# Patient Record
Sex: FEMALE | Race: OTHER | HISPANIC OR LATINO | Employment: OTHER | URBAN - METROPOLITAN AREA
[De-identification: names, ages, dates, MRNs, and addresses within clinical notes are randomized per-mention and may not be internally consistent; named-entity substitution may affect disease eponyms.]

---

## 2018-04-28 LAB
ABSOL LYMPHOCYTES (HISTORICAL): 2.1 K/UL (ref 0.5–4)
ACETONE, QUANT (HISTORICAL): NEGATIVE
ALBUMIN SERPL BCP-MCNC: 3.4 G/DL (ref 3–5.2)
ALP SERPL-CCNC: 184 U/L (ref 43–122)
ALT SERPL W P-5'-P-CCNC: 17 U/L (ref 9–52)
AMORPHOUS MATERIAL (HISTORICAL): ABNORMAL
ANION GAP SERPL CALCULATED.3IONS-SCNC: 14 MMOL/L (ref 5–14)
AST SERPL W P-5'-P-CCNC: 19 U/L (ref 14–36)
BACTERIA UR QL AUTO: ABNORMAL
BASOPHILS # BLD AUTO: 0 % (ref 0–1)
BASOPHILS # BLD AUTO: 0 K/UL (ref 0–0.1)
BILIRUB SERPL-MCNC: 1.5 MG/DL
BILIRUB UR QL STRIP: NEGATIVE MG/DL
BUN SERPL-MCNC: 42 MG/DL (ref 5–25)
CALCIUM SERPL-MCNC: 9.3 MG/DL (ref 8.4–10.2)
CASTS/CASTS TYPE (HISTORICAL): ABNORMAL /LPF
CHLORIDE SERPL-SCNC: 102 MEQ/L (ref 97–108)
CLARITY UR: ABNORMAL
CO2 SERPL-SCNC: 22 MMOL/L (ref 22–30)
COLOR UR: ABNORMAL
CREATINE, SERUM (HISTORICAL): 2.1 MG/DL (ref 0.6–1.2)
CRYSTAL TYPE (HISTORICAL): ABNORMAL /HPF
DEPRECATED RDW RBC AUTO: 13.2 %
EGFR (HISTORICAL): 24 ML/MIN/1.73 M2
EOSINOPHIL # BLD AUTO: 0.4 K/UL (ref 0–0.4)
EOSINOPHIL NFR BLD AUTO: 4 % (ref 0–6)
GLUCOSE SERPL-MCNC: 229 MG/DL (ref 70–99)
GLUCOSE SERPL-MCNC: 233 MG/DL (ref 70–99)
GLUCOSE SERPL-MCNC: 329 MG/DL (ref 70–99)
GLUCOSE UR STRIP-MCNC: 100 MG/DL
HCT VFR BLD AUTO: 37.6 % (ref 36–46)
HGB BLD-MCNC: 12.5 G/DL (ref 12–16)
HGB UR QL STRIP.AUTO: ABNORMAL
KETONES UR STRIP-MCNC: NEGATIVE MG/DL
LACTATE SERPL-SCNC: 1.7 MMOL/L (ref 0.7–2.1)
LEUKOCYTE ESTERASE UR QL STRIP: ABNORMAL
LYMPHOCYTES NFR BLD AUTO: 20 % (ref 25–45)
MCH RBC QN AUTO: 31.1 PG (ref 26–34)
MCHC RBC AUTO-ENTMCNC: 33.4 % (ref 31–36)
MCV RBC AUTO: 93 FL (ref 80–100)
MONOCYTES # BLD AUTO: 0.5 K/UL (ref 0.2–0.9)
MONOCYTES NFR BLD AUTO: 5 % (ref 1–10)
MUCOUS THREADS URNS QL MICRO: ABNORMAL
NEUTROPHILS ABS COUNT (HISTORICAL): 7.6 K/UL (ref 1.8–7.8)
NEUTS SEG NFR BLD AUTO: 71 % (ref 45–65)
NITRITE UR QL STRIP: POSITIVE
NON-SQ EPI CELLS URNS QL MICRO: ABNORMAL
OTHER STN SPEC: ABNORMAL
PH UR STRIP.AUTO: 5 [PH] (ref 4.5–8)
PLATELET # BLD AUTO: 193 K/MCL (ref 150–450)
POTASSIUM SERPL-SCNC: 4.4 MEQ/L (ref 3.6–5)
PROT UR STRIP-MCNC: >=500 MG/DL
RBC # BLD AUTO: 4.03 M/MCL (ref 4–5.2)
RBC #/AREA URNS AUTO: 3 /HPF
SODIUM SERPL-SCNC: 138 MEQ/L (ref 137–147)
SP GR UR STRIP.AUTO: 1.02 (ref 1–1.04)
TOTAL PROTEIN (HISTORICAL): 7.2 G/DL (ref 5.9–8.4)
TROPONIN I SERPL-MCNC: <0.01 NG/ML (ref 0–0.03)
UROBILINOGEN UR QL STRIP.AUTO: NEGATIVE MG/DL (ref 0–1)
WBC # BLD AUTO: 10.6 K/MCL (ref 4.5–11)
WBC #/AREA URNS AUTO: >35 /HPF

## 2018-04-29 LAB
ABSOL LYMPHOCYTES (HISTORICAL): 2.3 K/UL (ref 0.5–4)
ALBUMIN SERPL BCP-MCNC: 2.4 G/DL (ref 3–5.2)
ALP SERPL-CCNC: 119 U/L (ref 43–122)
ALT SERPL W P-5'-P-CCNC: 22 U/L (ref 9–52)
ANION GAP SERPL CALCULATED.3IONS-SCNC: 7 MMOL/L (ref 5–14)
AST SERPL W P-5'-P-CCNC: 15 U/L (ref 14–36)
BASOPHILS # BLD AUTO: 0.1 K/UL (ref 0–0.1)
BASOPHILS # BLD AUTO: 1 % (ref 0–1)
BILIRUB SERPL-MCNC: 1.4 MG/DL
BUN SERPL-MCNC: 41 MG/DL (ref 5–25)
CALCIUM SERPL-MCNC: 8 MG/DL (ref 8.4–10.2)
CHLORIDE SERPL-SCNC: 103 MEQ/L (ref 97–108)
CO2 SERPL-SCNC: 27 MMOL/L (ref 22–30)
CREATINE, SERUM (HISTORICAL): 2.07 MG/DL (ref 0.6–1.2)
DEPRECATED RDW RBC AUTO: 13.2 %
EGFR (HISTORICAL): 25 ML/MIN/1.73 M2
EOSINOPHIL # BLD AUTO: 0.5 K/UL (ref 0–0.4)
EOSINOPHIL NFR BLD AUTO: 6 % (ref 0–6)
EST. AVERAGE GLUCOSE BLD GHB EST-MCNC: 427 MG/DL
GLUCOSE FASTING (HISTORICAL): 231 MG/DL (ref 70–99)
GLUCOSE SERPL-MCNC: 118 MG/DL (ref 70–99)
GLUCOSE SERPL-MCNC: 299 MG/DL (ref 70–99)
GLUCOSE SERPL-MCNC: 312 MG/DL (ref 70–99)
GLUCOSE SERPL-MCNC: 318 MG/DL (ref 70–99)
HBA1C MFR BLD HPLC: 16.5 %
HCT VFR BLD AUTO: 28.1 % (ref 36–46)
HGB BLD-MCNC: 9.3 G/DL (ref 12–16)
LYMPHOCYTES NFR BLD AUTO: 28 % (ref 25–45)
MCH RBC QN AUTO: 31.1 PG (ref 26–34)
MCHC RBC AUTO-ENTMCNC: 33.1 % (ref 31–36)
MCV RBC AUTO: 94 FL (ref 80–100)
MONOCYTES # BLD AUTO: 0.5 K/UL (ref 0.2–0.9)
MONOCYTES NFR BLD AUTO: 6 % (ref 1–10)
NEUTROPHILS ABS COUNT (HISTORICAL): 4.9 K/UL (ref 1.8–7.8)
NEUTS SEG NFR BLD AUTO: 59 % (ref 45–65)
PLATELET # BLD AUTO: 174 K/MCL (ref 150–450)
POTASSIUM SERPL-SCNC: 4.2 MEQ/L (ref 3.6–5)
RBC # BLD AUTO: 3 M/MCL (ref 4–5.2)
SODIUM SERPL-SCNC: 137 MEQ/L (ref 137–147)
TOTAL PROTEIN (HISTORICAL): 5.2 G/DL (ref 5.9–8.4)
TSH SERPL DL<=0.05 MIU/L-ACNC: 1.52 UIU/ML (ref 0.47–4.68)
VITAMIN D25-HYDROXY (HISTORICAL): <12.8 NG/ML (ref 30–100)
WBC # BLD AUTO: 8.1 K/MCL (ref 4.5–11)

## 2018-04-30 LAB
ABSOL LYMPHOCYTES (HISTORICAL): 1.8 K/UL (ref 0.5–4)
AMORPHOUS MATERIAL (HISTORICAL): ABNORMAL
ANION GAP SERPL CALCULATED.3IONS-SCNC: 5 MMOL/L (ref 5–14)
BACTERIA UR QL AUTO: ABNORMAL
BASOPHILS # BLD AUTO: 0 K/UL (ref 0–0.1)
BASOPHILS # BLD AUTO: 1 % (ref 0–1)
BILIRUB UR QL STRIP: NEGATIVE MG/DL
BUN SERPL-MCNC: 32 MG/DL (ref 5–25)
CALCIUM SERPL-MCNC: 8.2 MG/DL (ref 8.4–10.2)
CASTS/CASTS TYPE (HISTORICAL): ABNORMAL /LPF
CHLORIDE SERPL-SCNC: 107 MEQ/L (ref 97–108)
CLARITY UR: ABNORMAL
CO2 SERPL-SCNC: 27 MMOL/L (ref 22–30)
COLOR UR: YELLOW
CREATINE, SERUM (HISTORICAL): 1.69 MG/DL (ref 0.6–1.2)
CRYSTAL TYPE (HISTORICAL): ABNORMAL /HPF
DEPRECATED RDW RBC AUTO: 12.8 %
EGFR (HISTORICAL): 31 ML/MIN/1.73 M2
EOSINOPHIL # BLD AUTO: 0.5 K/UL (ref 0–0.4)
EOSINOPHIL NFR BLD AUTO: 5 % (ref 0–6)
GLUCOSE FASTING (HISTORICAL): 115 MG/DL (ref 70–99)
GLUCOSE SERPL-MCNC: 152 MG/DL (ref 70–99)
GLUCOSE SERPL-MCNC: 191 MG/DL (ref 70–99)
GLUCOSE SERPL-MCNC: 221 MG/DL (ref 70–99)
GLUCOSE SERPL-MCNC: 270 MG/DL (ref 70–99)
GLUCOSE UR STRIP-MCNC: 250 MG/DL
HCT VFR BLD AUTO: 25.5 % (ref 36–46)
HGB BLD-MCNC: 8.6 G/DL (ref 12–16)
HGB UR QL STRIP.AUTO: ABNORMAL
KETONES UR STRIP-MCNC: NEGATIVE MG/DL
LEUKOCYTE ESTERASE UR QL STRIP: ABNORMAL
LYMPHOCYTES NFR BLD AUTO: 21 % (ref 25–45)
MCH RBC QN AUTO: 31.5 PG (ref 26–34)
MCHC RBC AUTO-ENTMCNC: 33.8 % (ref 31–36)
MCV RBC AUTO: 93 FL (ref 80–100)
MONOCYTES # BLD AUTO: 0.6 K/UL (ref 0.2–0.9)
MONOCYTES NFR BLD AUTO: 7 % (ref 1–10)
MUCOUS THREADS URNS QL MICRO: ABNORMAL
NEUTROPHILS ABS COUNT (HISTORICAL): 5.8 K/UL (ref 1.8–7.8)
NEUTS SEG NFR BLD AUTO: 66 % (ref 45–65)
NITRITE UR QL STRIP: NEGATIVE
NON-SQ EPI CELLS URNS QL MICRO: ABNORMAL
OTHER STN SPEC: ABNORMAL
PH UR STRIP.AUTO: 6 [PH] (ref 4.5–8)
PLATELET # BLD AUTO: 187 K/MCL (ref 150–450)
POTASSIUM SERPL-SCNC: 4.4 MEQ/L (ref 3.6–5)
PREGNANCY, SERUM (HISTORICAL): NEGATIVE
PROT UR STRIP-MCNC: 100 MG/DL
RBC # BLD AUTO: 2.74 M/MCL (ref 4–5.2)
RBC #/AREA URNS AUTO: 4 /HPF
SODIUM SERPL-SCNC: 139 MEQ/L (ref 137–147)
SP GR UR STRIP.AUTO: 1.01 (ref 1–1.04)
UROBILINOGEN UR QL STRIP.AUTO: NEGATIVE MG/DL (ref 0–1)
WBC # BLD AUTO: 8.8 K/MCL (ref 4.5–11)
WBC #/AREA URNS AUTO: >35 /HPF

## 2018-05-01 LAB
ABSOL LYMPHOCYTES (HISTORICAL): 2.4 K/UL (ref 0.5–4)
ALBUMIN SERPL BCP-MCNC: 2.3 G/DL (ref 3–5.2)
ALP SERPL-CCNC: 121 U/L (ref 43–122)
ALT SERPL W P-5'-P-CCNC: 20 U/L (ref 9–52)
ANION GAP SERPL CALCULATED.3IONS-SCNC: 6 MMOL/L (ref 5–14)
AST SERPL W P-5'-P-CCNC: 17 U/L (ref 14–36)
BASOPHILS # BLD AUTO: 0.1 K/UL (ref 0–0.1)
BASOPHILS # BLD AUTO: 1 % (ref 0–1)
BILIRUB SERPL-MCNC: 1.5 MG/DL
BUN SERPL-MCNC: 31 MG/DL (ref 5–25)
CALCIUM SERPL-MCNC: 8.2 MG/DL (ref 8.4–10.2)
CHLORIDE SERPL-SCNC: 107 MEQ/L (ref 97–108)
CO2 SERPL-SCNC: 26 MMOL/L (ref 22–30)
CREATINE, SERUM (HISTORICAL): 1.77 MG/DL (ref 0.6–1.2)
DEPRECATED RDW RBC AUTO: 13.2 %
EGFR (HISTORICAL): 30 ML/MIN/1.73 M2
EOSINOPHIL # BLD AUTO: 0.5 K/UL (ref 0–0.4)
EOSINOPHIL NFR BLD AUTO: 6 % (ref 0–6)
FERRITIN SERPL-MCNC: 817 NG/ML (ref 11–264)
GLUCOSE FASTING (HISTORICAL): 141 MG/DL (ref 70–99)
GLUCOSE SERPL-MCNC: 164 MG/DL (ref 70–99)
GLUCOSE SERPL-MCNC: 168 MG/DL (ref 70–99)
GLUCOSE SERPL-MCNC: 175 MG/DL (ref 70–99)
GLUCOSE SERPL-MCNC: 196 MG/DL (ref 70–99)
HCT VFR BLD AUTO: 23.2 % (ref 36–46)
HGB BLD-MCNC: 7.7 G/DL (ref 12–16)
LYMPHOCYTES NFR BLD AUTO: 27 % (ref 25–45)
MCH RBC QN AUTO: 31.4 PG (ref 26–34)
MCHC RBC AUTO-ENTMCNC: 33.2 % (ref 31–36)
MCV RBC AUTO: 95 FL (ref 80–100)
MONOCYTES # BLD AUTO: 0.6 K/UL (ref 0.2–0.9)
MONOCYTES NFR BLD AUTO: 7 % (ref 1–10)
NEUTROPHILS ABS COUNT (HISTORICAL): 5.2 K/UL (ref 1.8–7.8)
NEUTS SEG NFR BLD AUTO: 59 % (ref 45–65)
PLATELET # BLD AUTO: 218 K/MCL (ref 150–450)
POTASSIUM SERPL-SCNC: 4.4 MEQ/L (ref 3.6–5)
RBC # BLD AUTO: 2.46 M/MCL (ref 4–5.2)
SODIUM SERPL-SCNC: 139 MEQ/L (ref 137–147)
TOTAL PROTEIN (HISTORICAL): 5.1 G/DL (ref 5.9–8.4)
VANCOMYCIN TROUGH (HISTORICAL): 12.57 UG/ML (ref 10–20)
VIT B12 SERPL-MCNC: 394 PG/ML (ref 239–931)
WBC # BLD AUTO: 8.8 K/MCL (ref 4.5–11)

## 2018-05-02 LAB
ABSOL LYMPHOCYTES (HISTORICAL): 1.3 K/UL (ref 0.5–4)
ALBUMIN SERPL BCP-MCNC: 2.3 G/DL (ref 3–5.2)
ALP SERPL-CCNC: 140 U/L (ref 43–122)
ALT SERPL W P-5'-P-CCNC: 22 U/L (ref 9–52)
ANION GAP SERPL CALCULATED.3IONS-SCNC: 6 MMOL/L (ref 5–14)
AST SERPL W P-5'-P-CCNC: 24 U/L (ref 14–36)
BASOPHILS # BLD AUTO: 0 % (ref 0–1)
BASOPHILS # BLD AUTO: 0 K/UL (ref 0–0.1)
BILIRUB SERPL-MCNC: 1.1 MG/DL
BUN SERPL-MCNC: 27 MG/DL (ref 5–25)
CALCIUM SERPL-MCNC: 8.3 MG/DL (ref 8.4–10.2)
CHLORIDE SERPL-SCNC: 110 MEQ/L (ref 97–108)
CO2 SERPL-SCNC: 25 MMOL/L (ref 22–30)
CREATINE, SERUM (HISTORICAL): 1.63 MG/DL (ref 0.6–1.2)
DEPRECATED RDW RBC AUTO: 13.2 %
EGFR (HISTORICAL): 33 ML/MIN/1.73 M2
EOSINOPHIL # BLD AUTO: 0.3 K/UL (ref 0–0.4)
EOSINOPHIL NFR BLD AUTO: 5 % (ref 0–6)
GLUCOSE FASTING (HISTORICAL): 127 MG/DL (ref 70–99)
GLUCOSE SERPL-MCNC: 104 MG/DL (ref 70–99)
GLUCOSE SERPL-MCNC: 118 MG/DL (ref 70–99)
GLUCOSE SERPL-MCNC: 125 MG/DL (ref 70–99)
GLUCOSE SERPL-MCNC: 141 MG/DL (ref 70–99)
HCT VFR BLD AUTO: 22.7 % (ref 36–46)
HGB BLD-MCNC: 7.5 G/DL (ref 12–16)
LDH (HISTORICAL): 533 U/L (ref 313–618)
LYMPHOCYTES NFR BLD AUTO: 21 % (ref 25–45)
MCH RBC QN AUTO: 31.2 PG (ref 26–34)
MCHC RBC AUTO-ENTMCNC: 33 % (ref 31–36)
MCV RBC AUTO: 94 FL (ref 80–100)
MONOCYTES # BLD AUTO: 0.4 K/UL (ref 0.2–0.9)
MONOCYTES NFR BLD AUTO: 6 % (ref 1–10)
NEUTROPHILS ABS COUNT (HISTORICAL): 4.2 K/UL (ref 1.8–7.8)
NEUTS SEG NFR BLD AUTO: 68 % (ref 45–65)
PLATELET # BLD AUTO: 222 K/MCL (ref 150–450)
POTASSIUM SERPL-SCNC: 4.2 MEQ/L (ref 3.6–5)
RBC # BLD AUTO: 2.4 M/MCL (ref 4–5.2)
RETICULOCYTE COUNT (HISTORICAL): 2.98 % (ref 0.87–2.63)
SODIUM SERPL-SCNC: 142 MEQ/L (ref 137–147)
TOTAL PROTEIN (HISTORICAL): 5.2 G/DL (ref 5.9–8.4)
WBC # BLD AUTO: 6.2 K/MCL (ref 4.5–11)

## 2018-05-03 LAB
ABSOL LYMPHOCYTES (HISTORICAL): 2.7 K/UL (ref 0.5–4)
ALBUMIN SERPL BCP-MCNC: 2.3 G/DL (ref 3–5.2)
ALP SERPL-CCNC: 144 U/L (ref 43–122)
ALT SERPL W P-5'-P-CCNC: 32 U/L (ref 9–52)
AMORPHOUS MATERIAL (HISTORICAL): ABNORMAL
ANION GAP SERPL CALCULATED.3IONS-SCNC: 7 MMOL/L (ref 5–14)
AST SERPL W P-5'-P-CCNC: 33 U/L (ref 14–36)
BACTERIA UR QL AUTO: ABNORMAL
BANDS (HISTORICAL): 18 % (ref 3–11)
BILIRUB SERPL-MCNC: 1.5 MG/DL
BILIRUB UR QL STRIP: NEGATIVE MG/DL
BUN SERPL-MCNC: 29 MG/DL (ref 5–25)
CALCIUM SERPL-MCNC: 8.5 MG/DL (ref 8.4–10.2)
CASTS/CASTS TYPE (HISTORICAL): ABNORMAL /LPF
CHLORIDE SERPL-SCNC: 111 MEQ/L (ref 97–108)
CLARITY UR: CLEAR
CO2 SERPL-SCNC: 25 MMOL/L (ref 22–30)
COLOR UR: ABNORMAL
COMMENT (HISTORICAL): ABNORMAL
CREATINE, SERUM (HISTORICAL): 1.49 MG/DL (ref 0.6–1.2)
CRYSTAL TYPE (HISTORICAL): ABNORMAL /HPF
DEPRECATED RDW RBC AUTO: 15.8 %
EGFR (HISTORICAL): 36 ML/MIN/1.73 M2
EOSINOPHIL # BLD AUTO: 0.4 K/UL (ref 0–0.4)
EOSINOPHIL NFR BLD AUTO: 5 % (ref 0–6)
GLUCOSE FASTING (HISTORICAL): 57 MG/DL (ref 70–99)
GLUCOSE SERPL-MCNC: 169 MG/DL (ref 70–99)
GLUCOSE SERPL-MCNC: 194 MG/DL (ref 70–99)
GLUCOSE SERPL-MCNC: 197 MG/DL (ref 70–99)
GLUCOSE SERPL-MCNC: 66 MG/DL (ref 70–99)
GLUCOSE SERPL-MCNC: 91 MG/DL (ref 70–99)
GLUCOSE UR STRIP-MCNC: NEGATIVE MG/DL
HCT VFR BLD AUTO: 25.2 % (ref 36–46)
HGB BLD-MCNC: 8.2 G/DL (ref 12–16)
HGB UR QL STRIP.AUTO: ABNORMAL
KETONES UR STRIP-MCNC: NEGATIVE MG/DL
LEUKOCYTE ESTERASE UR QL STRIP: ABNORMAL
LYMPHOCYTES NFR BLD AUTO: 39 % (ref 25–45)
MCH RBC QN AUTO: 29.8 PG (ref 26–34)
MCHC RBC AUTO-ENTMCNC: 32.3 % (ref 31–36)
MCV RBC AUTO: 92 FL (ref 80–100)
MONOCYTES # BLD AUTO: 0.8 K/UL (ref 0.2–0.9)
MONOCYTES NFR BLD AUTO: 12 % (ref 1–10)
MUCOUS THREADS URNS QL MICRO: ABNORMAL
NEUTROPHILS ABS COUNT (HISTORICAL): 3.1 K/UL (ref 1.8–7.8)
NEUTS SEG NFR BLD AUTO: 26 % (ref 45–65)
NITRITE UR QL STRIP: NEGATIVE
NON-SQ EPI CELLS URNS QL MICRO: ABNORMAL
NRBC'S (HISTORICAL): 2 /100 WBC
OTHER STN SPEC: ABNORMAL
PH UR STRIP.AUTO: 6 [PH] (ref 4.5–8)
PLATELET # BLD AUTO: 237 K/MCL (ref 150–450)
POTASSIUM SERPL-SCNC: 3.6 MEQ/L (ref 3.6–5)
PROT UR STRIP-MCNC: >=500 MG/DL
RBC # BLD AUTO: 2.74 M/MCL (ref 4–5.2)
RBC #/AREA URNS AUTO: 5 /HPF
RBC MORPHOLOGY (HISTORICAL): ABNORMAL
SODIUM SERPL-SCNC: 144 MEQ/L (ref 137–147)
SP GR UR STRIP.AUTO: 1.01 (ref 1–1.04)
TOTAL PROTEIN (HISTORICAL): 5.2 G/DL (ref 5.9–8.4)
UROBILINOGEN UR QL STRIP.AUTO: NEGATIVE MG/DL (ref 0–1)
WBC # BLD AUTO: 7 K/MCL (ref 4.5–11)
WBC #/AREA URNS AUTO: >35 /HPF

## 2018-05-04 LAB
ABSOL LYMPHOCYTES (HISTORICAL): 2.2 K/UL (ref 0.5–4)
ALBUMIN SERPL BCP-MCNC: 2.4 G/DL (ref 3–5.2)
ALP SERPL-CCNC: 152 U/L (ref 43–122)
ALT SERPL W P-5'-P-CCNC: 35 U/L (ref 9–52)
ANION GAP SERPL CALCULATED.3IONS-SCNC: 6 MMOL/L (ref 5–14)
AST SERPL W P-5'-P-CCNC: 30 U/L (ref 14–36)
BANDS (HISTORICAL): 23 % (ref 3–11)
BILIRUB SERPL-MCNC: 0.5 MG/DL
BUN SERPL-MCNC: 24 MG/DL (ref 5–25)
CALCIUM SERPL-MCNC: 8.6 MG/DL (ref 8.4–10.2)
CHLORIDE SERPL-SCNC: 108 MEQ/L (ref 97–108)
CO2 SERPL-SCNC: 26 MMOL/L (ref 22–30)
COMMENT (HISTORICAL): ABNORMAL
CREATINE, SERUM (HISTORICAL): 1.44 MG/DL (ref 0.6–1.2)
DEPRECATED RDW RBC AUTO: 15.6 %
EGFR (HISTORICAL): 38 ML/MIN/1.73 M2
EOSINOPHIL # BLD AUTO: 0.5 K/UL (ref 0–0.4)
EOSINOPHIL NFR BLD AUTO: 7 % (ref 0–6)
GLUCOSE FASTING (HISTORICAL): 58 MG/DL (ref 70–99)
GLUCOSE SERPL-MCNC: 100 MG/DL (ref 70–99)
GLUCOSE SERPL-MCNC: 68 MG/DL (ref 70–99)
GLUCOSE SERPL-MCNC: 69 MG/DL (ref 70–99)
GLUCOSE SERPL-MCNC: 84 MG/DL (ref 70–99)
GLUCOSE SERPL-MCNC: 91 MG/DL (ref 70–99)
GLUCOSE SERPL-MCNC: 99 MG/DL (ref 70–99)
HCT VFR BLD AUTO: 24.6 % (ref 36–46)
HGB BLD-MCNC: 8.1 G/DL (ref 12–16)
LYMPHOCYTES NFR BLD AUTO: 30 % (ref 25–45)
MCH RBC QN AUTO: 30.4 PG (ref 26–34)
MCHC RBC AUTO-ENTMCNC: 33 % (ref 31–36)
MCV RBC AUTO: 92 FL (ref 80–100)
METAMYELOCYTES (HISTORICAL): 1 %
MONOCYTES # BLD AUTO: 0.7 K/UL (ref 0.2–0.9)
MONOCYTES NFR BLD AUTO: 10 % (ref 1–10)
MYELOCYTES (HISTORICAL): 1 %
NEUTROPHILS ABS COUNT (HISTORICAL): 4 K/UL (ref 1.8–7.8)
NEUTS SEG NFR BLD AUTO: 28 % (ref 45–65)
PLATELET # BLD AUTO: 245 K/MCL (ref 150–450)
POTASSIUM SERPL-SCNC: 3.9 MEQ/L (ref 3.6–5)
RBC # BLD AUTO: 2.68 M/MCL (ref 4–5.2)
RBC MORPHOLOGY (HISTORICAL): ABNORMAL
SODIUM SERPL-SCNC: 141 MEQ/L (ref 137–147)
TOTAL PROTEIN (HISTORICAL): 5.3 G/DL (ref 5.9–8.4)
WBC # BLD AUTO: 7.4 K/MCL (ref 4.5–11)

## 2018-05-05 LAB
ABSOL LYMPHOCYTES (HISTORICAL): 1.7 K/UL (ref 0.5–4)
ALBUMIN SERPL BCP-MCNC: 2.5 G/DL (ref 3–5.2)
ALP SERPL-CCNC: 184 U/L (ref 43–122)
ALT SERPL W P-5'-P-CCNC: 33 U/L (ref 9–52)
ANION GAP SERPL CALCULATED.3IONS-SCNC: 10 MMOL/L (ref 5–14)
AST SERPL W P-5'-P-CCNC: 35 U/L (ref 14–36)
BASOPHILS # BLD AUTO: 0 % (ref 0–1)
BASOPHILS # BLD AUTO: 0 K/UL (ref 0–0.1)
BILIRUB SERPL-MCNC: 0.4 MG/DL
BUN SERPL-MCNC: 24 MG/DL (ref 5–25)
CALCIUM SERPL-MCNC: 8.5 MG/DL (ref 8.4–10.2)
CHLORIDE SERPL-SCNC: 105 MEQ/L (ref 97–108)
CO2 SERPL-SCNC: 27 MMOL/L (ref 22–30)
CREATINE, SERUM (HISTORICAL): 1.44 MG/DL (ref 0.6–1.2)
DEPRECATED RDW RBC AUTO: 15.4 %
EGFR (HISTORICAL): 38 ML/MIN/1.73 M2
EOSINOPHIL # BLD AUTO: 0.4 K/UL (ref 0–0.4)
EOSINOPHIL NFR BLD AUTO: 4 % (ref 0–6)
GLUCOSE SERPL-MCNC: 133 MG/DL (ref 70–99)
GLUCOSE SERPL-MCNC: 64 MG/DL (ref 70–99)
GLUCOSE SERPL-MCNC: 76 MG/DL (ref 70–99)
GLUCOSE SERPL-MCNC: 77 MG/DL (ref 70–99)
GLUCOSE SERPL-MCNC: 85 MG/DL (ref 70–99)
GLUCOSE SERPL-MCNC: 91 MG/DL (ref 70–99)
GLUCOSE SERPL-MCNC: 92 MG/DL (ref 70–99)
HCT VFR BLD AUTO: 23.3 % (ref 36–46)
HGB BLD-MCNC: 7.8 G/DL (ref 12–16)
LYMPHOCYTES NFR BLD AUTO: 17 % (ref 25–45)
MCH RBC QN AUTO: 31 PG (ref 26–34)
MCHC RBC AUTO-ENTMCNC: 33.6 % (ref 31–36)
MCV RBC AUTO: 92 FL (ref 80–100)
MONOCYTES # BLD AUTO: 0.8 K/UL (ref 0.2–0.9)
MONOCYTES NFR BLD AUTO: 8 % (ref 1–10)
NEUTROPHILS ABS COUNT (HISTORICAL): 7.1 K/UL (ref 1.8–7.8)
NEUTS SEG NFR BLD AUTO: 71 % (ref 45–65)
PLATELET # BLD AUTO: 259 K/MCL (ref 150–450)
POTASSIUM SERPL-SCNC: 4.5 MEQ/L (ref 3.6–5)
RBC # BLD AUTO: 2.52 M/MCL (ref 4–5.2)
SODIUM SERPL-SCNC: 141 MEQ/L (ref 137–147)
TOTAL PROTEIN (HISTORICAL): 5.5 G/DL (ref 5.9–8.4)
WBC # BLD AUTO: 10 K/MCL (ref 4.5–11)

## 2018-05-06 LAB
ABSOL LYMPHOCYTES (HISTORICAL): 2.5 K/UL (ref 0.5–4)
ALBUMIN SERPL BCP-MCNC: 2.6 G/DL (ref 3–5.2)
ALP SERPL-CCNC: 200 U/L (ref 43–122)
ALT SERPL W P-5'-P-CCNC: 33 U/L (ref 9–52)
ANION GAP SERPL CALCULATED.3IONS-SCNC: 12 MMOL/L (ref 5–14)
AST SERPL W P-5'-P-CCNC: 32 U/L (ref 14–36)
BANDS (HISTORICAL): 17 % (ref 3–11)
BILIRUB SERPL-MCNC: 0.6 MG/DL
BUN SERPL-MCNC: 25 MG/DL (ref 5–25)
CALCIUM SERPL-MCNC: 8.7 MG/DL (ref 8.4–10.2)
CALLED AND READ BACK BY. (HISTORICAL): NORMAL
CHLORIDE SERPL-SCNC: 104 MEQ/L (ref 97–108)
CO2 SERPL-SCNC: 25 MMOL/L (ref 22–30)
COMMENT (HISTORICAL): ABNORMAL
CREATINE, SERUM (HISTORICAL): 1.42 MG/DL (ref 0.6–1.2)
DEPRECATED RDW RBC AUTO: 15.5 %
EGFR (HISTORICAL): 38 ML/MIN/1.73 M2
EOSINOPHIL # BLD AUTO: 0.1 K/UL (ref 0–0.4)
EOSINOPHIL NFR BLD AUTO: 1 % (ref 0–6)
GLUCOSE SERPL-MCNC: 110 MG/DL (ref 70–99)
GLUCOSE SERPL-MCNC: 144 MG/DL (ref 70–99)
GLUCOSE SERPL-MCNC: 45 MG/DL (ref 70–99)
GLUCOSE SERPL-MCNC: 53 MG/DL (ref 70–99)
GLUCOSE SERPL-MCNC: 59 MG/DL (ref 70–99)
GLUCOSE SERPL-MCNC: 74 MG/DL (ref 70–99)
GLUCOSE SERPL-MCNC: 95 MG/DL (ref 70–99)
HCT VFR BLD AUTO: 23.9 % (ref 36–46)
HGB BLD-MCNC: 7.9 G/DL (ref 12–16)
LYMPHOCYTES NFR BLD AUTO: 18 % (ref 25–45)
MCH RBC QN AUTO: 30.8 PG (ref 26–34)
MCHC RBC AUTO-ENTMCNC: 32.9 % (ref 31–36)
MCV RBC AUTO: 93 FL (ref 80–100)
METAMYELOCYTES (HISTORICAL): 1 %
MONOCYTES # BLD AUTO: 0.7 K/UL (ref 0.2–0.9)
MONOCYTES NFR BLD AUTO: 5 % (ref 1–10)
NEUTROPHILS ABS COUNT (HISTORICAL): 10.5 K/UL (ref 1.8–7.8)
NEUTS SEG NFR BLD AUTO: 58 % (ref 45–65)
PLATELET # BLD AUTO: 285 K/MCL (ref 150–450)
POTASSIUM SERPL-SCNC: 4.4 MEQ/L (ref 3.6–5)
RBC # BLD AUTO: 2.56 M/MCL (ref 4–5.2)
RBC MORPHOLOGY (HISTORICAL): ABNORMAL
SODIUM SERPL-SCNC: 142 MEQ/L (ref 137–147)
TOTAL PROTEIN (HISTORICAL): 5.7 G/DL (ref 5.9–8.4)
WBC # BLD AUTO: 13.8 K/MCL (ref 4.5–11)

## 2018-05-07 LAB
ABSOL LYMPHOCYTES (HISTORICAL): 1.7 K/UL (ref 0.5–4)
ANION GAP SERPL CALCULATED.3IONS-SCNC: 9 MMOL/L (ref 5–14)
BANDS (HISTORICAL): 36 % (ref 3–11)
BUN SERPL-MCNC: 28 MG/DL (ref 5–25)
CALCIUM SERPL-MCNC: 8.3 MG/DL (ref 8.4–10.2)
CALLED AND READ BACK BY. (HISTORICAL): NORMAL
CHLORIDE SERPL-SCNC: 103 MEQ/L (ref 97–108)
CO2 SERPL-SCNC: 26 MMOL/L (ref 22–30)
COMMENT (HISTORICAL): ABNORMAL
CREATINE, SERUM (HISTORICAL): 1.72 MG/DL (ref 0.6–1.2)
DEPRECATED RDW RBC AUTO: 16 %
EGFR (HISTORICAL): 31 ML/MIN/1.73 M2
EOSINOPHIL # BLD AUTO: 0.2 K/UL (ref 0–0.4)
EOSINOPHIL NFR BLD AUTO: 2 % (ref 0–6)
GLUCOSE SERPL-MCNC: 118 MG/DL (ref 70–99)
GLUCOSE SERPL-MCNC: 176 MG/DL (ref 70–99)
GLUCOSE SERPL-MCNC: 185 MG/DL (ref 70–99)
GLUCOSE SERPL-MCNC: 188 MG/DL (ref 70–99)
GLUCOSE SERPL-MCNC: 200 MG/DL (ref 70–99)
GLUCOSE SERPL-MCNC: 47 MG/DL (ref 70–99)
GLUCOSE SERPL-MCNC: 57 MG/DL (ref 70–99)
GLUCOSE SERPL-MCNC: 60 MG/DL (ref 70–99)
GLUCOSE SERPL-MCNC: 66 MG/DL (ref 70–99)
GLUCOSE SERPL-MCNC: 88 MG/DL (ref 70–99)
HCT VFR BLD AUTO: 22 % (ref 36–46)
HCT VFR BLD AUTO: 25.6 % (ref 36–46)
HGB BLD-MCNC: 7.1 G/DL (ref 12–16)
HGB BLD-MCNC: 8.4 G/DL (ref 12–16)
LDH (HISTORICAL): 562 U/L (ref 313–618)
LYMPHOCYTES NFR BLD AUTO: 14 % (ref 25–45)
MCH RBC QN AUTO: 30 PG (ref 26–34)
MCHC RBC AUTO-ENTMCNC: 32.2 % (ref 31–36)
MCV RBC AUTO: 93 FL (ref 80–100)
MONOCYTES # BLD AUTO: 0.6 K/UL (ref 0.2–0.9)
MONOCYTES NFR BLD AUTO: 5 % (ref 1–10)
MYELOCYTES (HISTORICAL): 2 %
NEUTROPHILS ABS COUNT (HISTORICAL): 9.4 K/UL (ref 1.8–7.8)
NEUTS SEG NFR BLD AUTO: 41 % (ref 45–65)
PLATELET # BLD AUTO: 262 K/MCL (ref 150–450)
POTASSIUM SERPL-SCNC: 4.6 MEQ/L (ref 3.6–5)
RBC # BLD AUTO: 2.36 M/MCL (ref 4–5.2)
RBC MORPHOLOGY (HISTORICAL): ABNORMAL
RETICULOCYTE COUNT (HISTORICAL): 6.4 % (ref 0.87–2.63)
SODIUM SERPL-SCNC: 139 MEQ/L (ref 137–147)
WBC # BLD AUTO: 11.9 K/MCL (ref 4.5–11)

## 2018-05-08 LAB
ABSOL LYMPHOCYTES (HISTORICAL): 2.1 K/UL (ref 0.5–4)
ANION GAP SERPL CALCULATED.3IONS-SCNC: 11 MMOL/L (ref 5–14)
BASOPHILS # BLD AUTO: 0.1 K/UL (ref 0–0.1)
BASOPHILS # BLD AUTO: 1 % (ref 0–1)
BUN SERPL-MCNC: 37 MG/DL (ref 5–25)
CALCIUM SERPL-MCNC: 8.5 MG/DL (ref 8.4–10.2)
CHLORIDE SERPL-SCNC: 103 MEQ/L (ref 97–108)
CO2 SERPL-SCNC: 25 MMOL/L (ref 22–30)
CORTIS SERPL-MCNC: 12.5 UG/DL
CREATINE, SERUM (HISTORICAL): 1.63 MG/DL (ref 0.6–1.2)
CYCLIC CITRULLINATED PEPTIDE ANTIBODY (HISTORICAL): 0.5 NG/ML
DEPRECATED RDW RBC AUTO: 16.8 %
EGFR (HISTORICAL): 33 ML/MIN/1.73 M2
EOSINOPHIL # BLD AUTO: 0.4 K/UL (ref 0–0.4)
EOSINOPHIL NFR BLD AUTO: 4 % (ref 0–6)
GLUCOSE SERPL-MCNC: 214 MG/DL (ref 70–99)
GLUCOSE SERPL-MCNC: 216 MG/DL (ref 70–99)
GLUCOSE SERPL-MCNC: 248 MG/DL (ref 70–99)
GLUCOSE SERPL-MCNC: 266 MG/DL (ref 70–99)
GLUCOSE SERPL-MCNC: 266 MG/DL (ref 70–99)
GLUCOSE SERPL-MCNC: 286 MG/DL (ref 70–99)
HCT VFR BLD AUTO: 24 % (ref 36–46)
HGB BLD-MCNC: 8.1 G/DL (ref 12–16)
LYMPHOCYTES NFR BLD AUTO: 21 % (ref 25–45)
MCH RBC QN AUTO: 30.8 PG (ref 26–34)
MCHC RBC AUTO-ENTMCNC: 33.7 % (ref 31–36)
MCV RBC AUTO: 92 FL (ref 80–100)
MONOCYTES # BLD AUTO: 0.9 K/UL (ref 0.2–0.9)
MONOCYTES NFR BLD AUTO: 9 % (ref 1–10)
NEUTROPHILS ABS COUNT (HISTORICAL): 6.8 K/UL (ref 1.8–7.8)
NEUTS SEG NFR BLD AUTO: 65 % (ref 45–65)
PLATELET # BLD AUTO: 273 K/MCL (ref 150–450)
POTASSIUM SERPL-SCNC: 4.5 MEQ/L (ref 3.6–5)
RBC # BLD AUTO: 2.63 M/MCL (ref 4–5.2)
SODIUM SERPL-SCNC: 139 MEQ/L (ref 137–147)
WBC # BLD AUTO: 10.4 K/MCL (ref 4.5–11)

## 2018-05-09 LAB
ABSOL LYMPHOCYTES (HISTORICAL): 1.9 K/UL (ref 0.5–4)
ALBUMIN %, URINE (HISTORICAL): 56.4
ALPHA 1 URINE (HISTORICAL): 10.4
ALPHA 2 URINE (HISTORICAL): 6.8
ANION GAP SERPL CALCULATED.3IONS-SCNC: 9 MMOL/L (ref 5–14)
BASOPHILS # BLD AUTO: 0 % (ref 0–1)
BASOPHILS # BLD AUTO: 0 K/UL (ref 0–0.1)
BETA GLOBULIN %, URINE (HISTORICAL): 8
BUN SERPL-MCNC: 38 MG/DL (ref 5–25)
CALCIUM SERPL-MCNC: 8.5 MG/DL (ref 8.4–10.2)
CHLORIDE SERPL-SCNC: 104 MEQ/L (ref 97–108)
CLINICAL REPORT (HISTORICAL): NORMAL
CO2 SERPL-SCNC: 25 MMOL/L (ref 22–30)
CREATINE, SERUM (HISTORICAL): 1.57 MG/DL (ref 0.6–1.2)
DEPRECATED RDW RBC AUTO: 16.8 %
EGFR (HISTORICAL): 34 ML/MIN/1.73 M2
EOSINOPHIL # BLD AUTO: 0.3 K/UL (ref 0–0.4)
EOSINOPHIL NFR BLD AUTO: 4 % (ref 0–6)
ERYTHROCYTE SEDIMENTATION RATE (HISTORICAL): >130 MM (ref 1–20)
FERRITIN SERPL-MCNC: 697 NG/ML (ref 11–264)
GAMMA GLOBULIN URINE (HISTORICAL): 18.4
GLUCOSE FASTING (HISTORICAL): 209 MG/DL (ref 70–99)
GLUCOSE SERPL-MCNC: 217 MG/DL (ref 70–99)
GLUCOSE SERPL-MCNC: 304 MG/DL (ref 70–99)
HCT VFR BLD AUTO: 26.2 % (ref 36–46)
HGB BLD-MCNC: 8.6 G/DL (ref 12–16)
INTERPRETATION (HISTORICAL): NORMAL
IRON SERPL-MCNC: 82 UG/DL (ref 37–170)
LDH (HISTORICAL): 548 U/L (ref 313–618)
LYMPHOCYTES NFR BLD AUTO: 22 % (ref 25–45)
MCH RBC QN AUTO: 30.4 PG (ref 26–34)
MCHC RBC AUTO-ENTMCNC: 32.9 % (ref 31–36)
MCV RBC AUTO: 93 FL (ref 80–100)
MONOCYTES # BLD AUTO: 0.7 K/UL (ref 0.2–0.9)
MONOCYTES NFR BLD AUTO: 8 % (ref 1–10)
NEUTROPHILS ABS COUNT (HISTORICAL): 6 K/UL (ref 1.8–7.8)
NEUTS SEG NFR BLD AUTO: 66 % (ref 45–65)
OCCULT BLD, FECAL IMMUNOLOGICAL (HISTORICAL): NEGATIVE
PARAPROTEIN %, URINE (HISTORICAL): 0
PARAPROTEIN EXCRETION PER 24HR (HISTORICAL): NORMAL
PERCENT SATURATION (HISTORICAL): 48 % (ref 11–46)
PLATELET # BLD AUTO: 253 K/MCL (ref 150–450)
POTASSIUM SERPL-SCNC: 4.5 MEQ/L (ref 3.6–5)
PROTEIN 24 HOUR URINE (HISTORICAL): NORMAL
PROTEIN URINE RANDOM (HISTORICAL): 221
RBC # BLD AUTO: 2.83 M/MCL (ref 4–5.2)
RETICULOCYTE COUNT (HISTORICAL): 6.75 % (ref 0.87–2.63)
SODIUM SERPL-SCNC: 138 MEQ/L (ref 137–147)
TIBC SERPL-MCNC: 171 UG/DL (ref 265–497)
TIME (HOURS) (HISTORICAL): NORMAL
TIME (HOURS) (HISTORICAL): NORMAL
TOTAL URINE VOLUME (HISTORICAL): NORMAL
TOTAL URINE VOLUME (HISTORICAL): NORMAL
VIT B12 SERPL-MCNC: >1000 PG/ML (ref 239–931)
WBC # BLD AUTO: 9 K/MCL (ref 4.5–11)

## 2018-05-10 LAB
ERYTHROPOIETIN (HISTORICAL): 52
HOMOCYSTINE PLASMA (HISTORICAL): 8

## 2018-05-11 LAB
ALBUMIN SERPL BCP-MCNC: 2.15 G/DL
ALPHA-1-GLOBULIN CSF (HISTORICAL): 0.67
ALPHA-2-GLOBULIN CSF (HISTORICAL): 1.22
BETA GLOBULIN, SERUM (HISTORICAL): 0.98
CLINICAL REPORT (HISTORICAL): ABNORMAL
GAMMA GLOBULIN (HISTORICAL): 1.29
IMMUNOFIXATION REFLEX (HISTORICAL): ABNORMAL
IMMUNOGLOBULIN A (HISTORICAL): 361
IMMUNOGLOBULIN G (HISTORICAL): 1120
IMMUNOGLOBULIN M (HISTORICAL): 123
INTERPRETATION (HISTORICAL): ABNORMAL
METHYLMALONIC ACID, S (HISTORICAL): 0.39
PROT ELEC SERUM (HISTORICAL): 6.3

## 2018-05-24 ENCOUNTER — HOSPITAL ENCOUNTER (INPATIENT)
Facility: HOSPITAL | Age: 53
LOS: 6 days | Discharge: HOME | DRG: 420 | End: 2018-05-30
Attending: INTERNAL MEDICINE | Admitting: INTERNAL MEDICINE
Payer: MEDICARE

## 2018-05-24 DIAGNOSIS — N76.0 BACTERIAL VAGINOSIS: ICD-10-CM

## 2018-05-24 DIAGNOSIS — Z22.39 ESBL ESCHERICHIA COLI CARRIER: ICD-10-CM

## 2018-05-24 DIAGNOSIS — E11.65 UNCONTROLLED DIABETES MELLITUS WITH HYPERGLYCEMIA (HCC): ICD-10-CM

## 2018-05-24 DIAGNOSIS — N89.8 VAGINAL DISCHARGE: ICD-10-CM

## 2018-05-24 DIAGNOSIS — Z72.0 TOBACCO USE: ICD-10-CM

## 2018-05-24 DIAGNOSIS — B96.89 BACTERIAL VAGINOSIS: ICD-10-CM

## 2018-05-24 DIAGNOSIS — H53.9 VISUAL CHANGES: Primary | ICD-10-CM

## 2018-05-24 DIAGNOSIS — E11.10 DIABETIC KETOACIDOSIS WITHOUT COMA ASSOCIATED WITH TYPE 2 DIABETES MELLITUS (HCC): ICD-10-CM

## 2018-05-24 PROBLEM — E78.5 DYSLIPIDEMIA: Status: ACTIVE | Noted: 2018-05-24

## 2018-05-24 PROBLEM — N18.9 CHRONIC KIDNEY DISEASE: Status: ACTIVE | Noted: 2018-05-24

## 2018-05-24 PROBLEM — N17.9 AKI (ACUTE KIDNEY INJURY) (HCC): Status: ACTIVE | Noted: 2018-05-24

## 2018-05-24 PROBLEM — I10 HTN (HYPERTENSION): Status: ACTIVE | Noted: 2018-05-24

## 2018-05-24 LAB
ABSOL LYMPHOCYTES (HISTORICAL): 1.3 K/UL (ref 0.5–4)
ACETONE, QUANT (HISTORICAL): POSITIVE
ALBUMIN SERPL BCP-MCNC: 3.5 G/DL (ref 3–5.2)
ALP SERPL-CCNC: 174 U/L (ref 43–122)
ALT SERPL W P-5'-P-CCNC: 28 U/L (ref 9–52)
AMORPHOUS MATERIAL (HISTORICAL): ABNORMAL
ANION GAP SERPL CALCULATED.3IONS-SCNC: 10 MMOL/L (ref 4–13)
ANION GAP SERPL CALCULATED.3IONS-SCNC: 16 MMOL/L (ref 5–14)
ANION GAP SERPL CALCULATED.3IONS-SCNC: 6 MMOL/L (ref 4–13)
AST SERPL W P-5'-P-CCNC: 16 U/L (ref 14–36)
BACTERIA UR QL AUTO: ABNORMAL
BACTERIA UR QL AUTO: ABNORMAL /HPF
BASOPHILS # BLD AUTO: 0.1 K/UL (ref 0–0.1)
BASOPHILS # BLD AUTO: 1 % (ref 0–1)
BILIRUB SERPL-MCNC: 0.4 MG/DL
BILIRUB UR QL STRIP: NEGATIVE
BILIRUB UR QL STRIP: NEGATIVE MG/DL
BUN SERPL-MCNC: 28 MG/DL (ref 5–25)
BUN SERPL-MCNC: 30 MG/DL (ref 5–25)
BUN SERPL-MCNC: 33 MG/DL (ref 5–25)
CA-I BLD-SCNC: 1.13 MMOL/L (ref 1.12–1.32)
CA-I BLD-SCNC: 1.15 MMOL/L (ref 1.12–1.32)
CALCIUM SERPL-MCNC: 9.2 MG/DL (ref 8.3–10.1)
CALCIUM SERPL-MCNC: 9.3 MG/DL (ref 8.3–10.1)
CALCIUM SERPL-MCNC: 9.8 MG/DL (ref 8.4–10.2)
CALLED AND READ BACK BY. (HISTORICAL): NORMAL
CASTS/CASTS TYPE (HISTORICAL): ABNORMAL /LPF
CHLORIDE SERPL-SCNC: 108 MMOL/L (ref 100–108)
CHLORIDE SERPL-SCNC: 111 MMOL/L (ref 100–108)
CHLORIDE SERPL-SCNC: 90 MEQ/L (ref 97–108)
CLARITY UR: ABNORMAL
CLARITY UR: ABNORMAL
CO2 SERPL-SCNC: 26 MMOL/L (ref 22–30)
CO2 SERPL-SCNC: 28 MMOL/L (ref 21–32)
CO2 SERPL-SCNC: 29 MMOL/L (ref 21–32)
COLOR UR: ABNORMAL
COLOR UR: YELLOW
CREAT SERPL-MCNC: 1.65 MG/DL (ref 0.6–1.3)
CREAT SERPL-MCNC: 1.8 MG/DL (ref 0.6–1.3)
CREATINE, SERUM (HISTORICAL): 1.8 MG/DL (ref 0.6–1.2)
CRYSTAL TYPE (HISTORICAL): ABNORMAL /HPF
DEPRECATED RDW RBC AUTO: 16.6 %
EGFR (HISTORICAL): 29 ML/MIN/1.73 M2
EOSINOPHIL # BLD AUTO: 0.3 K/UL (ref 0–0.4)
EOSINOPHIL NFR BLD AUTO: 3 % (ref 0–6)
ERYTHROCYTE SEDIMENTATION RATE (HISTORICAL): >130 MM (ref 1–20)
GFR SERPL CREATININE-BSD FRML MDRD: 31 ML/MIN/1.73SQ M
GFR SERPL CREATININE-BSD FRML MDRD: 35 ML/MIN/1.73SQ M
GLUCOSE SERPL-MCNC: 126 MG/DL (ref 65–140)
GLUCOSE SERPL-MCNC: 144 MG/DL (ref 65–140)
GLUCOSE SERPL-MCNC: 209 MG/DL (ref 65–140)
GLUCOSE SERPL-MCNC: 335 MG/DL (ref 65–140)
GLUCOSE SERPL-MCNC: 336 MG/DL (ref 65–140)
GLUCOSE SERPL-MCNC: 358 MG/DL (ref 65–140)
GLUCOSE SERPL-MCNC: 912 MG/DL (ref 70–99)
GLUCOSE SERPL-MCNC: >450 MG/DL (ref 70–99)
GLUCOSE SERPL-MCNC: >500 MG/DL (ref 65–140)
GLUCOSE UR STRIP-MCNC: >=1000 MG/DL
GLUCOSE UR STRIP-MCNC: ABNORMAL MG/DL
HCT VFR BLD AUTO: 37.4 % (ref 36–46)
HGB BLD-MCNC: 12.3 G/DL (ref 12–16)
HGB UR QL STRIP.AUTO: ABNORMAL
HGB UR QL STRIP.AUTO: ABNORMAL
KETONES UR STRIP-MCNC: 5 MG/DL
KETONES UR STRIP-MCNC: NEGATIVE MG/DL
LACTATE SERPL-SCNC: 0.8 MMOL/L (ref 0.7–2.1)
LEUKOCYTE ESTERASE UR QL STRIP: ABNORMAL
LEUKOCYTE ESTERASE UR QL STRIP: ABNORMAL
LIPASE SERPL-CCNC: 163 U/L (ref 23–300)
LYMPHOCYTES NFR BLD AUTO: 15 % (ref 25–45)
MAGNESIUM SERPL-MCNC: 2.1 MG/DL (ref 1.6–2.6)
MAGNESIUM SERPL-MCNC: 2.2 MG/DL (ref 1.6–2.6)
MCH RBC QN AUTO: 31.1 PG (ref 26–34)
MCHC RBC AUTO-ENTMCNC: 32.9 % (ref 31–36)
MCV RBC AUTO: 95 FL (ref 80–100)
MONOCYTES # BLD AUTO: 0.5 K/UL (ref 0.2–0.9)
MONOCYTES NFR BLD AUTO: 6 % (ref 1–10)
MUCOUS THREADS URNS QL MICRO: ABNORMAL
NEUTROPHILS ABS COUNT (HISTORICAL): 6.7 K/UL (ref 1.8–7.8)
NEUTS SEG NFR BLD AUTO: 75 % (ref 45–65)
NITRITE UR QL STRIP: NEGATIVE
NITRITE UR QL STRIP: POSITIVE
NON-SQ EPI CELLS URNS QL MICRO: ABNORMAL
NON-SQ EPI CELLS URNS QL MICRO: ABNORMAL /HPF
OTHER STN SPEC: ABNORMAL
OTHER STN SPEC: ABNORMAL
PH UR STRIP.AUTO: 5 [PH] (ref 4.5–8)
PH UR STRIP.AUTO: 5.5 [PH] (ref 4.5–8)
PHOSPHATE SERPL-MCNC: 3 MG/DL (ref 2.7–4.5)
PHOSPHATE SERPL-MCNC: 3 MG/DL (ref 2.7–4.5)
PLATELET # BLD AUTO: 255 THOUSANDS/UL (ref 149–390)
PLATELET # BLD AUTO: 277 K/MCL (ref 150–450)
PMV BLD AUTO: 11.7 FL (ref 8.9–12.7)
POTASSIUM SERPL-SCNC: 3.1 MMOL/L (ref 3.5–5.3)
POTASSIUM SERPL-SCNC: 4.3 MMOL/L (ref 3.5–5.3)
POTASSIUM SERPL-SCNC: 5.3 MEQ/L (ref 3.6–5)
PROT UR STRIP-MCNC: >=300 MG/DL
PROT UR STRIP-MCNC: >=500 MG/DL
RBC # BLD AUTO: 3.95 M/MCL (ref 4–5.2)
RBC #/AREA URNS AUTO: ABNORMAL /HPF
RBC #/AREA URNS AUTO: ABNORMAL /HPF
SODIUM SERPL-SCNC: 132 MEQ/L (ref 137–147)
SODIUM SERPL-SCNC: 143 MMOL/L (ref 136–145)
SODIUM SERPL-SCNC: 149 MMOL/L (ref 136–145)
SP GR UR STRIP.AUTO: 1.01 (ref 1–1.04)
SP GR UR STRIP.AUTO: 1.02 (ref 1–1.03)
TOTAL PROTEIN (HISTORICAL): 6.9 G/DL (ref 5.9–8.4)
TROPONIN I SERPL-MCNC: <0.01 NG/ML (ref 0–0.03)
UROBILINOGEN UR QL STRIP.AUTO: 0.2 E.U./DL
UROBILINOGEN UR QL STRIP.AUTO: NEGATIVE MG/DL (ref 0–1)
WBC # BLD AUTO: 8.9 K/MCL (ref 4.5–11)
WBC #/AREA URNS AUTO: >35 /HPF
WBC #/AREA URNS AUTO: ABNORMAL /HPF

## 2018-05-24 PROCEDURE — 85049 AUTOMATED PLATELET COUNT: CPT | Performed by: NURSE PRACTITIONER

## 2018-05-24 PROCEDURE — 81001 URINALYSIS AUTO W/SCOPE: CPT | Performed by: NURSE PRACTITIONER

## 2018-05-24 PROCEDURE — 83735 ASSAY OF MAGNESIUM: CPT | Performed by: NURSE PRACTITIONER

## 2018-05-24 PROCEDURE — 99223 1ST HOSP IP/OBS HIGH 75: CPT | Performed by: NURSE PRACTITIONER

## 2018-05-24 PROCEDURE — 82330 ASSAY OF CALCIUM: CPT | Performed by: NURSE PRACTITIONER

## 2018-05-24 PROCEDURE — 87077 CULTURE AEROBIC IDENTIFY: CPT | Performed by: NURSE PRACTITIONER

## 2018-05-24 PROCEDURE — 84100 ASSAY OF PHOSPHORUS: CPT | Performed by: NURSE PRACTITIONER

## 2018-05-24 PROCEDURE — 82948 REAGENT STRIP/BLOOD GLUCOSE: CPT

## 2018-05-24 PROCEDURE — 87186 SC STD MICRODIL/AGAR DIL: CPT | Performed by: NURSE PRACTITIONER

## 2018-05-24 PROCEDURE — 87086 URINE CULTURE/COLONY COUNT: CPT | Performed by: NURSE PRACTITIONER

## 2018-05-24 PROCEDURE — 80048 BASIC METABOLIC PNL TOTAL CA: CPT | Performed by: NURSE PRACTITIONER

## 2018-05-24 RX ORDER — SODIUM CHLORIDE 9 MG/ML
500 INJECTION, SOLUTION INTRAVENOUS CONTINUOUS
Status: DISPENSED | OUTPATIENT
Start: 2018-05-24 | End: 2018-05-24

## 2018-05-24 RX ORDER — NORTRIPTYLINE HYDROCHLORIDE 25 MG/1
50 CAPSULE ORAL DAILY
Status: DISCONTINUED | OUTPATIENT
Start: 2018-05-25 | End: 2018-05-30 | Stop reason: HOSPADM

## 2018-05-24 RX ORDER — ASPIRIN 81 MG/1
81 TABLET ORAL DAILY
COMMUNITY
End: 2018-06-28 | Stop reason: SDUPTHER

## 2018-05-24 RX ORDER — DEXTROSE AND SODIUM CHLORIDE 5; .45 G/100ML; G/100ML
125 INJECTION, SOLUTION INTRAVENOUS CONTINUOUS
Status: DISCONTINUED | OUTPATIENT
Start: 2018-05-24 | End: 2018-05-25

## 2018-05-24 RX ORDER — NORTRIPTYLINE HYDROCHLORIDE 50 MG/1
50 CAPSULE ORAL DAILY
COMMUNITY
End: 2018-08-02 | Stop reason: SDUPTHER

## 2018-05-24 RX ORDER — CHLORHEXIDINE GLUCONATE 0.12 MG/ML
15 RINSE ORAL EVERY 12 HOURS SCHEDULED
Status: DISCONTINUED | OUTPATIENT
Start: 2018-05-24 | End: 2018-05-30 | Stop reason: HOSPADM

## 2018-05-24 RX ORDER — HEPARIN SODIUM 5000 [USP'U]/ML
5000 INJECTION, SOLUTION INTRAVENOUS; SUBCUTANEOUS EVERY 8 HOURS SCHEDULED
Status: DISCONTINUED | OUTPATIENT
Start: 2018-05-24 | End: 2018-05-30 | Stop reason: HOSPADM

## 2018-05-24 RX ORDER — LISINOPRIL 5 MG/1
5 TABLET ORAL DAILY
COMMUNITY
End: 2018-06-28 | Stop reason: SDUPTHER

## 2018-05-24 RX ORDER — ATORVASTATIN CALCIUM 40 MG/1
40 TABLET, FILM COATED ORAL
Status: DISCONTINUED | OUTPATIENT
Start: 2018-05-24 | End: 2018-05-30 | Stop reason: HOSPADM

## 2018-05-24 RX ORDER — TRAMADOL HYDROCHLORIDE 50 MG/1
50 TABLET ORAL EVERY 6 HOURS PRN
Status: DISCONTINUED | OUTPATIENT
Start: 2018-05-24 | End: 2018-05-30 | Stop reason: HOSPADM

## 2018-05-24 RX ORDER — NICOTINE 21 MG/24HR
21 PATCH, TRANSDERMAL 24 HOURS TRANSDERMAL DAILY
Status: DISCONTINUED | OUTPATIENT
Start: 2018-05-25 | End: 2018-05-30 | Stop reason: HOSPADM

## 2018-05-24 RX ORDER — INSULIN GLARGINE 100 [IU]/ML
7 INJECTION, SOLUTION SUBCUTANEOUS
Status: DISCONTINUED | OUTPATIENT
Start: 2018-05-25 | End: 2018-05-26

## 2018-05-24 RX ORDER — SODIUM CHLORIDE 9 MG/ML
2000 INJECTION, SOLUTION INTRAVENOUS CONTINUOUS
Status: DISPENSED | OUTPATIENT
Start: 2018-05-24 | End: 2018-05-24

## 2018-05-24 RX ORDER — ATORVASTATIN CALCIUM 40 MG/1
40 TABLET, FILM COATED ORAL DAILY
COMMUNITY
End: 2018-06-28 | Stop reason: SDUPTHER

## 2018-05-24 RX ORDER — ACETAMINOPHEN 325 MG/1
650 TABLET ORAL EVERY 6 HOURS PRN
Status: DISCONTINUED | OUTPATIENT
Start: 2018-05-24 | End: 2018-05-30 | Stop reason: HOSPADM

## 2018-05-24 RX ORDER — DEXTROSE AND SODIUM CHLORIDE 5; .9 G/100ML; G/100ML
250 INJECTION, SOLUTION INTRAVENOUS CONTINUOUS
Status: DISCONTINUED | OUTPATIENT
Start: 2018-05-24 | End: 2018-05-24

## 2018-05-24 RX ORDER — PREGABALIN 50 MG/1
50 CAPSULE ORAL 2 TIMES DAILY
COMMUNITY
End: 2018-06-28 | Stop reason: SDUPTHER

## 2018-05-24 RX ORDER — SODIUM CHLORIDE 9 MG/ML
250 INJECTION, SOLUTION INTRAVENOUS CONTINUOUS
Status: DISCONTINUED | OUTPATIENT
Start: 2018-05-24 | End: 2018-05-25

## 2018-05-24 RX ORDER — ASPIRIN 81 MG/1
81 TABLET ORAL DAILY
Status: DISCONTINUED | OUTPATIENT
Start: 2018-05-25 | End: 2018-05-30 | Stop reason: HOSPADM

## 2018-05-24 RX ORDER — INSULIN GLARGINE 100 [IU]/ML
10 INJECTION, SOLUTION SUBCUTANEOUS ONCE
Status: COMPLETED | OUTPATIENT
Start: 2018-05-25 | End: 2018-05-25

## 2018-05-24 RX ADMIN — CEFTRIAXONE 1000 MG: 1 INJECTION, SOLUTION INTRAVENOUS at 20:13

## 2018-05-24 RX ADMIN — TRAMADOL HYDROCHLORIDE 50 MG: 50 TABLET, FILM COATED ORAL at 21:44

## 2018-05-24 RX ADMIN — CHLORHEXIDINE GLUCONATE 15 ML: 1.2 RINSE ORAL at 20:13

## 2018-05-24 RX ADMIN — DEXTROSE AND SODIUM CHLORIDE 125 ML/HR: 5; .45 INJECTION, SOLUTION INTRAVENOUS at 22:19

## 2018-05-24 RX ADMIN — SODIUM CHLORIDE 500 ML/HR: 0.9 INJECTION, SOLUTION INTRAVENOUS at 19:32

## 2018-05-24 RX ADMIN — HEPARIN SODIUM 5000 UNITS: 5000 INJECTION, SOLUTION INTRAVENOUS; SUBCUTANEOUS at 22:28

## 2018-05-24 RX ADMIN — ATORVASTATIN CALCIUM 40 MG: 40 TABLET, FILM COATED ORAL at 20:14

## 2018-05-24 RX ADMIN — SODIUM CHLORIDE 250 ML/HR: 0.9 INJECTION, SOLUTION INTRAVENOUS at 21:32

## 2018-05-24 RX ADMIN — SODIUM CHLORIDE 7 UNITS/HR: 9 INJECTION, SOLUTION INTRAVENOUS at 20:13

## 2018-05-24 NOTE — PLAN OF CARE
Problem: Potential for Falls  Goal: Patient will remain free of falls  INTERVENTIONS:  - Assess patient frequently for physical needs  -  Identify cognitive and physical deficits and behaviors that affect risk of falls  -  [place] fall precautions as indicated by assessment   - Educate patient/family on patient safety including physical limitations  - Instruct patient to call for assistance with activity based on assessment  - Modify environment to reduce risk of injury  - Consider OT/PT consult to assist with strengthening/mobility   Outcome: Progressing      Problem: Nutrition/Hydration-ADULT  Goal: Nutrient/Hydration intake appropriate for improving, restoring or maintaining nutritional needs  Monitor and assess patient's nutrition/hydration status for malnutrition (ex- brittle hair, bruises, dry skin, pale skin and conjunctiva, muscle wasting, smooth red tongue, and disorientation)  Collaborate with interdisciplinary team and initiate plan and interventions as ordered  Monitor patient's weight and dietary intake as ordered or per policy  Utilize nutrition screening tool and intervene per policy  Determine patient's food preferences and provide high-protein, high-caloric foods as appropriate       INTERVENTIONS:  - Monitor oral intake, urinary output, labs, and treatment plans  - Assess nutrition and hydration status and recommend course of action  - Evaluate amount of meals eaten  - Assist patient with eating if necessary   - Allow adequate time for meals  - Recommend/ encourage appropriate diets, oral nutritional supplements, and vitamin/mineral supplements  - Order, calculate, and assess calorie counts as needed  - Recommend, monitor, and adjust tube feedings and TPN/PPN based on assessed needs  - Assess need for intravenous fluids  - Provide specific nutrition/hydration education as appropriate  - Include patient/family/caregiver in decisions related to nutrition   Outcome: Progressing      Problem: GASTROINTESTINAL - ADULT  Goal: Minimal or absence of nausea and/or vomiting  INTERVENTIONS:  - Administer IV fluids as ordered to ensure adequate hydration  - Maintain NPO status until nausea and vomiting are resolved  - Nasogastric tube as ordered  - Administer ordered antiemetic medications as needed  - Provide nonpharmacologic comfort measures as appropriate  - Advance diet as tolerated, if ordered  - Nutrition services referral to assist patient with adequate nutrition and appropriate food choices  Outcome: Progressing      Problem: METABOLIC, FLUID AND ELECTROLYTES - ADULT  Goal: Electrolytes maintained within normal limits  INTERVENTIONS:  - Monitor labs and assess patient for signs and symptoms of electrolyte imbalances  - Administer electrolyte replacement as ordered  - Monitor response to electrolyte replacements, including repeat lab results as appropriate  - Instruct patient on fluid and nutrition as appropriate  Outcome: Progressing    Goal: Glucose maintained within target range  INTERVENTIONS:  - Monitor Blood Glucose as ordered  - Assess for signs and symptoms of hyperglycemia and hypoglycemia  - Administer ordered medications to maintain glucose within target range  - Assess nutritional intake and initiate nutrition service referral as needed  Outcome: Progressing

## 2018-05-24 NOTE — PROGRESS NOTES
Patient arrived to unit via transport from [facility]  Midline infusing insulin at 7 units  Glucose checked, greater than 500  Will relay findings to advanced practitioner  Awaiting orders at this time

## 2018-05-24 NOTE — H&P
History & Physical Exam - 61 [facility] Helen 52 y o  female MRN: [id number]  Unit/Bed#: ICU 13 Encounter: [id number]      Assessment/Plan:  1  Diabetic ketoacidosis with history of type 2 diabetes vs HHNK  · Patient will require a stay greater than two midnights  · Arrived to OSH with a serum blood glucose of 912, anion gap of 16 and a UA with +ketones  · WBC 8 6, lactic acid 0 8, afebrile  · Insulin drip ordered, currently at 7 units/hr with IVF   · BMP ordered Q4 hour, monitor for gap closure   · Blood culture ordered  · UA pending   2  JULIO on presumed CKD with unknown staging  · Unknown baseline, creatinine at OSH was 1 8  · IVF per DKA protocol   · Trend BMP  · Avoid nephrotoxic medications  3  Acute right eye vision loss with known cataracts  · C/o right eye vision loss this morning, has a history of cataracts  · Head Ct at [facility] was negative  · Transferred for an opthalmology consulted  4  Neurogenic Bladder s/p suprapubic catheter with chronic UTI  · Recently treated for UTI 2 weeks ago   · UA abnormal on arrival, could represent chronic colonization but started on ceftriaxone 1g daily while awaiting UC  5  Diabetes type 2 with diabetic neuropathy  · Takes lyrica at home, held at this time  · On 7 units of lantus at home per patient  6  New onset vaginal tenderness and pain  · Patient c/o 3 days of vaginal pain and tenderness around labia  · No redness or swelling  · Gynecology consulted  7  Hyperlipidemia   · Continued on home Lipitor  8  Hypertension  · Lisinopril held in the setting of JULIO  · -110s on admission   9   Current tobacco use  · Nicotine patch ordered  · Smoking cessation education     _____________________________________________________________________      HPI:    Eduardo Irwin is a 52 y o  female who is a poor historian with a significant PMH of diabetes type 2 with neuropathy, hyperlipidemia, CKD hypertension, neurogenic bladder s/p suprapubic catheter with chronic urinary tract infections who presented to [facility] this afternoon with 3 days of a headache, nausea, and with right eye vision loss and vomiting over the last 24 hours  She was found to have a blood glucose of 912 with an anion gap of 16  Related to the hyperglycemia and loss of right eye vision, she was transferred to [facility] ICU for further management and evaluation  At [facility], she a head CT that did not show any acute intercranial hemorrhage or mass related to her right eye vision loss  Her UA showed + ketones, + protein, WBC 8 9, A febrile, with a lactic acid of 0 8  The team at [facility] wanted opthalmology consulted following the negative head CT and she was transferred to [facility]  On arrival, she endorses a headache, right eye vision change, and nausea as well as bilateral hand numbness and bilateral foot pain  She says that she has not been eating over the last 2 days and has been vomiting since last night but was still taking her lantus  She complains of vagina pain and discharge which started 3 days ago  She denies CP, palpations, SOB, or cough  She is an active smoker for 40 years with who use to smoke 1ppd but has recently decreased to 5 cigarettes per day  Of note, Ms Ebony Mccormick was recently hospitalized a month ago for about a week per patient for a urinary tract infection and has a chronic subpubic catheter in place  Review of Systems:  Review of Systems   Constitutional: Positive for appetite change and fatigue  Negative for chills and fever  HENT: Negative for postnasal drip, sore throat and trouble swallowing  Eyes: Positive for visual disturbance  Negative for discharge and redness  Respiratory: Negative for cough, chest tightness, shortness of breath and wheezing  Cardiovascular: Negative for chest pain, palpitations and leg swelling  Gastrointestinal: Positive for nausea and vomiting  Negative for abdominal distention, blood in stool, constipation and diarrhea  Endocrine: Negative for polydipsia, polyphagia and polyuria  Genitourinary: Positive for vaginal discharge and vaginal pain  Negative for decreased urine volume, difficulty urinating, dysuria, pelvic pain and vaginal bleeding  Chronic suprapubic catheter    Skin: Negative for color change, pallor and rash  Allergic/Immunologic: Negative for immunocompromised state  Neurological: Positive for weakness, numbness and headaches  Hematological: Negative for adenopathy  Does not bruise/bleed easily  Psychiatric/Behavioral: Negative  Full 14 point review of systems was performed  Aside from what was mentioned in the HPI, it is otherwise negative  Historical Information   Past Medical History:   Diagnosis Date    Chronic kidney disease 5/24/2018    Diabetes mellitus ([address] )     Dyslipidemia 5/24/2018    History of frequent urinary tract infections     HTN (hypertension) 5/24/2018    Hyperlipidemia     Neuropathy      Past Surgical History:   Procedure Laterality Date    SUPRAPUBIC CATHETER INSERTION       Social History   History   Alcohol Use No     History   Drug Use No     History   Smoking Status    Current Every Day Smoker    Packs/day: 1 00    Years: 40 00    Types: Cigarettes   Smokeless Tobacco    Never Used       Family History:   Family History   Problem Relation Age of Onset    Family history unknown: Yes       Medications:  Pertinent medications were reviewed        No Known Allergies      Vitals:   /71   Pulse 102   Temp 98 1 °F (36 7 °C) (Temporal)   Resp 20   Ht 5' 3" (1 6 m)   Wt 82 1 kg (181 lb)   SpO2 98%   BMI 32 06 kg/m²   Body mass index is 32 06 kg/m²    SpO2: 98 %,   SpO2 Activity: At Rest,   O2 Device: None (Room air)      Intake/Output Summary (Last 24 hours) at 05/24/18 2047  Last data filed at 05/24/18 2000   Gross per 24 hour   Intake           233 33 ml   Output                0 ml   Net           233 33 ml     Invasive Devices     Peripheral Intravenous Line            Peripheral IV 05/24/18 Left Arm less than 1 day                Physical Exam:  Gen: NAD, appears stated age, obese  HEENT: Normocephalic, atraumatic, PERRLA, Right eye hemianopia, dry oral mucosa, no sclera icterus  Neck/lymph: Supple, with normal ROM, trachea midline, no lymphadenopathy   Chest: CTA breath sounds posteriorly, diminished bilateral bases  Cor: RRR, tachycardiac, normal S1/S2, no murmur, rubs or gallops  Abd: Rotund, soft, no rebound or guarding  Suprapubic catheter in place with thick yellow drainage from site with mild tenderness on palpation  Labia and vaginal tenderness, no redness or swelling  Ext: Warm with cool feet, palpable pulses and preserved pulse volume, DELA CRUZ 5/5 strength  Neuro: Awake, alert and orientated with decreased sensation in bilateral feet, right eye vision change with blurriness and hemianopia  Skin: Warm and dry without rash    Diagnostic Data:  Lab: I have personally reviewed pertinent lab results  ,   CBC:    Results from last 7 days  Lab Units 05/24/18 2006   PLATELETS Thousands/uL 255      CMP:   Lab Results   Component Value Date     05/24/2018    K 4 3 05/24/2018     05/24/2018    CO2 29 05/24/2018    ANIONGAP 6 05/24/2018    BUN 30 (H) 05/24/2018    CREATININE 1 80 (H) 05/24/2018    GLUCOSE 336 (H) 05/24/2018    CALCIUM 9 3 05/24/2018    EGFR 31 05/24/2018   ,   PT/INR: No results found for: PT, INR,   Troponin: No results found for: TROPONINI,   Magnesium: No results found for: MAG,  Phosphorous:   Lab Results   Component Value Date    PHOS 3 0 05/24/2018       ABG: No results found for: PHART, ZZQ5CKM, PO2ART, RCW6QUT, V3MTFZSU, BEART, SOURCE,     Microbiology:  UA pending  Blood cultures pending    Imaging: Imaging from OSH  CT head: No acute intracranial hemorrhage, mass effect, or an acute cortical/territorial infract  Mild intracranial vascular calcifications are present    CXR: No lung consolidations or a pleural effusion EKG/telemetry/Echo:   Sinus tachycardia in 100s      VTE Prophylaxis: Heparin    Code Status: Level 1 - Full Code    Portions of the record may have been created with voice recognition software  Occasional wrong word or "sound a like" substitutions may have occurred due to the inherent limitations of voice recognition software  Read the chart carefully and recognize, using context, where substitutions have occurred

## 2018-05-25 LAB
ANION GAP SERPL CALCULATED.3IONS-SCNC: 9 MMOL/L (ref 4–13)
BUN SERPL-MCNC: 26 MG/DL (ref 5–25)
CA-I BLD-SCNC: 1.15 MMOL/L (ref 1.12–1.32)
CALCIUM SERPL-MCNC: 8.9 MG/DL (ref 8.3–10.1)
CHLORIDE SERPL-SCNC: 111 MMOL/L (ref 100–108)
CO2 SERPL-SCNC: 28 MMOL/L (ref 21–32)
CREAT SERPL-MCNC: 1.5 MG/DL (ref 0.6–1.3)
GFR SERPL CREATININE-BSD FRML MDRD: 39 ML/MIN/1.73SQ M
GLUCOSE SERPL-MCNC: 118 MG/DL (ref 65–140)
GLUCOSE SERPL-MCNC: 127 MG/DL (ref 65–140)
GLUCOSE SERPL-MCNC: 132 MG/DL (ref 65–140)
GLUCOSE SERPL-MCNC: 149 MG/DL (ref 65–140)
GLUCOSE SERPL-MCNC: 151 MG/DL (ref 65–140)
GLUCOSE SERPL-MCNC: 154 MG/DL (ref 65–140)
GLUCOSE SERPL-MCNC: 292 MG/DL (ref 65–140)
GLUCOSE SERPL-MCNC: 319 MG/DL (ref 65–140)
GLUCOSE SERPL-MCNC: 384 MG/DL (ref 65–140)
GLUCOSE SERPL-MCNC: 416 MG/DL (ref 65–140)
MAGNESIUM SERPL-MCNC: 2 MG/DL (ref 1.6–2.6)
PHOSPHATE SERPL-MCNC: 3.4 MG/DL (ref 2.7–4.5)
POTASSIUM SERPL-SCNC: 3.6 MMOL/L (ref 3.5–5.3)
SODIUM SERPL-SCNC: 148 MMOL/L (ref 136–145)

## 2018-05-25 PROCEDURE — 99232 SBSQ HOSP IP/OBS MODERATE 35: CPT | Performed by: INTERNAL MEDICINE

## 2018-05-25 PROCEDURE — 84100 ASSAY OF PHOSPHORUS: CPT | Performed by: NURSE PRACTITIONER

## 2018-05-25 PROCEDURE — 83735 ASSAY OF MAGNESIUM: CPT | Performed by: NURSE PRACTITIONER

## 2018-05-25 PROCEDURE — 82948 REAGENT STRIP/BLOOD GLUCOSE: CPT

## 2018-05-25 PROCEDURE — 82330 ASSAY OF CALCIUM: CPT | Performed by: NURSE PRACTITIONER

## 2018-05-25 PROCEDURE — 80048 BASIC METABOLIC PNL TOTAL CA: CPT | Performed by: NURSE PRACTITIONER

## 2018-05-25 RX ORDER — POTASSIUM CHLORIDE 20 MEQ/1
40 TABLET, EXTENDED RELEASE ORAL ONCE
Status: COMPLETED | OUTPATIENT
Start: 2018-05-25 | End: 2018-05-25

## 2018-05-25 RX ORDER — PREGABALIN 50 MG/1
50 CAPSULE ORAL 2 TIMES DAILY
Status: DISCONTINUED | OUTPATIENT
Start: 2018-05-25 | End: 2018-05-27

## 2018-05-25 RX ORDER — METRONIDAZOLE 500 MG/1
500 TABLET ORAL EVERY 12 HOURS SCHEDULED
Status: DISCONTINUED | OUTPATIENT
Start: 2018-05-25 | End: 2018-05-30 | Stop reason: HOSPADM

## 2018-05-25 RX ORDER — LISINOPRIL 5 MG/1
5 TABLET ORAL DAILY
Status: DISCONTINUED | OUTPATIENT
Start: 2018-05-25 | End: 2018-05-30 | Stop reason: HOSPADM

## 2018-05-25 RX ORDER — HYDRALAZINE HYDROCHLORIDE 20 MG/ML
10 INJECTION INTRAMUSCULAR; INTRAVENOUS ONCE
Status: COMPLETED | OUTPATIENT
Start: 2018-05-25 | End: 2018-05-25

## 2018-05-25 RX ADMIN — PREGABALIN 50 MG: 50 CAPSULE ORAL at 21:43

## 2018-05-25 RX ADMIN — NICOTINE 21 MG: 21 PATCH, EXTENDED RELEASE TRANSDERMAL at 08:39

## 2018-05-25 RX ADMIN — INSULIN LISPRO 5 UNITS: 100 INJECTION, SOLUTION INTRAVENOUS; SUBCUTANEOUS at 11:18

## 2018-05-25 RX ADMIN — Medication 2 UNITS/HR: at 00:31

## 2018-05-25 RX ADMIN — ASPIRIN 81 MG: 81 TABLET, COATED ORAL at 08:39

## 2018-05-25 RX ADMIN — INSULIN GLARGINE 10 UNITS: 100 INJECTION, SOLUTION SUBCUTANEOUS at 05:42

## 2018-05-25 RX ADMIN — METRONIDAZOLE 500 MG: 500 TABLET ORAL at 21:43

## 2018-05-25 RX ADMIN — LISINOPRIL 5 MG: 5 TABLET ORAL at 12:23

## 2018-05-25 RX ADMIN — TRAMADOL HYDROCHLORIDE 50 MG: 50 TABLET, FILM COATED ORAL at 16:42

## 2018-05-25 RX ADMIN — INSULIN LISPRO 6 UNITS: 100 INJECTION, SOLUTION INTRAVENOUS; SUBCUTANEOUS at 16:42

## 2018-05-25 RX ADMIN — ATORVASTATIN CALCIUM 40 MG: 40 TABLET, FILM COATED ORAL at 16:42

## 2018-05-25 RX ADMIN — INSULIN HUMAN 4 UNITS: 100 INJECTION, SOLUTION PARENTERAL at 10:36

## 2018-05-25 RX ADMIN — INSULIN GLARGINE 7 UNITS: 100 INJECTION, SOLUTION SUBCUTANEOUS at 21:49

## 2018-05-25 RX ADMIN — CHLORHEXIDINE GLUCONATE 15 ML: 1.2 RINSE ORAL at 21:43

## 2018-05-25 RX ADMIN — INSULIN LISPRO 6 UNITS: 100 INJECTION, SOLUTION INTRAVENOUS; SUBCUTANEOUS at 21:49

## 2018-05-25 RX ADMIN — METRONIDAZOLE 500 MG: 500 TABLET ORAL at 11:14

## 2018-05-25 RX ADMIN — HEPARIN SODIUM 5000 UNITS: 5000 INJECTION, SOLUTION INTRAVENOUS; SUBCUTANEOUS at 05:43

## 2018-05-25 RX ADMIN — DEXTROSE AND SODIUM CHLORIDE 125 ML/HR: 5; .45 INJECTION, SOLUTION INTRAVENOUS at 05:58

## 2018-05-25 RX ADMIN — HYDRALAZINE HYDROCHLORIDE 10 MG: 20 INJECTION INTRAMUSCULAR; INTRAVENOUS at 12:23

## 2018-05-25 RX ADMIN — POTASSIUM CHLORIDE 40 MEQ: 1500 TABLET, EXTENDED RELEASE ORAL at 00:28

## 2018-05-25 RX ADMIN — HEPARIN SODIUM 5000 UNITS: 5000 INJECTION, SOLUTION INTRAVENOUS; SUBCUTANEOUS at 21:43

## 2018-05-25 RX ADMIN — NORTRIPTYLINE HYDROCHLORIDE 50 MG: 50 CAPSULE ORAL at 11:15

## 2018-05-25 NOTE — PROGRESS NOTES
Progress Note - Critical Care   Pretty Bryan 52 y o  female MRN: [id number]  Unit/Bed#: ICU 13 Encounter: [id number]    Assessment/Plan:  1  DKA vs HHNK with known hx of DM Type II  · Resolved with insulin infusion and fluid resuscitation  Anion gap closed  Will transition to Lantus/SSI regimen  · Start diabetic diet  2  DM Type II with diabetic neuropathy  · Treatment as above  Goal to maintain -180  · Restart home Lyrica  3  JULIO on presumed CKD with unknown baseline creatinine  · Creatinine improved after fluid resuscitation  Continue to trend renal indices an monitor intake and output  4  Neurogenic bladder s/p chronic suprapubic catheter with history of frequent UTIs vs chronic colonization  · Continue empiric ceftriaxone, currently d#2, for now while awaiting urine cultures  · Follow temps and WBC count  · Pt refused blood cultures  5  Vaginal pain/discharge  · GYN consult pending  6  Hyperlipidemia  · Continue home statin  7  Acute R eye vision loss with hx of known cataract  8  Current everyday smoker  · Encourage complete cessation  · Continue nicotine patch    _____________________________________________________________________    HPI/24hr events:   Afebrile  No acute events overnight      Medications:    Current Facility-Administered Medications:  acetaminophen 650 mg Oral Q6H PRN Colvin Corwin Spatzer, CRNP    aspirin 81 mg Oral Daily Colvin Corwin Spatzer, CRNP    atorvastatin 40 mg Oral Daily [employer] Spatzer, CRNP    cefTRIAXone 1,000 mg Intravenous Q24H Colvin Corwin Spatzer, CRNP Last Rate: Stopped (05/25/18 0000)   chlorhexidine 15 mL Swish & Spit Q12H [facility] Colvin Corwin Spatzer, CRNP    dextrose 5 % and sodium chloride 0 45 % 125 mL/hr Intravenous Continuous Colvin Corwin Spatzer, CRNP Last Rate: 125 mL/hr (05/25/18 0558)   heparin (porcine) 5,000 Units Subcutaneous [facility] Colvin Corwin Spatzer, CRNP    influenza vaccine 0 5 mL Intramuscular Prior to discharge Rolando Hunter MD    insulin glargine 7 Units Subcutaneous HS Timothy Louder Spatzer, CRNP    insulin lispro 1-6 Units Subcutaneous TID AC Timothy Louder Spatzer, CRNP    insulin lispro 1-6 Units Subcutaneous HS Timothy Louder Spatzer, CRNP    nicotine 21 mg Transdermal Daily Edgard oTbias, CRNP    nortriptyline 50 mg Oral Daily Timothy Louder Spatzer, CRNP    traMADol 50 mg Oral Q6H PRN Timothy Louder Spatzer, CRNP          dextrose 5 % and sodium chloride 0 45 % 125 mL/hr Last Rate: 125 mL/hr (05/25/18 0558)         Physical exam:  Vitals: Body mass index is 32 06 kg/m²  Blood pressure 135/73, pulse 92, temperature 98 5 °F (36 9 °C), temperature source Temporal, resp  rate (!) 11, height 5' 3" (1 6 m), weight 82 1 kg (181 lb), SpO2 97 %  ,  Temp  Min: 98 1 °F (36 7 °C)  Max: 98 5 °F (36 9 °C)  IBW: 52 4 kg    SpO2: 97 %  SpO2 Activity: At Rest  O2 Device: None (Room air)      Intake/Output Summary (Last 24 hours) at 05/25/18 1555  Last data filed at 05/25/18 0600   Gross per 24 hour   Intake          2247 95 ml   Output              150 ml   Net          2097 95 ml       Invasive/non-invasive ventilation settings:   Respiratory    Lab Data (Last 4 hours)    None         O2/Vent Data (Last 4 hours)    None              Invasive Devices     Peripheral Intravenous Line            Peripheral IV 05/24/18 Left Arm less than 1 day          Drain            Suprapubic Catheter 1 day                  Physical Exam:  Gen: Awake, alert, appropriate, no acute distress  HEENT: atraumatic, normocephalic, EOMI, R eye vision loss, L eye blurred vision,   Neck: Supple, trachea midline, no JVD, no lymphadenopathy  Chest: clear to auscultation b/l, no wheeze, rales or rhonchi  Cor: Single S1/S2, no m/r/g, RRR  Abd: soft, nontender, nondistended, BS+  Ext: no edema, no clubbing or cyanosis  Neuro: oriented x2, CN II-XII grossly intact, no focal deficits  Skin: warm, dry      Diagnostic Data:  Lab: I have personally reviewed pertinent lab results     CBC:     Results from last 7 days  Lab Units 05/24/18 2006   PLATELETS Thousands/uL 255       CMP:     Results from last 7 days  Lab Units 05/25/18 0410 05/24/18 2317 05/24/18 2006   SODIUM mmol/L 148* 149* 143   POTASSIUM mmol/L 3 6 3 1* 4 3   CHLORIDE mmol/L 111* 111* 108   CO2 mmol/L 28 28 29   BUN mg/dL 26* 28* 30*   CREATININE mg/dL 1 50* 1 65* 1 80*   CALCIUM mg/dL 8 9 9 2 9 3   GLUCOSE RANDOM mg/dL 149* 126 336*     PT/INR:   No results found for: PT, INR,   Magnesium:     Results from last 7 days  Lab Units 05/25/18 0410 05/24/18 2317 05/24/18 2006   MAGNESIUM mg/dL 2 0 2 1 2 2     Phosphorous:     Results from last 7 days  Lab Units 05/25/18 0410 05/24/18 2317 05/24/18 2006   PHOSPHORUS mg/dL 3 4 3 0 3 0       Microbiology:  Urine culture - pending    Imaging:  No new imaging    Cardiac lab/EKG/telemetry/ECHO:   Sinus rhythm to sinus tachycardia on telemetry    VTE Prophylaxis: Heparin, SCDs    Code Status: Level 1 - Full Code    Renard Chroman Spatzer, CRNP    Portions of the record may have been created with voice recognition software  Occasional wrong word or "sound a like" substitutions may have occurred due to the inherent limitations of voice recognition software  Read the chart carefully and recognize, using context, where substitutions have occurred

## 2018-05-25 NOTE — PLAN OF CARE
Problem: Potential for Falls  Goal: Patient will remain free of falls  INTERVENTIONS:  - Assess patient frequently for physical needs  -  Identify cognitive and physical deficits and behaviors that affect risk of falls  -  [place] fall precautions as indicated by assessment   - Educate patient/family on patient safety including physical limitations  - Instruct patient to call for assistance with activity based on assessment  - Modify environment to reduce risk of injury  - Consider OT/PT consult to assist with strengthening/mobility   Outcome: Progressing      Problem: Nutrition/Hydration-ADULT  Goal: Nutrient/Hydration intake appropriate for improving, restoring or maintaining nutritional needs  Monitor and assess patient's nutrition/hydration status for malnutrition (ex- brittle hair, bruises, dry skin, pale skin and conjunctiva, muscle wasting, smooth red tongue, and disorientation)  Collaborate with interdisciplinary team and initiate plan and interventions as ordered  Monitor patient's weight and dietary intake as ordered or per policy  Utilize nutrition screening tool and intervene per policy  Determine patient's food preferences and provide high-protein, high-caloric foods as appropriate       INTERVENTIONS:  - Monitor oral intake, urinary output, labs, and treatment plans  - Assess nutrition and hydration status and recommend course of action  - Evaluate amount of meals eaten  - Assist patient with eating if necessary   - Allow adequate time for meals  - Recommend/ encourage appropriate diets, oral nutritional supplements, and vitamin/mineral supplements  - Order, calculate, and assess calorie counts as needed  - Recommend, monitor, and adjust tube feedings and TPN/PPN based on assessed needs  - Assess need for intravenous fluids  - Provide specific nutrition/hydration education as appropriate  - Include patient/family/caregiver in decisions related to nutrition   Outcome: Progressing      Problem: GASTROINTESTINAL - ADULT  Goal: Minimal or absence of nausea and/or vomiting  INTERVENTIONS:  - Administer IV fluids as ordered to ensure adequate hydration  - Maintain NPO status until nausea and vomiting are resolved  - Nasogastric tube as ordered  - Administer ordered antiemetic medications as needed  - Provide nonpharmacologic comfort measures as appropriate  - Advance diet as tolerated, if ordered  - Nutrition services referral to assist patient with adequate nutrition and appropriate food choices   Outcome: Progressing      Problem: METABOLIC, FLUID AND ELECTROLYTES - ADULT  Goal: Electrolytes maintained within normal limits  INTERVENTIONS:  - Monitor labs and assess patient for signs and symptoms of electrolyte imbalances  - Administer electrolyte replacement as ordered  - Monitor response to electrolyte replacements, including repeat lab results as appropriate  - Instruct patient on fluid and nutrition as appropriate   Outcome: Progressing    Goal: Glucose maintained within target range  INTERVENTIONS:  - Monitor Blood Glucose as ordered  - Assess for signs and symptoms of hyperglycemia and hypoglycemia  - Administer ordered medications to maintain glucose within target range  - Assess nutritional intake and initiate nutrition service referral as needed   Outcome: Progressing

## 2018-05-25 NOTE — PROGRESS NOTES
Attempted to obtain blood cultures  She refused to have the labs drawn  Blood cultures not completed

## 2018-05-25 NOTE — CONSULTS
Consultation - Gynecology   Kwame Hamm [age] [address] y o  female MRN: [id number]  Unit/Bed#: ICU 13 Encounter: [id number]    No chief complaint on file  History of Present Illness   Physician Requesting Consult: Amita Wetzel DO  Reason for Consult / Principal Problem: Vaginal discharge  Subspeciality: General GYN  HPI: Kwame Hamm is a [age] [address] o  year old female who  Was admitted on May 24th for a KI in the setting of UTI  The patient has multiple comorbidities which are currently being treated by primary team   We were consulted for vaginal discharge  Consults    Review of Systems   Eyes:        Right eye blurry vision, history of  cataract   Respiratory: Negative  Cardiovascular: Negative  Gastrointestinal: Negative  Genitourinary: Positive for vaginal discharge and vaginal pain  Negative for pelvic pain and vaginal bleeding     Neurological:        Neurogenic bladder       Historical Information   Past Medical History:   Diagnosis Date    Chronic kidney disease 5/24/2018    Diabetes mellitus ([address] )     Dyslipidemia 5/24/2018    History of frequent urinary tract infections     HTN (hypertension) 5/24/2018    Hyperlipidemia     Neuropathy      Past Surgical History:   Procedure Laterality Date    SUPRAPUBIC CATHETER INSERTION       OB History   No data available     Family History   Problem Relation Age of Onset    Family history unknown: Yes     Social History   History   Alcohol Use No     History   Drug Use No     History   Smoking Status    Current Every Day Smoker    Packs/day: 1 00    Years: 40 00    Types: Cigarettes   Smokeless Tobacco    Never Used       Meds/Allergies   Current Facility-Administered Medications   Medication Dose Route Frequency    acetaminophen (TYLENOL) tablet 650 mg  650 mg Oral Q6H PRN    aspirin (ECOTRIN LOW STRENGTH) EC tablet 81 mg  81 mg Oral Daily    atorvastatin (LIPITOR) tablet 40 mg  40 mg Oral Daily With Dinner    chlorhexidine (PERIDEX) 0 12 % oral rinse 15 mL  15 mL Swish & Spit Q12H [address]    dextrose 5 % and sodium chloride 0 45 % infusion  125 mL/hr Intravenous Continuous    heparin (porcine) subcutaneous injection 5,000 Units  5,000 Units Subcutaneous Q8H [address]    influenza inactivated quadrivalent vaccine (FLULAVAL) IM injection 0 5 mL  0 5 mL Intramuscular Prior to discharge    insulin glargine (LANTUS) subcutaneous injection 7 Units 0 07 mL  7 Units Subcutaneous HS    insulin lispro (HumaLOG) 100 units/mL subcutaneous injection 1-6 Units  1-6 Units Subcutaneous TID AC    insulin lispro (HumaLOG) 100 units/mL subcutaneous injection 1-6 Units  1-6 Units Subcutaneous HS    insulin regular (HumuLIN R,NovoLIN R) injection 4 Units  4 Units Subcutaneous Once    nicotine (NICODERM CQ) 21 mg/24 hr TD 24 hr patch 21 mg  21 mg Transdermal Daily    nortriptyline (PAMELOR) capsule 50 mg  50 mg Oral Daily    traMADol (ULTRAM) tablet 50 mg  50 mg Oral Q6H PRN         No Known Allergies    Objective   Vitals: Blood pressure 135/73, pulse 92, temperature 98 °F (36 7 °C), temperature source Temporal, resp  rate (!) 11, height 5' 3" (1 6 m), weight 82 1 kg (181 lb), SpO2 97 %  Body mass index is 32 06 kg/m²  Intake/Output Summary (Last 24 hours) at 05/25/18 1027  Last data filed at 05/25/18 7645   Gross per 24 hour   Intake          2366 26 ml   Output              150 ml   Net          2216 26 ml       Invasive Devices     Peripheral Intravenous Line            Peripheral IV 05/24/18 Left Arm less than 1 day          Drain            Suprapubic Catheter 1 day                Physical Exam   Genitourinary: Vagina normal    Genitourinary Comments:  Atrophied  vaginal mucosa consistent with postmenopausal changes, no lesions ulcers or boils found  Normal looking cervix, no bleeding     positive whiff test and minimal grayish discharge, consistent with bacterial vaginosis       Lab Results:   Admission on 05/24/2018   Component Date Value    POC Glucose 05/24/2018 >500*    Platelets 83/39/6520 255     MPV 05/24/2018 11 7     Color, UA 05/24/2018 Yellow     Clarity, UA 05/24/2018 Cloudy     Specific Gravity, UA 05/24/2018 1 020     pH, UA 05/24/2018 5 5     Leukocytes, UA 05/24/2018 Small*    Nitrite, UA 05/24/2018 Positive*    Protein, UA 05/24/2018 >=300*    Glucose, UA 05/24/2018 >=1000 (1%)*    Ketones, UA 05/24/2018 Negative     Urobilinogen, UA 05/24/2018 0 2     Bilirubin, UA 05/24/2018 Negative     Blood, UA 05/24/2018 Moderate*    Sodium 05/24/2018 143     Potassium 05/24/2018 4 3     Chloride 05/24/2018 108     CO2 05/24/2018 29     Anion Gap 05/24/2018 6     BUN 05/24/2018 30*    Creatinine 05/24/2018 1 80*    Glucose 05/24/2018 336*    Calcium 05/24/2018 9 3     eGFR 05/24/2018 31     Magnesium 05/24/2018 2 2     Phosphorus 05/24/2018 3 0     Calcium, Ionized 05/24/2018 1 13     Sodium 05/24/2018 149*    Potassium 05/24/2018 3 1*    Chloride 05/24/2018 111*    CO2 05/24/2018 28     Anion Gap 05/24/2018 10     BUN 05/24/2018 28*    Creatinine 05/24/2018 1 65*    Glucose 05/24/2018 126     Calcium 05/24/2018 9 2     eGFR 05/24/2018 35     Magnesium 05/24/2018 2 1     Phosphorus 05/24/2018 3 0     Calcium, Ionized 05/24/2018 1 15     POC Glucose 05/24/2018 358*    RBC, UA 05/24/2018 Field obscured, unable to enumerate*    WBC, UA 05/24/2018 Innumerable*    Epithelial Cells 05/24/2018 Field obscured, unable to enumerate*    Bacteria, UA 05/24/2018 Innumerable*    OTHER OBSERVATIONS 05/24/2018 Yeast Cells Present     POC Glucose 05/24/2018 335*    POC Glucose 05/24/2018 209*    POC Glucose 05/24/2018 144*    POC Glucose 05/25/2018 132     Sodium 05/25/2018 148*    Potassium 05/25/2018 3 6     Chloride 05/25/2018 111*    CO2 05/25/2018 28     Anion Gap 05/25/2018 9     BUN 05/25/2018 26*    Creatinine 05/25/2018 1 50*    Glucose 05/25/2018 149*    Calcium 05/25/2018 8 9     eGFR 05/25/2018 39     Magnesium 2018 2 0     Phosphorus 2018 3 4     Calcium, Ionized 2018 1 15     POC Glucose 2018 118     POC Glucose 2018 151*    POC Glucose 2018 154*    POC Glucose 2018 127     POC Glucose 2018 292*     Imaging Studies: I have personally reviewed pertinent reports  EKG, Pathology, and Other Studies: I have personally reviewed pertinent reports  Assessment/Plan     Assessment:    57-year-old  postmenopausal patient with history of last Pap smear 1 year ago at [place] (per patient)  Admitted to the ICU for DKA on the setting of UTI With vaginal discharge  Consistent with bacterial vaginosis  Plan:  DKA: Resolved per primary care   UTI:  The be secondary to history of neurogenic bladder:Treated with ceftriaxone, for primary care   Vaginal discharge with positive with test:  Will start Flagyl 500 mg  b I d  For 7 days  HTN:  Blood  pressure medication held given JULIO,  For primary care team     will sign off, recommend patient to follow up as an outpatient in the [facility] whenever discharged    Code Status: Level 1 - Full Code  Advance Directive and Living Will:      Power of :    POLST:      Counseling / Coordination of Care  Total floor / unit time spent today15 minutes  minutes  Greater than 50% of total time was spent with the patient and / or family counseling and / or coordination of care   A description of the counseling / coordination of care:

## 2018-05-25 NOTE — CASE MANAGEMENT
Initial Clinical Review    Admission: Date/Time/Statement: 5/24/18 @ 1744     Orders Placed This Encounter   Procedures    Inpatient Admission     Standing Status:   Standing     Number of Occurrences:   1     Order Specific Question:   Admitting Physician     Answer:   Christiano Spence [[id number]]     Order Specific Question:   Level of Care     Answer:   Critical Care [15]     Order Specific Question:   Estimated length of stay     Answer:   More than 2 Midnights     Order Specific Question:   Certification     Answer:   I certify that inpatient services are medically necessary for this patient for a duration of greater than two midnights  See H&P and MD Progress Notes for additional information about the patient's course of treatment  ED: Date/Time/Mode of Arrival:   ED Arrival Information     Patient not seen in ED                       Chief Complaint: No chief complaint on file  History of Illness:    Bryant Azul is a 52 y o  female who is a poor historian with a significant PMH of diabetes type 2 with neuropathy, hyperlipidemia, CKD hypertension, neurogenic bladder s/p suprapubic catheter with chronic urinary tract infections who presented to [facility] this afternoon with 3 days of a headache, nausea, and with right eye vision loss and vomiting over the last 24 hours  She was found to have a blood glucose of 912 with an anion gap of 16  Related to the hyperglycemia and loss of right eye vision, she was transferred to [facility] ICU for further management and evaluation  At [facility], she a head CT that did not show any acute intercranial hemorrhage or mass related to her right eye vision loss  Her UA showed + ketones, + protein, WBC 8 9, A febrile, with a lactic acid of 0 8  The team at [facility] wanted opthalmology consulted following the negative head CT and she was transferred to [facility]                On arrival, she endorses a headache, right eye vision change, and nausea as well as bilateral hand numbness and bilateral foot pain  She says that she has not been eating over the last 2 days and has been vomiting since last night but was still taking her lantus  She complains of vagina pain and discharge which started 3 days ago  She denies CP, palpations, SOB, or cough  She is an active smoker for 40 years with who use to smoke 1ppd but has recently decreased to 5 cigarettes per day  Of note, Ms Theresa Merrill was recently hospitalized a month ago for about a week per patient for a urinary tract infection and has a chronic subpubic catheter in place  ED Vital Signs:   ED Triage Vitals   Temperature Pulse Respirations Blood Pressure SpO2   05/24/18 1755 05/24/18 1755 05/24/18 1755 05/24/18 1755 05/24/18 1755   98 2 °F (36 8 °C) 102 16 116/75 98 %      Temp Source Heart Rate Source Patient Position - Orthostatic VS BP Location FiO2 (%)   05/24/18 1755 05/24/18 1755 -- -- --   Temporal Monitor         Pain Score       05/24/18 1808       9        Wt Readings from Last 1 Encounters:   05/24/18 82 1 kg (181 lb)       Vital Signs (abnormal):    above    Abnormal Labs/Diagnostic Test Results: BUN/Creat     30/1 80  BS   336  u/A     sm leukocyte    + nitrite     > 300 protein    >   1000   Glucose     Mod  blood    ED Treatment:   Medication Administration - No Administrations Displayed (No Start Event Found)     None          Past Medical/Surgical History:    Active Ambulatory Problems     Diagnosis Date Noted    No Active Ambulatory Problems     Resolved Ambulatory Problems     Diagnosis Date Noted    No Resolved Ambulatory Problems     Past Medical History:   Diagnosis Date    Chronic kidney disease 5/24/2018    Diabetes mellitus ([address] )     Dyslipidemia 5/24/2018    History of frequent urinary tract infections     HTN (hypertension) 5/24/2018    Hyperlipidemia     Neuropathy        Admitting Diagnosis: Hyperglycemia [R73 9]    Age/Sex: [address] y o  female    Assessment/Plan: Assessment/Plan:  1  Diabetic ketoacidosis with history of type 2 diabetes vs HHNK  · Patient will require a stay greater than two midnights  · Arrived to OSH with a serum blood glucose of 912, anion gap of 16 and a UA with +ketones  · WBC 8 6, lactic acid 0 8, afebrile  · Insulin drip ordered, currently at 7 units/hr with IVF   · BMP ordered Q4 hour, monitor for gap closure   · Blood culture ordered  · UA pending   2  JULIO on presumed CKD with unknown staging  · Unknown baseline, creatinine at OSH was 1 8  · IVF per DKA protocol   · Trend BMP  · Avoid nephrotoxic medications  3  Acute right eye vision loss with known cataracts  · C/o right eye vision loss this morning, has a history of cataracts  · Head Ct at [facility] was negative  · Transferred for an opthalmology consulted  4  Neurogenic Bladder s/p suprapubic catheter with chronic UTI  · Recently treated for UTI 2 weeks ago   · UA abnormal on arrival, could represent chronic colonization but started on ceftriaxone 1g daily while awaiting UC  5  Diabetes type 2 with diabetic neuropathy  · Takes lyrica at home, held at this time  · On 7 units of lantus at home per patient  6  New onset vaginal tenderness and pain  · Patient c/o 3 days of vaginal pain and tenderness around labia  · No redness or swelling  · Gynecology consulted  7  Hyperlipidemia   · Continued on home Lipitor  8  Hypertension  ? Lisinopril held in the setting of JULIO  ? -110s on admission   9   Current tobacco use  · Nicotine patch ordered  · Smoking cessation education     _____________________________________________________________________      Admission Orders:   IP     5/24  @    6494  Scheduled Meds:   Current Facility-Administered Medications:  acetaminophen 650 mg Oral Q6H PRN Suzzanna Ishikawa Spatzer, CRNP    aspirin 81 mg Oral Daily Suzzanna Ishikawa Spatzer, CRNP    atorvastatin 40 mg Oral Daily With Shari Nestle Spatzer, CRNP    cefTRIAXone 1,000 mg Intravenous Q24H OMAYRA Hernandez Last Rate: Stopped (05/25/18 0000)   chlorhexidine 15 mL Swish & Spit Q12H [facility] Graciela Perks Spatzer, CRNP    dextrose 5 % and sodium chloride 0 45 % 125 mL/hr Intravenous Continuous Graciela Perks Spatzer, CRNP Last Rate: Stopped (05/25/18 6389)   heparin (porcine) 5,000 Units Subcutaneous [facility] Graciela Perks Spatzer, CRNP    influenza vaccine 0 5 mL Intramuscular Prior to discharge Ty Gutiérrez MD    insulin glargine 7 Units Subcutaneous HS Graciela Perks Spatzer, CRNP    insulin lispro 1-6 Units Subcutaneous TID AC Graciela Perks Spatzer, CRNP    insulin lispro 1-6 Units Subcutaneous HS Graciela Perks Spatzer, CRNP    nicotine 21 mg Transdermal Daily Edgard TobiasOMAYRA    nortriptyline 50 mg Oral Daily Graciela Perks Spatzer, CRNP    traMADol 50 mg Oral Q6H PRN Graciela Perks Spatzer, CRNP      Continuous Infusions:   dextrose 5 % and sodium chloride 0 45 % 125 mL/hr Last Rate: Stopped (05/25/18 0655)     PRN Meds:   acetaminophen    influenza vaccine    traMADol     BC  Cons neuro  Cons  OB/GYN  CCD diet  Insulin  Drip -  Now  D/c    Progress  Note    5/25  1  DKA vs HHNK with known hx of DM Type II  · Resolved with insulin infusion and fluid resuscitation  Anion gap closed  Will transition to Lantus/SSI regimen  · Start diabetic diet  2  DM Type II with diabetic neuropathy  · Treatment as above  Goal to maintain -180  · Restart home Lyrica  3  JULIO on presumed CKD with unknown baseline creatinine  · Creatinine improved after fluid resuscitation  Continue to trend renal indices an monitor intake and output  4  Neurogenic bladder s/p chronic suprapubic catheter with history of frequent UTIs vs chronic colonization  · Continue empiric ceftriaxone, currently d#2, for now while awaiting urine cultures  · Follow temps and WBC count  · Pt refused blood cultures  5  Vaginal pain/discharge  · GYN consult pending  6  Hyperlipidemia  · Continue home statin  7  Acute R eye vision loss with hx of known cataract  8   Current everyday smoker  · Encourage complete cessation  · Continue nicotine patch       Thank you,  [address]  [facility] the [facility] by Javi Gil for 2017  Network Utilization Review Department  Phone: [phone]; Fax [phone]  ATTENTION: The Network Utilization Review Department is now centralized for our 7 Facilities  Make a note that we have a new phone and fax numbers for our Department  Please call with any questions or concerns to [phone] and carefully follow the prompts so that you are directed to the right person  All voicemails are confidential  Fax any determinations, approvals, denials, and requests for initial or continue stay review clinical to [phone]  Due to HIGH CALL volume, it would be easier if you could please send faxed requests to expedite your requests and in part, help us provide discharge notifications faster

## 2018-05-26 PROBLEM — E11.42 DIABETIC PERIPHERAL NEUROPATHY (HCC): Status: ACTIVE | Noted: 2018-05-26

## 2018-05-26 LAB
BACTERIA UR CULT: ABNORMAL
GLUCOSE SERPL-MCNC: 386 MG/DL (ref 65–140)
GLUCOSE SERPL-MCNC: 400 MG/DL (ref 65–140)
GLUCOSE SERPL-MCNC: 407 MG/DL (ref 65–140)
GLUCOSE SERPL-MCNC: 450 MG/DL (ref 65–140)

## 2018-05-26 PROCEDURE — 82948 REAGENT STRIP/BLOOD GLUCOSE: CPT

## 2018-05-26 PROCEDURE — 99232 SBSQ HOSP IP/OBS MODERATE 35: CPT | Performed by: INTERNAL MEDICINE

## 2018-05-26 RX ORDER — INSULIN GLARGINE 100 [IU]/ML
20 INJECTION, SOLUTION SUBCUTANEOUS
Status: DISCONTINUED | OUTPATIENT
Start: 2018-05-26 | End: 2018-05-26

## 2018-05-26 RX ORDER — INSULIN GLARGINE 100 [IU]/ML
30 INJECTION, SOLUTION SUBCUTANEOUS
Status: DISCONTINUED | OUTPATIENT
Start: 2018-05-26 | End: 2018-05-27

## 2018-05-26 RX ADMIN — INSULIN LISPRO 6 UNITS: 100 INJECTION, SOLUTION INTRAVENOUS; SUBCUTANEOUS at 16:53

## 2018-05-26 RX ADMIN — ASPIRIN 81 MG: 81 TABLET, COATED ORAL at 08:45

## 2018-05-26 RX ADMIN — LISINOPRIL 5 MG: 5 TABLET ORAL at 08:45

## 2018-05-26 RX ADMIN — METRONIDAZOLE 500 MG: 500 TABLET ORAL at 08:45

## 2018-05-26 RX ADMIN — PIPERACILLIN SODIUM,TAZOBACTAM SODIUM 2.25 G: 2; .25 INJECTION, POWDER, FOR SOLUTION INTRAVENOUS at 23:43

## 2018-05-26 RX ADMIN — HEPARIN SODIUM 5000 UNITS: 5000 INJECTION, SOLUTION INTRAVENOUS; SUBCUTANEOUS at 13:18

## 2018-05-26 RX ADMIN — NICOTINE 21 MG: 21 PATCH, EXTENDED RELEASE TRANSDERMAL at 08:46

## 2018-05-26 RX ADMIN — NORTRIPTYLINE HYDROCHLORIDE 50 MG: 50 CAPSULE ORAL at 13:17

## 2018-05-26 RX ADMIN — INSULIN GLARGINE 30 UNITS: 100 INJECTION, SOLUTION SUBCUTANEOUS at 21:29

## 2018-05-26 RX ADMIN — CHLORHEXIDINE GLUCONATE 15 ML: 1.2 RINSE ORAL at 08:46

## 2018-05-26 RX ADMIN — INSULIN LISPRO 6 UNITS: 100 INJECTION, SOLUTION INTRAVENOUS; SUBCUTANEOUS at 21:30

## 2018-05-26 RX ADMIN — ATORVASTATIN CALCIUM 40 MG: 40 TABLET, FILM COATED ORAL at 16:54

## 2018-05-26 RX ADMIN — INSULIN LISPRO 6 UNITS: 100 INJECTION, SOLUTION INTRAVENOUS; SUBCUTANEOUS at 13:17

## 2018-05-26 RX ADMIN — HEPARIN SODIUM 5000 UNITS: 5000 INJECTION, SOLUTION INTRAVENOUS; SUBCUTANEOUS at 06:03

## 2018-05-26 RX ADMIN — HEPARIN SODIUM 5000 UNITS: 5000 INJECTION, SOLUTION INTRAVENOUS; SUBCUTANEOUS at 21:27

## 2018-05-26 RX ADMIN — INSULIN LISPRO 6 UNITS: 100 INJECTION, SOLUTION INTRAVENOUS; SUBCUTANEOUS at 08:45

## 2018-05-26 RX ADMIN — METRONIDAZOLE 500 MG: 500 TABLET ORAL at 21:29

## 2018-05-26 NOTE — PROGRESS NOTES
[facility] Progress Note  Patient: Teresita Kaminski 52 y o  female   MRN: [id number]  PCP: No primary care provider on file  Unit/Bed#: E5 -01 Encounter: [id number]  Date Of Visit: 05/26/18    Assessment:    Principal Problem:    DKA (diabetic ketoacidoses) ([place] [address] )  Active Problems:    JULIO (acute kidney injury) ([place] [address] )    Chronic kidney disease    HTN (hypertension)    Dyslipidemia    Diabetic peripheral neuropathy ([place] [address] )      Plan:    · Diabetic ketoacidosis initially admitted to ICU for insulin drip now closed gap but with persisting blood sugar elevations she had been on outpatient course of 7 units of Lantus insulin which is apparently not near enough and based on her weight should be on at least 30 at 40 units of Lantus daily she had been on 36 units of Lantus in the past with since and has lost some weight I will start at 30 units q h s  ideally should be also mealtime Humalog but seems resistant to that  Will watch another 24 hours on this and presume can discharge in a m  · Chronic kidney disease stage 3 and after hydration and treatment of her DKA creatinine at 1 5 which presumed baseline avoid nephrotoxins probably has underlying diabetic nephropathy  · Diabetic peripheral neuropathy care apparently has little results with usage of either gabapentin or Lyrica in past unclear what titration schedule has been on that however  · Vaginal infection with discharge being treated with metronidazole by gynecology service to complete a 7 day course total and [facility]  · Right eye cataract needs to see a ophthalmologist as outpatient through her insurance plan  · Hyperlipidemia would reassess this after being compliant with statin atorvastatin in 1 month      VTE Pharmacologic Prophylaxis:   Pharmacologic: Heparin  Mechanical VTE Prophylaxis in Place: Yes    Discussions with Specialists or Other Care Team Provider:     Time Spent for Care: 45 minutes    More than 50% of total time spent on counseling and coordination of care as described above  Subjective:   Main complaint in relation to peripheral neuropathy type pain in tingling little result with Lyrica that she used in the past and presently remains    Objective:     Vitals:   Temp (24hrs), Av 6 °F (36 4 °C), Min:97 °F (36 1 °C), Max:98 8 °F (37 1 °C)    HR:  [] 99  Resp:  [15-18] 18  BP: (130-181)/() 147/93  SpO2:  [97 %-100 %] 100 %  Body mass index is 32 06 kg/m²  Input and Output Summary (last 24 hours): Intake/Output Summary (Last 24 hours) at 18 1051  Last data filed at 18 0953   Gross per 24 hour   Intake              180 ml   Output             1650 ml   Net            -1470 ml       Physical Exam:     Physical Exam:   General appearance: alert, appears stated age and cooperative  Head: Normocephalic, without obvious abnormality, atraumatic  Lungs: clear to auscultation bilaterally  Heart: regular rate and rhythm  Abdomen: soft, non-tender; bowel sounds normal; no masses,  no organomegaly  Back: negative  Extremities: extremities normal, atraumatic, no cyanosis or edema  Neurologic: Grossly normal      Additional Data:     Labs:      Results from last 7 days  Lab Units 18   PLATELETS Thousands/uL 255       Results from last 7 days  Lab Units 18  0410   SODIUM mmol/L 148*   POTASSIUM mmol/L 3 6   CHLORIDE mmol/L 111*   CO2 mmol/L 28   BUN mg/dL 26*   CREATININE mg/dL 1 50*   CALCIUM mg/dL 8 9   GLUCOSE RANDOM mg/dL 149*           * I Have Reviewed All Lab Data Listed Above  * Additional Pertinent Lab Tests Reviewed: [address] [facility] Admission Reviewed    Imaging:  No results found  Imaging Reports Reviewed Today Include:   Imaging Personally Reviewed by Myself Includes:    No results found  Recent Cultures (last 7 days):       Results from last 7 days  Lab Units 18   URINE CULTURE  Culture results to follow         Last  Hours Medication List:     Current Facility-Administered Medications:  acetaminophen 650 mg Oral Q6H PRN Nilsa Gales Spatzer, CRNP   aspirin 81 mg Oral Daily Nilsa Gales Spatzer, CRNP   atorvastatin 40 mg Oral Daily With [employer] Spatzer, CRNP   chlorhexidine 15 mL Swish & Spit Q12H [facility] Nilsa Gales Spatzer, CRNP   heparin (porcine) 5,000 Units Subcutaneous Q8H [place], CRNP   influenza vaccine 0 5 mL Intramuscular Prior to discharge Cristin Jeffers MD   insulin glargine 30 Units Subcutaneous HS Jean-Paul Kapadia MD   insulin lispro 1-6 Units Subcutaneous TID AC Robert W Spatzer, CRNP   insulin lispro 1-6 Units Subcutaneous HS Nilsa Gales Spatzer, CRNP   lisinopril 5 mg Oral Daily OMAYRA Hutchinson   metroNIDAZOLE 500 mg Oral Q12H [facility] [place] HOME Lucas Bae   nicotine 21 mg Transdermal Daily Charles Sonday, OMAYRA   nortriptyline 50 mg Oral Daily Nilsa Gales Spatzer, CRNP   pregabalin 50 mg Oral BID Nilsa Gales Spatzer, CRNP   traMADol 50 mg Oral Q6H PRN OMAYRA [place]        Today, Patient Was Seen By: Jean-Paul Kapadia MD    ** Please Note: Dragon 360 Dictation voice to text software may have been used in the creation of this document   **

## 2018-05-26 NOTE — PLAN OF CARE
GASTROINTESTINAL - ADULT     Minimal or absence of nausea and/or vomiting Progressing        METABOLIC, FLUID AND ELECTROLYTES - ADULT     Electrolytes maintained within normal limits Progressing     Glucose maintained within target range Progressing        Nutrition/Hydration-ADULT     Nutrient/Hydration intake appropriate for improving, restoring or maintaining nutritional needs Progressing        Potential for Falls     Patient will remain free of falls Progressing

## 2018-05-27 LAB
GLUCOSE SERPL-MCNC: 272 MG/DL (ref 65–140)
GLUCOSE SERPL-MCNC: 352 MG/DL (ref 65–140)
GLUCOSE SERPL-MCNC: 356 MG/DL (ref 65–140)
GLUCOSE SERPL-MCNC: 391 MG/DL (ref 65–140)

## 2018-05-27 PROCEDURE — 82948 REAGENT STRIP/BLOOD GLUCOSE: CPT

## 2018-05-27 PROCEDURE — 99232 SBSQ HOSP IP/OBS MODERATE 35: CPT | Performed by: INTERNAL MEDICINE

## 2018-05-27 PROCEDURE — 99223 1ST HOSP IP/OBS HIGH 75: CPT | Performed by: INTERNAL MEDICINE

## 2018-05-27 RX ORDER — GABAPENTIN 300 MG/1
300 CAPSULE ORAL 3 TIMES DAILY
Status: DISCONTINUED | OUTPATIENT
Start: 2018-05-27 | End: 2018-05-30 | Stop reason: HOSPADM

## 2018-05-27 RX ORDER — INSULIN GLARGINE 100 [IU]/ML
35 INJECTION, SOLUTION SUBCUTANEOUS
Status: DISCONTINUED | OUTPATIENT
Start: 2018-05-27 | End: 2018-05-28

## 2018-05-27 RX ADMIN — INSULIN LISPRO 10 UNITS: 100 INJECTION, SOLUTION INTRAVENOUS; SUBCUTANEOUS at 16:15

## 2018-05-27 RX ADMIN — INSULIN LISPRO 6 UNITS: 100 INJECTION, SOLUTION INTRAVENOUS; SUBCUTANEOUS at 11:54

## 2018-05-27 RX ADMIN — LISINOPRIL 5 MG: 5 TABLET ORAL at 08:16

## 2018-05-27 RX ADMIN — GABAPENTIN 300 MG: 300 CAPSULE ORAL at 21:34

## 2018-05-27 RX ADMIN — PREGABALIN 50 MG: 50 CAPSULE ORAL at 08:16

## 2018-05-27 RX ADMIN — INSULIN LISPRO 6 UNITS: 100 INJECTION, SOLUTION INTRAVENOUS; SUBCUTANEOUS at 16:14

## 2018-05-27 RX ADMIN — GABAPENTIN 300 MG: 300 CAPSULE ORAL at 16:15

## 2018-05-27 RX ADMIN — GABAPENTIN 300 MG: 300 CAPSULE ORAL at 12:25

## 2018-05-27 RX ADMIN — HEPARIN SODIUM 5000 UNITS: 5000 INJECTION, SOLUTION INTRAVENOUS; SUBCUTANEOUS at 05:06

## 2018-05-27 RX ADMIN — INSULIN LISPRO 6 UNITS: 100 INJECTION, SOLUTION INTRAVENOUS; SUBCUTANEOUS at 08:17

## 2018-05-27 RX ADMIN — CHLORHEXIDINE GLUCONATE 15 ML: 1.2 RINSE ORAL at 08:16

## 2018-05-27 RX ADMIN — ASPIRIN 81 MG: 81 TABLET, COATED ORAL at 08:16

## 2018-05-27 RX ADMIN — HEPARIN SODIUM 5000 UNITS: 5000 INJECTION, SOLUTION INTRAVENOUS; SUBCUTANEOUS at 21:34

## 2018-05-27 RX ADMIN — NORTRIPTYLINE HYDROCHLORIDE 50 MG: 50 CAPSULE ORAL at 11:54

## 2018-05-27 RX ADMIN — ATORVASTATIN CALCIUM 40 MG: 40 TABLET, FILM COATED ORAL at 16:15

## 2018-05-27 RX ADMIN — METRONIDAZOLE 500 MG: 500 TABLET ORAL at 21:34

## 2018-05-27 RX ADMIN — HEPARIN SODIUM 5000 UNITS: 5000 INJECTION, SOLUTION INTRAVENOUS; SUBCUTANEOUS at 15:30

## 2018-05-27 RX ADMIN — INSULIN LISPRO 3 UNITS: 100 INJECTION, SOLUTION INTRAVENOUS; SUBCUTANEOUS at 21:35

## 2018-05-27 RX ADMIN — PIPERACILLIN SODIUM,TAZOBACTAM SODIUM 2.25 G: 2; .25 INJECTION, POWDER, FOR SOLUTION INTRAVENOUS at 05:06

## 2018-05-27 RX ADMIN — METRONIDAZOLE 500 MG: 500 TABLET ORAL at 08:16

## 2018-05-27 RX ADMIN — CHLORHEXIDINE GLUCONATE 15 ML: 1.2 RINSE ORAL at 21:34

## 2018-05-27 RX ADMIN — INSULIN LISPRO 10 UNITS: 100 INJECTION, SOLUTION INTRAVENOUS; SUBCUTANEOUS at 12:25

## 2018-05-27 RX ADMIN — NICOTINE 21 MG: 21 PATCH, EXTENDED RELEASE TRANSDERMAL at 08:17

## 2018-05-27 RX ADMIN — INSULIN GLARGINE 35 UNITS: 100 INJECTION, SOLUTION SUBCUTANEOUS at 21:34

## 2018-05-27 NOTE — PROGRESS NOTES
Received lab notification of ESBL  RRNP Ambrose Choi made aware of the results  Antibiotic ordered as per RRNP

## 2018-05-27 NOTE — PROGRESS NOTES
[facility] Progress Note  Patient: Welby Brittle 52 y o  female   MRN: [id number]  PCP: No primary care provider on file  Unit/Bed#: E5 -01 Encounter: [id number]  Date Of Visit: 05/27/18    Assessment:    Principal Problem:    DKA (diabetic ketoacidoses) ([place] [address] )  Active Problems:    JULIO (acute kidney injury) ([place] [address] )    Chronic kidney disease    HTN (hypertension)    Dyslipidemia    Diabetic peripheral neuropathy ([place] [address] )      Plan:    · Diabetic ketoacidosis initially admitted to ICU for insulin drip now closed gap but with persisting blood sugar elevations she had been on outpatient course of 7 units of Lantus insulin which is apparently not near enough and based on her weight should be on at least 30 at 40 units of Lantus daily she had been on 36 units of Lantus in the past with since and has lost some weight I will start at 30 units q h s  ideally should be also mealtime Humalog but seems resistant to that  Blood sugars remain quite elevated in spite of the above and cannot be discharged until we get better control I would add mealtime Humalog in spite of her resistance to it and would welcome Endocrinology input but they are not available until another 48 hr  · Chronic kidney disease stage 3 and after hydration and treatment of her DKA creatinine at 1 5 which presumed baseline avoid nephrotoxins probably has underlying diabetic nephropathy recheck BMP in a m  · ESBL UTI now off Zosyn per ID in presumption of colonization, procalcitonin pending in a m    · Diabetic peripheral neuropathy care apparently has little results with usage of either gabapentin or Lyrica in past unclear what titration schedule has been on that however but also seems to be drug-seeking for narcotics while here  · Vaginal infection with discharge being treated with metronidazole by gynecology service to complete a 7 day course total and follow up with [facility] on discharge  · Right eye cataract needs to see a ophthalmologist as outpatient through her insurance plan  · Hyperlipidemia would reassess this after being compliant with statin atorvastatin in 1 month      VTE Pharmacologic Prophylaxis:   Pharmacologic: Heparin  Mechanical VTE Prophylaxis in Place: Yes    Discussions with Specialists or Other Care Team Provider:     Time Spent for Care: 45 minutes  More than 50% of total time spent on counseling and coordination of care as described above  Subjective:   Main complaint in relation to peripheral neuropathy type pain in tingling little result with Lyrica that she used in the past and presently remains    Objective:     Vitals:   Temp (24hrs), Av 9 °F (36 6 °C), Min:97 7 °F (36 5 °C), Max:98 2 °F (36 8 °C)    HR:  [90-97] 93  Resp:  [18-20] 20  BP: (113-129)/(69-75) 129/72  SpO2:  [96 %-99 %] 96 %  Body mass index is 32 06 kg/m²  Input and Output Summary (last 24 hours): Intake/Output Summary (Last 24 hours) at 18 1147  Last data filed at 18 1100   Gross per 24 hour   Intake              240 ml   Output             1650 ml   Net            -1410 ml       Physical Exam:     Physical Exam:   General appearance: alert, appears stated age and cooperative  Head: Normocephalic, without obvious abnormality, atraumatic  Lungs: clear to auscultation bilaterally  Heart: regular rate and rhythm  Abdomen: soft, non-tender; bowel sounds normal; no masses,  no organomegaly suprapubic catheter  Back: negative  Extremities: extremities normal, atraumatic, no cyanosis or edema  Neurologic: Grossly normal      Additional Data:     Labs:      Results from last 7 days  Lab Units 18   PLATELETS Thousands/uL 255       Results from last 7 days  Lab Units 18  0410   SODIUM mmol/L 148*   POTASSIUM mmol/L 3 6   CHLORIDE mmol/L 111*   CO2 mmol/L 28   BUN mg/dL 26*   CREATININE mg/dL 1 50*   CALCIUM mg/dL 8 9   GLUCOSE RANDOM mg/dL 149*           * I Have Reviewed All Lab Data Listed Above    * Additional Pertinent Lab Tests Reviewed: [facility] Admission Reviewed    Imaging:  No results found  Imaging Reports Reviewed Today Include:   Imaging Personally Reviewed by Myself Includes:    No results found  Recent Cultures (last 7 days):       Results from last 7 days  Lab Units 05/24/18 2023   URINE CULTURE  >100,000 cfu/ml Escherichia coli ESBL*       Last 24 Hours Medication List:     Current Facility-Administered Medications:  acetaminophen 650 mg Oral Q6H PRN Shedrick Osmond Spatzer, CRNP   aspirin 81 mg Oral Daily Shedrick Osmond Spatzer, CRNP   atorvastatin 40 mg Oral Daily With [employer] Spatzer, CRNP   chlorhexidine 15 mL Swish & Spit Q12H [address] Shedrick Osmond Spatzer, CRNP   heparin (porcine) 5,000 Units Subcutaneous Q8H [place], CRNP   influenza vaccine 0 5 mL Intramuscular Prior to discharge Domonique Mcclain MD   insulin glargine 35 Units Subcutaneous HS Shey Doherty MD   insulin lispro 1-6 Units Subcutaneous TID AC Robert W Spatzer, CRNP   insulin lispro 1-6 Units Subcutaneous HS Shedrick Osmond Spatzer, CRNP   lisinopril 5 mg Oral Daily [place] Dutch, OMAYRA   metroNIDAZOLE 500 mg Oral Q12H [address] Indra Ngmary   nicotine 21 mg Transdermal Daily Charles Sonday, OMAYRA   nortriptyline 50 mg Oral Daily Shedrick Osmond Spatzer, CRNP   pregabalin 50 mg Oral BID Shedrick Osmond Spatzer, CRNP   traMADol 50 mg Oral Q6H PRN OMAYRA Melchor        Today, Patient Was Seen By: Shey Doherty MD    ** Please Note: Dragon 360 Dictation voice to text software may have been used in the creation of this document   **

## 2018-05-27 NOTE — CONSULTS
Consultation - Infectious Disease   Glenny Murrell 52 y o  female MRN: [id number]  Unit/Bed#: E5 -01 Encounter: [id number]      IMPRESSION & RECOMMENDATIONS:   1  Positive urine culture-patient appears to have asymptomatic bacteriuria in the setting of a chronic suprapubic catheter  The patient is without any fever or other evidence of an invasive urinary tract infection   -discontinue Zosyn  -monitor off all antibiotics  -will check a CBC with diff and a procalcitonin level tomorrow for completeness  -monitor for any onset of sepsis  -suprapubic catheter drainage    2  Acute kidney injury-with presumed chronic kidney disease  Suspect secondary to pre renal issues in the setting of DKA  The renal function is improving   -monitor the GFR  -no additional ID workup for now    3  Bacterial vaginosis-still symptomatic   -continue Flagyl x7 days   -gyn follow-up    4  Diabetic ketoacidosis-seems to have resolved with insulin drip and fluid resuscitation   -management of glucose as per the primary service    5  Right eye visual loss-in a patient with cataracts and poorly controlled diabetes   -await ophthalmology evaluation    HISTORY OF PRESENT ILLNESS:  Reason for Consult:  UTI  HPI: Glenny Murrell is a 52y o  year old female with a history of diabetes mellitus admitted [facility] [place] after being transferred from [facility] for treatment of DKA who I am asked to assist with management  Patient had recently been admitted to [facility] for UTI and recently discharged  The 23rd of May the patient presented back to [facility] with 3 days of headache, nausea, vomiting, and decreased right eye vision  She was found to have a glucose of 912 with an anion gap is 16  She was transferred to [facility] for further management  She is placed in the intensive care unit on insulin drip    She complained of some vaginal burning and drainage and therefore she was seen by gynecology and started on Flagyl for bacterial vaginosis  She had an abnormal urinalysis in the setting of a chronic suprapubic catheter and therefore she was started on ceftriaxone for possible UTI  During the patient's brief hospital stay she has remained afebrile and hemodynamically stable  She is feeling better overall but still has some vaginal burning  She has now been moved out of the intensive care unit  Her urine culture came back positive for an ESBL E coli and therefore her antibiotics were changed to Zosyn  She denies any current headache or stiff neck, denies any nausea vomiting or diarrhea, denies any cough or shortness of breath, denies any new rash or skin lesions  REVIEW OF SYSTEMS:  A complete 12 point system-based review of systems is otherwise negative  PAST MEDICAL HISTORY:  Past Medical History:   Diagnosis Date    Chronic kidney disease 2018    Diabetes mellitus ([address] )     Dyslipidemia 2018    History of frequent urinary tract infections     HTN (hypertension) 2018    Hyperlipidemia     Neuropathy      Past Surgical History:   Procedure Laterality Date    SUPRAPUBIC CATHETER INSERTION         FAMILY HISTORY:  Non-contributory    SOCIAL HISTORY:  Social History   History   Alcohol Use No     History   Drug Use No     History   Smoking Status    Current Every Day Smoker    Packs/day: 1 00    Years: 40 00    Types: Cigarettes   Smokeless Tobacco    Never Used       ALLERGIES:  No Known Allergies    MEDICATIONS:  All current active medications have been reviewed    Antibiotics:  Zosyn 2, Flagyl 3, total antibiotics 4    PHYSICAL EXAM:  HR:  [90-99] 90  Resp:  [18] 18  BP: (113-147)/(69-93) 122/75  SpO2:  [99 %-100 %] 99 %  Temp (24hrs), Av 1 °F (36 7 °C), Min:97 7 °F (36 5 °C), Max:98 8 °F (37 1 °C)  Current: Temperature: 97 7 °F (36 5 °C)    Intake/Output Summary (Last 24 hours) at 18 3351  Last data filed at 18 3596   Gross per 24 hour   Intake              180 ml   Output             1500 ml   Net            -1320 ml       General Appearance:  Appearing well, nontoxic, and in no distress   Head:  Normocephalic, without obvious abnormality, atraumatic   Eyes:  Conjunctiva pink and sclera anicteric, both eyes   Nose: Nares normal, mucosa normal, no drainage   Throat: Oropharynx moist without lesions   Neck: Supple, symmetrical, no adenopathy, no tenderness/mass/nodules   Back:   Symmetric, no curvature, ROM normal, no CVA tenderness   Lungs:   Clear to auscultation bilaterally, respirations unlabored   Chest Wall:  No tenderness or deformity   Heart:  RRR; no murmur, rub or gallop   Abdomen:   Soft, non-tender, non-distended, positive bowel sounds  Suprapubic catheter in place  Extremities: No cyanosis, clubbing or edema   Skin: No rashes or lesions  No draining wounds noted  Lymph nodes: Cervical, supraclavicular nodes normal   Neurologic: Alert, answer simple questions appropriately, able to move all 4 extremities       LABS, IMAGING, & OTHER STUDIES:  Lab Results:  I have personally reviewed pertinent labs      Results from last 7 days  Lab Units 05/24/18 2006   PLATELETS Thousands/uL 255       Results from last 7 days  Lab Units 05/25/18  0410 05/24/18  2317 05/24/18 2006   SODIUM mmol/L 148* 149* 143   POTASSIUM mmol/L 3 6 3 1* 4 3   CHLORIDE mmol/L 111* 111* 108   CO2 mmol/L 28 28 29   ANION GAP mmol/L 9 10 6   BUN mg/dL 26* 28* 30*   CREATININE mg/dL 1 50* 1 65* 1 80*   EGFR ml/min/1 73sq m 39 35 31   GLUCOSE RANDOM mg/dL 149* 126 336*   CALCIUM mg/dL 8 9 9 2 9 3       Results from last 7 days  Lab Units 05/24/18 2023   URINE CULTURE  >100,000 cfu/ml Escherichia coli ESBL*

## 2018-05-28 LAB
ANION GAP SERPL CALCULATED.3IONS-SCNC: 8 MMOL/L (ref 4–13)
BASOPHILS # BLD AUTO: 0.05 THOUSANDS/ΜL (ref 0–0.1)
BASOPHILS NFR BLD AUTO: 1 % (ref 0–1)
BUN SERPL-MCNC: 26 MG/DL (ref 5–25)
CALCIUM SERPL-MCNC: 8.8 MG/DL (ref 8.3–10.1)
CHLORIDE SERPL-SCNC: 104 MMOL/L (ref 100–108)
CO2 SERPL-SCNC: 27 MMOL/L (ref 21–32)
CREAT SERPL-MCNC: 1.58 MG/DL (ref 0.6–1.3)
EOSINOPHIL # BLD AUTO: 0.45 THOUSAND/ΜL (ref 0–0.61)
EOSINOPHIL NFR BLD AUTO: 8 % (ref 0–6)
ERYTHROCYTE [DISTWIDTH] IN BLOOD BY AUTOMATED COUNT: 15.5 % (ref 11.6–15.1)
GFR SERPL CREATININE-BSD FRML MDRD: 37 ML/MIN/1.73SQ M
GLUCOSE SERPL-MCNC: 318 MG/DL (ref 65–140)
GLUCOSE SERPL-MCNC: 327 MG/DL (ref 65–140)
GLUCOSE SERPL-MCNC: 393 MG/DL (ref 65–140)
GLUCOSE SERPL-MCNC: 415 MG/DL (ref 65–140)
GLUCOSE SERPL-MCNC: 416 MG/DL (ref 65–140)
GLUCOSE SERPL-MCNC: >500 MG/DL (ref 65–140)
HCT VFR BLD AUTO: 29.4 % (ref 34.8–46.1)
HGB BLD-MCNC: 9.6 G/DL (ref 11.5–15.4)
LYMPHOCYTES # BLD AUTO: 2.49 THOUSANDS/ΜL (ref 0.6–4.47)
LYMPHOCYTES NFR BLD AUTO: 46 % (ref 14–44)
MCH RBC QN AUTO: 30.7 PG (ref 26.8–34.3)
MCHC RBC AUTO-ENTMCNC: 32.7 G/DL (ref 31.4–37.4)
MCV RBC AUTO: 94 FL (ref 82–98)
MONOCYTES # BLD AUTO: 0.36 THOUSAND/ΜL (ref 0.17–1.22)
MONOCYTES NFR BLD AUTO: 7 % (ref 4–12)
NEUTROPHILS # BLD AUTO: 2.07 THOUSANDS/ΜL (ref 1.85–7.62)
NEUTS SEG NFR BLD AUTO: 38 % (ref 43–75)
NRBC BLD AUTO-RTO: 0 /100 WBCS
PLATELET # BLD AUTO: 205 THOUSANDS/UL (ref 149–390)
PMV BLD AUTO: 11.7 FL (ref 8.9–12.7)
POTASSIUM SERPL-SCNC: 4.1 MMOL/L (ref 3.5–5.3)
PROCALCITONIN SERPL-MCNC: 0.12 NG/ML
RBC # BLD AUTO: 3.13 MILLION/UL (ref 3.81–5.12)
SODIUM SERPL-SCNC: 139 MMOL/L (ref 136–145)
WBC # BLD AUTO: 5.42 THOUSAND/UL (ref 4.31–10.16)

## 2018-05-28 PROCEDURE — 84145 PROCALCITONIN (PCT): CPT | Performed by: INTERNAL MEDICINE

## 2018-05-28 PROCEDURE — 85025 COMPLETE CBC W/AUTO DIFF WBC: CPT | Performed by: INTERNAL MEDICINE

## 2018-05-28 PROCEDURE — 80048 BASIC METABOLIC PNL TOTAL CA: CPT | Performed by: INTERNAL MEDICINE

## 2018-05-28 PROCEDURE — 82948 REAGENT STRIP/BLOOD GLUCOSE: CPT

## 2018-05-28 PROCEDURE — 99232 SBSQ HOSP IP/OBS MODERATE 35: CPT | Performed by: INTERNAL MEDICINE

## 2018-05-28 PROCEDURE — 99232 SBSQ HOSP IP/OBS MODERATE 35: CPT | Performed by: HOSPITALIST

## 2018-05-28 RX ORDER — POLYETHYLENE GLYCOL 3350 17 G/17G
17 POWDER, FOR SOLUTION ORAL DAILY PRN
Status: DISCONTINUED | OUTPATIENT
Start: 2018-05-28 | End: 2018-05-30 | Stop reason: HOSPADM

## 2018-05-28 RX ORDER — INSULIN GLARGINE 100 [IU]/ML
40 INJECTION, SOLUTION SUBCUTANEOUS
Status: DISCONTINUED | OUTPATIENT
Start: 2018-05-28 | End: 2018-05-29

## 2018-05-28 RX ADMIN — INSULIN LISPRO 5 UNITS: 100 INJECTION, SOLUTION INTRAVENOUS; SUBCUTANEOUS at 16:44

## 2018-05-28 RX ADMIN — GABAPENTIN 300 MG: 300 CAPSULE ORAL at 15:04

## 2018-05-28 RX ADMIN — CHLORHEXIDINE GLUCONATE 15 ML: 1.2 RINSE ORAL at 21:57

## 2018-05-28 RX ADMIN — HEPARIN SODIUM 5000 UNITS: 5000 INJECTION, SOLUTION INTRAVENOUS; SUBCUTANEOUS at 05:33

## 2018-05-28 RX ADMIN — INSULIN LISPRO 10 UNITS: 100 INJECTION, SOLUTION INTRAVENOUS; SUBCUTANEOUS at 07:49

## 2018-05-28 RX ADMIN — INSULIN GLARGINE 40 UNITS: 100 INJECTION, SOLUTION SUBCUTANEOUS at 21:48

## 2018-05-28 RX ADMIN — HEPARIN SODIUM 5000 UNITS: 5000 INJECTION, SOLUTION INTRAVENOUS; SUBCUTANEOUS at 21:49

## 2018-05-28 RX ADMIN — LISINOPRIL 5 MG: 5 TABLET ORAL at 08:51

## 2018-05-28 RX ADMIN — HEPARIN SODIUM 5000 UNITS: 5000 INJECTION, SOLUTION INTRAVENOUS; SUBCUTANEOUS at 13:33

## 2018-05-28 RX ADMIN — METRONIDAZOLE 500 MG: 500 TABLET ORAL at 08:51

## 2018-05-28 RX ADMIN — POLYETHYLENE GLYCOL (3350) 17 G: 17 POWDER, FOR SOLUTION ORAL at 22:08

## 2018-05-28 RX ADMIN — CHLORHEXIDINE GLUCONATE 15 ML: 1.2 RINSE ORAL at 08:50

## 2018-05-28 RX ADMIN — METRONIDAZOLE 500 MG: 500 TABLET ORAL at 21:48

## 2018-05-28 RX ADMIN — NORTRIPTYLINE HYDROCHLORIDE 50 MG: 50 CAPSULE ORAL at 11:51

## 2018-05-28 RX ADMIN — INSULIN LISPRO 6 UNITS: 100 INJECTION, SOLUTION INTRAVENOUS; SUBCUTANEOUS at 11:51

## 2018-05-28 RX ADMIN — INSULIN LISPRO 10 UNITS: 100 INJECTION, SOLUTION INTRAVENOUS; SUBCUTANEOUS at 09:02

## 2018-05-28 RX ADMIN — INSULIN LISPRO 6 UNITS: 100 INJECTION, SOLUTION INTRAVENOUS; SUBCUTANEOUS at 21:48

## 2018-05-28 RX ADMIN — INSULIN LISPRO 6 UNITS: 100 INJECTION, SOLUTION INTRAVENOUS; SUBCUTANEOUS at 07:49

## 2018-05-28 RX ADMIN — ATORVASTATIN CALCIUM 40 MG: 40 TABLET, FILM COATED ORAL at 15:33

## 2018-05-28 RX ADMIN — ASPIRIN 81 MG: 81 TABLET, COATED ORAL at 08:50

## 2018-05-28 RX ADMIN — GABAPENTIN 300 MG: 300 CAPSULE ORAL at 08:50

## 2018-05-28 RX ADMIN — GABAPENTIN 300 MG: 300 CAPSULE ORAL at 21:48

## 2018-05-28 RX ADMIN — NICOTINE 21 MG: 21 PATCH, EXTENDED RELEASE TRANSDERMAL at 08:50

## 2018-05-28 NOTE — PROGRESS NOTES
Progress Note - Polly Garza 52 y o  female MRN: [id number]    Unit/Bed#: E5 -01 Encounter: [id number]    Principal Problem:    DKA (diabetic ketoacidoses) ([facility] )  Active Problems:    JULIO (acute kidney injury) ([facility] )    Chronic kidney disease    HTN (hypertension)    Dyslipidemia    Diabetic peripheral neuropathy ([facility])      Assessment/Plan:    · Diabetic ketoacidosis- initially admitted to ICU for insulin drip- now closed gap but with persisting blood sugar elevations running between 300 and 400  Patient states that she had been on outpatient dose of 7 units of Lantus insulin   Patient currently on 40 units of Lantus at night and 15 units of Humalog t i d  with meals  Patient very reluctant to be on t i d  Humalog at home  However without the Lantus Humalog combination I doubt whether will be able to achieve any reasonable blood sugar control  Will have Endocrinology evaluate and recommend  ·  Chronic kidney disease stage 3 with creatinine of 1 58  Likely secondary to diabetic nephropathy  ·   ESBL UTI -id input appreciated  Secondary to chronic colonization antibiotics have been discontinued  · Diabetic peripheral neuropathy-on gabapentin    · Vaginal infection with discharge being treated with metronidazole by gynecology service to complete a 7 day course total and follow up with [facility] on discharge  Today's day 4     · Right eye cataract -will follow up with Ophthalmology as an outpatient  · Hyperlipidemia -continue statin      Subjective:  Patient without any complaints however her blood sugars are ranging between 304 100  Will increase Lantus to 40 units at night and Humalog 15 units t i d  with meals  Patient states that she has been on 7 units of Lantus at home and that her sugar keeps fluctuating  Will have Endocrinology evaluate the patient since patient is reluctant to be on the t i d   Humalog insulin        Physical Exam:   Vitals: Blood pressure 123/60, pulse 89, temperature 97 8 °F (36 6 °C), temperature source Tympanic, resp  rate 18, height 5' 3" (1 6 m), weight 82 1 kg (181 lb), SpO2 95 %  ,Body mass index is 32 06 kg/m²  Gen:  Pleasant, non-tachypnic, non-dyspnic  Conversant  Heart: regular rate and rhythm, S1S2 present, no murmur, rub or gallop  Lungs: clear to ausculatation bilaterally  No wheezing, crackless, or rhonchi  No accessory muscle use or respiratory distress  Abd: soft, non-tender, non-distended  NABS, no guarding, rebound or peritoneal signs  Extremities: no clubbing, cyanosis or edema  2+pedal pulses bilaterally  Full range of motion  Neuro: awake, alert and oriented  Cranial nerves 2-12 intact  Strength and sensation grossly intact  Skin: warm and dry: no petechiae, purpura and rash      LABS:     Results from last 7 days  Lab Units 05/28/18  0541 05/24/18 2006   WBC Thousand/uL 5 42  --    HEMOGLOBIN g/dL 9 6*  --    HEMATOCRIT % 29 4*  --    PLATELETS Thousands/uL 205 255       Results from last 7 days  Lab Units 05/28/18  0541 05/25/18  0410 05/24/18  2317   SODIUM mmol/L 139 148* 149*   POTASSIUM mmol/L 4 1 3 6 3 1*   CHLORIDE mmol/L 104 111* 111*   CO2 mmol/L 27 28 28   BUN mg/dL 26* 26* 28*   CREATININE mg/dL 1 58* 1 50* 1 65*   GLUCOSE RANDOM mg/dL 327* 149* 126   CALCIUM mg/dL 8 8 8 9 9 2       Intake/Output Summary (Last 24 hours) at 05/28/18 1434  Last data filed at 05/28/18 1219   Gross per 24 hour   Intake                0 ml   Output              975 ml   Net             -975 ml           Current Facility-Administered Medications:  acetaminophen 650 mg Oral Q6H PRN Marvelyn Beauvais Spatzer, CRNP   aspirin 81 mg Oral Daily Marvelyn Beauvais Spatzer, CRNP   atorvastatin 40 mg Oral Daily With [employer] Spatzer, CRNP   chlorhexidine 15 mL Swish & Spit Q12H OMAYRA Domínguez   gabapentin 300 mg Oral TID Claudia Schaefer MD   heparin (porcine) 5,000 Units Subcutaneous [facility] Marvelyn Beauvais Spatzer, CRNP   influenza vaccine 0 5 mL Intramuscular Prior to discharge Emily Garcia MD   insulin glargine 40 Units Subcutaneous HS Melissa Soares MD   insulin lispro 1-6 Units Subcutaneous TID AC Robert W Spatzer, CRNP   insulin lispro 1-6 Units Subcutaneous HS Loyal Salles Spatzer, CRNP   insulin lispro 15 Units Subcutaneous TID With Meals Melissa Soares MD   lisinopril 5 mg Oral Daily OMAYRA Ozuna   metroNIDAZOLE 500 mg Oral Q12H [facility] Paola Roquetresa   nicotine 21 mg Transdermal Daily OMAYRA Mendez   nortriptyline 50 mg Oral Daily Loyal Salles Spatzer, CRNP   traMADol 50 mg Oral Q6H PRN OMAYRA Echeverria

## 2018-05-28 NOTE — PROGRESS NOTES
Progress Note - Infectious Disease   Diana Guevara 52 y o  female MRN: [id number]  Unit/Bed#: E5 -01 Encounter: [id number]      Impression/Recommendations:  1  Asymptomatic bacteriuria -likely chronic colonization in the setting of a suprapubic catheter  The patient is without any fever, leukocytosis, or other evidence of an invasive urinary tract infection  Procalcitonin negative  -monitor off all antibiotics  -monitor for any onset of sepsis  -suprapubic catheter drainage     2  Acute kidney injury-with presumed chronic kidney disease  Suspect secondary to pre renal issues in the setting of DKA  The renal function is improving   -monitor the GFR  -no additional ID workup for now     3  Bacterial vaginosis-still symptomatic   -continue Flagyl x7 days   -gyn follow-up     4  Diabetic ketoacidosis-seems to have resolved with insulin drip and fluid resuscitation   -management of glucose as per the primary service     5  Right eye visual loss-in a patient with cataracts and poorly controlled diabetes   -await ophthalmology evaluation    The patient is stable from an ID standpoint  We will sign off for now  Please call with new questions  Antibiotics:  PO Flagyl    Subjective:  Patient seen on AM rounds  Patient still has vaginal symptoms  Taking pain medicine with relief  24 Hour Events:  No documented fevers, chills, sweats, nausea, vomiting, or diarrhea  Objective:  Vitals:  HR:  [89-97] 89  Resp:  [18-22] 18  BP: (117-135)/(60-74) 123/60  SpO2:  [95 %-97 %] 95 %  Temp (24hrs), Av 6 °F (36 4 °C), Min:97 2 °F (36 2 °C), Max:97 8 °F (36 6 °C)  Current: Temperature: 97 8 °F (36 6 °C)    Physical Exam:   General:  No acute distress  Psychiatric:  Awake and alert  Pulmonary:  Normal respiratory excursion without accessory muscle use  Abdomen:  Soft, nontender  Extremities:  No edema  Skin:  No rashes  :  Drainage bag with clear yellow urine    Suprapubic exit site without erythema or purulence    Lab Results:  I have personally reviewed pertinent labs  Results from last 7 days  Lab Units 05/28/18  0541 05/25/18  0410 05/24/18  2317   SODIUM mmol/L 139 148* 149*   POTASSIUM mmol/L 4 1 3 6 3 1*   CHLORIDE mmol/L 104 111* 111*   CO2 mmol/L 27 28 28   ANION GAP mmol/L 8 9 10   BUN mg/dL 26* 26* 28*   CREATININE mg/dL 1 58* 1 50* 1 65*   EGFR ml/min/1 73sq m 37 39 35   GLUCOSE RANDOM mg/dL 327* 149* 126   CALCIUM mg/dL 8 8 8 9 9 2       Results from last 7 days  Lab Units 05/28/18  0541 05/24/18  2006   WBC Thousand/uL 5 42  --    HEMOGLOBIN g/dL 9 6*  --    PLATELETS Thousands/uL 205 255       Results from last 7 days  Lab Units 05/24/18 2023   URINE CULTURE  >100,000 cfu/ml Escherichia coli ESBL*     Procalcitonin 0 12    Imaging Studies:   I have personally reviewed pertinent imaging study reports and images in PACS  EKG, Pathology, and Other Studies:   I have personally reviewed pertinent reports

## 2018-05-28 NOTE — PLAN OF CARE
Problem: Potential for Falls  Goal: Patient will remain free of falls  INTERVENTIONS:  - Assess patient frequently for physical needs  -  Identify cognitive and physical deficits and behaviors that affect risk of falls  -  [place] fall precautions as indicated by assessment   - Educate patient/family on patient safety including physical limitations  - Instruct patient to call for assistance with activity based on assessment  - Modify environment to reduce risk of injury  - Consider OT/PT consult to assist with strengthening/mobility   Outcome: Progressing      Problem: Nutrition/Hydration-ADULT  Goal: Nutrient/Hydration intake appropriate for improving, restoring or maintaining nutritional needs  Monitor and assess patient's nutrition/hydration status for malnutrition (ex- brittle hair, bruises, dry skin, pale skin and conjunctiva, muscle wasting, smooth red tongue, and disorientation)  Collaborate with interdisciplinary team and initiate plan and interventions as ordered  Monitor patient's weight and dietary intake as ordered or per policy  Utilize nutrition screening tool and intervene per policy  Determine patient's food preferences and provide high-protein, high-caloric foods as appropriate       INTERVENTIONS:  - Monitor oral intake, urinary output, labs, and treatment plans  - Assess nutrition and hydration status and recommend course of action  - Evaluate amount of meals eaten  - Assist patient with eating if necessary   - Allow adequate time for meals  - Recommend/ encourage appropriate diets, oral nutritional supplements, and vitamin/mineral supplements  - Order, calculate, and assess calorie counts as needed  - Recommend, monitor, and adjust tube feedings and TPN/PPN based on assessed needs  - Assess need for intravenous fluids  - Provide specific nutrition/hydration education as appropriate  - Include patient/family/caregiver in decisions related to nutrition   Outcome: Progressing      Problem: GASTROINTESTINAL - ADULT  Goal: Minimal or absence of nausea and/or vomiting  INTERVENTIONS:  - Administer IV fluids as ordered to ensure adequate hydration  - Maintain NPO status until nausea and vomiting are resolved  - Nasogastric tube as ordered  - Administer ordered antiemetic medications as needed  - Provide nonpharmacologic comfort measures as appropriate  - Advance diet as tolerated, if ordered  - Nutrition services referral to assist patient with adequate nutrition and appropriate food choices   Outcome: Progressing      Problem: METABOLIC, FLUID AND ELECTROLYTES - ADULT  Goal: Electrolytes maintained within normal limits  INTERVENTIONS:  - Monitor labs and assess patient for signs and symptoms of electrolyte imbalances  - Administer electrolyte replacement as ordered  - Monitor response to electrolyte replacements, including repeat lab results as appropriate  - Instruct patient on fluid and nutrition as appropriate   Outcome: Progressing    Goal: Glucose maintained within target range  INTERVENTIONS:  - Monitor Blood Glucose as ordered  - Assess for signs and symptoms of hyperglycemia and hypoglycemia  - Administer ordered medications to maintain glucose within target range  - Assess nutritional intake and initiate nutrition service referral as needed   Outcome: Progressing      Problem: GENITOURINARY - ADULT  Goal: Maintains or returns to baseline urinary function  INTERVENTIONS:  - Assess urinary function  - Encourage oral fluids to ensure adequate hydration  - Administer IV fluids as ordered to ensure adequate hydration  - Administer ordered medications as needed  - Offer frequent toileting  - Follow urinary retention protocol if ordered   Outcome: Progressing    Goal: Absence of urinary retention  INTERVENTIONS:  - Assess patients ability to void and empty bladder  - Monitor I/O  - Bladder scan as needed  - Discuss with physician/AP medications to alleviate retention as needed  - Discuss catheterization for long term situations as appropriate   Outcome: Progressing    Goal: Urinary catheter remains patent  INTERVENTIONS:  - Assess patency of urinary catheter  - If patient has a chronic ann, consider changing catheter if non-functioning  - Follow guidelines for intermittent irrigation of non-functioning urinary catheter   Outcome: Progressing

## 2018-05-29 LAB
ANION GAP SERPL CALCULATED.3IONS-SCNC: 5 MMOL/L (ref 4–13)
BUN SERPL-MCNC: 32 MG/DL (ref 5–25)
CALCIUM SERPL-MCNC: 8.8 MG/DL (ref 8.3–10.1)
CHLORIDE SERPL-SCNC: 103 MMOL/L (ref 100–108)
CO2 SERPL-SCNC: 29 MMOL/L (ref 21–32)
CREAT SERPL-MCNC: 1.66 MG/DL (ref 0.6–1.3)
EST. AVERAGE GLUCOSE BLD GHB EST-MCNC: 301 MG/DL
GFR SERPL CREATININE-BSD FRML MDRD: 34 ML/MIN/1.73SQ M
GLUCOSE SERPL-MCNC: 186 MG/DL (ref 65–140)
GLUCOSE SERPL-MCNC: 221 MG/DL (ref 65–140)
GLUCOSE SERPL-MCNC: 285 MG/DL (ref 65–140)
GLUCOSE SERPL-MCNC: 338 MG/DL (ref 65–140)
GLUCOSE SERPL-MCNC: 386 MG/DL (ref 65–140)
GLUCOSE SERPL-MCNC: 496 MG/DL (ref 65–140)
HBA1C MFR BLD: 12.1 % (ref 4.2–6.3)
POTASSIUM SERPL-SCNC: 4.7 MMOL/L (ref 3.5–5.3)
SODIUM SERPL-SCNC: 137 MMOL/L (ref 136–145)

## 2018-05-29 PROCEDURE — 80048 BASIC METABOLIC PNL TOTAL CA: CPT | Performed by: HOSPITALIST

## 2018-05-29 PROCEDURE — 99232 SBSQ HOSP IP/OBS MODERATE 35: CPT | Performed by: HOSPITALIST

## 2018-05-29 PROCEDURE — 99255 IP/OBS CONSLTJ NEW/EST HI 80: CPT | Performed by: INTERNAL MEDICINE

## 2018-05-29 PROCEDURE — 82948 REAGENT STRIP/BLOOD GLUCOSE: CPT

## 2018-05-29 PROCEDURE — 83036 HEMOGLOBIN GLYCOSYLATED A1C: CPT | Performed by: INTERNAL MEDICINE

## 2018-05-29 RX ORDER — INSULIN ASPART 100 [IU]/ML
35 INJECTION, SUSPENSION SUBCUTANEOUS EVERY 6 HOURS
Status: DISCONTINUED | OUTPATIENT
Start: 2018-05-29 | End: 2018-05-30 | Stop reason: HOSPADM

## 2018-05-29 RX ORDER — INSULIN GLARGINE 100 [IU]/ML
45 INJECTION, SOLUTION SUBCUTANEOUS
Status: DISCONTINUED | OUTPATIENT
Start: 2018-05-29 | End: 2018-05-29

## 2018-05-29 RX ADMIN — GABAPENTIN 300 MG: 300 CAPSULE ORAL at 08:04

## 2018-05-29 RX ADMIN — INSULIN LISPRO 6 UNITS: 100 INJECTION, SOLUTION INTRAVENOUS; SUBCUTANEOUS at 11:18

## 2018-05-29 RX ADMIN — INSULIN ASPART 35 UNITS: 100 INJECTION, SUSPENSION SUBCUTANEOUS at 18:16

## 2018-05-29 RX ADMIN — ATORVASTATIN CALCIUM 40 MG: 40 TABLET, FILM COATED ORAL at 15:59

## 2018-05-29 RX ADMIN — HEPARIN SODIUM 5000 UNITS: 5000 INJECTION, SOLUTION INTRAVENOUS; SUBCUTANEOUS at 05:12

## 2018-05-29 RX ADMIN — CHLORHEXIDINE GLUCONATE 15 ML: 1.2 RINSE ORAL at 08:04

## 2018-05-29 RX ADMIN — INSULIN LISPRO 4 UNITS: 100 INJECTION, SOLUTION INTRAVENOUS; SUBCUTANEOUS at 15:58

## 2018-05-29 RX ADMIN — ASPIRIN 81 MG: 81 TABLET, COATED ORAL at 08:04

## 2018-05-29 RX ADMIN — POLYETHYLENE GLYCOL (3350) 17 G: 17 POWDER, FOR SOLUTION ORAL at 08:13

## 2018-05-29 RX ADMIN — METRONIDAZOLE 500 MG: 500 TABLET ORAL at 08:04

## 2018-05-29 RX ADMIN — LISINOPRIL 5 MG: 5 TABLET ORAL at 08:04

## 2018-05-29 RX ADMIN — GABAPENTIN 300 MG: 300 CAPSULE ORAL at 15:59

## 2018-05-29 RX ADMIN — CHLORHEXIDINE GLUCONATE 15 ML: 1.2 RINSE ORAL at 21:51

## 2018-05-29 RX ADMIN — NORTRIPTYLINE HYDROCHLORIDE 50 MG: 50 CAPSULE ORAL at 11:17

## 2018-05-29 RX ADMIN — GABAPENTIN 300 MG: 300 CAPSULE ORAL at 21:51

## 2018-05-29 RX ADMIN — HEPARIN SODIUM 5000 UNITS: 5000 INJECTION, SOLUTION INTRAVENOUS; SUBCUTANEOUS at 21:50

## 2018-05-29 RX ADMIN — INSULIN LISPRO 2 UNITS: 100 INJECTION, SOLUTION INTRAVENOUS; SUBCUTANEOUS at 21:49

## 2018-05-29 RX ADMIN — HEPARIN SODIUM 5000 UNITS: 5000 INJECTION, SOLUTION INTRAVENOUS; SUBCUTANEOUS at 13:23

## 2018-05-29 RX ADMIN — METRONIDAZOLE 500 MG: 500 TABLET ORAL at 21:50

## 2018-05-29 RX ADMIN — NICOTINE 21 MG: 21 PATCH, EXTENDED RELEASE TRANSDERMAL at 08:04

## 2018-05-29 RX ADMIN — INSULIN LISPRO 6 UNITS: 100 INJECTION, SOLUTION INTRAVENOUS; SUBCUTANEOUS at 08:06

## 2018-05-29 NOTE — PROGRESS NOTES
Progress Note - Polly Garza 52 y o  female MRN: [id number]    Unit/Bed#: E5 -01 Encounter: [id number]    Principal Problem:    DKA (diabetic ketoacidoses) ([facility] 75 )  Active Problems:    JULIO (acute kidney injury) ([place] [address] )    Chronic kidney disease    HTN (hypertension)    Dyslipidemia    Diabetic peripheral neuropathy (HCC)  Obesity    Assessment/Plan:  · Hyperosmolar hyperglycemic nonketotic syndrome- initially admitted to ICU for insulin drip- now closed gap but with persisting blood sugar elevations running between 300 and 400  Patient states that she had been on outpatient dose of 7 units of Lantus insulin which I find difficult to bili     Patient currently on 45 units of Lantus at night and 20 units of Humalog t i d  with meals  Blood sugars still running around 300  Will have Endocrinology evaluate and recommend      ·  Chronic kidney disease stage 3 with creatinine of 1 68  Likely secondary to diabetic nephropathy  Likely her new baseline      ·   ESBL UTI -id input appreciated  Secondary to chronic colonization antibiotics have been discontinued      · Diabetic peripheral neuropathy-on gabapentin     · Vaginal idischarge being treated with metronidazole by gynecology service to complete a 7 day course total and follow up with [facility] on discharge  Today's day 5      · Right eye cataract -will follow up with Ophthalmology as an outpatient      · Hyperlipidemia -continue statin    Will await Endocrinology recommendations  Subjective:  Blood sugar still very high at around 400  Will increase Lantus to 45 units and Humalog to 20 units t i d  with meals  Physical Exam:   Vitals: Blood pressure 131/75, pulse 91, temperature 97 5 °F (36 4 °C), temperature source Temporal, resp  rate 18, height 5' 3" (1 6 m), weight 82 1 kg (181 lb), SpO2 95 %  ,Body mass index is 32 06 kg/m²  Gen:  Pleasant, non-tachypnic, non-dyspnic  Conversant    Heart: regular rate and rhythm, S1S2 present, no murmur, rub or gallop  Lungs: clear to ausculatation bilaterally  No wheezing, crackless, or rhonchi  No accessory muscle use or respiratory distress  Abd: soft, non-tender, non-distended  NABS, no guarding, rebound or peritoneal signs  Extremities: no clubbing, cyanosis or edema  2+pedal pulses bilaterally  Full range of motion  Neuro: awake, alert and oriented  Cranial nerves 2-12 intact  Strength and sensation grossly intact  Skin: warm and dry: no petechiae, purpura and rash      LABS:     Results from last 7 days  Lab Units 05/28/18  0541 05/24/18 2006   WBC Thousand/uL 5 42  --    HEMOGLOBIN g/dL 9 6*  --    HEMATOCRIT % 29 4*  --    PLATELETS Thousands/uL 205 255       Results from last 7 days  Lab Units 05/29/18  0516 05/28/18  0541 05/25/18  0410   SODIUM mmol/L 137 139 148*   POTASSIUM mmol/L 4 7 4 1 3 6   CHLORIDE mmol/L 103 104 111*   CO2 mmol/L 29 27 28   BUN mg/dL 32* 26* 26*   CREATININE mg/dL 1 66* 1 58* 1 50*   GLUCOSE RANDOM mg/dL 338* 327* 149*   CALCIUM mg/dL 8 8 8 8 8 9       Intake/Output Summary (Last 24 hours) at 05/29/18 1404  Last data filed at 05/28/18 2201   Gross per 24 hour   Intake                0 ml   Output              350 ml   Net             -350 ml           Current Facility-Administered Medications:  acetaminophen 650 mg Oral Q6H PRN Bernette Batten Spatzer, CRNP   aspirin 81 mg Oral Daily Bernette Batten Spatzer, CRNP   atorvastatin 40 mg Oral Daily With [facility] Spatzer, CRNP   chlorhexidine 15 mL Swish & Spit Q12H [address] Bernette Batten Spatzer, CRNP   gabapentin 300 mg Oral TID Edwin Rubi MD   heparin (porcine) 5,000 Units Subcutaneous Count includes the [facility] Bernette Batten Spatzer, CRNP   influenza vaccine 0 5 mL Intramuscular Prior to discharge Heladio Ricci MD   insulin glargine 45 Units Subcutaneous HS Dl Chairez MD   insulin lispro 1-6 Units Subcutaneous TID AC Robert W Spatzer, CRNP   insulin lispro 1-6 Units Subcutaneous HS Bernette Batten Spatzer, CRNP   insulin lispro 20 Units Subcutaneous TID With Meals Alexis Tee MD   lisinopril 5 mg Oral Daily OMAYRA Geller   metroNIDAZOLE 500 mg Oral Q12H [facility] Rafael Curtis   nicotine 21 mg Transdermal Daily OMAYRA Mendez   nortriptyline 50 mg Oral Daily Nowell Douse Spatzer, CRNP   polyethylene glycol 17 g Oral Daily PRN OMAYRA William   traMADol 50 mg Oral Q6H PRN OMAYRA Ramesh

## 2018-05-29 NOTE — CASE MANAGEMENT
Continued Stay Review    Date:   5/29/2018    Vital Signs: /75 (BP Location: Right arm)   Pulse 91   Temp 97 5 °F (36 4 °C) (Temporal)   Resp 18   Ht 5' 3" (1 6 m)   Wt 82 1 kg (181 lb)   SpO2 95%   BMI 32 06 kg/m²     Medications:   Scheduled Meds:   Current Facility-Administered Medications:  acetaminophen 650 mg Oral Q6H PRN Zadie Bedford Spatzer, CRNP   aspirin 81 mg Oral Daily Zadie Bedford Spatzer, CRNP   atorvastatin 40 mg Oral Daily With [employer] Spatzer, CRNP   chlorhexidine 15 mL Swish & Spit Q12H [place], OMAYRA   gabapentin 300 mg Oral TID Regi Garcia MD   heparin (porcine) 5,000 Units Subcutaneous [facility] Zadie Bedford Spatzer, CRNP   influenza vaccine 0 5 mL Intramuscular Prior to discharge José Luis Bernal MD   insulin glargine 45 Units Subcutaneous HS Silvano Vences MD   insulin lispro 1-6 Units Subcutaneous TID AC Robert W Spatzer, CRNP   insulin lispro 1-6 Units Subcutaneous HS Zadie Bedford Spatzer, CRNP   insulin lispro 20 Units Subcutaneous TID With Meals Marek Chandler MD   lisinopril 5 mg Oral Daily OMAYRA Uriarte   metroNIDAZOLE 500 mg Oral Q12H [address] Joneen Phyllis   nicotine 21 mg Transdermal Daily Charles Sonday, OMAYRA   nortriptyline 50 mg Oral Daily Robert W Spatzer, CRNP   polyethylene glycol 17 g Oral Daily PRN OMAYRA Wilcox   traMADol 50 mg Oral Q6H PRN Zadie Bedford Spatzer, CRNP     Continuous Infusions:    PRN Meds:   acetaminophen    influenza vaccine    polyethylene glycol    traMADol    Abnormal Labs/Diagnostic Results:   BUN/Creat   32/1 66  BS   338    Age/Sex: 52 y o  female     · Assessment/Plan: Hyperosmolar hyperglycemic nonketotic syndrome- initially admitted to ICU for insulin drip- now closed gap but with persisting blood sugar elevations running between 300 and 400   Patient states that she had been on outpatient dose of 7 units of Lantus insulin which I find difficult to bili     Patient currently on 45 units of Lantus at night and 20 units of Humalog t i d  with meals  Blood sugars still running around 300    Will have Endocrinology evaluate and recommend      ·  Chronic kidney disease stage 3 with creatinine of 1 68   Likely secondary to diabetic nephropathy  Likely her new baseline      ·   ESBL UTI -id input appreciated   Secondary to chronic colonization antibiotics have been discontinued      · Diabetic peripheral neuropathy-on gabapentin     · Vaginal idischarge being treated with metronidazole by gynecology service to complete a 7 day course total and follow up with [facility] on discharge  Today's day 5      · Right eye cataract -will follow up with Ophthalmology as an outpatient      Hyperlipidemia -continue statin    Discharge Plan:    Home      Thank you,  [facility] in the [facility] by Javi Gil for 2017  Network Utilization Review Department  Phone: [phone]; Fax [phone]  ATTENTION: The Network Utilization Review Department is now centralized for our 7 Facilities  Make a note that we have a new phone and fax numbers for our Department  Please call with any questions or concerns to [phone] and carefully follow the prompts so that you are directed to the right person  All voicemails are confidential  Fax any determinations, approvals, denials, and requests for initial or continue stay review clinical to [phone]  Due to HIGH CALL volume, it would be easier if you could please send faxed requests to expedite your requests and in part, help us provide discharge notifications faster

## 2018-05-29 NOTE — PLAN OF CARE
Problem: Potential for Falls  Goal: Patient will remain free of falls  INTERVENTIONS:  - Assess patient frequently for physical needs  -  Identify cognitive and physical deficits and behaviors that affect risk of falls  -  [place] fall precautions as indicated by assessment   - Educate patient/family on patient safety including physical limitations  - Instruct patient to call for assistance with activity based on assessment  - Modify environment to reduce risk of injury  - Consider OT/PT consult to assist with strengthening/mobility   Outcome: Progressing      Problem: Nutrition/Hydration-ADULT  Goal: Nutrient/Hydration intake appropriate for improving, restoring or maintaining nutritional needs  Monitor and assess patient's nutrition/hydration status for malnutrition (ex- brittle hair, bruises, dry skin, pale skin and conjunctiva, muscle wasting, smooth red tongue, and disorientation)  Collaborate with interdisciplinary team and initiate plan and interventions as ordered  Monitor patient's weight and dietary intake as ordered or per policy  Utilize nutrition screening tool and intervene per policy  Determine patient's food preferences and provide high-protein, high-caloric foods as appropriate       INTERVENTIONS:  - Monitor oral intake, urinary output, labs, and treatment plans  - Assess nutrition and hydration status and recommend course of action  - Evaluate amount of meals eaten  - Assist patient with eating if necessary   - Allow adequate time for meals  - Recommend/ encourage appropriate diets, oral nutritional supplements, and vitamin/mineral supplements  - Order, calculate, and assess calorie counts as needed  - Recommend, monitor, and adjust tube feedings and TPN/PPN based on assessed needs  - Assess need for intravenous fluids  - Provide specific nutrition/hydration education as appropriate  - Include patient/family/caregiver in decisions related to nutrition   Outcome: Progressing      Problem: GASTROINTESTINAL - ADULT  Goal: Minimal or absence of nausea and/or vomiting  INTERVENTIONS:  - Administer IV fluids as ordered to ensure adequate hydration  - Maintain NPO status until nausea and vomiting are resolved  - Nasogastric tube as ordered  - Administer ordered antiemetic medications as needed  - Provide nonpharmacologic comfort measures as appropriate  - Advance diet as tolerated, if ordered  - Nutrition services referral to assist patient with adequate nutrition and appropriate food choices   Outcome: Progressing      Problem: METABOLIC, FLUID AND ELECTROLYTES - ADULT  Goal: Electrolytes maintained within normal limits  INTERVENTIONS:  - Monitor labs and assess patient for signs and symptoms of electrolyte imbalances  - Administer electrolyte replacement as ordered  - Monitor response to electrolyte replacements, including repeat lab results as appropriate  - Instruct patient on fluid and nutrition as appropriate   Outcome: Progressing    Goal: Glucose maintained within target range  INTERVENTIONS:  - Monitor Blood Glucose as ordered  - Assess for signs and symptoms of hyperglycemia and hypoglycemia  - Administer ordered medications to maintain glucose within target range  - Assess nutritional intake and initiate nutrition service referral as needed   Outcome: Progressing      Problem: GENITOURINARY - ADULT  Goal: Maintains or returns to baseline urinary function  INTERVENTIONS:  - Assess urinary function  - Encourage oral fluids to ensure adequate hydration  - Administer IV fluids as ordered to ensure adequate hydration  - Administer ordered medications as needed  - Offer frequent toileting  - Follow urinary retention protocol if ordered   Outcome: Progressing    Goal: Absence of urinary retention  INTERVENTIONS:  - Assess patients ability to void and empty bladder  - Monitor I/O  - Bladder scan as needed  - Discuss with physician/AP medications to alleviate retention as needed  - Discuss catheterization for long term situations as appropriate   Outcome: Progressing    Goal: Urinary catheter remains patent  INTERVENTIONS:  - Assess patency of urinary catheter  - If patient has a chronic ann, consider changing catheter if non-functioning  - Follow guidelines for intermittent irrigation of non-functioning urinary catheter   Outcome: Progressing      Problem: Prexisting or High Potential for Compromised Skin Integrity  Goal: Skin integrity is maintained or improved  INTERVENTIONS:  - Identify patients at risk for skin breakdown  - Assess and monitor skin integrity  - Assess and monitor nutrition and hydration status  - Monitor labs (i e  albumin)  - Assess for incontinence   - Turn and reposition patient  - Assist with mobility/ambulation  - Relieve pressure over bony prominences  - Avoid friction and shearing  - Provide appropriate hygiene as needed including keeping skin clean and dry  - Evaluate need for skin moisturizer/barrier cream  - Collaborate with interdisciplinary team (i e  Nutrition, Rehabilitation, etc )   - Patient/family teaching   Outcome: Progressing

## 2018-05-29 NOTE — CONSULTS
Consultation - Donnamaria Krabbe 52 y o  female MRN: [id number]    Unit/Bed#: E5 -01 Encounter: [id number]      Assessment/Plan     Assessment: This is a 52y o -year-old female with diabetes with hyperglycemia  Plan:  1  Type 2 diabetes with severe hyperglycemia as evident by hemoglobin A1c and glucometer checks-start basal bolus insulin therapy and start NovoLog 70/30 35 units every 6 hours  Continue to monitor blood sugar over time and make adjustments to the regimen if necessary  Check urine for microalbumin  She should also have a 25 hydroxy vitamin-D level done  Upon discharge, if NovoLog 70/30 is not covered or unaffordable that Humalog 75/25 can be substituted or she can get relion 70/30 at [facility] for 25 dollars per vial       CC: Diabetes Consult    History of Present Illness     HPI: Donnamaria Krabbe is a 52y o  year old female with type 2 diabetes for 10 years  She is on insulin at home  She denies any polyuria, polydipsia, nocturia and blurry vision  She denies nephropathy, retinopathy, heart attack, stroke and claudication but does admit to neuropathy  She denies any hypoglycemia  Her father had diabetes  This morning, she had MiraLax mixed with prune juice  Inpatient consult to Endocrinology  Consult performed by: Jared Reyes  Consult ordered by: Edouard Snyder          Review of Systems   Constitutional: Negative for chills and fever  Respiratory: Negative for shortness of breath  Cardiovascular: Negative for chest pain  Gastrointestinal: Negative for constipation, diarrhea, nausea and vomiting  Endocrine: Negative for polydipsia and polyuria  Neurological: Positive for numbness  All other systems reviewed and are negative        Historical Information   Past Medical History:   Diagnosis Date    Chronic kidney disease 5/24/2018    Diabetes mellitus ([facility] )     Dyslipidemia 5/24/2018    History of frequent urinary tract infections     HTN (hypertension) 5/24/2018    Hyperlipidemia     Neuropathy      Past Surgical History:   Procedure Laterality Date    SUPRAPUBIC CATHETER INSERTION       Social History   History   Alcohol Use No     History   Drug Use No     History   Smoking Status    Current Every Day Smoker    Packs/day: 1 00    Years: 40 00    Types: Cigarettes   Smokeless Tobacco    Never Used     Family History:   Family History   Problem Relation Age of Onset    Family history unknown: Yes       Meds/Allergies   Current Facility-Administered Medications   Medication Dose Route Frequency Provider Last Rate Last Dose    acetaminophen (TYLENOL) tablet 650 mg  650 mg Oral Q6H PRN Pixie Spruce Spatzer, CRNP        aspirin (ECOTRIN LOW STRENGTH) EC tablet 81 mg  81 mg Oral Daily Pixie Spruce Spatzer, CRNP   81 mg at 05/29/18 0804    atorvastatin (LIPITOR) tablet 40 mg  40 mg Oral Daily With [employer] Spatzer, CRNP   40 mg at 05/29/18 1559    chlorhexidine (PERIDEX) 0 12 % oral rinse 15 mL  15 mL Swish & Spit Q12H [facility] Pixie Spruce Spatzer, CRNP   15 mL at 05/29/18 0804    gabapentin (NEURONTIN) capsule 300 mg  300 mg Oral TID Shari Pelletier MD   300 mg at 05/29/18 1559    heparin (porcine) subcutaneous injection 5,000 Units  5,000 Units Subcutaneous [facility] Pixie Spruce Spatzer, CRNP   5,000 Units at 05/29/18 1323    influenza inactivated quadrivalent vaccine (FLULAVAL) IM injection 0 5 mL  0 5 mL Intramuscular Prior to discharge Lito Panda MD        insulin glargine (LANTUS) subcutaneous injection 45 Units 0 45 mL  45 Units Subcutaneous HS Silvano Vences MD        insulin lispro (HumaLOG) 100 units/mL subcutaneous injection 1-6 Units  1-6 Units Subcutaneous TID AC Pixie Spruce Spatzer, CRNP   4 Units at 05/29/18 1558    insulin lispro (HumaLOG) 100 units/mL subcutaneous injection 1-6 Units  1-6 Units Subcutaneous HS Pixie Spruce Spatzer, CRNP   6 Units at 05/28/18 2148    insulin lispro (HumaLOG) 100 units/mL subcutaneous injection 20 Units  20 Units Subcutaneous TID With Meals Celeste Gavin MD   20 Units at 05/29/18 1558    lisinopril (ZESTRIL) tablet 5 mg  5 mg Oral Daily Gaby SilvaOMAYRA   5 mg at 05/29/18 0804    metroNIDAZOLE (FLAGYL) tablet 500 mg  500 mg Oral Q12H [facility] Megan Jameson   500 mg at 05/29/18 0804    nicotine (NICODERM CQ) 21 mg/24 hr TD 24 hr patch 21 mg  21 mg Transdermal Daily Edgard Tobias CRNP   21 mg at 05/29/18 0804    nortriptyline (PAMELOR) capsule 50 mg  50 mg Oral Daily Hilton Sears Spatzer, CRNP   50 mg at 05/29/18 1117    polyethylene glycol (MIRALAX) packet 17 g  17 g Oral Daily PRN [employer], CRNP   17 g at 05/29/18 0813    traMADol (ULTRAM) tablet 50 mg  50 mg Oral Q6H PRN Hilton Sears Spatzer, CRNP   50 mg at 05/25/18 1642     No Known Allergies    Objective   Vitals: Blood pressure 130/72, pulse 97, temperature 97 8 °F (36 6 °C), temperature source Temporal, resp  rate 19, height 5' 3" (1 6 m), weight 82 1 kg (181 lb), SpO2 98 %  Intake/Output Summary (Last 24 hours) at 05/29/18 1614  Last data filed at 05/28/18 2201   Gross per 24 hour   Intake                0 ml   Output              350 ml   Net             -350 ml     Invasive Devices     Peripheral Intravenous Line            Long-Dwell Peripheral IV (Midline) 05/24/18 Left 5 days          Drain            Suprapubic Catheter 5 days                Physical Exam   Constitutional: She is oriented to person, place, and time  She appears well-developed and well-nourished  No distress  HENT:   Head: Normocephalic and atraumatic  Mouth/Throat: Oropharynx is clear and moist and mucous membranes are normal  No oropharyngeal exudate  Eyes: Conjunctivae, EOM and lids are normal  Right eye exhibits no discharge  Left eye exhibits no discharge  No scleral icterus  Neck: Neck supple  No thyromegaly present  Cardiovascular: Normal rate, regular rhythm and normal heart sounds  Exam reveals no gallop and no friction rub      No murmur heard   Pulmonary/Chest: Effort normal and breath sounds normal  No respiratory distress  She has no wheezes  Abdominal: Soft  Bowel sounds are normal  She exhibits no distension  There is no tenderness  Musculoskeletal: Normal range of motion  She exhibits no edema, tenderness or deformity  Lymphadenopathy:        Head (right side): No occipital adenopathy present  Head (left side): No occipital adenopathy present  Right: No supraclavicular adenopathy present  Left: No supraclavicular adenopathy present  Neurological: She is alert and oriented to person, place, and time  No cranial nerve deficit  Skin: Skin is warm and intact  No rash noted  She is not diaphoretic  No erythema  Psychiatric: She has a normal mood and affect  Her behavior is normal    Vitals reviewed  The history was obtained from the review of the chart, patient  Lab Results:     Results from last 7 days  Lab Units 05/29/18  0516   HEMOGLOBIN A1C % 12 1*     Lab Results   Component Value Date    WBC 5 42 05/28/2018    HGB 9 6 (L) 05/28/2018    HCT 29 4 (L) 05/28/2018    MCV 94 05/28/2018     05/28/2018     Lab Results   Component Value Date/Time    BUN 32 (H) 05/29/2018 05:16 AM     05/29/2018 05:16 AM    K 4 7 05/29/2018 05:16 AM     05/29/2018 05:16 AM    CO2 29 05/29/2018 05:16 AM    CREATININE 1 66 (H) 05/29/2018 05:16 AM     No results for input(s): CHOL, HDL, LDL, TRIG, VLDL in the last 72 hours  No results found for: Vannessa Elkins  POC Glucose (mg/dl)   Date Value   05/29/2018 285 (H)   05/29/2018 496 (H)   05/29/2018 386 (H)   05/28/2018 415 (H)   05/28/2018 318 (H)   05/28/2018 393 (H)   05/28/2018 >500 (HH)   05/28/2018 416 (H)   05/27/2018 272 (H)   05/27/2018 352 (H)       Imaging Studies: I have personally reviewed pertinent reports  Portions of the record may have been created with voice recognition software

## 2018-05-30 VITALS
SYSTOLIC BLOOD PRESSURE: 125 MMHG | WEIGHT: 181 LBS | HEIGHT: 63 IN | TEMPERATURE: 96.7 F | BODY MASS INDEX: 32.07 KG/M2 | DIASTOLIC BLOOD PRESSURE: 81 MMHG | HEART RATE: 86 BPM | RESPIRATION RATE: 19 BRPM | OXYGEN SATURATION: 98 %

## 2018-05-30 PROBLEM — E11.10 DKA (DIABETIC KETOACIDOSES): Status: RESOLVED | Noted: 2018-05-24 | Resolved: 2018-05-30

## 2018-05-30 PROBLEM — N17.9 AKI (ACUTE KIDNEY INJURY) (HCC): Status: RESOLVED | Noted: 2018-05-24 | Resolved: 2018-05-30

## 2018-05-30 LAB
GLUCOSE SERPL-MCNC: 143 MG/DL (ref 65–140)
GLUCOSE SERPL-MCNC: 299 MG/DL (ref 65–140)
GLUCOSE SERPL-MCNC: 315 MG/DL (ref 65–140)

## 2018-05-30 PROCEDURE — 82948 REAGENT STRIP/BLOOD GLUCOSE: CPT

## 2018-05-30 PROCEDURE — 99239 HOSP IP/OBS DSCHRG MGMT >30: CPT | Performed by: HOSPITALIST

## 2018-05-30 RX ORDER — NICOTINE 21 MG/24HR
1 PATCH, TRANSDERMAL 24 HOURS TRANSDERMAL DAILY
Qty: 28 PATCH | Refills: 0 | Status: SHIPPED | OUTPATIENT
Start: 2018-05-31 | End: 2018-06-28 | Stop reason: SDUPTHER

## 2018-05-30 RX ORDER — METRONIDAZOLE 500 MG/1
500 TABLET ORAL EVERY 12 HOURS SCHEDULED
Qty: 3 TABLET | Refills: 0 | Status: SHIPPED | OUTPATIENT
Start: 2018-05-30 | End: 2018-06-01

## 2018-05-30 RX ORDER — INSULIN ASPART 100 [IU]/ML
35 INJECTION, SUSPENSION SUBCUTANEOUS 3 TIMES DAILY
Qty: 3150 UNITS | Refills: 0 | Status: SHIPPED | OUTPATIENT
Start: 2018-05-30 | End: 2018-06-28 | Stop reason: SDUPTHER

## 2018-05-30 RX ADMIN — CHLORHEXIDINE GLUCONATE 15 ML: 1.2 RINSE ORAL at 08:22

## 2018-05-30 RX ADMIN — INSULIN ASPART 35 UNITS: 100 INJECTION, SUSPENSION SUBCUTANEOUS at 13:27

## 2018-05-30 RX ADMIN — METRONIDAZOLE 500 MG: 500 TABLET ORAL at 08:22

## 2018-05-30 RX ADMIN — INSULIN ASPART 35 UNITS: 100 INJECTION, SUSPENSION SUBCUTANEOUS at 08:26

## 2018-05-30 RX ADMIN — ASPIRIN 81 MG: 81 TABLET, COATED ORAL at 08:21

## 2018-05-30 RX ADMIN — GABAPENTIN 300 MG: 300 CAPSULE ORAL at 08:22

## 2018-05-30 RX ADMIN — LISINOPRIL 5 MG: 5 TABLET ORAL at 08:21

## 2018-05-30 RX ADMIN — INSULIN ASPART 35 UNITS: 100 INJECTION, SUSPENSION SUBCUTANEOUS at 03:09

## 2018-05-30 RX ADMIN — INSULIN LISPRO 12 UNITS: 100 INJECTION, SOLUTION INTRAVENOUS; SUBCUTANEOUS at 11:42

## 2018-05-30 RX ADMIN — NORTRIPTYLINE HYDROCHLORIDE 50 MG: 50 CAPSULE ORAL at 11:44

## 2018-05-30 RX ADMIN — HEPARIN SODIUM 5000 UNITS: 5000 INJECTION, SOLUTION INTRAVENOUS; SUBCUTANEOUS at 06:34

## 2018-05-30 NOTE — PROGRESS NOTES
Progress Note - Nita Vargas 52 y o  female MRN: [id number]    Unit/Bed#: E5 -01 Encounter: [id number]    Principal Problem:    DKA (diabetic ketoacidoses) ([facility] )  Active Problems:    JULIO (acute kidney injury) ([facility] )    Chronic kidney disease    HTN (hypertension)    Dyslipidemia    Diabetic peripheral neuropathy (HCC)    Assessment/Plan:  Hyperosmolar hyperglycemic nonketotic syndrome- initially admitted to ICU for insulin drip- was transferred to a [facility] floor following resolution of hyperglycemia  She was not adequately controlled on Lantus and he Humalog combination hence changed to NovoLog 70/30  35 units every 6 hours with which her blood sugars are now trended down to around 180  Patient is stable for discharge home on this dose      Chronic kidney disease stage 3 with creatinine of 1 66   Likely secondary to diabetic nephropathy  Likely her new baseline      ESBL UTI -id input appreciated   Secondary to chronic colonization antibiotics have been discontinued      Diabetic peripheral neuropathy-on gabapentin     · Vaginal idischarge being treated with metronidazole by gynecology service to complete a 7 day course total and follow up with [facility] on discharge  Today's day 6     · Right eye cataract -will follow up with Ophthalmology as an outpatient      · Hyperlipidemia -continue statin    Stable for discharge home  follow-up with her primary care physician  Subjective:  Blood sugars have now 186-143 after change in insulin to 70/30 35 units 3 times a day  Patient is stable for discharge home  Endocrinology input appreciated  Physical Exam:   Vitals: Blood pressure 125/81, pulse 86, temperature (!) 96 7 °F (35 9 °C), temperature source Temporal, resp  rate 19, height 5' 3" (1 6 m), weight 82 1 kg (181 lb), SpO2 98 %  ,Body mass index is 32 06 kg/m²  Gen:  Pleasant, non-tachypnic, non-dyspnic  Conversant    Heart: regular rate and rhythm, S1S2 present, no murmur, rub or gallop  Lungs: clear to ausculatation bilaterally  No wheezing, crackless, or rhonchi  No accessory muscle use or respiratory distress  Abd: soft, non-tender, non-distended  NABS, no guarding, rebound or peritoneal signs  Extremities: no clubbing, cyanosis or edema  2+pedal pulses bilaterally  Full range of motion  Neuro: awake, alert and oriented  Cranial nerves 2-12 intact  Strength and sensation grossly intact  Skin: warm and dry: no petechiae, purpura and rash      LABS:     Results from last 7 days  Lab Units 05/28/18  0541 05/24/18 2006   WBC Thousand/uL 5 42  --    HEMOGLOBIN g/dL 9 6*  --    HEMATOCRIT % 29 4*  --    PLATELETS Thousands/uL 205 255       Results from last 7 days  Lab Units 05/29/18  0516 05/28/18  0541 05/25/18  0410   SODIUM mmol/L 137 139 148*   POTASSIUM mmol/L 4 7 4 1 3 6   CHLORIDE mmol/L 103 104 111*   CO2 mmol/L 29 27 28   BUN mg/dL 32* 26* 26*   CREATININE mg/dL 1 66* 1 58* 1 50*   GLUCOSE RANDOM mg/dL 338* 327* 149*   CALCIUM mg/dL 8 8 8 8 8 9       Intake/Output Summary (Last 24 hours) at 05/30/18 1136  Last data filed at 05/29/18 2000   Gross per 24 hour   Intake                0 ml   Output              675 ml   Net             -675 ml           Current Facility-Administered Medications:  acetaminophen 650 mg Oral Q6H PRN Dixie Hausen Spatzer, CRNP   aspirin 81 mg Oral Daily Dixie Hausen Spatzer, CRNP   atorvastatin 40 mg Oral Daily With ScoreStreak Spatzer, CRNP   chlorhexidine 15 mL Swish & Spit Q12H [facility] Dixie Hausen Spatzer, CRNP   gabapentin 300 mg Oral TID Cady Marroquin MD   heparin (porcine) 5,000 Units Subcutaneous [facility] Dixie Hausen Spatzer, CRNP   influenza vaccine 0 5 mL Intramuscular Prior to discharge Mohan Bridges MD   insulin aspart protamine-insulin aspart 35 Units Subcutaneous Q6H Stephanie Gomez MD   insulin lispro 2-12 Units Subcutaneous HS Stephanie Gomez MD   insulin lispro 4-20 Units Subcutaneous TID With Meals July Suero MD   lisinopril 5 mg Oral Daily Inocencio De La Torre, OMAYRA   metroNIDAZOLE 500 mg Oral Q12H [facility] Amelia Ghosh   nicotine 21 mg Transdermal Daily Edgard Tobias, OMAYRA   nortriptyline 50 mg Oral Daily Graciela Perks Spatzer, CRNP   polyethylene glycol 17 g Oral Daily PRN OMAYRA Phillips   traMADol 50 mg Oral Q6H PRN OMAYRA Daniels

## 2018-05-30 NOTE — DISCHARGE SUMMARY
Discharge Summary - Medical Shelly Gomez 52 y o  female MRN: [id number]    [facility] [place] [address] MED SURG Room / Bed: [address] 555/E5 -* Encounter: [id number]    BRIEF OVERVIEW      Admission Date: 5/24/2018       Discharge Date:  05/30/2018      Admitting Diagnosis:  Diabetes mellitus type 2 with severe hyperglycemia    Primary Discharge Diagnosis  Principal Problem (Resolved):    Diabetes mellitus type 2 with severe hyperglycemia Active Problems:    Chronic kidney disease    HTN (hypertension)    Dyslipidemia    Diabetic peripheral neuropathy (HCC)    JULIO (acute kidney injury) ([facility] )      Service:  [facility]    Consulting Providers   Endocrinology Dr Raji Peraza and plan on date of discharge  Hyperosmolar hyperglycemic nonketotic syndrome- initially admitted to ICU for insulin drip- was transferred to a med surge floor following resolution of hyperglycemia  She was not adequately controlled on Lantus and he Humalog combination hence changed to NovoLog 70/30  35 units every 8 hours with which her blood sugars are now trended down to around 180  Patient is stable for discharge home on this dose       Chronic kidney disease stage 3 with creatinine of 1 66   Likely secondary to diabetic nephropathy   Likely her new baseline      ESBL UTI -id input appreciated   Secondary to chronic colonization antibiotics have been discontinued      Diabetic peripheral neuropathy-on gabapentin     · Vaginal idischarge being treated with metronidazole by gynecology service to complete a 7 day course total and follow up with [facility] on discharge   Joselin Melendez is day 6     · Right eye cataract -will follow up with Ophthalmology as an outpatient      · Hyperlipidemia -continue statin     Stable for discharge home  follow-up with her primary care physician        Discharge Condition: Improved    Discharge Disposition:  Home    Discharge summary:     Shelly Gomez is a 68-year-old female with a significant past medical history of diabetes mellitus type 2 with neuropathy, dyslipidemia, chronic kidney disease 3 with a baseline creatinine of around 1 5, hypertension, neurogenic bladder status post suprapubic catheter who presented to [facility] with a 3 day history of headache nausea and vomiting on 05/24/2018  She was found to have a blood glucose of 912 with an anion gap of 16 and was transferred to [facility]  She was admitted to the intensive care unit on an insulin drip  During that  She complained of burning in the vagina and was seen by gynecology diagnosed as bacterial vaginosis and started on Flagyl  She had an abnormal urine analysis in the setting of a chronic suprapubic catheter and was initially started on ceftriaxone which was discontinued by Infectious Disease since it was felt to be chronic colonization  The patient has blood sugars subsequently trended down and she was transferred to a [facility] floor she was started on Lantus and Humalog  Despite being on Lantus 45 units and Humalog 20 units t i d  her blood sugar still trended between 300 and 400-endocrinology was consulted who recommended changing her to NovoLog 900418 units 3 times a day-her blood sugars are now trended down to around 180  She will be discharged home on NovoLog 391268 units 3 times a day  She is to follow up with her primary care physician   She remains stable for discharge        Discharge Medications   Please see Medical Reconciliation Discharge Form    Discharge Follow Up Appointments:   PCP    Discharge  Statement   Total Time Spent today including physical exam, discussion with patient and family, and discharge arrangements/care 46 minutes

## 2018-06-28 DIAGNOSIS — E11.65 UNCONTROLLED DIABETES MELLITUS WITH HYPERGLYCEMIA (HCC): ICD-10-CM

## 2018-06-28 DIAGNOSIS — E78.2 MIXED HYPERLIPIDEMIA: Primary | ICD-10-CM

## 2018-06-28 DIAGNOSIS — I25.10 CORONARY ARTERY DISEASE DUE TO LIPID RICH PLAQUE: ICD-10-CM

## 2018-06-28 DIAGNOSIS — I25.83 CORONARY ARTERY DISEASE DUE TO LIPID RICH PLAQUE: ICD-10-CM

## 2018-06-28 DIAGNOSIS — M79.7 FIBROMYALGIA: ICD-10-CM

## 2018-06-28 DIAGNOSIS — Z72.0 TOBACCO USE: ICD-10-CM

## 2018-06-28 DIAGNOSIS — I10 ESSENTIAL HYPERTENSION: ICD-10-CM

## 2018-06-29 RX ORDER — ATORVASTATIN CALCIUM 40 MG/1
40 TABLET, FILM COATED ORAL DAILY
Qty: 30 TABLET | Refills: 5 | Status: SHIPPED | OUTPATIENT
Start: 2018-06-29 | End: 2018-07-02 | Stop reason: SDUPTHER

## 2018-06-29 RX ORDER — LISINOPRIL 5 MG/1
5 TABLET ORAL DAILY
Qty: 30 TABLET | Refills: 5 | Status: SHIPPED | OUTPATIENT
Start: 2018-06-29 | End: 2018-07-02 | Stop reason: SDUPTHER

## 2018-06-29 RX ORDER — PREGABALIN 50 MG/1
50 CAPSULE ORAL 2 TIMES DAILY
Qty: 60 CAPSULE | Refills: 0 | Status: SHIPPED | OUTPATIENT
Start: 2018-06-29 | End: 2018-08-02 | Stop reason: SDUPTHER

## 2018-06-29 RX ORDER — NICOTINE 21 MG/24HR
1 PATCH, TRANSDERMAL 24 HOURS TRANSDERMAL DAILY
Qty: 28 PATCH | Refills: 0 | Status: SHIPPED | OUTPATIENT
Start: 2018-06-29 | End: 2018-08-02 | Stop reason: ALTCHOICE

## 2018-06-29 RX ORDER — ASPIRIN 81 MG/1
81 TABLET ORAL DAILY
Qty: 30 TABLET | Refills: 5 | Status: SHIPPED | OUTPATIENT
Start: 2018-06-29 | End: 2018-07-02 | Stop reason: SDUPTHER

## 2018-06-29 RX ORDER — INSULIN ASPART 100 [IU]/ML
20 INJECTION, SUSPENSION SUBCUTANEOUS
Qty: 3150 UNITS | Refills: 5 | Status: SHIPPED | OUTPATIENT
Start: 2018-06-29 | End: 2018-07-02 | Stop reason: SDUPTHER

## 2018-07-02 ENCOUNTER — TELEPHONE (OUTPATIENT)
Dept: FAMILY MEDICINE CLINIC | Facility: CLINIC | Age: 53
End: 2018-07-02

## 2018-07-02 DIAGNOSIS — E11.65 UNCONTROLLED TYPE 2 DIABETES MELLITUS WITH HYPERGLYCEMIA, WITH LONG-TERM CURRENT USE OF INSULIN (HCC): ICD-10-CM

## 2018-07-02 DIAGNOSIS — Z79.4 UNCONTROLLED TYPE 2 DIABETES MELLITUS WITH HYPERGLYCEMIA, WITH LONG-TERM CURRENT USE OF INSULIN (HCC): ICD-10-CM

## 2018-07-02 DIAGNOSIS — Z72.0 TOBACCO USE: ICD-10-CM

## 2018-07-02 DIAGNOSIS — E78.2 MIXED HYPERLIPIDEMIA: ICD-10-CM

## 2018-07-02 DIAGNOSIS — M79.7 FIBROMYALGIA: ICD-10-CM

## 2018-07-02 DIAGNOSIS — I25.83 CORONARY ARTERY DISEASE DUE TO LIPID RICH PLAQUE: ICD-10-CM

## 2018-07-02 DIAGNOSIS — I10 ESSENTIAL HYPERTENSION: ICD-10-CM

## 2018-07-02 DIAGNOSIS — I25.10 CORONARY ARTERY DISEASE DUE TO LIPID RICH PLAQUE: ICD-10-CM

## 2018-07-02 RX ORDER — OMEPRAZOLE 20 MG/1
20 CAPSULE, DELAYED RELEASE ORAL
COMMUNITY
Start: 2018-05-10 | End: 2018-08-02 | Stop reason: SDUPTHER

## 2018-07-02 RX ORDER — ATORVASTATIN CALCIUM 40 MG/1
40 TABLET, FILM COATED ORAL DAILY
Qty: 30 TABLET | Refills: 0 | Status: SHIPPED | OUTPATIENT
Start: 2018-07-02 | End: 2018-08-02 | Stop reason: SDUPTHER

## 2018-07-02 RX ORDER — ASPIRIN 81 MG/1
81 TABLET ORAL DAILY
Qty: 30 TABLET | Refills: 0 | Status: SHIPPED | OUTPATIENT
Start: 2018-07-02 | End: 2018-08-02 | Stop reason: SDUPTHER

## 2018-07-02 RX ORDER — ERGOCALCIFEROL 1.25 MG/1
50000 CAPSULE ORAL WEEKLY
COMMUNITY
Start: 2018-05-09 | End: 2018-08-02 | Stop reason: SDUPTHER

## 2018-07-02 RX ORDER — UBIQUINOL 100 MG
1 CAPSULE ORAL AS NEEDED
COMMUNITY
Start: 2018-06-26 | End: 2020-01-01 | Stop reason: SDUPTHER

## 2018-07-02 RX ORDER — LISINOPRIL 5 MG/1
5 TABLET ORAL DAILY
Qty: 30 TABLET | Refills: 0 | Status: SHIPPED | OUTPATIENT
Start: 2018-07-02 | End: 2018-08-02 | Stop reason: SDUPTHER

## 2018-07-02 RX ORDER — INSULIN ASPART 100 [IU]/ML
20 INJECTION, SUSPENSION SUBCUTANEOUS
Qty: 3150 UNITS | Refills: 0 | Status: SHIPPED | OUTPATIENT
Start: 2018-07-02 | End: 2018-08-02 | Stop reason: SDUPTHER

## 2018-07-17 ENCOUNTER — TELEPHONE (OUTPATIENT)
Dept: FAMILY MEDICINE CLINIC | Facility: CLINIC | Age: 53
End: 2018-07-17

## 2018-07-17 NOTE — TELEPHONE ENCOUNTER
Visiting nurse states sugar is over 300  Patent does not want to go to the hospital  She states she instructed patient to take her insulin, as she hadn't taken it yet, and that she would call her back in an hour to see if it's improved  I told her to have the patient call our office for the soonest available appt if sugar remains uncontrolled

## 2018-07-20 ENCOUNTER — HOSPITAL ENCOUNTER (EMERGENCY)
Facility: HOSPITAL | Age: 53
Discharge: HOME | End: 2018-07-20
Attending: EMERGENCY MEDICINE | Admitting: EMERGENCY MEDICINE
Payer: MEDICARE

## 2018-07-20 VITALS
DIASTOLIC BLOOD PRESSURE: 68 MMHG | WEIGHT: 194.45 LBS | HEIGHT: 63 IN | HEART RATE: 86 BPM | TEMPERATURE: 97.6 F | SYSTOLIC BLOOD PRESSURE: 120 MMHG | BODY MASS INDEX: 34.45 KG/M2 | RESPIRATION RATE: 18 BRPM | OXYGEN SATURATION: 96 %

## 2018-07-20 DIAGNOSIS — B96.89 BACTERIAL VAGINOSIS: ICD-10-CM

## 2018-07-20 DIAGNOSIS — N39.0 URINARY TRACT INFECTION: Primary | ICD-10-CM

## 2018-07-20 DIAGNOSIS — N76.0 BACTERIAL VAGINOSIS: ICD-10-CM

## 2018-07-20 LAB
AMORPH URATE CRY URNS QL MICRO: ABNORMAL /HPF
BACTERIA UR QL AUTO: ABNORMAL /HPF
BILIRUB UR QL STRIP: NEGATIVE
CLARITY UR: ABNORMAL
COLOR UR: ABNORMAL
GLUCOSE UR STRIP-MCNC: ABNORMAL MG/DL
HGB UR QL STRIP.AUTO: 150
KETONES UR STRIP-MCNC: NEGATIVE MG/DL
LEUKOCYTE ESTERASE UR QL STRIP: 500
MUCOUS THREADS UR QL AUTO: ABNORMAL
NITRITE UR QL STRIP: NEGATIVE
NON-SQ EPI CELLS URNS QL MICRO: ABNORMAL /HPF
OTHER STN SPEC: ABNORMAL
PH UR STRIP.AUTO: 6 [PH] (ref 4.5–8)
PROT UR STRIP-MCNC: >=500 MG/DL
RBC #/AREA URNS AUTO: ABNORMAL /HPF
SP GR UR STRIP.AUTO: 1.01 (ref 1–1.04)
UROBILINOGEN UA: NEGATIVE MG/DL
WBC #/AREA URNS AUTO: ABNORMAL /HPF

## 2018-07-20 PROCEDURE — 81001 URINALYSIS AUTO W/SCOPE: CPT | Performed by: PHYSICIAN ASSISTANT

## 2018-07-20 PROCEDURE — 81003 URINALYSIS AUTO W/O SCOPE: CPT | Performed by: PHYSICIAN ASSISTANT

## 2018-07-20 PROCEDURE — 87086 URINE CULTURE/COLONY COUNT: CPT | Performed by: PHYSICIAN ASSISTANT

## 2018-07-20 PROCEDURE — 99283 EMERGENCY DEPT VISIT LOW MDM: CPT

## 2018-07-20 RX ORDER — SULFAMETHOXAZOLE AND TRIMETHOPRIM 800; 160 MG/1; MG/1
TABLET ORAL
Status: COMPLETED
Start: 2018-07-20 | End: 2018-07-20

## 2018-07-20 RX ORDER — METRONIDAZOLE 500 MG/1
500 TABLET ORAL EVERY 12 HOURS SCHEDULED
Qty: 14 TABLET | Refills: 0 | Status: SHIPPED | OUTPATIENT
Start: 2018-07-20 | End: 2018-07-27

## 2018-07-20 RX ORDER — METRONIDAZOLE 500 MG/1
500 TABLET ORAL ONCE
Status: COMPLETED | OUTPATIENT
Start: 2018-07-20 | End: 2018-07-20

## 2018-07-20 RX ORDER — SULFAMETHOXAZOLE AND TRIMETHOPRIM 800; 160 MG/1; MG/1
1 TABLET ORAL ONCE
Status: COMPLETED | OUTPATIENT
Start: 2018-07-20 | End: 2018-07-20

## 2018-07-20 RX ORDER — SULFAMETHOXAZOLE AND TRIMETHOPRIM 800; 160 MG/1; MG/1
1 TABLET ORAL 2 TIMES DAILY
Qty: 14 TABLET | Refills: 0 | Status: SHIPPED | OUTPATIENT
Start: 2018-07-20 | End: 2018-07-27

## 2018-07-20 RX ADMIN — SULFAMETHOXAZOLE AND TRIMETHOPRIM 1 TABLET: 800; 160 TABLET ORAL at 20:22

## 2018-07-20 RX ADMIN — METRONIDAZOLE 500 MG: 500 TABLET ORAL at 20:22

## 2018-07-20 NOTE — ED PROVIDER NOTES
History  Chief Complaint   Patient presents with    Vaginal Discharge     White cloudy vaginal discharge and pain X 3 days  A 59-year-old female with suprapubic catheter presenting with vaginal pain and discharge  When questioned why patient has suprapubic catheter she reports well I had 10 kids, did a lot of drugs and drank a lot of alcohol so my bladder gave out'  Patient reports having suprapubic catheter changed approximately 5 days ago and started having vaginal pain 1 day after it was changed  Patient describes vaginal pain as sharp and states she has white thin vaginal discharge  She denies any vaginal bleeding, fevers chills or sweats  She denies any blockage from the suprapubic cath and has a has been draining of urine which is cloudy  She has been taking Tylenol Extra Strength at home without much relief  Will send urine for urinalysis but with description and physical exam vaginal discharge likely bacterial vaginosis  Prior to Admission Medications   Prescriptions Last Dose Informant Patient Reported? Taking?    Alcohol Swabs (ALCOHOL PREP) 70 % PADS   Yes Yes   Sig: Apply 1 applicator topically as needed   ONE TOUCH ULTRA TEST test strip   Yes Yes   Si strips by Device route 3 (three) times a day   aspirin (ECOTRIN LOW STRENGTH) 81 mg EC tablet   No Yes   Sig: Take 1 tablet (81 mg total) by mouth daily   atorvastatin (LIPITOR) 40 mg tablet   No Yes   Sig: Take 1 tablet (40 mg total) by mouth daily for 180 days   ergocalciferol (VITAMIN D2) 50,000 units   Yes Yes   Sig: Take 50,000 Units by mouth once a week   insulin aspart protamine-insulin aspart (NovoLOG 70/30) 100 units/mL injection   No Yes   Sig: Inject 20 Units under the skin 2 (two) times a day before meals   lisinopril (ZESTRIL) 5 mg tablet   No Yes   Sig: Take 1 tablet (5 mg total) by mouth daily   nicotine (NICODERM CQ) 21 mg/24 hr TD 24 hr patch   No No   Sig: Place 1 patch on the skin daily   nortriptyline (PAMELOR) 50 mg capsule  Self Yes Yes   Sig: Take 50 mg by mouth daily   omeprazole (PriLOSEC) 20 mg delayed release capsule   Yes Yes   Sig: Take 20 mg by mouth daily before breakfast   pregabalin (LYRICA) 50 mg capsule   No Yes   Sig: Take 1 capsule (50 mg total) by mouth 2 (two) times a day      Facility-Administered Medications: None       Past Medical History:   Diagnosis Date    Chronic kidney disease 5/24/2018    Diabetes mellitus ([address] )     Dyslipidemia 5/24/2018    History of frequent urinary tract infections     HTN (hypertension) 5/24/2018    Hyperlipidemia     Neuropathy        Past Surgical History:   Procedure Laterality Date    SUPRAPUBIC CATHETER INSERTION         Family History   Problem Relation Age of Onset    Family history unknown: Yes     I have reviewed and agree with the history as documented  Social History   Substance Use Topics    Smoking status: Current Every Day Smoker     Packs/day: 1 00     Years: 40 00     Types: Cigarettes    Smokeless tobacco: Never Used    Alcohol use No        Review of Systems   All other systems reviewed and are negative  Physical Exam  Physical Exam   Constitutional: She is oriented to person, place, and time  She appears well-developed and well-nourished  No distress  HENT:   Head: Normocephalic and atraumatic  Eyes: Conjunctivae are normal    EOM grossly intact   Neck: Normal range of motion  Neck supple  No JVD present  Cardiovascular: Normal rate  Pulmonary/Chest: Effort normal    Abdominal: Soft  Genitourinary: Vaginal discharge found  Musculoskeletal:   FROM, steady gait, cap refill brisk, strength and sensation grossly intact throughout   Neurological: She is alert and oriented to person, place, and time  Skin: Skin is warm and dry  Capillary refill takes less than 2 seconds  Psychiatric: She has a normal mood and affect  Her behavior is normal    Nursing note and vitals reviewed        Vital Signs  ED Triage Vitals [07/20/18 1902]   Temperature Pulse Respirations Blood Pressure SpO2   97 6 °F (36 4 °C) 86 18 120/68 96 %      Temp Source Heart Rate Source Patient Position - Orthostatic VS BP Location FiO2 (%)   Temporal Monitor Sitting Left arm --      Pain Score       No Pain           Vitals:    07/20/18 1902   BP: 120/68   Pulse: 86   Patient Position - Orthostatic VS: Sitting       Visual Acuity      ED Medications  Medications - No data to display    Diagnostic Studies  Results Reviewed     Procedure Component Value Units Date/Time    Urine Microscopic [[id number]]  (Abnormal) Collected:  07/20/18 1934    Lab Status:  Final result Specimen:  Urine from Urine, Suprapubic catheter Updated:  07/20/18 2000     RBC, UA 20-30 (A) /hpf      WBC, UA Innumerable (A) /hpf      Epithelial Cells Occasional /hpf      Bacteria, UA Innumerable (A) /hpf      AMORPH URATES Occasional /hpf      OTHER OBSERVATIONS Yeast Cells Present     MUCOUS THREADS Occasional (A)    Urine culture [[id number]] Collected:  07/20/18 1934    Lab Status:   In process Specimen:  Urine from Urine, Suprapubic catheter Updated:  07/20/18 1959    UA w Reflex to Microscopic w Reflex to Culture [[id number]]  (Abnormal) Collected:  07/20/18 1934    Lab Status:  Final result Specimen:  Urine from Urine, Suprapubic catheter Updated:  07/20/18 1949     Color, UA Qian (A)     Clarity, UA Cloudy (A)     Specific Gravity, UA 1 015     pH, UA 6 0     Leukocytes,  0 (A)     Nitrite, UA Negative     Protein, UA >=500 (A) mg/dl      Glucose,  (1/10%) (A) mg/dl      Ketones, UA Negative mg/dl      Bilirubin, UA Negative     Blood,  0 (A)     UROBILINOGEN UA Negative mg/dL                  No orders to display              Procedures  Procedures       Phone Contacts  ED Phone Contact    ED Course                               MDM  Number of Diagnoses or Management Options  Diagnosis management comments: Urinalysis is positive for leukocytes with innumerable bacteria, will treat for uti and BV  All labs and imaging discussed with patient, strict return to ED precautions discussed  Pt verbalizes understanding and agrees with plan  Pt is stable for discharge      CritCare Time    Disposition  Final diagnoses:   Urinary tract infection   Bacterial vaginosis     Time reflects when diagnosis was documented in both MDM as applicable and the Disposition within this note     Time User Action Codes Description Comment    7/20/2018  8:05 PM Amanda Cuevas Add [N39 0] Urinary tract infection     7/20/2018  8:05 PM Lindy Katie JANG Add [N76 0,  B96 89] Bacterial vaginosis       ED Disposition     ED Disposition Condition Comment    Discharge  Welby Brittle discharge to home/self care  Condition at discharge: Good        Follow-up Information     Follow up With Specialties Details Why [address]  [address] [phone]          Patient's Medications   Discharge Prescriptions    METRONIDAZOLE (FLAGYL) 500 MG TABLET    Take 1 tablet (500 mg total) by mouth every 12 (twelve) hours for 7 days       Start Date: 7/20/2018 End Date: 7/27/2018       Order Dose: 500 mg       Quantity: 14 tablet    Refills: 0    SULFAMETHOXAZOLE-TRIMETHOPRIM (BACTRIM DS) 800-160 MG PER TABLET    Take 1 tablet by mouth 2 (two) times a day for 7 days smx-tmp DS (BACTRIM) 800-160 mg tabs (1tab q12 D10)       Start Date: 7/20/2018 End Date: 7/27/2018       Order Dose: 1 tablet       Quantity: 14 tablet    Refills: 0     No discharge procedures on file      ED Provider  Electronically Signed by           Zuleika Duenas PA-C  07/20/18 2007

## 2018-07-21 NOTE — DISCHARGE INSTRUCTIONS

## 2018-07-22 LAB
BACTERIA UR CULT: ABNORMAL

## 2018-07-23 ENCOUNTER — HOSPITAL ENCOUNTER (EMERGENCY)
Facility: HOSPITAL | Age: 53
Discharge: HOME | End: 2018-07-23
Attending: EMERGENCY MEDICINE
Payer: MEDICARE

## 2018-07-23 VITALS
HEART RATE: 81 BPM | DIASTOLIC BLOOD PRESSURE: 59 MMHG | BODY MASS INDEX: 34.72 KG/M2 | WEIGHT: 196 LBS | SYSTOLIC BLOOD PRESSURE: 104 MMHG | TEMPERATURE: 98.7 F | OXYGEN SATURATION: 93 % | RESPIRATION RATE: 18 BRPM

## 2018-07-23 DIAGNOSIS — R10.2 VAGINAL PAIN: ICD-10-CM

## 2018-07-23 DIAGNOSIS — N39.0 UTI (URINARY TRACT INFECTION): Primary | ICD-10-CM

## 2018-07-23 PROCEDURE — 87480 CANDIDA DNA DIR PROBE: CPT | Performed by: EMERGENCY MEDICINE

## 2018-07-23 PROCEDURE — 87591 N.GONORRHOEAE DNA AMP PROB: CPT | Performed by: EMERGENCY MEDICINE

## 2018-07-23 PROCEDURE — 87510 GARDNER VAG DNA DIR PROBE: CPT | Performed by: EMERGENCY MEDICINE

## 2018-07-23 PROCEDURE — 87660 TRICHOMONAS VAGIN DIR PROBE: CPT | Performed by: EMERGENCY MEDICINE

## 2018-07-23 PROCEDURE — 87491 CHLMYD TRACH DNA AMP PROBE: CPT | Performed by: EMERGENCY MEDICINE

## 2018-07-23 PROCEDURE — 99283 EMERGENCY DEPT VISIT LOW MDM: CPT

## 2018-07-23 RX ORDER — NITROFURANTOIN 25; 75 MG/1; MG/1
100 CAPSULE ORAL ONCE
Status: DISCONTINUED | OUTPATIENT
Start: 2018-07-23 | End: 2018-07-23 | Stop reason: HOSPADM

## 2018-07-23 RX ORDER — FLUCONAZOLE 100 MG/1
200 TABLET ORAL ONCE
Status: COMPLETED | OUTPATIENT
Start: 2018-07-23 | End: 2018-07-23

## 2018-07-23 RX ORDER — LEVOFLOXACIN 500 MG/1
500 TABLET, FILM COATED ORAL DAILY
Qty: 7 TABLET | Refills: 0 | Status: SHIPPED | OUTPATIENT
Start: 2018-07-23 | End: 2018-07-30

## 2018-07-23 RX ORDER — LEVOFLOXACIN 750 MG/1
750 TABLET ORAL ONCE
Status: COMPLETED | OUTPATIENT
Start: 2018-07-23 | End: 2018-07-23

## 2018-07-23 RX ORDER — LEVOFLOXACIN 750 MG/1
TABLET ORAL
Status: COMPLETED
Start: 2018-07-23 | End: 2018-07-23

## 2018-07-23 RX ORDER — NITROFURANTOIN 25; 75 MG/1; MG/1
100 CAPSULE ORAL 2 TIMES DAILY
Qty: 10 CAPSULE | Refills: 0 | Status: SHIPPED | OUTPATIENT
Start: 2018-07-23 | End: 2018-08-02 | Stop reason: ALTCHOICE

## 2018-07-23 RX ORDER — FLUCONAZOLE 100 MG/1
TABLET ORAL
Status: COMPLETED
Start: 2018-07-23 | End: 2018-07-23

## 2018-07-23 RX ORDER — NITROFURANTOIN MACROCRYSTALS 50 MG/1
CAPSULE ORAL
Status: COMPLETED
Start: 2018-07-23 | End: 2018-07-23

## 2018-07-23 RX ADMIN — NITROFURANTOIN MACROCRYSTALS 100 MG: 50 CAPSULE ORAL at 19:36

## 2018-07-23 RX ADMIN — FLUCONAZOLE 200 MG: 100 TABLET ORAL at 19:37

## 2018-07-23 RX ADMIN — LEVOFLOXACIN 750 MG: 750 TABLET, FILM COATED ORAL at 19:37

## 2018-07-23 RX ADMIN — LEVOFLOXACIN 750 MG: 750 TABLET ORAL at 19:37

## 2018-07-23 NOTE — DISCHARGE INSTRUCTIONS
Catheter-associated Urinary Tract Infection   WHAT YOU NEED TO KNOW:   A catheter-associated urinary tract infection (CAUTI) is an infection caused by an indwelling urinary catheter  An indwelling urinary catheter is a thin, flexible tube that is inserted into the bladder  It is left in place to drain urine  The infection may travel along the catheter and into the bladder or kidneys  DISCHARGE INSTRUCTIONS:   Return to the emergency department if:   · You have severe pain in your lower back or abdomen  · You have blood in your urine  · You stop urinating, or you urinate much less than usual   Contact your healthcare provider if:   · You have a fever  · Your symptoms do not improve or get worse  · You have questions or concerns about your condition or care  Medicines: You may need any of the following:  · Antibiotics  help treat an infection caused by bacteria  · Antifungals  help treat an infection caused by fungus  · Acetaminophen  decreases pain and fever  It is available without a doctor's order  Ask how much to take and how often to take it  Follow directions  Read the labels of all other medicines you are using to see if they also contain acetaminophen, or ask your doctor or pharmacist  Acetaminophen can cause liver damage if not taken correctly  Do not use more than 4 grams (4,000 milligrams) total of acetaminophen in one day  · NSAIDs , such as ibuprofen, help decrease swelling, pain, and fever  This medicine is available with or without a doctor's order  NSAIDs can cause stomach bleeding or kidney problems in certain people  If you take blood thinner medicine, always ask your healthcare provider if NSAIDs are safe for you  Always read the medicine label and follow directions  · Take your medicine as directed  Contact your healthcare provider if you think your medicine is not helping or if you have side effects  Tell him of her if you are allergic to any medicine   Keep a list of the medicines, vitamins, and herbs you take  Include the amounts, and when and why you take them  Bring the list or the pill bottles to follow-up visits  Carry your medicine list with you in case of an emergency  Self-care:   · Drink fluids as directed  Fluids may help your kidneys and bladder get rid of the infection  · Keep the catheter area clean  Clean your skin around the catheter as directed  Shower once a day  Do not take baths or go in hot tubs until your infection is gone  · Do not have sex  until your healthcare provider says it is okay  Sex may delay healing or cause another UTI  Prevent another CAUTI:   · Wash your hands before and after you use the bathroom or touch the catheter  Wash your hands to prevent the spread of infection to your urinary tract  · Clean all parts of your catheter as directed  Keep your catheter tubing clean  Do not place the catheter on the ground  Do not allow the drainage spout to touch the toilet  Use an alcohol swab to clean the end of drainage spout as directed  · Keep the drainage bag below your waist   This may prevent urine from moving back into your bladder, which can cause an infection  · Empty the urine bag as directed  This may prevent urine from flowing back into your bladder  · Women should wipe front to back  after a bowel movement  This may prevent germs from getting into the urinary tract  · Keep the catheter secured to your leg as directed  Use tape or a special catheter flowers to prevent your catheter from being pulled  This may also prevent kinks that could cause the urine to move back into the bladder  Follow up with your healthcare provider as directed:  Write down your questions so you remember to ask them during your visits  © 2017 [facility] Information is for End User's use only and may not be sold, redistributed or otherwise used for commercial purposes   All illustrations and images included in [facility] are the copyrighted property of A D A M , Inc  or Javi Gil  The above information is an  only  It is not intended as medical advice for individual conditions or treatments  Talk to your doctor, nurse or pharmacist before following any medical regimen to see if it is safe and effective for you

## 2018-07-23 NOTE — ED PROVIDER NOTES
Pt Name: Irena Seen  MRN: [id number]  [place] 1965  Age/Sex: 52 y o  female  Date of evaluation: 7/23/2018  PCP: Estephania Morris MD    [address] [place] [address]    Chief Complaint   Patient presents with    Vaginal Pain     Patient states was here "last week" for vaginal discharge, cloudy urine from super pubic, and vaginal pain  States "That girl just looked at my urine not my vagina and when I stand up there be a lot of pain there and these medicines ain't working none"  ON flagyl and bactrim  States continues w/ vaginal discharge, vaginal pain  Has superpubiic due to "failed bladder"           HPI    Deana Alcala presents to the Emergency Department complaining of vaginal pain  She is taking her abx for UTI and feels that there is vaginal pain and discharge  She has not seen a PCP and has no other complaints     "its way up in there"      HPI      Past Medical and Surgical History    Past Medical History:   Diagnosis Date    Chronic kidney disease 5/24/2018    Diabetes mellitus ([address] )     Dyslipidemia 5/24/2018    History of frequent urinary tract infections     HTN (hypertension) 5/24/2018    Hyperlipidemia     Neuropathy        Past Surgical History:   Procedure Laterality Date    SUPRAPUBIC CATHETER INSERTION         Family History   Problem Relation Age of Onset   Napoleon Schmidt Breast cancer Mother     Hypertension Mother     Diabetes Mother         Mellitus    Parkinsonism Mother     Cancer Maternal Aunt         Unknown       Social History   Substance Use Topics    Smoking status: Current Every Day Smoker     Packs/day: 0 50     Years: 40 00     Types: Cigarettes    Smokeless tobacco: Never Used      Comment: Per [employer]: Heavy tobacco smoker/12 cigs per day    Alcohol use No              Allergies    No Known Allergies    Home Medications    Prior to Admission medications    Medication Sig Start Date End Date Taking?  Authorizing Provider   Alcohol Swabs (ALCOHOL PREP) 70 % PADS Apply 1 applicator topically as needed 6/26/18   Historical Provider, MD   aspirin (ECOTRIN LOW STRENGTH) 81 mg EC tablet Take 1 tablet (81 mg total) by mouth daily 7/2/18   Rehab DO Olaf   atorvastatin (LIPITOR) 40 mg tablet Take 1 tablet (40 mg total) by mouth daily for 180 days 7/2/18 12/29/18  Rehab Edwina Singletary DO   ergocalciferol (VITAMIN D2) 50,000 units Take 50,000 Units by mouth once a week 5/9/18   Historical Provider, MD   insulin aspart protamine-insulin aspart (NovoLOG 70/30) 100 units/mL injection Inject 20 Units under the skin 2 (two) times a day before meals 7/2/18   Rehab Olaf,    lisinopril (ZESTRIL) 5 mg tablet Take 1 tablet (5 mg total) by mouth daily 7/2/18   Rehab Olaf,    metroNIDAZOLE (FLAGYL) 500 mg tablet Take 1 tablet (500 mg total) by mouth every 12 (twelve) hours for 7 days 7/20/18 7/27/18  Kelley Santana PA-C   nicotine (NICODERM CQ) 21 mg/24 hr TD 24 hr patch Place 1 patch on the skin daily 6/29/18   Viola Day, MD   nortriptyline (PAMELOR) 50 mg capsule Take 50 mg by mouth daily    Historical Provider, MD   omeprazole (PriLOSEC) 20 mg delayed release capsule Take 20 mg by mouth daily before breakfast 5/10/18   Historical Provider, MD   ONE TOUCH ULTRA TEST test strip 100 strips by Device route 3 (three) times a day 6/12/18   Historical Provider, MD   pregabalin (LYRICA) 50 mg capsule Take 1 capsule (50 mg total) by mouth 2 (two) times a day 6/29/18   Viola Day, MD   sulfamethoxazole-trimethoprim (BACTRIM DS) 800-160 mg per tablet Take 1 tablet by mouth 2 (two) times a day for 7 days smx-tmp DS (BACTRIM) 800-160 mg tabs (1tab q12 D10) 7/20/18 7/27/18  Kelley Santana PA-C           Review of Systems    Review of Systems   Constitutional: Negative for activity change, appetite change, chills, diaphoresis, fatigue and fever  HENT: Negative for congestion, postnasal drip, rhinorrhea, sinus pressure, sneezing and sore throat  Eyes: Negative for pain and visual disturbance     Respiratory: Negative for cough, chest tightness and shortness of breath  Cardiovascular: Negative for chest pain, palpitations and leg swelling  Gastrointestinal: Negative for abdominal distention, abdominal pain, constipation, diarrhea, nausea and vomiting  Endocrine: Negative for polydipsia, polyphagia and polyuria  Genitourinary: Positive for pelvic pain and vaginal pain  Negative for decreased urine volume, difficulty urinating, dysuria, flank pain, frequency and hematuria  Musculoskeletal: Negative for arthralgias, gait problem, joint swelling and neck pain  Skin: Negative for pallor and rash  Allergic/Immunologic: Negative for immunocompromised state  Neurological: Negative for syncope, speech difficulty, weakness, light-headedness, numbness and headaches  All other systems reviewed and are negative  Physical Exam      ED Triage Vitals [07/23/18 1813]   Temperature Pulse Respirations Blood Pressure SpO2   98 7 °F (37 1 °C) 81 18 104/59 93 %      Temp Source Heart Rate Source Patient Position - Orthostatic VS BP Location FiO2 (%)   Temporal Monitor Sitting Left arm --      Pain Score       --               Physical Exam   Constitutional: She is oriented to person, place, and time  She appears well-developed and well-nourished  No distress  HENT:   Head: Normocephalic and atraumatic  Nose: Nose normal    Mouth/Throat: Oropharynx is clear and moist    Eyes: Conjunctivae, EOM and lids are normal  Pupils are equal, round, and reactive to light  Neck: Normal range of motion  Neck supple  Cardiovascular: Normal rate, regular rhythm and normal heart sounds  Exam reveals no gallop and no friction rub  No murmur heard  Pulmonary/Chest: Effort normal and breath sounds normal  No accessory muscle usage  No respiratory distress  She has no wheezes  She has no rales  Abdominal: Soft  She exhibits no distension  There is no tenderness  There is no rebound and no guarding     Genitourinary: Vagina normal  Cervix exhibits no motion tenderness, no discharge and no friability  No erythema, tenderness or bleeding in the vagina  No foreign body in the vagina  No signs of injury around the vagina  No vaginal discharge found  Neurological: She is alert and oriented to person, place, and time  No cranial nerve deficit or sensory deficit  Skin: Skin is warm and dry  No rash noted  She is not diaphoretic  No erythema  Psychiatric: She has a normal mood and affect  Her speech is normal and behavior is normal  Judgment and thought content normal    Nursing note and vitals reviewed          Assessment and Plan        MDM    Diagnostic Results    Labs:    Results for orders placed or performed during the hospital encounter of 07/20/18   Urine culture   Result Value Ref Range    Urine Culture >100,000 cfu/ml Pseudomonas aeruginosa (A)     Urine Culture >100,000 cfu/ml Gram Negative Matt Enteric Like (A)     Urine Culture (A)      >100,000 cfu/ml Non lactose fermenting gram negative matt    Urine Culture >100,000 cfu/ml Enterococcus species (A)    UA w Reflex to Microscopic w Reflex to Culture   Result Value Ref Range    Color, UA Qian (A) Straw, Yellow, Pale Yellow    Clarity, UA Cloudy (A) Clear, Other    Specific Gravity, UA 1 015 1 003 - 1 040    pH, UA 6 0 4 5 - 8 0    Leukocytes,  0 (A) Negative    Nitrite, UA Negative Negative    Protein, UA >=500 (A) Negative mg/dl    Glucose,  (1/10%) (A) Negative mg/dl    Ketones, UA Negative Negative mg/dl    Bilirubin, UA Negative Negative    Blood,  0 (A) Negative    UROBILINOGEN UA Negative 1 0, Negative mg/dL   Urine Microscopic   Result Value Ref Range    RBC, UA 20-30 (A) None Seen, 0-5 /hpf    WBC, UA Innumerable (A) None Seen, 0-5, 5-55, 5-65 /hpf    Epithelial Cells Occasional None Seen, Occasional /hpf    Bacteria, UA Innumerable (A) None Seen, Occasional /hpf    AMORPH URATES Occasional /hpf    OTHER OBSERVATIONS Yeast Cells Present     MUCOUS THREADS Occasional (A) None Seen       All labs reviewed and utilized in the medical decision making process    Radiology:    No orders to display       All radiology studies independently viewed by me and interpreted by the radiologist     Procedure    Procedures    CritCare Time      ED Course of Care and Re-Assessments    Patient has hx of drug resistant UTI so I will broaden coverage and tx for candida  Medications   fluconazole (DIFLUCAN) tablet 200 mg (200 mg Oral Given 7/23/18 1937)   levofloxacin (LEVAQUIN) tablet 750 mg (750 mg Oral Given 7/23/18 1937)   nitrofurantoin (MACRODANTIN) 50 mg capsule **AcuDose Override Pull** (100 mg  Given 7/23/18 1936)           FINAL IMPRESSION    Final diagnoses:   UTI (urinary tract infection)   Vaginal pain         DISPOSITION/PLAN    Time reflects when diagnosis was documented in both MDM as applicable and the Disposition within this note     Time User Action Codes Description Comment    7/23/2018  7:43 PM Mariajose Wynn L Add [N39 0] UTI (urinary tract infection)     7/23/2018  7:43 PM Mariajose Ast Add [R10 2] Vaginal pain       ED Disposition     ED Disposition Condition Comment    Discharge  Ethel STAN Cervantes discharge to home/self care  Condition at discharge: Good        Follow-up Information     Follow up With Specialties Details Why Contact Akbar Bledsoe MD Family Medicine Schedule an appointment as soon as possible for a visit  [address] [facility] [address]       [facility] Emergency Department Emergency Medicine Go to As needed, If symptoms worsen [address] [address] [phone]       Please contact your OBGYN for further evaluation                 PATIENT REFERRED TO:    Jojo Cruz MD  [address] [place] [address],[address]  [address]  [phone]    Schedule an appointment as soon as possible for a visit      [address] Emergency Department  [address] [place] [id number] [phone]  Go to  As needed, If symptoms worsen          Please contact your OBGYN for further evaluation          DISCHARGE MEDICATIONS:    Discharge Medication List as of 7/23/2018  7:49 PM      START taking these medications    Details   levofloxacin (LEVAQUIN) 500 mg tablet Take 1 tablet (500 mg total) by mouth daily for 7 days, Starting Mon 7/23/2018, Until Mon 7/30/2018, Print      nitrofurantoin (MACROBID) 100 mg capsule Take 1 capsule (100 mg total) by mouth 2 (two) times a day, Starting Mon 7/23/2018, Normal         CONTINUE these medications which have NOT CHANGED    Details   Alcohol Swabs (ALCOHOL PREP) 70 % PADS Apply 1 applicator topically as needed, Starting Tue 6/26/2018, Historical Med      aspirin (ECOTRIN LOW STRENGTH) 81 mg EC tablet Take 1 tablet (81 mg total) by mouth daily, Starting Mon 7/2/2018, Normal      atorvastatin (LIPITOR) 40 mg tablet Take 1 tablet (40 mg total) by mouth daily for 180 days, Starting Mon 7/2/2018, Until Sat 12/29/2018, Normal      ergocalciferol (VITAMIN D2) 50,000 units Take 50,000 Units by mouth once a week, Starting Wed 5/9/2018, Historical Med      insulin aspart protamine-insulin aspart (NovoLOG 70/30) 100 units/mL injection Inject 20 Units under the skin 2 (two) times a day before meals, Starting Mon 7/2/2018, Normal      lisinopril (ZESTRIL) 5 mg tablet Take 1 tablet (5 mg total) by mouth daily, Starting Mon 7/2/2018, Normal      metroNIDAZOLE (FLAGYL) 500 mg tablet Take 1 tablet (500 mg total) by mouth every 12 (twelve) hours for 7 days, Starting Fri 7/20/2018, Until Fri 7/27/2018, Print      nicotine (NICODERM CQ) 21 mg/24 hr TD 24 hr patch Place 1 patch on the skin daily, Starting Fri 6/29/2018, Normal      nortriptyline (PAMELOR) 50 mg capsule Take 50 mg by mouth daily, Historical Med      omeprazole (PriLOSEC) 20 mg delayed release capsule Take 20 mg by mouth daily before breakfast, Starting Thu 5/10/2018, Historical Med      ONE TOUCH ULTRA TEST test strip 100 strips by Device route 3 (three) times a day, Starting Tue 6/12/2018, Historical Med      pregabalin (LYRICA) 50 mg capsule Take 1 capsule (50 mg total) by mouth 2 (two) times a day, Starting Fri 6/29/2018, Print      sulfamethoxazole-trimethoprim (BACTRIM DS) 800-160 mg per tablet Take 1 tablet by mouth 2 (two) times a day for 7 days smx-tmp DS (BACTRIM) 800-160 mg tabs (1tab q12 D10), Starting Fri 7/20/2018, Until Fri 7/27/2018, Print             No discharge procedures on file           Champ Oliveira, [address] [place] [address],   07/23/18 0223

## 2018-07-25 LAB
CANDIDA RRNA VAG QL PROBE: POSITIVE
CHLAMYDIA DNA CVX QL NAA+PROBE: NORMAL
G VAGINALIS RRNA GENITAL QL PROBE: NEGATIVE
N GONORRHOEA DNA GENITAL QL NAA+PROBE: NORMAL
T VAGINALIS RRNA GENITAL QL PROBE: NEGATIVE

## 2018-08-02 ENCOUNTER — OFFICE VISIT (OUTPATIENT)
Dept: FAMILY MEDICINE CLINIC | Facility: CLINIC | Age: 53
End: 2018-08-02
Payer: MEDICARE

## 2018-08-02 ENCOUNTER — TELEPHONE (OUTPATIENT)
Dept: FAMILY MEDICINE CLINIC | Facility: CLINIC | Age: 53
End: 2018-08-02

## 2018-08-02 VITALS
HEIGHT: 63 IN | RESPIRATION RATE: 16 BRPM | SYSTOLIC BLOOD PRESSURE: 138 MMHG | DIASTOLIC BLOOD PRESSURE: 90 MMHG | TEMPERATURE: 97.1 F | BODY MASS INDEX: 33.66 KG/M2 | HEART RATE: 90 BPM | WEIGHT: 190 LBS | OXYGEN SATURATION: 99 %

## 2018-08-02 DIAGNOSIS — E55.9 VITAMIN D DEFICIENCY: ICD-10-CM

## 2018-08-02 DIAGNOSIS — M79.7 FIBROMYALGIA: ICD-10-CM

## 2018-08-02 DIAGNOSIS — K59.00 CONSTIPATION, UNSPECIFIED CONSTIPATION TYPE: ICD-10-CM

## 2018-08-02 DIAGNOSIS — I25.10 CORONARY ARTERY DISEASE DUE TO LIPID RICH PLAQUE: ICD-10-CM

## 2018-08-02 DIAGNOSIS — K21.9 GASTROESOPHAGEAL REFLUX DISEASE WITHOUT ESOPHAGITIS: Primary | ICD-10-CM

## 2018-08-02 DIAGNOSIS — I25.83 CORONARY ARTERY DISEASE DUE TO LIPID RICH PLAQUE: ICD-10-CM

## 2018-08-02 DIAGNOSIS — Z79.4 UNCONTROLLED TYPE 2 DIABETES MELLITUS WITH HYPERGLYCEMIA, WITH LONG-TERM CURRENT USE OF INSULIN (HCC): ICD-10-CM

## 2018-08-02 DIAGNOSIS — E11.65 UNCONTROLLED TYPE 2 DIABETES MELLITUS WITH HYPERGLYCEMIA, WITH LONG-TERM CURRENT USE OF INSULIN (HCC): ICD-10-CM

## 2018-08-02 DIAGNOSIS — I10 ESSENTIAL HYPERTENSION: ICD-10-CM

## 2018-08-02 DIAGNOSIS — E78.2 MIXED HYPERLIPIDEMIA: ICD-10-CM

## 2018-08-02 PROBLEM — E11.9 TYPE II DIABETES MELLITUS, WELL CONTROLLED (HCC): Status: ACTIVE | Noted: 2017-04-21

## 2018-08-02 PROCEDURE — 3008F BODY MASS INDEX DOCD: CPT | Performed by: PHYSICIAN ASSISTANT

## 2018-08-02 PROCEDURE — 99213 OFFICE O/P EST LOW 20 MIN: CPT | Performed by: PHYSICIAN ASSISTANT

## 2018-08-02 PROCEDURE — 3725F SCREEN DEPRESSION PERFORMED: CPT | Performed by: PHYSICIAN ASSISTANT

## 2018-08-02 RX ORDER — ERGOCALCIFEROL 1.25 MG/1
50000 CAPSULE ORAL WEEKLY
Qty: 4 CAPSULE | Refills: 2 | Status: SHIPPED | OUTPATIENT
Start: 2018-08-02 | End: 2018-10-08 | Stop reason: SDUPTHER

## 2018-08-02 RX ORDER — INSULIN ASPART 100 [IU]/ML
35 INJECTION, SUSPENSION SUBCUTANEOUS 3 TIMES DAILY
Qty: 3150 UNITS | Refills: 2 | Status: SHIPPED | OUTPATIENT
Start: 2018-08-02 | End: 2018-10-08 | Stop reason: SDUPTHER

## 2018-08-02 RX ORDER — SENNOSIDES 8.6 MG
1 TABLET ORAL
Qty: 120 EACH | Refills: 0 | Status: SHIPPED | OUTPATIENT
Start: 2018-08-02 | End: 2018-10-08 | Stop reason: SDUPTHER

## 2018-08-02 RX ORDER — ASPIRIN 81 MG/1
81 TABLET ORAL DAILY
Qty: 30 TABLET | Refills: 2 | Status: SHIPPED | OUTPATIENT
Start: 2018-08-02 | End: 2018-10-08 | Stop reason: SDUPTHER

## 2018-08-02 RX ORDER — PREGABALIN 50 MG/1
50 CAPSULE ORAL 2 TIMES DAILY
Qty: 60 CAPSULE | Refills: 2 | Status: SHIPPED | OUTPATIENT
Start: 2018-08-02 | End: 2018-10-08 | Stop reason: SDUPTHER

## 2018-08-02 RX ORDER — ATORVASTATIN CALCIUM 40 MG/1
40 TABLET, FILM COATED ORAL DAILY
Qty: 30 TABLET | Refills: 2 | Status: SHIPPED | OUTPATIENT
Start: 2018-08-02 | End: 2018-10-08 | Stop reason: SDUPTHER

## 2018-08-02 RX ORDER — LISINOPRIL 5 MG/1
5 TABLET ORAL DAILY
Qty: 30 TABLET | Refills: 2 | Status: SHIPPED | OUTPATIENT
Start: 2018-08-02 | End: 2018-10-08 | Stop reason: SDUPTHER

## 2018-08-02 RX ORDER — OMEPRAZOLE 20 MG/1
20 CAPSULE, DELAYED RELEASE ORAL
Qty: 30 CAPSULE | Refills: 2 | Status: SHIPPED | OUTPATIENT
Start: 2018-08-02 | End: 2018-10-08 | Stop reason: SDUPTHER

## 2018-08-02 RX ORDER — NORTRIPTYLINE HYDROCHLORIDE 50 MG/1
50 CAPSULE ORAL DAILY
Qty: 30 CAPSULE | Refills: 2 | Status: SHIPPED | OUTPATIENT
Start: 2018-08-02 | End: 2018-10-08 | Stop reason: SDUPTHER

## 2018-08-02 NOTE — PROGRESS NOTES
Assessment/Plan:     Diagnoses and all orders for this visit:    Gastroesophageal reflux disease without esophagitis  -     omeprazole (PriLOSEC) 20 mg delayed release capsule; Take 1 capsule (20 mg total) by mouth daily before breakfast    Coronary artery disease due to lipid rich plaque  -     aspirin (ECOTRIN LOW STRENGTH) 81 mg EC tablet; Take 1 tablet (81 mg total) by mouth daily    Mixed hyperlipidemia  -     atorvastatin (LIPITOR) 40 mg tablet; Take 1 tablet (40 mg total) by mouth daily for 180 days    Essential hypertension  -     lisinopril (ZESTRIL) 5 mg tablet; Take 1 tablet (5 mg total) by mouth daily    Fibromyalgia  -     nortriptyline (PAMELOR) 50 mg capsule; Take 1 capsule (50 mg total) by mouth daily  -     pregabalin (LYRICA) 50 mg capsule; Take 1 capsule (50 mg total) by mouth 2 (two) times a day    Uncontrolled type 2 diabetes mellitus with hyperglycemia, with long-term current use of insulin (HCC)  -     insulin aspart protamine-insulin aspart (NovoLOG 70/30) 100 units/mL injection; Inject 35 Units under the skin 3 (three) times a day    Vitamin D deficiency  -     ergocalciferol (VITAMIN D2) 50,000 units; Take 1 capsule (50,000 Units total) by mouth once a week    Constipation, unspecified constipation type  -     senna (SENOKOT) 8 6 mg; Take 1 tablet (8 6 mg total) by mouth daily at bedtime          Subjective: Patient is coming in for medication refills  Medications have been reconciled in her chart  She denies any issues or concerns today and has a follow up appointment for management of chronic conditions on 8/13 with her PCP  Patient ID: Kamala Sunshine is a 52 y o  female  HPI    The following portions of the patient's history were reviewed and updated as appropriate: She  has a past medical history of Chronic kidney disease (5/24/2018); Diabetes mellitus ([address] ); Dyslipidemia (5/24/2018); History of frequent urinary tract infections; HTN (hypertension) (5/24/2018);  Hyperlipidemia; and Neuropathy  Patient Active Problem List    Diagnosis Date Noted    Diabetic peripheral neuropathy ([facility] [address] ) 05/26/2018    Chronic kidney disease 05/24/2018    HTN (hypertension) 05/24/2018    Dyslipidemia 05/24/2018     She  has a past surgical history that includes Suprapubic catheter insertion  Her family history includes Breast cancer in her mother; Cancer in her maternal aunt; Diabetes in her mother; Hypertension in her mother; Parkinsonism in her mother  She  reports that she has been smoking Cigarettes  She has a 20 00 pack-year smoking history  She has never used smokeless tobacco  She reports that she does not drink alcohol or use drugs    Current Outpatient Prescriptions   Medication Sig Dispense Refill    Alcohol Swabs (ALCOHOL PREP) 70 % PADS Apply 1 applicator topically as needed      aspirin (ECOTRIN LOW STRENGTH) 81 mg EC tablet Take 1 tablet (81 mg total) by mouth daily 30 tablet 2    atorvastatin (LIPITOR) 40 mg tablet Take 1 tablet (40 mg total) by mouth daily for 180 days 30 tablet 2    ergocalciferol (VITAMIN D2) 50,000 units Take 1 capsule (50,000 Units total) by mouth once a week 4 capsule 2    insulin aspart protamine-insulin aspart (NovoLOG 70/30) 100 units/mL injection Inject 35 Units under the skin 3 (three) times a day 3150 Units 2    lisinopril (ZESTRIL) 5 mg tablet Take 1 tablet (5 mg total) by mouth daily 30 tablet 2    nortriptyline (PAMELOR) 50 mg capsule Take 1 capsule (50 mg total) by mouth daily 30 capsule 2    omeprazole (PriLOSEC) 20 mg delayed release capsule Take 1 capsule (20 mg total) by mouth daily before breakfast 30 capsule 2    ONE TOUCH ULTRA TEST test strip 100 strips by Device route 3 (three) times a day      pregabalin (LYRICA) 50 mg capsule Take 1 capsule (50 mg total) by mouth 2 (two) times a day 60 capsule 2    senna (SENOKOT) 8 6 mg Take 1 tablet (8 6 mg total) by mouth daily at bedtime 120 each 0     No current facility-administered medications for this visit  Current Outpatient Prescriptions on File Prior to Visit   Medication Sig    Alcohol Swabs (ALCOHOL PREP) 70 % PADS Apply 1 applicator topically as needed    ONE TOUCH ULTRA TEST test strip 100 strips by Device route 3 (three) times a day    [DISCONTINUED] aspirin (ECOTRIN LOW STRENGTH) 81 mg EC tablet Take 1 tablet (81 mg total) by mouth daily    [DISCONTINUED] atorvastatin (LIPITOR) 40 mg tablet Take 1 tablet (40 mg total) by mouth daily for 180 days    [DISCONTINUED] ergocalciferol (VITAMIN D2) 50,000 units Take 50,000 Units by mouth once a week    [DISCONTINUED] insulin aspart protamine-insulin aspart (NovoLOG 70/30) 100 units/mL injection Inject 20 Units under the skin 2 (two) times a day before meals    [DISCONTINUED] lisinopril (ZESTRIL) 5 mg tablet Take 1 tablet (5 mg total) by mouth daily    [DISCONTINUED] nicotine (NICODERM CQ) 21 mg/24 hr TD 24 hr patch Place 1 patch on the skin daily    [DISCONTINUED] nitrofurantoin (MACROBID) 100 mg capsule Take 1 capsule (100 mg total) by mouth 2 (two) times a day    [DISCONTINUED] nortriptyline (PAMELOR) 50 mg capsule Take 50 mg by mouth daily    [DISCONTINUED] omeprazole (PriLOSEC) 20 mg delayed release capsule Take 20 mg by mouth daily before breakfast    [DISCONTINUED] pregabalin (LYRICA) 50 mg capsule Take 1 capsule (50 mg total) by mouth 2 (two) times a day     No current facility-administered medications on file prior to visit  She has No Known Allergies       Review of Systems   Constitutional: Negative  HENT: Negative for congestion, ear pain, facial swelling, rhinorrhea and sore throat  Eyes: Negative for pain, discharge and visual disturbance  Respiratory: Negative  Cardiovascular: Negative  Gastrointestinal: Negative for abdominal distention, abdominal pain, constipation, diarrhea, nausea and vomiting  Genitourinary: Negative for dysuria  Musculoskeletal: Negative  Skin: Negative for color change  Neurological: Negative for dizziness, weakness and numbness  Objective:      /90 (BP Location: Right arm, Patient Position: Sitting, Cuff Size: Standard)   Pulse 90   Temp (!) 97 1 °F (36 2 °C) (Temporal)   Resp 16   Ht 5' 3" (1 6 m)   Wt 86 2 kg (190 lb)   SpO2 99%   BMI 33 66 kg/m²          Physical Exam   Constitutional: She is oriented to person, place, and time  She appears well-developed and well-nourished  No distress  HENT:   Head: Normocephalic and atraumatic  Eyes: Conjunctivae and EOM are normal  Pupils are equal, round, and reactive to light  Neck: Normal range of motion  Neck supple  Cardiovascular: Normal rate, regular rhythm and normal heart sounds  Pulmonary/Chest: Effort normal and breath sounds normal  No respiratory distress  Musculoskeletal: Normal range of motion  Neurological: She is alert and oriented to person, place, and time  Skin: Skin is warm and dry  She is not diaphoretic

## 2018-08-02 NOTE — TELEPHONE ENCOUNTER
I was on hold with the pharmacy for 10 minutes and was unable to reach anyone  I checked to make sure they were sent to the pharmacy in the right format, which they were  I will attempt to call the pharmacy again tomorrow  She may have gone too soon before they were filled

## 2018-08-13 ENCOUNTER — APPOINTMENT (OUTPATIENT)
Dept: LAB | Facility: HOSPITAL | Age: 53
End: 2018-08-13
Payer: MEDICARE

## 2018-08-13 ENCOUNTER — HOSPITAL ENCOUNTER (OUTPATIENT)
Dept: RADIOLOGY | Facility: HOSPITAL | Age: 53
Discharge: HOME | End: 2018-08-13
Payer: MEDICARE

## 2018-08-13 ENCOUNTER — OFFICE VISIT (OUTPATIENT)
Dept: FAMILY MEDICINE CLINIC | Facility: CLINIC | Age: 53
End: 2018-08-13
Payer: MEDICARE

## 2018-08-13 VITALS
DIASTOLIC BLOOD PRESSURE: 90 MMHG | OXYGEN SATURATION: 98 % | RESPIRATION RATE: 17 BRPM | SYSTOLIC BLOOD PRESSURE: 152 MMHG | BODY MASS INDEX: 35.78 KG/M2 | HEART RATE: 68 BPM | WEIGHT: 202 LBS | TEMPERATURE: 97.8 F

## 2018-08-13 DIAGNOSIS — K59.09 OTHER CONSTIPATION: ICD-10-CM

## 2018-08-13 DIAGNOSIS — E11.42 DIABETIC PERIPHERAL NEUROPATHY (HCC): ICD-10-CM

## 2018-08-13 DIAGNOSIS — I10 ESSENTIAL HYPERTENSION: ICD-10-CM

## 2018-08-13 DIAGNOSIS — E11.9 TYPE II DIABETES MELLITUS, WELL CONTROLLED (HCC): ICD-10-CM

## 2018-08-13 DIAGNOSIS — N18.30 STAGE 3 CHRONIC KIDNEY DISEASE (HCC): ICD-10-CM

## 2018-08-13 DIAGNOSIS — E11.42 DIABETIC PERIPHERAL NEUROPATHY (HCC): Primary | ICD-10-CM

## 2018-08-13 DIAGNOSIS — M79.604 PAIN OF RIGHT LOWER EXTREMITY: ICD-10-CM

## 2018-08-13 DIAGNOSIS — M79.604 PAIN OF RIGHT LOWER EXTREMITY: Primary | ICD-10-CM

## 2018-08-13 DIAGNOSIS — K59.00 CONSTIPATION, UNSPECIFIED CONSTIPATION TYPE: ICD-10-CM

## 2018-08-13 PROBLEM — R60.0 PEDAL EDEMA: Status: ACTIVE | Noted: 2018-08-13

## 2018-08-13 PROBLEM — Z79.4 TYPE 2 DIABETES MELLITUS WITH KIDNEY COMPLICATION, WITH LONG-TERM CURRENT USE OF INSULIN (HCC): Status: ACTIVE | Noted: 2017-04-21

## 2018-08-13 PROBLEM — E11.29 TYPE 2 DIABETES MELLITUS WITH KIDNEY COMPLICATION, WITH LONG-TERM CURRENT USE OF INSULIN (HCC): Status: ACTIVE | Noted: 2017-04-21

## 2018-08-13 LAB
ALBUMIN SERPL BCP-MCNC: 3.6 G/DL (ref 3–5.2)
ALP SERPL-CCNC: 104 U/L (ref 43–122)
ALT SERPL W P-5'-P-CCNC: 28 U/L (ref 9–52)
ANION GAP SERPL CALCULATED.3IONS-SCNC: 7 MMOL/L (ref 5–14)
ANISOCYTOSIS BLD QL SMEAR: PRESENT
AST SERPL W P-5'-P-CCNC: 26 U/L (ref 14–36)
BASOPHILS # BLD AUTO: 0 THOUSANDS/ΜL (ref 0–0.1)
BASOPHILS NFR BLD AUTO: 1 % (ref 0–1)
BILIRUB SERPL-MCNC: 0.5 MG/DL
BUN SERPL-MCNC: 44 MG/DL (ref 5–25)
CALCIUM SERPL-MCNC: 9.2 MG/DL (ref 8.4–10.2)
CHLORIDE SERPL-SCNC: 110 MMOL/L (ref 97–108)
CO2 SERPL-SCNC: 26 MMOL/L (ref 22–30)
CREAT SERPL-MCNC: 1.51 MG/DL (ref 0.6–1.2)
EOSINOPHIL # BLD AUTO: 0.3 THOUSAND/ΜL (ref 0–0.4)
EOSINOPHIL NFR BLD AUTO: 5 % (ref 0–6)
ERYTHROCYTE [DISTWIDTH] IN BLOOD BY AUTOMATED COUNT: 14.8 %
EST. AVERAGE GLUCOSE BLD GHB EST-MCNC: 223 MG/DL
GFR SERPL CREATININE-BSD FRML MDRD: 45 ML/MIN/1.73SQ M
GLUCOSE SERPL-MCNC: 250 MG/DL (ref 70–99)
HBA1C MFR BLD: 9.4 % (ref 4.2–6.3)
HCT VFR BLD AUTO: 32.4 % (ref 36–46)
HGB BLD-MCNC: 10.7 G/DL (ref 12–16)
LYMPHOCYTES # BLD AUTO: 2.2 THOUSANDS/ΜL (ref 0.5–4)
LYMPHOCYTES NFR BLD AUTO: 34 % (ref 20–50)
MCH RBC QN AUTO: 33.6 PG (ref 26–34)
MCHC RBC AUTO-ENTMCNC: 33.1 G/DL (ref 31–36)
MCV RBC AUTO: 102 FL (ref 80–100)
MONOCYTES # BLD AUTO: 0.3 THOUSAND/ΜL (ref 0.2–0.9)
MONOCYTES NFR BLD AUTO: 5 % (ref 1–10)
NEUTROPHILS # BLD AUTO: 3.4 THOUSANDS/ΜL (ref 1.8–7.8)
NEUTS SEG NFR BLD AUTO: 55 % (ref 45–65)
PLATELET # BLD AUTO: 214 THOUSANDS/UL (ref 150–450)
PLATELET BLD QL SMEAR: ADEQUATE
PMV BLD AUTO: 9.8 FL (ref 8.9–12.7)
POTASSIUM SERPL-SCNC: 5.9 MMOL/L (ref 3.6–5)
PROT SERPL-MCNC: 7.4 G/DL (ref 5.9–8.4)
RBC # BLD AUTO: 3.19 MILLION/UL (ref 4–5.2)
RBC MORPH BLD: PRESENT
SODIUM SERPL-SCNC: 143 MMOL/L (ref 137–147)
WBC # BLD AUTO: 6.3 THOUSAND/UL (ref 4.5–11)

## 2018-08-13 PROCEDURE — 83036 HEMOGLOBIN GLYCOSYLATED A1C: CPT

## 2018-08-13 PROCEDURE — 99214 OFFICE O/P EST MOD 30 MIN: CPT | Performed by: FAMILY MEDICINE

## 2018-08-13 PROCEDURE — 36415 COLL VENOUS BLD VENIPUNCTURE: CPT

## 2018-08-13 PROCEDURE — 72110 X-RAY EXAM L-2 SPINE 4/>VWS: CPT

## 2018-08-13 PROCEDURE — 3066F NEPHROPATHY DOC TX: CPT | Performed by: FAMILY MEDICINE

## 2018-08-13 PROCEDURE — 85025 COMPLETE CBC W/AUTO DIFF WBC: CPT

## 2018-08-13 PROCEDURE — 80053 COMPREHEN METABOLIC PANEL: CPT

## 2018-08-13 RX ORDER — SENNA AND DOCUSATE SODIUM 50; 8.6 MG/1; MG/1
TABLET, FILM COATED ORAL EVERY 24 HOURS
COMMUNITY
Start: 2017-04-21 | End: 2018-10-08

## 2018-08-13 RX ORDER — ACETAMINOPHEN,DIPHENHYDRAMINE HCL 500; 25 MG/1; MG/1
1 TABLET, FILM COATED ORAL
Qty: 30 TABLET | Refills: 0 | Status: SHIPPED | OUTPATIENT
Start: 2018-08-13 | End: 2018-12-06 | Stop reason: HOSPADM

## 2018-08-13 RX ORDER — DOCUSATE SODIUM 100 MG/1
100 CAPSULE, LIQUID FILLED ORAL 2 TIMES DAILY
Qty: 10 CAPSULE | Refills: 0 | Status: SHIPPED | OUTPATIENT
Start: 2018-08-13 | End: 2018-12-27 | Stop reason: SDUPTHER

## 2018-08-13 RX ORDER — NICOTINE 21 MG/24HR
PATCH, TRANSDERMAL 24 HOURS TRANSDERMAL EVERY 24 HOURS
COMMUNITY
Start: 2017-12-12 | End: 2018-10-08

## 2018-08-13 RX ORDER — NICOTINE POLACRILEX 4 MG/1
GUM, CHEWING ORAL
COMMUNITY
Start: 2018-03-23 | End: 2018-10-08

## 2018-08-13 RX ORDER — ERGOCALCIFEROL 1.25 MG/1
CAPSULE ORAL
COMMUNITY
Start: 2015-03-09 | End: 2018-10-08

## 2018-08-13 RX ORDER — NYSTATIN 100000 [USP'U]/G
POWDER TOPICAL EVERY 12 HOURS
COMMUNITY
Start: 2017-12-20 | End: 2019-05-30 | Stop reason: HOSPADM

## 2018-08-13 RX ORDER — PREDNISONE 20 MG/1
20 TABLET ORAL DAILY
Qty: 7 TABLET | Refills: 0 | Status: SHIPPED | OUTPATIENT
Start: 2018-08-13 | End: 2018-08-20

## 2018-08-13 RX ORDER — LANCETS 23 GAUGE
EACH MISCELLANEOUS
COMMUNITY
Start: 2018-06-12 | End: 2020-01-01 | Stop reason: SDUPTHER

## 2018-08-13 RX ORDER — OXYCODONE HYDROCHLORIDE AND ACETAMINOPHEN 5; 325 MG/1; MG/1
TABLET ORAL EVERY 4 HOURS
COMMUNITY
End: 2018-12-06 | Stop reason: HOSPADM

## 2018-08-13 NOTE — PROGRESS NOTES
Assessment/Plan:    Pedal edema  Swelling began in May, soon after her discharge for DKA  Pain of right lower extremity  With poor tip toe and unable to assess right patellar reflex with history of numbness and tingling of right foot different from usually neuropathy  Will get stat XR and add MRI due to neurological symptoms    Chronic kidney disease  Patient completely unaware that she had kidney disease, explained she was CKD stage 3 and what it meant and pathology of the disease  Will repeat labs at this point to evaluate kidney function as she reports she has been taking "lots" of pain medications at home but unable to tell me which ones  HTN (hypertension)  Patient currently in pain, will reevaluate in 1 week  Asked to bring all of her medications at next visit as she was unable to verbalize name of her medications  Patient is unable to read as well, she manages her medications  Type 2 diabetes mellitus with kidney complication, with long-term current use of insulin (HCC)  Lab Results   Component Value Date    HGBA1C 12 1 (H) 05/29/2018       No results for input(s): POCGLU in the last 72 hours  Blood Sugar Average: Last 72 hrs: Will follow repeat HgbA1c, states sugars have maintained in the 100's  Uses Novolog 70/30 TID 35 units  Diabetic peripheral neuropathy (HCC)  Lab Results   Component Value Date    HGBA1C 12 1 (H) 05/29/2018       No results for input(s): POCGLU in the last 72 hours  Blood Sugar Average: Last 72 hrs:    Usually under control with pregabalin  In acute pain in right leg at this time  Other constipation  Counseled to increase amount of fiber in diet, will send docusate 100mg  Diagnoses and all orders for this visit:    Pain of right lower extremity  -     XR spine lumbar minimum 4 views non injury;  Future  -     diphenhydrAMINE-acetaminophen (TYLENOL PM)  MG TABS; Take 1 tablet by mouth daily at bedtime as needed for sleep for up to 30 days  - MRI lumbar spine wo contrast; Future  -     predniSONE 20 mg tablet; Take 1 tablet (20 mg total) by mouth daily for 7 doses    Stage 3 chronic kidney disease  -     Comprehensive metabolic panel; Future  -     Cancel: Comprehensive metabolic panel    Essential hypertension    Diabetic peripheral neuropathy (HCC)  -     CBC and differential; Future  -     Hemoglobin A1C; Future  -     Cancel: CBC and differential  -     Cancel: Hemoglobin A1C    Type II diabetes mellitus, well controlled (HCC)  -     CBC and differential; Future  -     Hemoglobin A1C; Future  -     Cancel: CBC and differential  -     Cancel: Hemoglobin A1C    Constipation, unspecified constipation type  -     docusate sodium (COLACE) 100 mg capsule; Take 1 capsule (100 mg total) by mouth 2 (two) times a day    Other constipation    Other orders  -     ergocalciferol (VITAMIN D2) 50,000 units; take 1 capsule by oral route  every week  -     senna-docusate sodium (SENOKOT-S) 8 6-50 mg per tablet; every 24 hours  -     Insulin Pen Needle (BD PEN NEEDLE LUNA U/F) 32G X 4 MM MISC; as directed with Basaglar  -     oxyCODONE-acetaminophen (PERCOCET) 5-325 mg per tablet; every 4 (four) hours  -     glucose blood test strip; Test blood sugars three times daily  -     glucose blood test strip; Test blood sugars three times daily  -     Omeprazole 20 MG TBEC; take 1 capsule by oral route  every day before a meal  -     nystatin (MYCOSTATIN) powder; Every 12 hours  -     Discontinue: insulin aspart (NOVOLOG FLEXPEN) 100 Units/mL injection pen; inject 35 units by subcutaneous route 3 times per day  -     nicotine (NICODERM CQ) 7 mg/24hr TD 24 hr patch; every 24 hours  -     aspirin 81 MG tablet; every 24 hours  -     Lancets 28G MISC;  Test blood sugars three times daily  -     miconazole (MONISTAT 7 SIMPLY CURE) 2 % vaginal cream; every 24 hours  -     nicotine (NICODERM CQ) 14 mg/24hr TD 24 hr patch; every 24 hours  -     Cancel: XR hip/pelvis 4+ vw right if performed; Future          Subjective:      Patient ID: Yessica Edwards is a 52 y o  female  She's states she is up all night due to the pain, pain is constant throughout the day, daily  Started three months ago, no inciting event  Leg Pain    There was no injury mechanism  The pain is present in the right leg  The quality of the pain is described as stabbing  The pain is at a severity of 10/10  The pain is severe  Associated symptoms include numbness (at foot and ankle)  Pertinent negatives include no inability to bear weight  Associated symptoms comments: Knee gives out  The symptoms are aggravated by movement and weight bearing  She has tried NSAIDs, ice, heat, acetaminophen, non-weight bearing and rest for the symptoms  The treatment provided mild relief  The following portions of the patient's history were reviewed and updated as appropriate: allergies, current medications, past family history, past medical history, past social history, past surgical history and problem list     Review of Systems   Constitutional: Negative for activity change, appetite change, fatigue and fever  HENT: Negative for drooling and sore throat  Eyes: Negative for pain and visual disturbance  Respiratory: Negative for cough, chest tightness and shortness of breath  Cardiovascular: Negative for chest pain and palpitations  Gastrointestinal: Positive for constipation (states she hasn't gone in four days)  Negative for abdominal pain, diarrhea and nausea  Genitourinary: Negative for dysuria and hematuria  Musculoskeletal: Positive for back pain (low back pain with pain radiating down entire right leg) and myalgias (right thigh pain)  Negative for joint swelling  Skin: Negative for color change  Neurological: Positive for numbness (at foot and ankle)  Negative for dizziness, tremors, syncope, facial asymmetry, speech difficulty and headaches   Weakness: Reports pain is so severe of right leg that it becomes weak and "gives out"  Psychiatric/Behavioral: Negative for hallucinations and suicidal ideas  Objective:      /90 (BP Location: Left arm, Patient Position: Sitting, Cuff Size: Adult)   Pulse 68   Temp 97 8 °F (36 6 °C) (Temporal)   Resp 17   Wt 91 6 kg (202 lb)   SpO2 98%   BMI 35 78 kg/m²          Physical Exam   Constitutional: She is oriented to person, place, and time  She appears well-developed and well-nourished  HENT:   Head: Normocephalic and atraumatic  Right Ear: External ear normal    Left Ear: External ear normal    Eyes: Pupils are equal, round, and reactive to light  Neck: Normal range of motion  Neck supple  Cardiovascular: Normal rate, regular rhythm, normal heart sounds and intact distal pulses  Pulmonary/Chest: Effort normal and breath sounds normal  No respiratory distress  Abdominal: Soft  Bowel sounds are normal  She exhibits no distension  There is no tenderness  Musculoskeletal: Normal range of motion  She exhibits no edema  Lymphadenopathy:     She has no cervical adenopathy  Neurological: She is alert and oriented to person, place, and time  She displays abnormal reflex  Gait (antalgic gait) abnormal  Coordination normal  GCS eye subscore is 4  GCS verbal subscore is 5  GCS motor subscore is 6  Reflex Scores:       Patellar reflexes are 1+ on the right side and 2+ on the left side  Unable to get proper assessment of right patellar reflex, examined with attending  No reflex was elicited initially despite using distraction tactics  At best +1 elicited  She was unable to walk on tiptoes, likely secondary to pain, did better with heel walk  Sensation intact  Skin: Skin is warm and dry  Psychiatric: She has a normal mood and affect

## 2018-08-13 NOTE — PATIENT INSTRUCTIONS
Diabetic Peripheral Neuropathy   WHAT YOU NEED TO KNOW:   Diabetic peripheral neuropathy (DPN) is damage to the nerves in your arms, hands, legs, and feet  DPN is most common in the legs and feet and can increase your risk for foot ulcers  Nerve pain caused by DPN can limit your mobility, and affect your quality of life  DISCHARGE INSTRUCTIONS:   Return to the emergency department if:   · Your legs or feet start to turn blue or black  · You have a wound that does not heal or is red, swollen, or draining fluid  Contact your healthcare provider if:   · You begin to have symptoms  · Your blood sugar level is higher or lower than healthcare providers have told you it should be  · You have redness, calluses, or sores on your feet  · You have questions or concerns about your condition or care  Medicines:   · Medicine  may be given to decrease nerve pain  · Take your medicine as directed  Contact your healthcare provider if you think your medicine is not helping or if you have side effects  Tell him or her if you are allergic to any medicine  Keep a list of the medicines, vitamins, and herbs you take  Include the amounts, and when and why you take them  Bring the list or the pill bottles to follow-up visits  Carry your medicine list with you in case of an emergency  Control your blood sugar:  Keep your blood sugar levels as close to normal as possible by taking your medicines as directed  Check your blood sugar levels as often as directed  Contact your healthcare provider if your levels are higher than they should be  · Follow the meal plan  that your healthcare or dietitian gave you  This meal plan can help you control your blood sugar and decrease your symptoms  · Exercise regularly  Exercise can help keep your blood sugar level steady and help you manage your weight  Exercise for at least 30 minutes, 5 days a week  Ask your healthcare provider about the best exercise plan for you   Use caution when you exercise if you have decreased feeling in your feet  · Maintain a healthy weight  Ask your healthcare provider how much you should weigh  A healthy weight can help you control your diabetes  Ask him to help you create a weight loss plan if you are overweight  Even a 10 to 15 pound weight loss can help you manage your blood sugar level  · Limit alcohol  Alcohol affects your blood sugar level and can make it harder to manage your diabetes  Women should limit alcohol to 1 drink a day  Men should limit alcohol to 2 drinks a day  A drink of alcohol is 12 ounces of beer, 5 ounces of wine, or 1½ ounces of liquor  Care for your feet:  Check your feet each day for cuts, scratches, calluses, or other wounds  Look for redness and swelling, and feel for warmth  Wear shoes that fit well  Check your shoes for rocks or other objects that can hurt your feet  Do not walk barefoot or wear shoes without socks  Wear cotton socks to help keep your feet dry  Do not smoke:  Nicotine can worsen your symptoms and make it more difficult to manage your diabetes  Do not use e-cigarettes or smokeless tobacco in place of cigarettes or to help you quit  They still contain nicotine  Ask your healthcare provider for information if you currently smoke and need help quitting  Follow up with your healthcare provider as directed: You will need to have your feet checked at least once each year  Write down your questions so you remember to ask them during your visits  © 2017 [address] Information is for End User's use only and may not be sold, redistributed or otherwise used for commercial purposes  All illustrations and images included in CareNotes® are the copyrighted property of [employer]  or Javi Gil  The above information is an  only  It is not intended as medical advice for individual conditions or treatments   Talk to your doctor, nurse or pharmacist before following any medical regimen to see if it is safe and effective for you

## 2018-08-13 NOTE — ASSESSMENT & PLAN NOTE
Lab Results   Component Value Date    HGBA1C 12 1 (H) 05/29/2018       No results for input(s): POCGLU in the last 72 hours  Blood Sugar Average: Last 72 hrs:    Usually under control with pregabalin  In acute pain in right leg at this time

## 2018-08-13 NOTE — ASSESSMENT & PLAN NOTE
Patient currently in pain, will reevaluate in 1 week  Asked to bring all of her medications at next visit as she was unable to verbalize name of her medications  Patient is unable to read as well, she manages her medications

## 2018-08-13 NOTE — ASSESSMENT & PLAN NOTE
Patient completely unaware that she had kidney disease, explained she was CKD stage 3 and what it meant and pathology of the disease  Will repeat labs at this point to evaluate kidney function as she reports she has been taking "lots" of pain medications at home but unable to tell me which ones

## 2018-08-13 NOTE — ASSESSMENT & PLAN NOTE
Lab Results   Component Value Date    HGBA1C 12 1 (H) 05/29/2018       No results for input(s): POCGLU in the last 72 hours  Blood Sugar Average: Last 72 hrs: Will follow repeat HgbA1c, states sugars have maintained in the 100's  Uses Novolog 70/30 TID 35 units

## 2018-08-13 NOTE — ASSESSMENT & PLAN NOTE
With poor tip toe and unable to assess right patellar reflex with history of numbness and tingling of right foot different from usually neuropathy  Will get stat XR and add MRI due to neurological symptoms  Will give prednisone 20mg QD for 7 days and RTC in 1 week to re evaluate  Counseled patient to go to ED if pain worsens

## 2018-08-14 ENCOUNTER — TELEPHONE (OUTPATIENT)
Dept: OTHER | Facility: HOSPITAL | Age: 53
End: 2018-08-14

## 2018-08-28 ENCOUNTER — TELEPHONE (OUTPATIENT)
Dept: FAMILY MEDICINE CLINIC | Facility: CLINIC | Age: 53
End: 2018-08-28

## 2018-08-28 NOTE — TELEPHONE ENCOUNTER
[facility] needs more information regarding MRI low back the dr has to do a peer to peer call [phone] tracking # [id number] dr Jean-Pierre Kaur is the attending physicians  Approval code:    [id number]     Exp 10/12/18    -D   Day

## 2018-09-26 DIAGNOSIS — E11.9 DM2 (DIABETES MELLITUS, TYPE 2) (HCC): Primary | ICD-10-CM

## 2018-10-05 DIAGNOSIS — M79.7 FIBROMYALGIA: ICD-10-CM

## 2018-10-05 RX ORDER — PREGABALIN 50 MG/1
50 CAPSULE ORAL 2 TIMES DAILY
Qty: 60 CAPSULE | Refills: 0 | Status: CANCELLED | OUTPATIENT
Start: 2018-10-05

## 2018-10-08 ENCOUNTER — OFFICE VISIT (OUTPATIENT)
Dept: FAMILY MEDICINE CLINIC | Facility: CLINIC | Age: 53
End: 2018-10-08
Payer: COMMERCIAL

## 2018-10-08 VITALS
HEART RATE: 100 BPM | OXYGEN SATURATION: 96 % | TEMPERATURE: 96.4 F | DIASTOLIC BLOOD PRESSURE: 88 MMHG | BODY MASS INDEX: 34.01 KG/M2 | WEIGHT: 192 LBS | SYSTOLIC BLOOD PRESSURE: 148 MMHG

## 2018-10-08 DIAGNOSIS — I25.83 CORONARY ARTERY DISEASE DUE TO LIPID RICH PLAQUE: ICD-10-CM

## 2018-10-08 DIAGNOSIS — K59.00 CONSTIPATION, UNSPECIFIED CONSTIPATION TYPE: ICD-10-CM

## 2018-10-08 DIAGNOSIS — Z23 NEED FOR INFLUENZA VACCINATION: Primary | ICD-10-CM

## 2018-10-08 DIAGNOSIS — I10 ESSENTIAL HYPERTENSION: ICD-10-CM

## 2018-10-08 DIAGNOSIS — I25.10 CORONARY ARTERY DISEASE DUE TO LIPID RICH PLAQUE: ICD-10-CM

## 2018-10-08 DIAGNOSIS — E11.29 TYPE 2 DIABETES MELLITUS WITH KIDNEY COMPLICATION, WITH LONG-TERM CURRENT USE OF INSULIN (HCC): ICD-10-CM

## 2018-10-08 DIAGNOSIS — E78.2 MIXED HYPERLIPIDEMIA: ICD-10-CM

## 2018-10-08 DIAGNOSIS — K21.9 GASTROESOPHAGEAL REFLUX DISEASE WITHOUT ESOPHAGITIS: ICD-10-CM

## 2018-10-08 DIAGNOSIS — E78.5 DYSLIPIDEMIA: ICD-10-CM

## 2018-10-08 DIAGNOSIS — K59.09 OTHER CONSTIPATION: ICD-10-CM

## 2018-10-08 DIAGNOSIS — E11.65 UNCONTROLLED TYPE 2 DIABETES MELLITUS WITH HYPERGLYCEMIA, WITH LONG-TERM CURRENT USE OF INSULIN (HCC): ICD-10-CM

## 2018-10-08 DIAGNOSIS — M79.7 FIBROMYALGIA: ICD-10-CM

## 2018-10-08 DIAGNOSIS — Z79.4 TYPE 2 DIABETES MELLITUS WITH KIDNEY COMPLICATION, WITH LONG-TERM CURRENT USE OF INSULIN (HCC): ICD-10-CM

## 2018-10-08 DIAGNOSIS — Z79.4 UNCONTROLLED TYPE 2 DIABETES MELLITUS WITH HYPERGLYCEMIA, WITH LONG-TERM CURRENT USE OF INSULIN (HCC): ICD-10-CM

## 2018-10-08 DIAGNOSIS — N31.9 NEUROGENIC BLADDER: ICD-10-CM

## 2018-10-08 DIAGNOSIS — N18.30 STAGE 3 CHRONIC KIDNEY DISEASE (HCC): ICD-10-CM

## 2018-10-08 DIAGNOSIS — E55.9 VITAMIN D DEFICIENCY: ICD-10-CM

## 2018-10-08 DIAGNOSIS — D53.9 MACROCYTIC ANEMIA: ICD-10-CM

## 2018-10-08 PROCEDURE — 99213 OFFICE O/P EST LOW 20 MIN: CPT | Performed by: INTERNAL MEDICINE

## 2018-10-08 PROCEDURE — 90471 IMMUNIZATION ADMIN: CPT | Performed by: INTERNAL MEDICINE

## 2018-10-08 PROCEDURE — 90682 RIV4 VACC RECOMBINANT DNA IM: CPT | Performed by: INTERNAL MEDICINE

## 2018-10-08 RX ORDER — INSULIN ASPART 100 [IU]/ML
20 INJECTION, SUSPENSION SUBCUTANEOUS
Qty: 3150 UNITS | Refills: 0 | Status: SHIPPED | OUTPATIENT
Start: 2018-10-08 | End: 2018-11-12 | Stop reason: SDUPTHER

## 2018-10-08 RX ORDER — PREGABALIN 50 MG/1
50 CAPSULE ORAL 2 TIMES DAILY
Qty: 60 CAPSULE | Refills: 3 | Status: SHIPPED | OUTPATIENT
Start: 2018-10-08 | End: 2019-03-11 | Stop reason: SDUPTHER

## 2018-10-08 RX ORDER — PREGABALIN 50 MG/1
50 CAPSULE ORAL 2 TIMES DAILY
Qty: 60 CAPSULE | Refills: 0 | Status: SHIPPED | OUTPATIENT
Start: 2018-10-08 | End: 2018-10-08 | Stop reason: SDUPTHER

## 2018-10-08 NOTE — PATIENT INSTRUCTIONS
10% - bad control"> 10% - bad control,Hemoglobin A1c (HbA1c) greater than 10% indicating poor diabetic control,Haemoglobin A1c greater than 10% indicating poor diabetic control">   Diabetes Mellitus Type 2 in Adults, Ambulatory Care   GENERAL INFORMATION:   Diabetes mellitus type 2  is a disease that affects how your body uses glucose (sugar)  Insulin helps move sugar out of the blood so it can be used for energy  Normally, when the blood sugar level increases, the pancreas makes more insulin  Type 2 diabetes develops because either the body cannot make enough insulin, or it cannot use the insulin correctly  After many years, your pancreas may stop making insulin  Common symptoms include the following:   · More hunger or thirst than usual     · Frequent urination     · Weight loss without trying     · Blurred vision  Seek immediate care for the following symptoms:   · Severe abdominal pain, or pain that spreads to your back  You may also be vomiting  · Trouble staying awake or focusing    · Shaking or sweating    · Blurred or double vision    · Breath has a fruity, sweet smell    · Breathing is deep and labored, or rapid and shallow    · Heartbeat is fast and weak  Treatment for diabetes mellitus type 2  includes keeping your blood sugar at a normal level  You must eat the right foods, and exercise regularly  You may also need medicine if you cannot control your blood sugar level with nutrition and exercise  Manage diabetes mellitus type 2:   · Check your blood sugar level  You will be taught how to check a small drop of blood in a glucose monitor  Ask your healthcare provider when and how often to check during the day  Ask your healthcare provider what your blood sugar levels should be when you check them  · Keep track of carbohydrates (sugar and starchy foods)  Your blood sugar level can get too high if you eat too many carbohydrates   Your dietitian will help you plan meals and snacks that have the right amount of carbohydrates  · Eat low-fat foods  Some examples are skinless chicken and low-fat milk  · Eat less sodium (salt)  Some examples of high-sodium foods to limit are soy sauce, potato chips, and soup  Do not add salt to food you cook  Limit your use of table salt  · Eat high-fiber foods  Foods that are a good source of fiber include vegetables, whole grain bread, and beans  · Limit alcohol  Alcohol affects your blood sugar level and can make it harder to manage your diabetes  Women should limit alcohol to 1 drink a day  Men should limit alcohol to 2 drinks a day  A drink of alcohol is 12 ounces of beer, 5 ounces of wine, or 1½ ounces of liquor  · Get regular exercise  Exercise can help keep your blood sugar level steady, decrease your risk of heart disease, and help you lose weight  Exercise for at least 30 minutes, 5 days a week  Include muscle strengthening activities 2 days each week  Work with your healthcare provider to create an exercise plan  · Check your feet each day  for injuries or open sores  Ask your healthcare provider for activities you can do if you have an open sore  · Quit smoking  If you smoke, it is never too late to quit  Smoking can worsen the problems that may occur with diabetes  Ask your healthcare provider for information about how to stop smoking if you are having trouble quitting  · Ask about your weight:  Ask healthcare providers if you need to lose weight, and how much to lose  Ask them to help you with a weight loss program  Even a 10 to 15 pound weight loss can help you manage your blood sugar level  · Carry medical alert identification  Wear medical alert jewelry or carry a card that says you have diabetes  Ask your healthcare provider where to get these items  · Ask about vaccines  Diabetes puts you at risk of serious illness if you get the flu, pneumonia, or hepatitis   Ask your healthcare provider if you should get a flu, pneumonia, or hepatitis B vaccine, and when to get the vaccine  Follow up with your healthcare provider as directed:  Write down your questions so you remember to ask them during your visits  CARE AGREEMENT:   You have the right to help plan your care  Learn about your health condition and how it may be treated  Discuss treatment options with your caregivers to decide what care you want to receive  You always have the right to refuse treatment  The above information is an  only  It is not intended as medical advice for individual conditions or treatments  Talk to your doctor, nurse or pharmacist before following any medical regimen to see if it is safe and effective for you  © 2014 [address] is for End User's use only and may not be sold, redistributed or otherwise used for commercial purposes  All illustrations and images included in CareNotes® are the copyrighted property of [employer]  or Javi Gil

## 2018-10-10 PROBLEM — D53.9 MACROCYTIC ANEMIA: Status: ACTIVE | Noted: 2018-10-10

## 2018-10-10 PROCEDURE — 4010F ACE/ARB THERAPY RXD/TAKEN: CPT | Performed by: FAMILY MEDICINE

## 2018-10-10 RX ORDER — ASPIRIN 81 MG/1
81 TABLET ORAL DAILY
Qty: 30 TABLET | Refills: 0 | Status: SHIPPED | OUTPATIENT
Start: 2018-10-10 | End: 2019-03-07 | Stop reason: SDUPTHER

## 2018-10-10 RX ORDER — SENNOSIDES 8.6 MG
1 TABLET ORAL
Qty: 120 EACH | Refills: 0 | Status: SHIPPED | OUTPATIENT
Start: 2018-10-10 | End: 2018-12-27 | Stop reason: SDUPTHER

## 2018-10-10 RX ORDER — OMEPRAZOLE 20 MG/1
20 CAPSULE, DELAYED RELEASE ORAL
Qty: 30 CAPSULE | Refills: 0 | Status: SHIPPED | OUTPATIENT
Start: 2018-10-10 | End: 2018-11-12 | Stop reason: SDUPTHER

## 2018-10-10 RX ORDER — ERGOCALCIFEROL 1.25 MG/1
50000 CAPSULE ORAL WEEKLY
Qty: 4 CAPSULE | Refills: 0 | Status: SHIPPED | OUTPATIENT
Start: 2018-10-10 | End: 2018-11-12 | Stop reason: SDUPTHER

## 2018-10-10 RX ORDER — ATORVASTATIN CALCIUM 40 MG/1
40 TABLET, FILM COATED ORAL DAILY
Qty: 30 TABLET | Refills: 0 | Status: SHIPPED | OUTPATIENT
Start: 2018-10-10 | End: 2018-12-27 | Stop reason: SDUPTHER

## 2018-10-10 RX ORDER — NORTRIPTYLINE HYDROCHLORIDE 50 MG/1
50 CAPSULE ORAL DAILY
Qty: 30 CAPSULE | Refills: 0 | Status: SHIPPED | OUTPATIENT
Start: 2018-10-10 | End: 2018-11-12 | Stop reason: SDUPTHER

## 2018-10-10 RX ORDER — LISINOPRIL 5 MG/1
5 TABLET ORAL DAILY
Qty: 30 TABLET | Refills: 0 | Status: SHIPPED | OUTPATIENT
Start: 2018-10-10 | End: 2018-11-12 | Stop reason: SDUPTHER

## 2018-10-10 NOTE — PROGRESS NOTES
Assessment/Plan:    Macrocytic anemia  Will follow vitamin B12 and folate  Denies any history of GI disease or surgery  Neurogenic bladder  Her neighbor that used to change her catheter very three months, has moved away  Need to establish with home visiting nurse, will send referral  It was last changed about three months ago, so she is due now  Offered to change in clinic but she does not have supplies with her and states she has also run out of supplies at home  Other constipation  Controlled on senokot  Counseled to stay well hydrated and consume foods high in fiber  Vitamin D deficiency  Reports compliance with ergocalciferol  Will repeat level and see if can titrate dose down  Dyslipidemia  Discussed increased cholesterol with patient as well as target cholesterol goal   Maintain a low-fat, low-cholesterol diet  Outlined low-fat, low-cholesterol alternatives  Weight loss can help control cholesterol levels along with diet  She reports compliance with atorvastatin  Chronic kidney disease  Will follow repeat BMP  HTN (hypertension)  Blood pressure is not at goal at this time  Reviewed blood pressure target goal with patient  Discussed importance of low-salt diet and avoidance of alcohol  Discussed red flag symptoms and Emergency Room precautions if these develop  Goal for age and comorbidities <140/90mmHg  Will continue to monitor at this time  Type 2 diabetes mellitus with kidney complication, with long-term current use of insulin (HCC)  Lab Results   Component Value Date    HGBA1C 9 4 (H) 08/13/2018       No results for input(s): POCGLU in the last 72 hours  Blood Sugar Average: Last 72 hrs:    Much improved from 12 1 back in May 2018  Will decrease her short acting down to 20 units twice a day with meals and add on glargine 10units QHS  Patient instructed with insulin kit from office, she verbalized understanding her new regimen          Diagnoses and all orders for this visit:    Need for influenza vaccination  -     influenza vaccine, [id number], quadrivalent, recombinant, PF, 0 5 mL, for patients 18 yr+ (FLUBLOK)    Fibromyalgia  -     Discontinue: pregabalin (LYRICA) 50 mg capsule; Take 1 capsule (50 mg total) by mouth 2 (two) times a day  -     pregabalin (LYRICA) 50 mg capsule; Take 1 capsule (50 mg total) by mouth 2 (two) times a day  -     nortriptyline (PAMELOR) 50 mg capsule; Take 1 capsule (50 mg total) by mouth daily    Essential hypertension  -     lisinopril (ZESTRIL) 5 mg tablet; Take 1 tablet (5 mg total) by mouth daily    Type 2 diabetes mellitus with kidney complication, with long-term current use of insulin ([facility])  -     insulin glargine (BASAGLAR KWIKPEN) 100 units/mL injection pen; Inject 10 Units under the skin daily    Uncontrolled type 2 diabetes mellitus with hyperglycemia, with long-term current use of insulin ([facility])  -     insulin aspart protamine-insulin aspart (NovoLOG 70/30) 100 units/mL injection; Inject 20 Units under the skin 2 (two) times a day before meals    Vitamin D deficiency  -     ergocalciferol (VITAMIN D2) 50,000 units; Take 1 capsule (50,000 Units total) by mouth once a week  -     Vitamin D 25 hydroxy; Future    Gastroesophageal reflux disease without esophagitis  -     omeprazole (PriLOSEC) 20 mg delayed release capsule; Take 1 capsule (20 mg total) by mouth daily before breakfast    Mixed hyperlipidemia  -     atorvastatin (LIPITOR) 40 mg tablet; Take 1 tablet (40 mg total) by mouth daily for 180 days    Constipation, unspecified constipation type  -     senna (SENOKOT) 8 6 mg; Take 1 tablet (8 6 mg total) by mouth daily at bedtime    Coronary artery disease due to lipid rich plaque  -     aspirin (ECOTRIN LOW STRENGTH) 81 mg EC tablet;  Take 1 tablet (81 mg total) by mouth daily    Dyslipidemia    Stage 3 chronic kidney disease (HCC)    Other constipation    Neurogenic bladder    Macrocytic anemia  -     CBC and differential; Future  - Vitamin B12/Folate, Serum Panel; Future  -     Basic metabolic panel; Future          Subjective:      Patient ID: Romel Javier is a 52 y o  female  She presents today for management of chronic conditions  The following portions of the patient's history were reviewed and updated as appropriate: allergies, current medications, past family history, past medical history, past social history, past surgical history and problem list     Review of Systems   Constitutional: Negative for activity change, appetite change, fatigue and fever  HENT: Negative for drooling and sore throat  Eyes: Negative for pain and visual disturbance  Respiratory: Negative for cough, chest tightness and shortness of breath  Cardiovascular: Negative for chest pain and palpitations  Gastrointestinal: Negative for abdominal pain, constipation, diarrhea and nausea  Genitourinary: Negative for dysuria and hematuria  Musculoskeletal: Positive for arthralgias, back pain and myalgias  Skin: Negative for color change  Neurological: Negative for numbness and headaches  Psychiatric/Behavioral: Negative for hallucinations and suicidal ideas  Objective:      /88   Pulse 100   Temp (!) 96 4 °F (35 8 °C) (Temporal)   Wt 87 1 kg (192 lb)   SpO2 96%   BMI 34 01 kg/m²          Physical Exam   Constitutional: She is oriented to person, place, and time  She appears well-developed and well-nourished  HENT:   Head: Normocephalic and atraumatic  Right Ear: External ear normal    Left Ear: External ear normal    Eyes: Pupils are equal, round, and reactive to light  Neck: Normal range of motion  Neck supple  Cardiovascular: Normal rate, regular rhythm, normal heart sounds and intact distal pulses  Pulmonary/Chest: Effort normal and breath sounds normal  No respiratory distress  Abdominal: Soft  Bowel sounds are normal  She exhibits no distension  There is no tenderness     Musculoskeletal: Normal range of motion  She exhibits tenderness (lumbar spine, hips and knees bilaterally  )  She exhibits no edema  Lymphadenopathy:     She has no cervical adenopathy  Neurological: She is alert and oriented to person, place, and time  Skin: Skin is warm and dry  Psychiatric: She has a normal mood and affect  Vitals reviewed

## 2018-10-10 NOTE — ASSESSMENT & PLAN NOTE
Blood pressure is not at goal at this time  Reviewed blood pressure target goal with patient  Discussed importance of low-salt diet and avoidance of alcohol  Discussed red flag symptoms and Emergency Room precautions if these develop  Goal for age and comorbidities <140/90mmHg  Will continue to monitor at this time

## 2018-10-10 NOTE — ASSESSMENT & PLAN NOTE
Her neighbor that used to change her catheter very three months, has moved away  Need to establish with home visiting nurse, will send referral  It was last changed about three months ago, so she is due now  Offered to change in clinic but she does not have supplies with her and states she has also run out of supplies at home

## 2018-10-10 NOTE — ASSESSMENT & PLAN NOTE
Lab Results   Component Value Date    HGBA1C 9 4 (H) 08/13/2018       No results for input(s): POCGLU in the last 72 hours  Blood Sugar Average: Last 72 hrs:    Much improved from 12 1 back in May 2018  Will decrease her short acting down to 20 units twice a day with meals and add on glargine 10units QHS  Patient instructed with insulin kit from office, she verbalized understanding her new regimen

## 2018-10-11 DIAGNOSIS — Z74.2 NEED FOR HOME HEALTH CARE: Primary | ICD-10-CM

## 2018-10-12 ENCOUNTER — DOCUMENTATION (OUTPATIENT)
Dept: FAMILY MEDICINE CLINIC | Facility: CLINIC | Age: 53
End: 2018-10-12

## 2018-10-12 ENCOUNTER — PATIENT OUTREACH (OUTPATIENT)
Dept: FAMILY MEDICINE CLINIC | Facility: CLINIC | Age: 53
End: 2018-10-12

## 2018-10-12 NOTE — PROGRESS NOTES
Spoke to pt on phone and she states [facility] was following her but stopped coming out Oct 6  Called to [facility] and spoke to [place] and explained that  would like pt to have continued home care for cath changes every three months  [place] states they can accept the order and they will also come out once a month for maintenance  Lianna Petersen is also aware pt is out of catheter supplies  She states they can come out [facility] or Tue and will call pt at home to schedule  Call back to pt and she is aware  fall

## 2018-10-16 ENCOUNTER — TELEPHONE (OUTPATIENT)
Dept: UROLOGY | Facility: AMBULATORY SURGERY CENTER | Age: 53
End: 2018-10-16

## 2018-10-16 ENCOUNTER — PATIENT OUTREACH (OUTPATIENT)
Dept: FAMILY MEDICINE CLINIC | Facility: CLINIC | Age: 53
End: 2018-10-16

## 2018-10-16 NOTE — TELEPHONE ENCOUNTER
Called pt  She needs her S/P tube changed  I offered her an appt but she didn't want an appt, she wanted the doctor to come to her house  I told her our doctors don't make house calls  She does have visiting nurses  I instructed her to call her family doctor, and ask if he could order S/P tube change with visiting nurse  She has not seen any of our doctors yet, so we can't write the order  Pt understood

## 2018-10-16 NOTE — TELEPHONE ENCOUNTER
Reason for appointment/Complaint/Diagnosis : Dr Renetta Hopkins former patient  Needs to have supra pubic catheter change  Insurance: [employer]  History of Cancer? no               If yes, what kind? n/a  Previous urologist?   Dr [employer]     Records requested/where? Dr Renetta Hopkins  Outside testing/where?  [facility]    Location Preference for office visit?  [place]

## 2018-10-22 ENCOUNTER — TELEPHONE (OUTPATIENT)
Dept: FAMILY MEDICINE CLINIC | Facility: CLINIC | Age: 53
End: 2018-10-22

## 2018-10-22 NOTE — TELEPHONE ENCOUNTER
Patient states she needs Dr Tom to call her medical insurance [facility] for her medicine Shaniazhang Robertson number is [phone]

## 2018-10-24 ENCOUNTER — TELEPHONE (OUTPATIENT)
Dept: FAMILY MEDICINE CLINIC | Facility: CLINIC | Age: 53
End: 2018-10-24

## 2018-10-24 NOTE — TELEPHONE ENCOUNTER
Tried calling her four times at three different numbers with no response  Try calling her tomorrow and see if she'll leave me a message as to what she would like me to tell [employer] [facility]

## 2018-10-24 NOTE — TELEPHONE ENCOUNTER
Tried calling her twice with no answer to let her know I faxed the prior auth form to [employer] for her lyrica  Please try calling her and let her know

## 2018-10-24 NOTE — TELEPHONE ENCOUNTER
Pt called office and left voice message stating insurance needs prio auth for foot medication  As well pt would like for you to contact her regarding [employer]?!, didn't quite understood her message since she sounded upset  PT has also requested on voice message for you to contact her

## 2018-10-29 ENCOUNTER — TELEPHONE (OUTPATIENT)
Dept: FAMILY MEDICINE CLINIC | Facility: CLINIC | Age: 53
End: 2018-10-29

## 2018-10-29 ENCOUNTER — TELEPHONE (OUTPATIENT)
Dept: OTHER | Facility: HOSPITAL | Age: 53
End: 2018-10-29

## 2018-10-29 NOTE — TELEPHONE ENCOUNTER
Aziza Regalado, I'm sorry the patient is upset  I have forwarded you on the conversations with the , the urology office and my attempts to contact the patient for your completion  We would like her to establish with [facility] but patient refused, she insisted the Urologist come to her home and change her suprapubic catheter for her and the LPN at the Urology office told her that was not possible  The Case Manger spoke with Emily Cornelius at [facility] and it was set up that they would go to her home to change her catheter  I attempted to contact the patient several times last week on four different numbers and had no response  In regards to her Lyrica I have already submitted the prior authorization to her insurance last week, I have not heard their response yet  Thank you, Day

## 2018-10-31 ENCOUNTER — TELEPHONE (OUTPATIENT)
Dept: FAMILY MEDICINE CLINIC | Facility: CLINIC | Age: 53
End: 2018-10-31

## 2018-10-31 ENCOUNTER — TELEPHONE (OUTPATIENT)
Dept: OTHER | Facility: HOSPITAL | Age: 53
End: 2018-10-31

## 2018-10-31 DIAGNOSIS — E11.9 DM2 (DIABETES MELLITUS, TYPE 2) (HCC): ICD-10-CM

## 2018-10-31 DIAGNOSIS — N31.9 NEUROGENIC BLADDER: Primary | ICD-10-CM

## 2018-11-05 ENCOUNTER — TELEPHONE (OUTPATIENT)
Dept: FAMILY MEDICINE CLINIC | Facility: CLINIC | Age: 53
End: 2018-11-05

## 2018-11-05 NOTE — TELEPHONE ENCOUNTER
Jeanette Wasserman from Dr Jordyn Yanes office called to see if Dr Tom is willing to double book herself for this pt  Patient would like to have cataract eye surgery at the end of this month 11/29/2018  If Dr Tom would like to double book herself please let Angela Hanson know date/time and I will call the patient and inform her

## 2018-11-06 ENCOUNTER — TELEPHONE (OUTPATIENT)
Dept: FAMILY MEDICINE CLINIC | Facility: CLINIC | Age: 53
End: 2018-11-06

## 2018-11-06 NOTE — TELEPHONE ENCOUNTER
I received a voicemail from Marguerite Armstrong stating the diagnosis code of Y84 6 was being rejected  The script I faxed did not have that code and I faxed it directly to the home health agency so I am going to ignore this message unless I hear something otherwise  I will try to make some calls tomorrow morning to see what exactly is going on

## 2018-11-09 ENCOUNTER — TELEPHONE (OUTPATIENT)
Dept: FAMILY MEDICINE CLINIC | Facility: CLINIC | Age: 53
End: 2018-11-09

## 2018-11-09 NOTE — TELEPHONE ENCOUNTER
I see this was handled already  I called patient to confirm she never received her supplies  I told her to call if she doesn't have them within the week

## 2018-11-09 NOTE — TELEPHONE ENCOUNTER
Good morning,    Marty Drake is calling to verify why the need for the cathter  You may call her back to verify at [phone]

## 2018-11-12 DIAGNOSIS — E55.9 VITAMIN D DEFICIENCY: ICD-10-CM

## 2018-11-12 DIAGNOSIS — E11.65 UNCONTROLLED TYPE 2 DIABETES MELLITUS WITH HYPERGLYCEMIA, WITH LONG-TERM CURRENT USE OF INSULIN (HCC): ICD-10-CM

## 2018-11-12 DIAGNOSIS — I10 ESSENTIAL HYPERTENSION: ICD-10-CM

## 2018-11-12 DIAGNOSIS — M79.7 FIBROMYALGIA: ICD-10-CM

## 2018-11-12 DIAGNOSIS — Z79.4 UNCONTROLLED TYPE 2 DIABETES MELLITUS WITH HYPERGLYCEMIA, WITH LONG-TERM CURRENT USE OF INSULIN (HCC): ICD-10-CM

## 2018-11-12 DIAGNOSIS — K21.9 GASTROESOPHAGEAL REFLUX DISEASE WITHOUT ESOPHAGITIS: ICD-10-CM

## 2018-11-13 PROCEDURE — 4010F ACE/ARB THERAPY RXD/TAKEN: CPT | Performed by: PHYSICIAN ASSISTANT

## 2018-11-13 RX ORDER — INSULIN ASPART 100 [IU]/ML
20 INJECTION, SUSPENSION SUBCUTANEOUS
Qty: 30 ML | Refills: 0 | Status: SHIPPED | OUTPATIENT
Start: 2018-11-13 | End: 2018-12-27 | Stop reason: SDUPTHER

## 2018-11-13 RX ORDER — NORTRIPTYLINE HYDROCHLORIDE 50 MG/1
CAPSULE ORAL
Qty: 30 CAPSULE | Refills: 0 | Status: SHIPPED | OUTPATIENT
Start: 2018-11-13 | End: 2018-12-26 | Stop reason: SDUPTHER

## 2018-11-13 RX ORDER — OMEPRAZOLE 20 MG/1
CAPSULE, DELAYED RELEASE ORAL
Qty: 30 CAPSULE | Refills: 0 | Status: SHIPPED | OUTPATIENT
Start: 2018-11-13 | End: 2018-12-26 | Stop reason: SDUPTHER

## 2018-11-13 RX ORDER — ERGOCALCIFEROL 1.25 MG/1
CAPSULE ORAL
Qty: 4 CAPSULE | Refills: 0 | Status: SHIPPED | OUTPATIENT
Start: 2018-11-13 | End: 2018-12-26 | Stop reason: SDUPTHER

## 2018-11-13 RX ORDER — LISINOPRIL 5 MG/1
TABLET ORAL
Qty: 30 TABLET | Refills: 0 | Status: SHIPPED | OUTPATIENT
Start: 2018-11-13 | End: 2018-12-26 | Stop reason: SDUPTHER

## 2018-11-20 ENCOUNTER — CONSULT (OUTPATIENT)
Dept: FAMILY MEDICINE CLINIC | Facility: CLINIC | Age: 53
End: 2018-11-20
Payer: COMMERCIAL

## 2018-11-20 VITALS
WEIGHT: 180 LBS | RESPIRATION RATE: 16 BRPM | HEIGHT: 63 IN | OXYGEN SATURATION: 99 % | SYSTOLIC BLOOD PRESSURE: 144 MMHG | BODY MASS INDEX: 31.89 KG/M2 | DIASTOLIC BLOOD PRESSURE: 82 MMHG | TEMPERATURE: 97.4 F | HEART RATE: 103 BPM

## 2018-11-20 DIAGNOSIS — Z23 ENCOUNTER FOR ADMINISTRATION OF VACCINE: Primary | ICD-10-CM

## 2018-11-20 DIAGNOSIS — Z01.818 PREOP EXAMINATION: ICD-10-CM

## 2018-11-20 PROCEDURE — 99214 OFFICE O/P EST MOD 30 MIN: CPT | Performed by: INTERNAL MEDICINE

## 2018-11-20 PROCEDURE — 90471 IMMUNIZATION ADMIN: CPT | Performed by: INTERNAL MEDICINE

## 2018-11-20 PROCEDURE — 90732 PPSV23 VACC 2 YRS+ SUBQ/IM: CPT | Performed by: INTERNAL MEDICINE

## 2018-11-20 NOTE — PROGRESS NOTES
[facility] PRE-OPERATIVE EVALUATION  [facility] - [address]    NAME: Purvi Cervantes  AGE: 52 y o  SEX: female  : 1965     DATE: 2018    [facility] Pre-Operative Evaluation      Chief Complaint: Pre-operative Evaluation     Surgery: cataract surgery right eye  Anticipated Date of Surgery: within next 2 months  Referring Provider: Dr Yani Oates     History of Present Illness:     Tito Michel is a 52 y o  female who presents to the office today for a preoperative consultation at the request of surgeon, Dr Yani Oates, who plans on performing cateract surgery on right eye  Planned anesthesia is local  Patient has a bleeding risk of: no recent abnormal bleeding  Patient does not have objections to receiving blood products if needed  Current anti-platelet/anti-coagulation medications that the patient is prescribed includes: [id number]     Assessment of Chronic Conditions:   - Diabetes Mellitus:   - Hypertension:   -CKD  Assessment of Cardiac Risk:  · Denies unstable or severe angina or MI in the last 6 weeks or history of stent placement in the last year   · Denies decompensated heart failure (e g  New onset heart failure, NYHA functional class IV heart failure, or worsening existing heart failure)  · Denies significant arrhythmias such as high grade AV block, symptomatic ventricular arrhythmia, newly recognized ventricular tachycardia, supraventricular tachycardia with resting heart rate >100, or symptomatic bradycardia  · Denies severe heart valve disease including aortic stenosis or symptomatic mitral stenosis     Exercise Capacity:  · Able to walk 4 blocks without symptoms?: Yes  · Able to walk 2 flights without symptoms?: Yes    Prior Anesthesia Reactions: No     Personal history of venous thromboembolic disease? No    History of steroid use for >2 weeks within last year?  No         Review of Systems:     Review of Systems Constitutional: Negative for activity change and fever  HENT: Negative for sore throat  Eyes: Negative for pain  Respiratory: Negative for cough, choking, chest tightness, shortness of breath and wheezing  Cardiovascular: Negative for chest pain, palpitations and leg swelling  Gastrointestinal: Negative for abdominal distention, abdominal pain, diarrhea, nausea and vomiting  Musculoskeletal: Positive for back pain  Skin: Negative for rash  Neurological: Negative for dizziness, tremors, syncope, facial asymmetry and weakness  Psychiatric/Behavioral: Negative for agitation  Current Problem List:     Patient Active Problem List   Diagnosis    Chronic kidney disease    HTN (hypertension)    Dyslipidemia    Diabetic peripheral neuropathy (HCC)    Gastroesophageal reflux disease    Type 2 diabetes mellitus with kidney complication, with long-term current use of insulin (HCC)    Vitamin D deficiency    Pain of right lower extremity    Other constipation    Pedal edema    Neurogenic bladder    Macrocytic anemia    Preop examination    Encounter for administration of vaccine       Allergies: Allergies   Allergen Reactions    Gabapentin      Other reaction(s): slurring of speech, falls  Current Medications:       Current Outpatient Prescriptions:     Alcohol Swabs (ALCOHOL PREP) 70 % PADS, Apply 1 applicator topically as needed, Disp: , Rfl:     aspirin (ECOTRIN LOW STRENGTH) 81 mg EC tablet, Take 1 tablet (81 mg total) by mouth daily, Disp: 30 tablet, Rfl: 0    atorvastatin (LIPITOR) 40 mg tablet, Take 1 tablet (40 mg total) by mouth daily for 180 days, Disp: 30 tablet, Rfl: 0    Catheters MISC, Please change patients suprapubic catheter as soon as possible and then every three months    Dx: N31 9 Neurogenic Bladder, Disp: 6 each, Rfl: 0    diphenhydrAMINE-acetaminophen (TYLENOL PM)  MG TABS, Take 1 tablet by mouth daily at bedtime as needed for sleep for up to 30 days, Disp: 30 tablet, Rfl: 0    docusate sodium (COLACE) 100 mg capsule, Take 1 capsule (100 mg total) by mouth 2 (two) times a day, Disp: 10 capsule, Rfl: 0    ergocalciferol (VITAMIN D2) 50,000 units, TAKE ONE CAPSULE BY MOUTH ONCE WEEKLY, Disp: 4 capsule, Rfl: 0    glucose blood test strip, Test blood sugars three times daily, Disp: , Rfl:     glucose blood test strip, Test blood sugars three times daily, Disp: , Rfl:     insulin glargine (BASAGLAR KWIKPEN) 100 units/mL injection pen, Inject 10 Units under the skin daily, Disp: 5 pen, Rfl: 0    Insulin Pen Needle (BD PEN NEEDLE LUNA U/F) 32G X 4 MM MISC, Inject under the skin 3 (three) times a day, Disp: 100 each, Rfl: 0    Lancets 28G MISC, Test blood sugars three times daily, Disp: , Rfl:     lisinopril (ZESTRIL) 5 mg tablet, TAKE ONE TABLET BY MOUTH EVERY DAY, Disp: 30 tablet, Rfl: 0    miconazole (MONISTAT 7 SIMPLY CURE) 2 % vaginal cream, every 24 hours, Disp: , Rfl:     nortriptyline (PAMELOR) 50 mg capsule, TAKE ONE CAPSULE BY MOUTH EVERY DAY, Disp: 30 capsule, Rfl: 0    NOVOLOG MIX 70/30 FLEXPEN (70-30) 100 units/mL injection pen, Inject 20 Units under the skin 2 (two) times a day before meals, Disp: 30 mL, Rfl: 0    nystatin (MYCOSTATIN) powder, Every 12 hours, Disp: , Rfl:     omeprazole (PriLOSEC) 20 mg delayed release capsule, TAKE ONE CAPSULE BY MOUTH EVERY DAY BEFORE BREAKFAST, Disp: 30 capsule, Rfl: 0    ONE TOUCH ULTRA TEST test strip, 100 strips by Device route 3 (three) times a day, Disp: , Rfl:     oxyCODONE-acetaminophen (PERCOCET) 5-325 mg per tablet, every 4 (four) hours, Disp: , Rfl:     pregabalin (LYRICA) 50 mg capsule, Take 1 capsule (50 mg total) by mouth 2 (two) times a day, Disp: 60 capsule, Rfl: 3    senna (SENOKOT) 8 6 mg, Take 1 tablet (8 6 mg total) by mouth daily at bedtime, Disp: 120 each, Rfl: 0    Past Medical History:       Past Medical History:   Diagnosis Date    Chronic kidney disease 5/24/2018    Diabetes mellitus ([address] )     Dyslipidemia 5/24/2018    History of frequent urinary tract infections     HTN (hypertension) 5/24/2018    Hyperlipidemia     Neuropathy         Past Surgical History:   Procedure Laterality Date    SUPRAPUBIC CATHETER INSERTION          Family History   Problem Relation Age of Onset   [facility] Breast cancer Mother     Hypertension Mother     Diabetes Mother         Mellitus    Parkinsonism Mother     Cancer Maternal Aunt         Unknown        Social History     Social History    Marital status: Single     Spouse name: N/A    Number of children: N/A    Years of education: N/A     Occupational History    Not on file  Social History Main Topics    Smoking status: Current Every Day Smoker     Packs/day: 0 50     Years: 40 00     Types: Cigarettes    Smokeless tobacco: Current User      Comment: Per [employer]: Heavy tobacco smoker/12 cigs per day    Alcohol use No    Drug use: No    Sexual activity: Yes     Partners: Male     Birth control/ protection: None     Other Topics Concern    Not on file     Social History Narrative    Hospitalization: 4/28/18        Physical Exam:     /82 (BP Location: Right arm, Patient Position: Sitting, Cuff Size: Standard)   Pulse 103   Temp (!) 97 4 °F (36 3 °C) (Temporal)   Resp 16   Ht 5' 3" (1 6 m)   Wt 81 6 kg (180 lb)   SpO2 99%   BMI 31 89 kg/m²     Physical Exam   Constitutional: She is oriented to person, place, and time  She appears well-developed and well-nourished  No distress  HENT:   Head: Normocephalic and atraumatic  Eyes: Pupils are equal, round, and reactive to light  Neck: Neck supple  No tracheal deviation present  No thyromegaly present  Cardiovascular: Normal rate, regular rhythm and normal heart sounds  No murmur heard  Pulmonary/Chest: Effort normal and breath sounds normal  No respiratory distress  She has no wheezes  Abdominal: Soft  Bowel sounds are normal  She exhibits no distension   There is no tenderness  There is no guarding  Musculoskeletal: Normal range of motion  She exhibits no edema  Lumbar region tender to palpation (chronic condition)   Neurological: She is alert and oriented to person, place, and time  Skin: Skin is warm  No rash noted  She is not diaphoretic  Psychiatric: She has a normal mood and affect  Data:     Pre-operative work-up    Laboratory Results: I have personally reviewed the pertinent laboratory results/reports      EKG: I have personally reviewed pertinent reports  Chest x-ray: I have personally reviewed pertinent reports  Previous cardiopulmonary studies within the past year:  · Echocardiogram: Denies  · Cardiac Catheterization: Denies  · Stress Test: Denies  · Pulmonary Function Testing: Denies      Assessment & Recommendations:     1  Encounter for administration of vaccine  PNEUMOCOCCAL POLYSACCHARIDE VACCINE 23-VALENT =>3YO SQ IM   2  Preop examination         Pre-Op Evaluation Assessment  52 y o  female with planned surgery:cateract  Known risk factors for perioperative complications: Diabetes mellitus  Current medications which may produce withdrawal symptoms if withheld perioperatively: None  Pre-Op Evaluation Plan  1  Further preoperative workup as follows:   - None; no further preoperative work-up is required    2  Medication Management/Recommendations:   - None, continue medication regimen including morning of surgery, with sip of water    3  Prophylaxis for cardiac events with perioperative beta-blockers: not indicated  4  Patient requires further consultation with: None    Clearance  Patient is CLEARED for surgery without any additional cardiac testing       Kimmy Donaldson MD  [address] [place] [address], [address] [place] [address]  Nghia Melissa   [address]  [phone]  Phone#  [phone]  Fax#  [phone]

## 2018-11-20 NOTE — ASSESSMENT & PLAN NOTE
Medically stable for surgery, low risk for low risk procedure  Chronic conditions stable at this time   No current symptoms of active infection   Patient does not have gastrointestional, or upper respiratory symptoms    No previous history of bleeding or transfusion   No personal history or family history of blood clots   Follow up with surgeon following surgical procedure before return visit to [facility] for chronic conditions

## 2018-12-03 ENCOUNTER — HOSPITAL ENCOUNTER (INPATIENT)
Facility: HOSPITAL | Age: 53
LOS: 2 days | Discharge: HOME HEALTH | DRG: 420 | End: 2018-12-06
Attending: EMERGENCY MEDICINE | Admitting: FAMILY MEDICINE
Payer: COMMERCIAL

## 2018-12-03 ENCOUNTER — APPOINTMENT (EMERGENCY)
Dept: RADIOLOGY | Facility: HOSPITAL | Age: 53
DRG: 420 | End: 2018-12-03
Payer: COMMERCIAL

## 2018-12-03 DIAGNOSIS — N31.9 NEUROGENIC BLADDER: ICD-10-CM

## 2018-12-03 DIAGNOSIS — Z91.19 NON-COMPLIANCE: Chronic | ICD-10-CM

## 2018-12-03 DIAGNOSIS — Z79.4 TYPE 2 DIABETES MELLITUS WITH KIDNEY COMPLICATION, WITH LONG-TERM CURRENT USE OF INSULIN (HCC): Primary | ICD-10-CM

## 2018-12-03 DIAGNOSIS — E11.42 DIABETIC PERIPHERAL NEUROPATHY (HCC): ICD-10-CM

## 2018-12-03 DIAGNOSIS — E11.621 DIABETIC ULCER OF TOE OF LEFT FOOT ASSOCIATED WITH TYPE 2 DIABETES MELLITUS, WITH FAT LAYER EXPOSED (HCC): ICD-10-CM

## 2018-12-03 DIAGNOSIS — L97.522 DIABETIC ULCER OF TOE OF LEFT FOOT ASSOCIATED WITH TYPE 2 DIABETES MELLITUS, WITH FAT LAYER EXPOSED (HCC): ICD-10-CM

## 2018-12-03 DIAGNOSIS — E11.29 TYPE 2 DIABETES MELLITUS WITH KIDNEY COMPLICATION, WITH LONG-TERM CURRENT USE OF INSULIN (HCC): Primary | ICD-10-CM

## 2018-12-03 LAB
ACETONE SERPL-MCNC: NEGATIVE MG/DL
ALBUMIN SERPL BCP-MCNC: 3.5 G/DL (ref 3–5.2)
ALP SERPL-CCNC: 165 U/L (ref 43–122)
ALT SERPL W P-5'-P-CCNC: 20 U/L (ref 9–52)
ANION GAP SERPL CALCULATED.3IONS-SCNC: 9 MMOL/L (ref 5–14)
APTT PPP: 22 SECONDS (ref 23–34)
AST SERPL W P-5'-P-CCNC: 19 U/L (ref 14–36)
BACTERIA UR QL AUTO: ABNORMAL /HPF
BASE EX.OXY STD BLDV CALC-SCNC: 84.8 %
BASE EXCESS BLDV CALC-SCNC: -0.7 MMOL/L (ref -2.1–2.1)
BASOPHILS # BLD AUTO: 0.1 THOUSANDS/ΜL (ref 0–0.1)
BASOPHILS NFR BLD AUTO: 1 % (ref 0–1)
BILIRUB SERPL-MCNC: 0.5 MG/DL
BILIRUB UR QL STRIP: NEGATIVE
BUN SERPL-MCNC: 30 MG/DL (ref 5–25)
CALCIUM SERPL-MCNC: 8.7 MG/DL (ref 8.4–10.2)
CHLORIDE SERPL-SCNC: 93 MMOL/L (ref 97–108)
CLARITY UR: ABNORMAL
CO2 SERPL-SCNC: 28 MMOL/L (ref 22–30)
COLOR UR: ABNORMAL
CREAT SERPL-MCNC: 1.61 MG/DL (ref 0.6–1.2)
EOSINOPHIL # BLD AUTO: 0.5 THOUSAND/ΜL (ref 0–0.4)
EOSINOPHIL NFR BLD AUTO: 8 % (ref 0–6)
ERYTHROCYTE [DISTWIDTH] IN BLOOD BY AUTOMATED COUNT: 12.8 %
FLUAV + FLUBV RNA ISLT NAA+PROBE: NOT DETECTED
FLUAV + FLUBV RNA ISLT NAA+PROBE: NOT DETECTED
GFR SERPL CREATININE-BSD FRML MDRD: 41 ML/MIN/1.73SQ M
GLUCOSE SERPL-MCNC: 919 MG/DL (ref 70–99)
GLUCOSE UR STRIP-MCNC: ABNORMAL MG/DL
HCO3 BLDV-SCNC: 26.2 MMOL/L (ref 23–28)
HCT VFR BLD AUTO: 38.1 % (ref 36–46)
HGB BLD-MCNC: 12.2 G/DL (ref 12–16)
HGB UR QL STRIP.AUTO: 25
INR PPP: 0.9 (ref 0.89–1.1)
KETONES UR STRIP-MCNC: NEGATIVE MG/DL
LACTATE SERPL-SCNC: 1.5 MMOL/L (ref 0.7–2)
LEUKOCYTE ESTERASE UR QL STRIP: 500
LIPASE SERPL-CCNC: 123 U/L (ref 23–300)
LYMPHOCYTES # BLD AUTO: 1.9 THOUSANDS/ΜL (ref 0.5–4)
LYMPHOCYTES NFR BLD AUTO: 30 % (ref 20–50)
MAGNESIUM SERPL-MCNC: 2.3 MG/DL (ref 1.6–2.3)
MCH RBC QN AUTO: 30.6 PG (ref 26–34)
MCHC RBC AUTO-ENTMCNC: 32 G/DL (ref 31–36)
MCV RBC AUTO: 96 FL (ref 80–100)
MONOCYTES # BLD AUTO: 0.4 THOUSAND/ΜL (ref 0.2–0.9)
MONOCYTES NFR BLD AUTO: 6 % (ref 1–10)
NEUTROPHILS # BLD AUTO: 3.6 THOUSANDS/ΜL (ref 1.8–7.8)
NEUTS SEG NFR BLD AUTO: 55 % (ref 45–65)
NITRITE UR QL STRIP: POSITIVE
NON-SQ EPI CELLS URNS QL MICRO: ABNORMAL /HPF
O2 CT BLDV-SCNC: 14 ML/DL
OTHER STN SPEC: ABNORMAL
PCO2 BLDV: 52 MM HG (ref 41–51)
PH BLDV: 7.31 [PH] (ref 7.35–7.45)
PH UR STRIP.AUTO: 5 [PH] (ref 4.5–8)
PHOSPHATE SERPL-MCNC: 3.9 MG/DL (ref 2.5–4.8)
PLATELET # BLD AUTO: 199 THOUSANDS/UL (ref 150–450)
PMV BLD AUTO: 11.1 FL (ref 8.9–12.7)
PO2 BLDV: 55 MM HG
POTASSIUM SERPL-SCNC: 4.8 MMOL/L (ref 3.6–5)
PROT SERPL-MCNC: 6.6 G/DL (ref 5.9–8.4)
PROT UR STRIP-MCNC: ABNORMAL MG/DL
PROTHROMBIN TIME: 9.6 SECONDS (ref 9.5–11.6)
RBC # BLD AUTO: 3.98 MILLION/UL (ref 4–5.2)
RBC #/AREA URNS AUTO: ABNORMAL /HPF
SODIUM SERPL-SCNC: 130 MMOL/L (ref 137–147)
SP GR UR STRIP.AUTO: 1.01 (ref 1–1.04)
TROPONIN I SERPL-MCNC: <0.01 NG/ML (ref 0–0.03)
UROBILINOGEN UA: NEGATIVE MG/DL
WBC # BLD AUTO: 6.6 THOUSAND/UL (ref 4.5–11)
WBC #/AREA URNS AUTO: ABNORMAL /HPF

## 2018-12-03 PROCEDURE — 83735 ASSAY OF MAGNESIUM: CPT | Performed by: EMERGENCY MEDICINE

## 2018-12-03 PROCEDURE — 83605 ASSAY OF LACTIC ACID: CPT | Performed by: EMERGENCY MEDICINE

## 2018-12-03 PROCEDURE — 84100 ASSAY OF PHOSPHORUS: CPT | Performed by: EMERGENCY MEDICINE

## 2018-12-03 PROCEDURE — 84484 ASSAY OF TROPONIN QUANT: CPT | Performed by: EMERGENCY MEDICINE

## 2018-12-03 PROCEDURE — 05HP33Z INSERTION OF INFUSION DEVICE INTO RIGHT EXTERNAL JUGULAR VEIN, PERCUTANEOUS APPROACH: ICD-10-PCS | Performed by: EMERGENCY MEDICINE

## 2018-12-03 PROCEDURE — 82805 BLOOD GASES W/O2 SATURATION: CPT | Performed by: EMERGENCY MEDICINE

## 2018-12-03 PROCEDURE — 80053 COMPREHEN METABOLIC PANEL: CPT | Performed by: EMERGENCY MEDICINE

## 2018-12-03 PROCEDURE — 83930 ASSAY OF BLOOD OSMOLALITY: CPT | Performed by: EMERGENCY MEDICINE

## 2018-12-03 PROCEDURE — 85730 THROMBOPLASTIN TIME PARTIAL: CPT | Performed by: EMERGENCY MEDICINE

## 2018-12-03 PROCEDURE — 71045 X-RAY EXAM CHEST 1 VIEW: CPT

## 2018-12-03 PROCEDURE — 81001 URINALYSIS AUTO W/SCOPE: CPT | Performed by: EMERGENCY MEDICINE

## 2018-12-03 PROCEDURE — 82948 REAGENT STRIP/BLOOD GLUCOSE: CPT

## 2018-12-03 PROCEDURE — 82009 KETONE BODYS QUAL: CPT | Performed by: EMERGENCY MEDICINE

## 2018-12-03 PROCEDURE — 85610 PROTHROMBIN TIME: CPT | Performed by: EMERGENCY MEDICINE

## 2018-12-03 PROCEDURE — 87186 SC STD MICRODIL/AGAR DIL: CPT | Performed by: EMERGENCY MEDICINE

## 2018-12-03 PROCEDURE — 87147 CULTURE TYPE IMMUNOLOGIC: CPT | Performed by: EMERGENCY MEDICINE

## 2018-12-03 PROCEDURE — 81003 URINALYSIS AUTO W/O SCOPE: CPT | Performed by: EMERGENCY MEDICINE

## 2018-12-03 PROCEDURE — 87086 URINE CULTURE/COLONY COUNT: CPT | Performed by: EMERGENCY MEDICINE

## 2018-12-03 PROCEDURE — 99285 EMERGENCY DEPT VISIT HI MDM: CPT

## 2018-12-03 PROCEDURE — 87502 INFLUENZA DNA AMP PROBE: CPT | Performed by: EMERGENCY MEDICINE

## 2018-12-03 PROCEDURE — 85025 COMPLETE CBC W/AUTO DIFF WBC: CPT | Performed by: EMERGENCY MEDICINE

## 2018-12-03 PROCEDURE — 36415 COLL VENOUS BLD VENIPUNCTURE: CPT | Performed by: EMERGENCY MEDICINE

## 2018-12-03 PROCEDURE — 83690 ASSAY OF LIPASE: CPT | Performed by: EMERGENCY MEDICINE

## 2018-12-03 PROCEDURE — 87077 CULTURE AEROBIC IDENTIFY: CPT | Performed by: EMERGENCY MEDICINE

## 2018-12-03 PROCEDURE — 87040 BLOOD CULTURE FOR BACTERIA: CPT | Performed by: EMERGENCY MEDICINE

## 2018-12-03 PROCEDURE — 96365 THER/PROPH/DIAG IV INF INIT: CPT

## 2018-12-03 PROCEDURE — 87076 CULTURE ANAEROBE IDENT EACH: CPT | Performed by: EMERGENCY MEDICINE

## 2018-12-03 RX ORDER — ONDANSETRON 4 MG/1
4 TABLET, ORALLY DISINTEGRATING ORAL ONCE
Status: COMPLETED | OUTPATIENT
Start: 2018-12-03 | End: 2018-12-03

## 2018-12-03 RX ORDER — LEVOFLOXACIN 5 MG/ML
750 INJECTION, SOLUTION INTRAVENOUS ONCE
Status: COMPLETED | OUTPATIENT
Start: 2018-12-03 | End: 2018-12-04

## 2018-12-03 RX ORDER — LIDOCAINE HYDROCHLORIDE 10 MG/ML
5 INJECTION, SOLUTION EPIDURAL; INFILTRATION; INTRACAUDAL; PERINEURAL ONCE
Status: COMPLETED | OUTPATIENT
Start: 2018-12-03 | End: 2018-12-03

## 2018-12-03 RX ORDER — SYRINGE-NEEDLE,INSULIN,0.5 ML 31 GX5/16"
SYRINGE, EMPTY DISPOSABLE MISCELLANEOUS
COMMUNITY
Start: 2018-10-03 | End: 2020-01-01 | Stop reason: SDUPTHER

## 2018-12-03 RX ADMIN — SODIUM CHLORIDE 1000 ML: 9 INJECTION, SOLUTION INTRAVENOUS at 22:40

## 2018-12-03 RX ADMIN — ONDANSETRON 4 MG: 4 TABLET, ORALLY DISINTEGRATING ORAL at 22:00

## 2018-12-03 RX ADMIN — LEVOFLOXACIN 750 MG: 5 INJECTION, SOLUTION INTRAVENOUS at 22:50

## 2018-12-03 RX ADMIN — LIDOCAINE HYDROCHLORIDE 5 ML: 10 INJECTION, SOLUTION EPIDURAL; INFILTRATION; INTRACAUDAL; PERINEURAL at 22:23

## 2018-12-03 RX ADMIN — SODIUM CHLORIDE 1000 ML: 9 INJECTION, SOLUTION INTRAVENOUS at 23:38

## 2018-12-04 ENCOUNTER — TELEPHONE (OUTPATIENT)
Dept: FAMILY MEDICINE CLINIC | Facility: CLINIC | Age: 53
End: 2018-12-04

## 2018-12-04 ENCOUNTER — APPOINTMENT (INPATIENT)
Dept: RADIOLOGY | Facility: HOSPITAL | Age: 53
DRG: 420 | End: 2018-12-04
Payer: COMMERCIAL

## 2018-12-04 PROBLEM — L97.522 DIABETIC ULCER OF TOE OF LEFT FOOT ASSOCIATED WITH TYPE 2 DIABETES MELLITUS, WITH FAT LAYER EXPOSED (HCC): Status: ACTIVE | Noted: 2018-12-04

## 2018-12-04 PROBLEM — Z91.19 NON-COMPLIANCE: Chronic | Status: ACTIVE | Noted: 2018-12-04

## 2018-12-04 PROBLEM — R73.9 HYPERGLYCEMIA: Status: ACTIVE | Noted: 2018-12-04

## 2018-12-04 PROBLEM — E11.621 DIABETIC ULCER OF TOE OF LEFT FOOT ASSOCIATED WITH TYPE 2 DIABETES MELLITUS, WITH FAT LAYER EXPOSED (HCC): Status: ACTIVE | Noted: 2018-12-04

## 2018-12-04 LAB
ANION GAP SERPL CALCULATED.3IONS-SCNC: 6 MMOL/L (ref 5–14)
BUN SERPL-MCNC: 26 MG/DL (ref 5–25)
CALCIUM SERPL-MCNC: 8.4 MG/DL (ref 8.4–10.2)
CHLORIDE SERPL-SCNC: 107 MMOL/L (ref 97–108)
CO2 SERPL-SCNC: 26 MMOL/L (ref 22–30)
CREAT SERPL-MCNC: 1.44 MG/DL (ref 0.6–1.2)
GFR SERPL CREATININE-BSD FRML MDRD: 47 ML/MIN/1.73SQ M
GLUCOSE SERPL-MCNC: 155 MG/DL (ref 70–99)
GLUCOSE SERPL-MCNC: 206 MG/DL (ref 70–99)
GLUCOSE SERPL-MCNC: 267 MG/DL (ref 70–99)
GLUCOSE SERPL-MCNC: 345 MG/DL (ref 70–99)
GLUCOSE SERPL-MCNC: 380 MG/DL (ref 70–99)
GLUCOSE SERPL-MCNC: 458 MG/DL (ref 70–99)
GLUCOSE SERPL-MCNC: 536 MG/DL (ref 70–99)
GLUCOSE SERPL-MCNC: 55 MG/DL (ref 70–99)
GLUCOSE SERPL-MCNC: >600 MG/DL (ref 70–99)
OSMOLALITY UR/SERPL-RTO: 329 MMOL/KG (ref 282–298)
POTASSIUM SERPL-SCNC: 3.4 MMOL/L (ref 3.6–5)
SODIUM SERPL-SCNC: 139 MMOL/L (ref 137–147)

## 2018-12-04 PROCEDURE — 93005 ELECTROCARDIOGRAM TRACING: CPT

## 2018-12-04 PROCEDURE — 80048 BASIC METABOLIC PNL TOTAL CA: CPT | Performed by: INTERNAL MEDICINE

## 2018-12-04 PROCEDURE — 99219 PR INITIAL OBSERVATION CARE/DAY 50 MINUTES: CPT | Performed by: FAMILY MEDICINE

## 2018-12-04 PROCEDURE — 82948 REAGENT STRIP/BLOOD GLUCOSE: CPT

## 2018-12-04 PROCEDURE — 73630 X-RAY EXAM OF FOOT: CPT

## 2018-12-04 RX ORDER — NICOTINE 21 MG/24HR
1 PATCH, TRANSDERMAL 24 HOURS TRANSDERMAL DAILY
Status: DISCONTINUED | OUTPATIENT
Start: 2018-12-04 | End: 2018-12-06

## 2018-12-04 RX ORDER — DOCUSATE SODIUM 100 MG/1
100 CAPSULE, LIQUID FILLED ORAL 2 TIMES DAILY
Status: DISCONTINUED | OUTPATIENT
Start: 2018-12-04 | End: 2018-12-06 | Stop reason: HOSPADM

## 2018-12-04 RX ORDER — PREGABALIN 50 MG/1
50 CAPSULE ORAL 2 TIMES DAILY
Status: DISCONTINUED | OUTPATIENT
Start: 2018-12-04 | End: 2018-12-06 | Stop reason: HOSPADM

## 2018-12-04 RX ORDER — ASPIRIN 81 MG/1
81 TABLET ORAL DAILY
Status: DISCONTINUED | OUTPATIENT
Start: 2018-12-04 | End: 2018-12-06 | Stop reason: HOSPADM

## 2018-12-04 RX ORDER — SODIUM CHLORIDE 9 MG/ML
125 INJECTION, SOLUTION INTRAVENOUS CONTINUOUS
Status: DISCONTINUED | OUTPATIENT
Start: 2018-12-04 | End: 2018-12-04

## 2018-12-04 RX ORDER — OXYCODONE HYDROCHLORIDE AND ACETAMINOPHEN 5; 325 MG/1; MG/1
1 TABLET ORAL EVERY 8 HOURS PRN
Status: DISCONTINUED | OUTPATIENT
Start: 2018-12-04 | End: 2018-12-06

## 2018-12-04 RX ORDER — INSULIN GLARGINE 100 [IU]/ML
25 INJECTION, SOLUTION SUBCUTANEOUS
Status: DISCONTINUED | OUTPATIENT
Start: 2018-12-04 | End: 2018-12-05

## 2018-12-04 RX ORDER — SENNOSIDES 8.6 MG
1 TABLET ORAL
Status: DISCONTINUED | OUTPATIENT
Start: 2018-12-04 | End: 2018-12-06 | Stop reason: HOSPADM

## 2018-12-04 RX ORDER — ATORVASTATIN CALCIUM 40 MG/1
40 TABLET, FILM COATED ORAL
Status: DISCONTINUED | OUTPATIENT
Start: 2018-12-04 | End: 2018-12-06 | Stop reason: HOSPADM

## 2018-12-04 RX ORDER — NORTRIPTYLINE HYDROCHLORIDE 25 MG/1
50 CAPSULE ORAL DAILY
Status: DISCONTINUED | OUTPATIENT
Start: 2018-12-04 | End: 2018-12-06 | Stop reason: HOSPADM

## 2018-12-04 RX ORDER — NYSTATIN 100000 [USP'U]/G
POWDER TOPICAL 2 TIMES DAILY
Status: DISCONTINUED | OUTPATIENT
Start: 2018-12-04 | End: 2018-12-06 | Stop reason: HOSPADM

## 2018-12-04 RX ORDER — ONDANSETRON 2 MG/ML
4 INJECTION INTRAMUSCULAR; INTRAVENOUS EVERY 6 HOURS PRN
Status: DISCONTINUED | OUTPATIENT
Start: 2018-12-04 | End: 2018-12-06

## 2018-12-04 RX ORDER — PANTOPRAZOLE SODIUM 20 MG/1
20 TABLET, DELAYED RELEASE ORAL
Status: DISCONTINUED | OUTPATIENT
Start: 2018-12-04 | End: 2018-12-06 | Stop reason: HOSPADM

## 2018-12-04 RX ADMIN — ATORVASTATIN CALCIUM 40 MG: 40 TABLET, FILM COATED ORAL at 16:11

## 2018-12-04 RX ADMIN — INSULIN LISPRO 6 UNITS: 100 INJECTION, SOLUTION INTRAVENOUS; SUBCUTANEOUS at 21:05

## 2018-12-04 RX ADMIN — PANTOPRAZOLE SODIUM 20 MG: 20 TABLET, DELAYED RELEASE ORAL at 05:11

## 2018-12-04 RX ADMIN — SILVER SULFADIAZINE: 10 CREAM TOPICAL at 15:04

## 2018-12-04 RX ADMIN — NYSTATIN: 100000 POWDER TOPICAL at 17:25

## 2018-12-04 RX ADMIN — SENNOSIDES 8.6 MG: 8.6 TABLET, FILM COATED ORAL at 01:47

## 2018-12-04 RX ADMIN — INSULIN GLARGINE 25 UNITS: 100 INJECTION, SOLUTION SUBCUTANEOUS at 01:47

## 2018-12-04 RX ADMIN — INSULIN GLARGINE 25 UNITS: 100 INJECTION, SOLUTION SUBCUTANEOUS at 21:04

## 2018-12-04 RX ADMIN — DOCUSATE SODIUM 100 MG: 100 CAPSULE, LIQUID FILLED ORAL at 08:01

## 2018-12-04 RX ADMIN — OXYCODONE HYDROCHLORIDE AND ACETAMINOPHEN 1 TABLET: 5; 325 TABLET ORAL at 01:47

## 2018-12-04 RX ADMIN — SENNOSIDES 8.6 MG: 8.6 TABLET, FILM COATED ORAL at 21:08

## 2018-12-04 RX ADMIN — SODIUM CHLORIDE 125 ML/HR: 9 INJECTION, SOLUTION INTRAVENOUS at 10:12

## 2018-12-04 RX ADMIN — INSULIN LISPRO 6 UNITS: 100 INJECTION, SOLUTION INTRAVENOUS; SUBCUTANEOUS at 01:47

## 2018-12-04 RX ADMIN — INSULIN HUMAN 20 UNITS: 100 INJECTION, SOLUTION PARENTERAL at 03:30

## 2018-12-04 RX ADMIN — PREGABALIN 50 MG: 50 CAPSULE ORAL at 08:01

## 2018-12-04 RX ADMIN — INSULIN LISPRO 6 UNITS: 100 INJECTION, SOLUTION INTRAVENOUS; SUBCUTANEOUS at 16:07

## 2018-12-04 RX ADMIN — INSULIN LISPRO 5 UNITS: 100 INJECTION, SOLUTION INTRAVENOUS; SUBCUTANEOUS at 12:05

## 2018-12-04 RX ADMIN — DOCUSATE SODIUM 100 MG: 100 CAPSULE, LIQUID FILLED ORAL at 17:25

## 2018-12-04 RX ADMIN — ASPIRIN 81 MG: 81 TABLET, COATED ORAL at 08:01

## 2018-12-04 RX ADMIN — NYSTATIN: 100000 POWDER TOPICAL at 08:02

## 2018-12-04 RX ADMIN — INSULIN LISPRO 1 UNITS: 100 INJECTION, SOLUTION INTRAVENOUS; SUBCUTANEOUS at 06:14

## 2018-12-04 RX ADMIN — SODIUM CHLORIDE 125 ML/HR: 9 INJECTION, SOLUTION INTRAVENOUS at 01:41

## 2018-12-04 RX ADMIN — NORTRIPTYLINE HYDROCHLORIDE 50 MG: 25 CAPSULE ORAL at 08:02

## 2018-12-04 RX ADMIN — INSULIN LISPRO 20 UNITS: 100 INJECTION, SOLUTION INTRAVENOUS; SUBCUTANEOUS at 03:30

## 2018-12-04 RX ADMIN — PREGABALIN 50 MG: 50 CAPSULE ORAL at 17:25

## 2018-12-04 NOTE — UTILIZATION REVIEW
Initial Clinical Review    Admission: Date/Time/Statement: 12/4/18 @ 0015     Orders Placed This Encounter   Procedures    Inpatient Admission (expected length of stay for this patient is greater than two midnights)     Standing Status:   Standing     Number of Occurrences:   1     Order Specific Question:   Admitting Physician     Answer:   Sebastian Bright     Order Specific Question:   Level of Care     Answer:   Med Surg [16]     Order Specific Question:   Estimated length of stay     Answer:   More than 2 Midnights     Order Specific Question:   Certification     Answer:   I certify that inpatient services are medically necessary for this patient for a duration of greater than two midnights  See H&P and MD Progress Notes for additional information about the patient's course of treatment  ED: Date/Time/Mode of Arrival:   ED Arrival Information     Expected Arrival Acuity Means of Arrival Escorted By Service Admission Type    - 12/3/2018 21:11 Urgent [facility] Urgent    Arrival Complaint    low blood sugar          Chief Complaint:   Chief Complaint   Patient presents with    Hyperglycemia - Symptomatic     pt arrives from home via EMS with c/o high blood sugar  Pt states sugar has been high for a few days and shes unable to get it down  EMS reports "HI" on their monitor  Pt c/o generalized pain  Pt was dx with urine infection is May   States she feels like she has another one because she states "I keep itching down there "       History of Illness:  52 y o  female who presented to the ED with hyperglycemia    ED Vital Signs:   ED Triage Vitals [12/03/18 2114]   Temperature Pulse Respirations Blood Pressure SpO2   (!) 96 3 °F (35 7 °C) 96 20 114/65 95 %      Temp Source Heart Rate Source Patient Position - Orthostatic VS BP Location FiO2 (%)   Tympanic Monitor Lying Left arm --      Pain Score       Worst Possible Pain        Wt Readings from Last 1 Encounters:   12/04/18 89 1 kg (196 lb 6 9 oz)     Abnormal Labs/Diagnostic Test Results: Serum Osmolality 329, Na 130, Cl 93, Bun 30, Creat 1 61, Glucose 919, Alk Phos 185    UA w Reflex to Microscopic w Reflex to Culture [[id number]] (Abnormal)   Lab Status: Final result    12/03/2018 Specimen: Urine from Urine, Suprapubic catheter    Color, UA Straw Straw, Yellow, Pale Yellow    Clarity, UA Cloudy (A) Clear, Other    Specific [place], UA 1 010 1 003 - 1 040    pH, UA 5 0 4 5 - 8 0    Leukocytes,  0 (A) Negative    Nitrite, UA Positive (A) Negative    Protein,  (2+) (A) Negative mg/dl    Glucose, UA >=1000 (1%) (A) Negative mg/dl    Ketones, UA Negative Negative mg/dl    Bilirubin, UA Negative Negative    Blood, UA 25 0 (A) Negative    UROBILINOGEN UA Negative 1 0, Negative mg/dL   Urine Microscopic [[id number]] (Abnormal)   Lab Status: Final result Specimen: Urine from Urine, Suprapubic catheter    RBC, UA 2-4 (A) None Seen, 0-5 /hpf    WBC, UA 30-50 (A) None Seen, 0-5, 5-55, 5-65 /hpf    Epithelial Cells Occasional None Seen, Occasional /hpf    Bacteria, UA Innumerable (A) None Seen, Occasional /         ED Treatment:   Medication Administration from 12/03/2018 2111 to 12/04/2018 0112       Date/Time Order Dose Route Action Action by Comments     12/03/2018 2240 sodium chloride 0 9 % bolus 1,000 mL 1,000 mL Intravenous New Bag Cesilia St RN just obtained IV access     12/03/2018 2200 ondansetron (ZOFRAN-ODT) dispersible tablet 4 mg 4 mg Oral Given Cesilia St RN      12/03/2018 2223 lidocaine (PF) (XYLOCAINE-MPF) 1 % injection 5 mL 5 mL Infiltration Given Cesilia St RN done by ED attending     12/03/2018 2250 levofloxacin (LEVAQUIN) IVPB (premix) 750 mg 750 mg Intravenous [address] RN      12/04/2018 0022 sodium chloride 0 9 % bolus 1,000 mL 0 mL Intravenous Stopped Andressa Hastings RN      12/03/2018 6451 sodium chloride 0 9 % bolus 1,000 mL 1,000 mL Intravenous New Bag Andressa Morocho RN           Past Medical/Surgical History:   Diagnosis    Chronic kidney disease    Diabetes mellitus ([address] )    Dyslipidemia    History of frequent urinary tract infections    HTN (hypertension)    Hyperlipidemia    Neuropathy       Admitting Diagnosis: Hyperglycemia [R73 9]  Type 2 diabetes mellitus with kidney complication, with long-term current use of insulin (HCC) [E11 29, Z79 4]    Age/Sex: 52 y o  female    Assessment/Plan:     * Hyperglycemia   Assessment & Plan     Improving, presented to ED with BS in 900's, negative acetone, vpH 7 310  Will continue management with insulin and IVF      Type 2 diabetes mellitus with kidney complication, with long-term current use of insulin [facility])   Assessment & Plan             Lab Results   Component Value Date     HGBA1C 9 4 (H) 08/13/2018                Recent Labs      12/04/18   0340  12/04/18   0555  12/04/18   0739  12/04/18   0908   POCGLU  536*  155*  55*  206*         Blood Sugar Average: Last 72 hrs:  (P) 003 [id number]   Currently in ISS, Lantus 25units QHS and diabetic diet      Diabetic ulcer of toe of left foot associated with type 2 diabetes mellitus, with fat layer exposed [facility])   Assessment & Plan             Lab Results   Component Value Date     HGBA1C 9 4 (H) 08/13/2018                Recent Labs      12/04/18   0555  12/04/18   0739  12/04/18   0908  12/04/18   1136   POCGLU  155*  55*  206*  345*         Blood Sugar Average: Last 72 hrs:  (P) 162 125      Cultured, will consult podiatry for evaluation and obtain Foot XR      Neurogenic bladder   Assessment & Plan     Suprapubic catheter in place        HTN (hypertension)   Assessment & Plan     Controlled at this time with BP of 110/55, will continue home dose of lisinopril              VTE Prophylaxis: SCD's  Code Status: Level 1 - Full Code  Anticipated Length of Stay:  Patient will be admitted on an Inpatient basis with an anticipated length of stay of  at least than 2 midnights       Justification for Hospital Stay: Hyperglycemia      Admission Orders:  Inpatient/MedSurg  Consult Podiatry r/e diabetic peripheral neuropathy  Consult Nutrition r/e diabetic diet  Bilateral Sequential Compression Device  Wound Culture Pending  SSI  NSS @ 125    Scheduled Meds:   Current Facility-Administered Medications:  aspirin 81 mg Oral Daily   atorvastatin 40 mg Oral Daily With Dinner   docusate sodium 100 mg Oral BID   insulin glargine 25 Units Subcutaneous HS   insulin lispro 1-6 Units Subcutaneous TID AC   insulin lispro 1-6 Units Subcutaneous HS   nicotine 1 patch Transdermal Daily   nortriptyline 50 mg Oral Daily   nystatin  Topical BID   pantoprazole 20 mg Oral Early Morning   pregabalin 50 mg Oral BID   senna 1 tablet Oral HS   silver sulfadiazine  Topical Daily   sodium chloride 125 mL/hr Intravenous Continuous     PRN Meds: ondansetron    oxyCODONE-acetaminophen

## 2018-12-04 NOTE — SOCIAL WORK
Pt admitted for treatment of hyperglycemia  Pt also has a hx of neurogenic bladder with suprapubic cath placement  Urine positive for ESBL  Pt placed on contact precautions  SW met with pt to complete assessment  Pt lives with her dtr in a 3 story home w/ 3 DAIJA and 1st floor set-up  Pt notes her dtr works full-time days  Pt is independent with ambulation w/o DME (does not own any either) and with self-care tasks  Pt relies on family for transportation  Pt notes she is active with [address] for SN only for cath management  Pt denies any hx of SNF admissions  No MH hx reported  Pt does smoke 1/2 ppd of cigarettes  PCP is [employer] and preferred pharmacy is [facility]  Pt states that when medically cleared for discharge she wants to walk home if family is unavailable  SVEN to send [address] referral to [facility] [address] to keep updated with POC  No questions or concerns from pt at this time  [facility]EN will continue to follow for discharge needs

## 2018-12-04 NOTE — PROGRESS NOTES
Chart reviewed  Suprapubic tubes are routinely changed without image guidance by Urology  Please consult Urology, as there is no apparent need for image guidance in this case

## 2018-12-04 NOTE — ED NOTES
Fingerstick blood glucose reading HI after repeat test  Patient changed into gown, placed on cardiac monitor and continuous pulse ox     Abhay Witt RN  12/03/18 3864

## 2018-12-04 NOTE — ED PROVIDER NOTES
Pt Name: Lisa Current  MRN: [id number]  [place] 1965  Age/Sex: 52 y o  female  Date of evaluation: 12/3/2018  PCP: Kyra Chandra MD    [address] [place] [address]    Chief Complaint   Patient presents with    Hyperglycemia - Symptomatic     pt arrives from home via EMS with c/o high blood sugar  Pt states sugar has been high for a few days and shes unable to get it down  EMS reports "HI" on their monitor  Pt c/o generalized pain  Pt was dx with urine infection is May  States she feels like she has another one because she states "I keep itching down there "         HPI    Hansa Patrick presents to the Emergency Department complaining of feeling weak and nauseated  Her blood sugar read HIGH for EMS  She refuses to give hx and tells me that she is here all the time for the same issues, we should have all of her information in the computer and that she will need a PICC line in the morning because they are never able to get IV access on her         HPI      Past Medical and Surgical History    Past Medical History:   Diagnosis Date    Anemia     macrocyctic    Chronic kidney disease 5/24/2018    Diabetes mellitus ([address] )     Dyslipidemia 5/24/2018    GERD (gastroesophageal reflux disease)     History of frequent urinary tract infections     HTN (hypertension) 5/24/2018    Hyperlipidemia     Neurogenic bladder     Neuropathy     Pedal edema     Vitamin D deficiency        Past Surgical History:   Procedure Laterality Date    SUPRAPUBIC CATHETER INSERTION         Family History   Problem Relation Age of Onset   Stepahnie Courser Breast cancer Mother     Hypertension Mother     Diabetes Mother         Mellitus    Parkinsonism Mother     Cancer Maternal Aunt         Unknown       Social History   Substance Use Topics    Smoking status: Current Every Day Smoker     Packs/day: 0 50     Years: 40 00     Types: Cigarettes    Smokeless tobacco: Current User      Comment: Per [employer]: Heavy tobacco smoker/12 cigs per day    Alcohol use No      Allergies    Allergies   Allergen Reactions    Gabapentin      Other reaction(s): slurring of speech, falls  Home Medications    Prior to Admission medications    Medication Sig Start Date End Date Taking? Authorizing Provider   Alcohol Swabs (ALCOHOL PREP) 70 % PADS Apply 1 applicator topically as needed 6/26/18   Historical Provider, MD   aspirin (ECOTRIN LOW STRENGTH) 81 mg EC tablet Take 1 tablet (81 mg total) by mouth daily 10/10/18   Viola Day, MD   atorvastatin (LIPITOR) 40 mg tablet Take 1 tablet (40 mg total) by mouth daily for 180 days 10/10/18 4/8/19  Viola Day, MD POWELL INS SYRINGE 0 5CC/31GX5/16 31G X 5/16" 0 5 ML MISC  10/3/18   Historical Provider, MD   Catheters MISC Please change patients suprapubic catheter as soon as possible and then every three months      Dx: N31 9 Neurogenic Bladder 10/31/18   Viola Day, MD   diphenhydrAMINE-acetaminophen (TYLENOL PM)  MG TABS Take 1 tablet by mouth daily at bedtime as needed for sleep for up to 30 days 8/13/18 9/12/18 Diane Day, MD   docusate sodium (COLACE) 100 mg capsule Take 1 capsule (100 mg total) by mouth 2 (two) times a day 8/13/18   Chuck Milder Day, MD   ergocalciferol (VITAMIN D2) 50,000 units TAKE ONE CAPSULE BY MOUTH ONCE WEEKLY 11/13/18   Viola Day, MD   glucose blood test strip Test blood sugars three times daily 6/12/18   Historical Provider, MD   glucose blood test strip Test blood sugars three times daily 6/12/18   Historical Provider, MD   insulin glargine (BASAGLAR KWIKPEN) 100 units/mL injection pen Inject 10 Units under the skin daily 10/8/18   Viola Day, MD   Insulin Pen Needle (BD PEN NEEDLE LUNA U/F) 32G X 4 MM MISC Inject under the skin 3 (three) times a day 10/31/18   Viola Day, MD   Lancets 28G MISC Test blood sugars three times daily 6/12/18   Historical Provider, MD   lisinopril (ZESTRIL) 5 mg tablet TAKE ONE TABLET BY MOUTH EVERY DAY 11/13/18   Viola Day, MD   miconazole (MONISTAT 7 SIMPLY CURE) 2 % vaginal cream every 24 hours 12/12/17   Historical Provider, MD   nortriptyline (PAMELOR) 50 mg capsule TAKE ONE CAPSULE BY MOUTH EVERY DAY 11/13/18   Viola Day, MD   NOVOLOG MIX 70/30 FLEXPEN (70-30) 100 units/mL injection pen Inject 20 Units under the skin 2 (two) times a day before meals 11/13/18   Viola Day, MD   nystatin (MYCOSTATIN) powder Every 12 hours 12/20/17   Historical Provider, MD   omeprazole (PriLOSEC) 20 mg delayed release capsule TAKE ONE CAPSULE BY MOUTH EVERY DAY BEFORE BREAKFAST 11/13/18   Viola Day, MD   ONE TOUCH ULTRA TEST test strip 100 strips by Device route 3 (three) times a day 6/12/18   Historical Provider, MD   oxyCODONE-acetaminophen (PERCOCET) 5-325 mg per tablet every 4 (four) hours    Historical Provider, MD   pregabalin (LYRICA) 50 mg capsule Take 1 capsule (50 mg total) by mouth 2 (two) times a day 10/8/18   Ameena Rolon MD   senna (SENOKOT) 8 6 mg Take 1 tablet (8 6 mg total) by mouth daily at bedtime 10/10/18   Viola Day, MD           Review of Systems    Review of Systems   Constitutional: Positive for appetite change, fatigue and fever  Negative for activity change, chills and diaphoresis  HENT: Negative for congestion, postnasal drip, rhinorrhea, sinus pressure, sneezing and sore throat  Eyes: Negative for pain and visual disturbance  Respiratory: Negative for cough, chest tightness and shortness of breath  Cardiovascular: Negative for chest pain, palpitations and leg swelling  Gastrointestinal: Positive for vomiting  Negative for abdominal distention, abdominal pain, constipation, diarrhea and nausea  Endocrine: Negative for polydipsia, polyphagia and polyuria  Genitourinary: Positive for vaginal discharge  Negative for decreased urine volume, difficulty urinating, dysuria, flank pain, frequency and hematuria  Musculoskeletal: Negative for arthralgias, gait problem, joint swelling and neck pain  Skin: Negative for pallor and rash     Allergic/Immunologic: Negative for immunocompromised state  Neurological: Negative for syncope, speech difficulty, weakness, light-headedness, numbness and headaches  All other systems reviewed and are negative  Physical Exam      ED Triage Vitals [12/03/18 2114]   Temperature Pulse Respirations Blood Pressure SpO2   (!) 96 3 °F (35 7 °C) 96 20 114/65 95 %      Temp Source Heart Rate Source Patient Position - Orthostatic VS BP Location FiO2 (%)   Tympanic Monitor Lying Left arm --      Pain Score       Worst Possible Pain               Physical Exam   Constitutional: She is oriented to person, place, and time  She appears well-developed and well-nourished  No distress  HENT:   Head: Normocephalic and atraumatic  Nose: Nose normal    Mouth/Throat: Oropharynx is clear and moist    Eyes: Pupils are equal, round, and reactive to light  Conjunctivae, EOM and lids are normal    Neck: Normal range of motion  Neck supple  Cardiovascular: Normal rate, regular rhythm and normal heart sounds  Exam reveals no gallop and no friction rub  No murmur heard  Pulmonary/Chest: Effort normal and breath sounds normal  No accessory muscle usage  No respiratory distress  She has no wheezes  She has no rales  Abdominal: Soft  She exhibits no distension  There is no tenderness  There is no rebound and no guarding  Neurological: She is alert and oriented to person, place, and time  No cranial nerve deficit or sensory deficit  Skin: Skin is warm and dry  No rash noted  She is not diaphoretic  No erythema  Psychiatric: She has a normal mood and affect  Her speech is normal and behavior is normal  Judgment and thought content normal    Nursing note and vitals reviewed  Assessment and Plan     Shay James is a 52 y o  female who presents with high blood sugar  Plan will be to perform diagnostic testing and treat symptomatically          MDM  Number of Diagnoses or Management Options  Diagnosis management comments: Right EJ 20g angiocath placed by myself without complication  Patient was anesthetized with local infiltration of 1% lidocaine  She tolerated the procedure well  Blood specimens drawn, labelled and sent by nursing  Diagnostic Results    EKG:   normal sinus rhythm,  Rate 97    Non specific ST segments     EKG INTERPRETATION  EKG Interpretation  EKG interpreted by me  Interpretation by Ravinder Long DO  EKG reviewed and interpreted independently      Labs:    Results for orders placed or performed during the hospital encounter of 12/03/18   Rapid Flu-Viral RNA amplification ([facility] ONLY)   Result Value Ref Range    INFLUENZA A AMPLIFIED RNA Not Detected Not Detected    INFLUENZA B AMPLIFIED Not Detected Not Detected   CBC and differential   Result Value Ref Range    WBC 6 60 4 50 - 11 00 Thousand/uL    RBC 3 98 (L) 4 00 - 5 20 Million/uL    Hemoglobin 12 2 12 0 - 16 0 g/dL    Hematocrit 38 1 36 0 - 46 0 %    MCV 96 80 - 100 fL    MCH 30 6 26 0 - 34 0 pg    MCHC 32 0 31 0 - 36 0 g/dL    RDW 12 8 <15 3 %    MPV 11 1 8 9 - 12 7 fL    Platelets 857 569 - 400 Thousands/uL    Neutrophils Relative 55 45 - 65 %    Lymphocytes Relative 30 20 - 50 %    Monocytes Relative 6 1 - 10 %    Eosinophils Relative 8 (H) 0 - 6 %    Basophils Relative 1 0 - 1 %    Neutrophils Absolute 3 60 1 80 - 7 80 Thousands/µL    Lymphocytes Absolute 1 90 0 50 - 4 00 Thousands/µL    Monocytes Absolute 0 40 0 20 - 0 90 Thousand/µL    Eosinophils Absolute 0 50 (H) 0 00 - 0 40 Thousand/µL    Basophils Absolute 0 10 0 00 - 0 10 Thousands/µL   Comprehensive metabolic panel   Result Value Ref Range    Sodium 130 (L) 137 - 147 mmol/L    Potassium 4 8 3 6 - 5 0 mmol/L    Chloride 93 (L) 97 - 108 mmol/L    CO2 28 22 - 30 mmol/L    ANION GAP 9 5 - 14 mmol/L    BUN 30 (H) 5 - 25 mg/dL    Creatinine 1 61 (H) 0 60 - 1 20 mg/dL    Glucose 919 (HH) 70 - 99 mg/dL    Calcium 8 7 8 4 - 10 2 mg/dL    AST 19 14 - 36 U/L    ALT 20 9 - 52 U/L Alkaline Phosphatase 165 (H) 43 - 122 U/L    Total Protein 6 6 5 9 - 8 4 g/dL    Albumin 3 5 3 0 - 5 2 g/dL    Total Bilirubin 0 50 <1 30 mg/dL    eGFR 41 (L) >60 ml/min/1 73sq m   Troponin I   Result Value Ref Range    Troponin I <0 01 0 00 - 0 03 ng/mL   Lipase   Result Value Ref Range    Lipase 123 23 - 300 u/L   Phosphorus   Result Value Ref Range    Phosphorus 3 9 2 5 - 4 8 mg/dL   Magnesium   Result Value Ref Range    Magnesium 2 3 1 6 - 2 3 mg/dL   Lactic acid, plasma   Result Value Ref Range    LACTIC ACID 1 5 0 7 - 2 0 mmol/L   UA w Reflex to Microscopic w Reflex to Culture   Result Value Ref Range    Color, UA Straw Straw, Yellow, Pale Yellow    Clarity, UA Cloudy (A) Clear, Other    Specific [place], UA 1 010 1 003 - 1 040    pH, UA 5 0 4 5 - 8 0    Leukocytes,  0 (A) Negative    Nitrite, UA Positive (A) Negative    Protein,  (2+) (A) Negative mg/dl    Glucose, UA >=1000 (1%) (A) Negative mg/dl    Ketones, UA Negative Negative mg/dl    Bilirubin, UA Negative Negative    Blood, UA 25 0 (A) Negative    UROBILINOGEN UA Negative 1 0, Negative mg/dL   Acetone   Result Value Ref Range    Acetone, Bld Negative Negative   Blood gas, venous   Result Value Ref Range    pH, Vasyl 7 310 (L) 7 350 - 7 450    pCO2, Vasyl 52 0 (H) 41 0 - 51 0 mm Hg    pO2, Vasyl 55 0 >=40 0 mm Hg    HCO3, Vasyl 26 2 23 - 28 mmol/L    Base Excess, Vasyl -0 7 -2 1 - 2 1 mmol/L    O2 Content, Vasyl 14 0 ml/dL    O2 HGB, VENOUS 84 8 >75 0 %   Protime-INR   Result Value Ref Range    Protime 9 6 9 5 - 11 6 seconds    INR 0 90 0 89 - 1 10   APTT   Result Value Ref Range    PTT 22 (L) 23 - 34 seconds   Urine Microscopic   Result Value Ref Range    RBC, UA 2-4 (A) None Seen, 0-5 /hpf    WBC, UA 30-50 (A) None Seen, 0-5, 5-55, 5-65 /hpf    Epithelial Cells Occasional None Seen, Occasional /hpf    Bacteria, UA Innumerable (A) None Seen, Occasional /hpf    OTHER OBSERVATIONS Yeast Cells Present    Osmolality   Result Value Ref Range    Osmolality Serum 329 (H) 282 - 298 mmol/KG   Basic metabolic panel   Result Value Ref Range    Sodium 139 137 - 147 mmol/L    Potassium 3 4 (L) 3 6 - 5 0 mmol/L    Chloride 107 97 - 108 mmol/L    CO2 26 22 - 30 mmol/L    ANION GAP 6 5 - 14 mmol/L    BUN 26 (H) 5 - 25 mg/dL    Creatinine 1 44 (H) 0 60 - 1 20 mg/dL    Glucose 267 (H) 70 - 99 mg/dL    Calcium 8 4 8 4 - 10 2 mg/dL    eGFR 47 (L) >60 ml/min/1 73sq m   Fingerstick Glucose (POCT)   Result Value Ref Range    POC Glucose >600 (HH) 70 - 99 mg/dl   Fingerstick Glucose (POCT)   Result Value Ref Range    POC Glucose >600 (HH) 70 - 99 mg/dl   Fingerstick Glucose (POCT)   Result Value Ref Range    POC Glucose 536 (HH) 70 - 99 mg/dl   Fingerstick Glucose (POCT)   Result Value Ref Range    POC Glucose 155 (H) 70 - 99 mg/dl   Fingerstick Glucose (POCT)   Result Value Ref Range    POC Glucose 55 (L) 70 - 99 mg/dl   Fingerstick Glucose (POCT)   Result Value Ref Range    POC Glucose 206 (H) 70 - 99 mg/dl   Fingerstick Glucose (POCT)   Result Value Ref Range    POC Glucose >600 (HH) 70 - 99 mg/dl   Fingerstick Glucose (POCT)   Result Value Ref Range    POC Glucose 345 (H) 70 - 99 mg/dl   Fingerstick Glucose (POCT)   Result Value Ref Range    POC Glucose 458 (H) 70 - 99 mg/dl   Fingerstick Glucose (POCT)   Result Value Ref Range    POC Glucose 380 (H) 70 - 99 mg/dl       All labs reviewed and utilized in the medical decision making process    Radiology:    XR foot 3+ vw left   Final Result      No plain film evidence of osteomyelitis  Workstation performed: [id number]         XR chest 1 view portable   Final Result      No acute cardiopulmonary disease              Workstation performed: [id number]             All radiology studies independently viewed by me and interpreted by the radiologist     Procedure    Procedures    CritCare Time      ED Course of Care and Re-Assessments          Medications   aspirin (ECOTRIN LOW STRENGTH) EC tablet 81 mg (81 mg Oral Given 12/4/18 0801)   atorvastatin (LIPITOR) tablet 40 mg (40 mg Oral Given 12/4/18 1611)   docusate sodium (COLACE) capsule 100 mg (100 mg Oral Given 12/4/18 1725)   insulin glargine (LANTUS) subcutaneous injection 25 Units 0 25 mL (25 Units Subcutaneous Given 12/4/18 2104)   nortriptyline (PAMELOR) capsule 50 mg (50 mg Oral Given 12/4/18 0802)   nystatin (MYCOSTATIN) powder ( Topical Given 12/4/18 1725)   pantoprazole (PROTONIX) EC tablet 20 mg (20 mg Oral Given 12/4/18 0511)   pregabalin (LYRICA) capsule 50 mg (50 mg Oral Given 12/4/18 1725)   senna (SENOKOT) tablet 8 6 mg (8 6 mg Oral Given 12/4/18 2108)   oxyCODONE-acetaminophen (PERCOCET) 5-325 mg per tablet 1 tablet (1 tablet Oral Given 12/4/18 0147)   ondansetron (ZOFRAN) injection 4 mg (not administered)   nicotine (NICODERM CQ) 21 mg/24 hr TD 24 hr patch 1 patch (1 patch Transdermal Not Given 12/4/18 0801)   insulin lispro (HumaLOG) 100 units/mL subcutaneous injection 1-6 Units (6 Units Subcutaneous Given 12/4/18 1607)   insulin lispro (HumaLOG) 100 units/mL subcutaneous injection 1-6 Units (6 Units Subcutaneous Given 12/4/18 2105)   silver sulfadiazine (SILVADENE,SSD) 1 % cream ( Topical Given 12/4/18 1504)   sodium chloride 0 9 % bolus 1,000 mL (0 mL Intravenous Stopped 12/3/18 2337)   ondansetron (ZOFRAN-ODT) dispersible tablet 4 mg (4 mg Oral Given 12/3/18 2200)   lidocaine (PF) (XYLOCAINE-MPF) 1 % injection 5 mL (5 mL Infiltration Given 12/3/18 2223)   levofloxacin (LEVAQUIN) IVPB (premix) 750 mg (0 mg Intravenous Stopped 12/4/18 0022)   sodium chloride 0 9 % bolus 1,000 mL (0 mL Intravenous Stopped 12/4/18 0022)   insulin regular (HumuLIN R,NovoLIN R) injection 20 Units (20 Units Intravenous Given 12/4/18 0835)           FINAL IMPRESSION    Final diagnoses:   Type 2 diabetes mellitus with kidney complication, with long-term current use of insulin ([facility])   Diabetic peripheral neuropathy ([facility] )         DISPOSITION/PLAN      Time reflects when diagnosis was documented in both MDM as applicable and the Disposition within this note     Time User Action Codes Description Comment    12/4/2018 12:42 AM Joellen Chowdary Add [E11 29,  Z79 4] Type 2 diabetes mellitus with kidney complication, with long-term current use of insulin ([address] )     12/4/2018 12:42 AM Joellen Chowdary Modify [E11 29,  Z79 4] Type 2 diabetes mellitus with kidney complication, with long-term current use of insulin ([address] )     12/4/2018 12:42 AM Joellen Chowdary Modify [E11 29,  Z79 4] Type 2 diabetes mellitus with kidney complication, with long-term current use of insulin ([address] )     12/4/2018 10:47 AM Day, Viola Add [E11 42] Diabetic peripheral neuropathy ([address] )       ED Disposition     None      Follow-up Information    None           PATIENT REFERRED TO:    No follow-up provider specified  DISCHARGE MEDICATIONS:    Current Discharge Medication List      CONTINUE these medications which have NOT CHANGED    Details   Alcohol Swabs (ALCOHOL PREP) 70 % PADS Apply 1 applicator topically as needed      aspirin (ECOTRIN LOW STRENGTH) 81 mg EC tablet Take 1 tablet (81 mg total) by mouth daily  Qty: 30 tablet, Refills: 0    Associated Diagnoses: Coronary artery disease due to lipid rich plaque      atorvastatin (LIPITOR) 40 mg tablet Take 1 tablet (40 mg total) by mouth daily for 180 days  Qty: 30 tablet, Refills: 0    Associated Diagnoses: Mixed hyperlipidemia      B-D INS SYRINGE 0 5CC/31GX5/16 31G X 5/16" 0 5 ML MISC       Catheters MISC Please change patients suprapubic catheter as soon as possible and then every three months      Dx: N31 9 Neurogenic Bladder  Qty: 6 each, Refills: 0    Associated Diagnoses: Neurogenic bladder      diphenhydrAMINE-acetaminophen (TYLENOL PM)  MG TABS Take 1 tablet by mouth daily at bedtime as needed for sleep for up to 30 days  Qty: 30 tablet, Refills: 0    Associated Diagnoses: Pain of right lower extremity      docusate sodium (COLACE) 100 mg capsule Take 1 capsule (100 mg total) by mouth 2 (two) times a day  Qty: 10 capsule, Refills: 0    Associated Diagnoses: Constipation, unspecified constipation type      ergocalciferol (VITAMIN D2) 50,000 units TAKE ONE CAPSULE BY MOUTH ONCE WEEKLY  Qty: 4 capsule, Refills: 0    Associated Diagnoses: Vitamin D deficiency      !! glucose blood test strip Test blood sugars three times daily      !! glucose blood test strip Test blood sugars three times daily      insulin glargine (BASAGLAR KWIKPEN) 100 units/mL injection pen Inject 10 Units under the skin daily  Qty: 5 pen, Refills: 0    Associated Diagnoses: Type 2 diabetes mellitus with kidney complication, with long-term current use of insulin ([facility])      Insulin Pen Needle (BD PEN NEEDLE LUNA U/F) 32G X 4 MM MISC Inject under the skin 3 (three) times a day  Qty: 100 each, Refills: 0    Associated Diagnoses: DM2 (diabetes mellitus, type 2) ([facility])      Lancets 28G MISC Test blood sugars three times daily      lisinopril (ZESTRIL) 5 mg tablet TAKE ONE TABLET BY MOUTH EVERY DAY  Qty: 30 tablet, Refills: 0    Associated Diagnoses: Essential hypertension      miconazole (MONISTAT 7 SIMPLY CURE) 2 % vaginal cream every 24 hours      nortriptyline (PAMELOR) 50 mg capsule TAKE ONE CAPSULE BY MOUTH EVERY DAY  Qty: 30 capsule, Refills: 0    Associated Diagnoses: Fibromyalgia      NOVOLOG MIX 70/30 FLEXPEN (70-30) 100 units/mL injection pen Inject 20 Units under the skin 2 (two) times a day before meals  Qty: 30 mL, Refills: 0    Associated Diagnoses: Uncontrolled type 2 diabetes mellitus with hyperglycemia, with long-term current use of insulin ([facility])      nystatin (MYCOSTATIN) powder Every 12 hours      omeprazole (PriLOSEC) 20 mg delayed release capsule TAKE ONE CAPSULE BY MOUTH EVERY DAY BEFORE BREAKFAST  Qty: 30 capsule, Refills: 0    Associated Diagnoses: Gastroesophageal reflux disease without esophagitis      !! ONE TOUCH ULTRA TEST test strip 100 strips by Device route 3 (three) times a day oxyCODONE-acetaminophen (PERCOCET) 5-325 mg per tablet every 4 (four) hours      pregabalin (LYRICA) 50 mg capsule Take 1 capsule (50 mg total) by mouth 2 (two) times a day  Qty: 60 capsule, Refills: 3    Associated Diagnoses: Fibromyalgia      senna (SENOKOT) 8 6 mg Take 1 tablet (8 6 mg total) by mouth daily at bedtime  Qty: 120 each, Refills: 0    Associated Diagnoses: Constipation, unspecified constipation type       !! - Potential duplicate medications found  Please discuss with provider  No discharge procedures on file           Rubi Hurley, [address] [place] [address], DO  12/04/18 [id number]

## 2018-12-04 NOTE — ASSESSMENT & PLAN NOTE
Lab Results   Component Value Date    HGBA1C 9 4 (H) 08/13/2018       Recent Labs      12/04/18   0340  12/04/18   0555  12/04/18   0739  12/04/18   0908   POCGLU  536*  155*  55*  206*       Blood Sugar Average: Last 72 hrs:  (P) 542 [id number]   Currently in [facility], Lantus 25units QHS and diabetic diet

## 2018-12-04 NOTE — H&P
H&P- Roly Patiño 1965, 52 y o  female MRN: [id number]    Unit/Bed#:  -01 Encounter: [id number]    Primary Care Provider: Guillermo Jett MD   Date and time admitted to hospital: 12/3/2018  9:11 PM          History and Physical - Fercho Godoy    Patient Information: Roly Patiño 52 y o  female MRN: [id number]  Unit/Bed#:  -01 Encounter: [id number]  Admitting Physician: Guillermo Jett MD  PCP: Guillermo Jett MD  Date of Admission:  12/04/18    Assessment and Plan    * Hyperglycemia   Assessment & Plan    Improving, presented to ED with BS in 900's, negative acetone, vpH 7 310  Will continue management with insulin and IVF     Type 2 diabetes mellitus with kidney complication, with long-term current use of insulin [facility])   Assessment & Plan    Lab Results   Component Value Date    HGBA1C 9 4 (H) 08/13/2018       Recent Labs      12/04/18   0340  12/04/18   0555  12/04/18   0739  12/04/18   0908   POCGLU  536*  155*  55*  206*       Blood Sugar Average: Last 72 hrs:  (P) 846 [id number]   Currently in ISS, Lantus 25units QHS and diabetic diet     Diabetic ulcer of toe of left foot associated with type 2 diabetes mellitus, with fat layer exposed [facility])   Assessment & Plan    Lab Results   Component Value Date    HGBA1C 9 4 (H) 08/13/2018       Recent Labs      12/04/18   0555  12/04/18   0739  12/04/18   0908  12/04/18   1136   POCGLU  155*  55*  206*  345*       Blood Sugar Average: Last 72 hrs:  (P) 162 125     Cultured, will consult podiatry for evaluation and obtain Foot XR     Neurogenic bladder   Assessment & Plan    Suprapubic catheter in place  HTN (hypertension)   Assessment & Plan    Controlled at this time with BP of 110/55, will continue home dose of lisinopril  VTE Prophylaxis: SCD's  Code Status: Level 1 - Full Code  Anticipated Length of Stay:  Patient will be admitted on an Inpatient basis with an anticipated length of stay of  at least than 2 midnights  Justification for Hospital Stay: Hyperglycemia  Total Time for Visit, including Counseling / Coordination of Care: 30 mins  Greater than 50% of this total time spent on direct patient counseling and coordination of care  Chief Complaint:     Chief Complaint   Patient presents with    Hyperglycemia - Symptomatic     pt arrives from home via EMS with c/o high blood sugar  Pt states sugar has been high for a few days and shes unable to get it down  EMS reports "HI" on their monitor  Pt c/o generalized pain  Pt was dx with urine infection is May  States she feels like she has another one because she states "I keep itching down there "     History of Present Illness:    Israel Yung is a 52 y o  female who presents with hyperglycemia  Review of Systems:  Review of Systems   Constitutional: Negative for activity change, appetite change, fatigue and fever  HENT: Negative for drooling and sore throat  Eyes: Negative for pain and visual disturbance  Respiratory: Negative for cough, chest tightness and shortness of breath  Cardiovascular: Negative for chest pain and palpitations  Gastrointestinal: Negative for abdominal pain, constipation, diarrhea and nausea  Genitourinary: Negative for dysuria and hematuria  Musculoskeletal: Negative for back pain and myalgias  Skin: Negative for color change  Neurological: Negative for numbness and headaches  Psychiatric/Behavioral: Negative for hallucinations and suicidal ideas  Past Medical and Surgical History:   Past Medical History:   Diagnosis Date    Chronic kidney disease 5/24/2018    Diabetes mellitus ([address] )     Dyslipidemia 5/24/2018    History of frequent urinary tract infections     HTN (hypertension) 5/24/2018    Hyperlipidemia     Neuropathy      Past Surgical History:   Procedure Laterality Date    SUPRAPUBIC CATHETER INSERTION       Meds/Allergies: Allergies:    Allergies   Allergen Reactions    Gabapentin      Other reaction(s): slurring of speech, falls  Prior to Admission Medications   Prescriptions Last Dose Informant Patient Reported? Taking? Alcohol Swabs (ALCOHOL PREP) 70 % PADS   Yes No   Sig: Apply 1 applicator topically as needed   B-D INS SYRINGE 0 5CC/31GX5/16 31G X 5/16" 0 5 ML MISC   Yes No   Catheters MISC   No No   Sig: Please change patients suprapubic catheter as soon as possible and then every three months      Dx: N31 9 Neurogenic Bladder   Insulin Pen Needle (BD PEN NEEDLE LUNA U/F) 32G X 4 MM MISC   No No   Sig: Inject under the skin 3 (three) times a day   Lancets 28G MISC   Yes No   Sig: Test blood sugars three times daily   NOVOLOG MIX 70/30 FLEXPEN (70-30) 100 units/mL injection pen   No No   Sig: Inject 20 Units under the skin 2 (two) times a day before meals   ONE TOUCH ULTRA TEST test strip   Yes No   Si strips by Device route 3 (three) times a day   aspirin (ECOTRIN LOW STRENGTH) 81 mg EC tablet   No No   Sig: Take 1 tablet (81 mg total) by mouth daily   atorvastatin (LIPITOR) 40 mg tablet   No No   Sig: Take 1 tablet (40 mg total) by mouth daily for 180 days   diphenhydrAMINE-acetaminophen (TYLENOL PM)  MG TABS   No No   Sig: Take 1 tablet by mouth daily at bedtime as needed for sleep for up to 30 days   docusate sodium (COLACE) 100 mg capsule   No No   Sig: Take 1 capsule (100 mg total) by mouth 2 (two) times a day   ergocalciferol (VITAMIN D2) 50,000 units   No No   Sig: TAKE ONE CAPSULE BY MOUTH ONCE WEEKLY   glucose blood test strip   Yes No   Sig: Test blood sugars three times daily   glucose blood test strip   Yes No   Sig: Test blood sugars three times daily   insulin glargine (BASAGLAR KWIKPEN) 100 units/mL injection pen   No No   Sig: Inject 10 Units under the skin daily   lisinopril (ZESTRIL) 5 mg tablet   No No   Sig: TAKE ONE TABLET BY MOUTH EVERY DAY   miconazole (MONISTAT 7 SIMPLY CURE) 2 % vaginal cream   Yes No   Sig: every 24 hours   nortriptyline (PAMELOR) 50 mg capsule   No No   Sig: TAKE ONE CAPSULE BY MOUTH EVERY DAY   nystatin (MYCOSTATIN) powder   Yes No   Sig: Every 12 hours   omeprazole (PriLOSEC) 20 mg delayed release capsule   No No   Sig: TAKE ONE CAPSULE BY MOUTH EVERY DAY BEFORE BREAKFAST   oxyCODONE-acetaminophen (PERCOCET) 5-325 mg per tablet   Yes No   Sig: every 4 (four) hours   pregabalin (LYRICA) 50 mg capsule   No No   Sig: Take 1 capsule (50 mg total) by mouth 2 (two) times a day   senna (SENOKOT) 8 6 mg   No No   Sig: Take 1 tablet (8 6 mg total) by mouth daily at bedtime      Facility-Administered Medications: None     Social History:     Social History     Social History    Marital status: Single     Spouse name: N/A    Number of children: N/A    Years of education: N/A     Occupational History    Not on file  Social History Main Topics    Smoking status: Current Every Day Smoker     Packs/day: 0 50     Years: 40 00     Types: Cigarettes    Smokeless tobacco: Current User      Comment: Per [employer]: Heavy tobacco smoker/12 cigs per day    Alcohol use No    Drug use: No    Sexual activity: Yes     Partners: Male     Birth control/ protection: None     Other Topics Concern    Not on file     Social History Narrative    Hospitalization: 4/28/18       Family History:  Family History   Problem Relation Age of Onset   Jessika Pall Breast cancer Mother     Hypertension Mother     Diabetes Mother         Mellitus    Parkinsonism Mother     Cancer Maternal Aunt         Unknown     Physical Exam:   Vitals:   Blood Pressure: 110/55 (12/04/18 0726)  Pulse: 98 (12/04/18 0726)  Temperature: (!) 97 1 °F (36 2 °C) (12/04/18 0726)  Temp Source: Temporal (12/04/18 0726)  Respirations: 20 (12/04/18 0726)  Height: 5' 3" (160 cm) (12/04/18 0113)  Weight - Scale: 89 1 kg (196 lb 6 9 oz) (12/04/18 0600)  SpO2: 96 % (12/04/18 0726)    Physical Exam   Constitutional: She is oriented to person, place, and time  She appears well-developed and well-nourished     HENT: Head: Normocephalic and atraumatic  Right Ear: External ear normal    Left Ear: External ear normal    Eyes: Pupils are equal, round, and reactive to light  Neck: Normal range of motion  Neck supple  Cardiovascular: Normal rate, regular rhythm, normal heart sounds and intact distal pulses  Pulmonary/Chest: Effort normal and breath sounds normal  No respiratory distress  Abdominal: Soft  Bowel sounds are normal  She exhibits no distension  There is no tenderness  Musculoskeletal: Normal range of motion  She exhibits no edema  Lymphadenopathy:     She has no cervical adenopathy  Neurological: She is alert and oriented to person, place, and time  Skin: Skin is warm and dry  Diabetic foot ulcer on left great toe, about 1cm by 1cm, about 3mm deep, cultured, unable to probe bone  Non malodorous, minimal discharge appreciated  Psychiatric: She has a normal mood and affect  Vitals reviewed  Lab Results: I have personally reviewed pertinent reports        Results from last 7 days  Lab Units 12/03/18 2237   WBC Thousand/uL 6 60   HEMOGLOBIN g/dL 12 2   HEMATOCRIT % 38 1   PLATELETS Thousands/uL 199   NEUTROS PCT % 55   LYMPHS PCT % 30   MONOS PCT % 6   EOS PCT % 8*       Results from last 7 days  Lab Units 12/04/18  0454 12/03/18  2237   POTASSIUM mmol/L 3 4* 4 8   CHLORIDE mmol/L 107 93*   CO2 mmol/L 26 28   BUN mg/dL 26* 30*   CREATININE mg/dL 1 44* 1 61*   CALCIUM mg/dL 8 4 8 7   ALK PHOS U/L  --  165*   ALT U/L  --  20   AST U/L  --  19   EGFR ml/min/1 73sq m 47* 41*   MAGNESIUM mg/dL  --  2 3   PHOSPHORUS mg/dL  --  3 9       Results from last 7 days  Lab Units 12/03/18  2237   INR  0 90       Results from last 7 days  Lab Units 12/03/18  2237   TROPONIN I ng/mL <0 01       Results from last 7 days  Lab Units 12/03/18  2237   LACTIC ACID mmol/L 1 5                Results from last 7 days  Lab Units 12/03/18  2213   COLOR UA  Straw   CLARITY UA  Cloudy*   SPEC GRAV UA  1 010   PH UA  5 0 LEUKOCYTES UA  500 0*   NITRITE UA  Positive*   GLUCOSE UA mg/dl >=1000 (1%)*   KETONES UA mg/dl Negative   BILIRUBIN UA  Negative   BLOOD UA  25 0*        Results from last 7 days  Lab Units 12/03/18  2213   RBC UA /hpf 2-4*   WBC UA /hpf 30-50*   EPITHELIAL CELLS WET PREP /hpf Occasional   BACTERIA UA /hpf Innumerable*        Imaging: I have personally reviewed pertinent reports  Xr Chest 1 View Portable    Result Date: 12/4/2018  Narrative: CHEST INDICATION:   fever  COMPARISON:  5/24/2018 EXAM PERFORMED/VIEWS:  XR CHEST PORTABLE FINDINGS: Cardiomediastinal silhouette appears unremarkable  The lungs are clear  No pneumothorax or pleural effusion  Osseous structures appear within normal limits for patient age  Impression: No acute cardiopulmonary disease   Workstation performed: [id number]       EKG, Pathology, and Other Studies Reviewed on Admission:   EKG  Result Date: 12/04/18  Impression:      Allscripts Records Reviewed: Yes    Entire H&P was discussed with Dr Chelsea Frost who agreed to what is noted above    George Tom MD  12/04/18  1:15 PM

## 2018-12-04 NOTE — ASSESSMENT & PLAN NOTE
Lab Results   Component Value Date    HGBA1C 9 4 (H) 08/13/2018       Recent Labs      12/04/18   0555  12/04/18   0739  12/04/18   0908  12/04/18   1136   POCGLU  155*  55*  206*  345*       Blood Sugar Average: Last 72 hrs:  (P) 162 125     Cultured, will consult podiatry for evaluation and obtain Foot XR

## 2018-12-04 NOTE — TELEPHONE ENCOUNTER
I received a voice message from PAT regarding pt test results  Joe stated on the message on the bmp,blood sugar level is above 9 19  Please contact PAT at ext [phone]  Pt is schedule to have cataract surgery with dr Jarvis Nair next Tuesday

## 2018-12-04 NOTE — CONSULTS
Consult Routine [address] Amy Cervantes 52 y o  female MRN: [id number]  Unit/Bed#: 5T -01 Encounter: [id number]    Assessment/Plan     Assessment:  1  L hallux wound, stage 2, noninfected  2  Diabetic foot ulcer     Plan:  - pt eval and managed today  - xrays reviewed and interpreted by me  Negative for bony anomaly  - will order Silvadene and DSD to be applied daily by nursing while pt is in house  - Pt to continue application at home  - pt to follow up with me immediately after discharge for proper wound care  - d/w pt risk of being noncompliant with post discharge treatment  - no present infection, ok for d/c per Podiatry and follow up as outpt  - we appreciate the consult and will continue to follow with you for patient care  History of Present Illness     HPI:  Gemini Bernal is a 52 y o  female who presents with L big toe wound  Pt states she "just noticed wound today"  Pt is known to me as outpt  I have not seen patient in almost 2 months  Pt unsure of how the wound started  No pain  No drainage  No Redness  Pt has not been applying anything to the wound  Pt has had xrays today  Pt history of being uncontrolled diabetic  No other complaints  Inpatient consult to Podiatry  Consult performed by: Celina Puente  Consult ordered by: Shey Sun        Review of Systems   Constitutional: Negative  HENT: Negative  Eyes: Negative  Respiratory: Negative  Cardiovascular: Negative  Gastrointestinal: Negative  Musculoskeletal: Negative   Skin: L big toe wound  Neurological: Negative          Historical Information   Past Medical History:   Diagnosis Date    Chronic kidney disease 5/24/2018    Diabetes mellitus ([address] )     Dyslipidemia 5/24/2018    History of frequent urinary tract infections     HTN (hypertension) 5/24/2018    Hyperlipidemia     Neuropathy      Past Surgical History:   Procedure Laterality Date    SUPRAPUBIC CATHETER INSERTION       Social History History   Alcohol Use No     History   Drug Use No     History   Smoking Status    Current Every Day Smoker    Packs/day: 0 50    Years: 40 00    Types: Cigarettes   Smokeless Tobacco    Current User     Comment: Per [employer]: Heavy tobacco smoker/12 cigs per day     Family History:   Family History   Problem Relation Age of Onset   [facility] Breast cancer Mother     Hypertension Mother     Diabetes Mother         Mellitus    Parkinsonism Mother     Cancer Maternal Aunt         Unknown       Meds/Allergies   Prescriptions Prior to Admission   Medication    Alcohol Swabs (ALCOHOL PREP) 70 % PADS    aspirin (ECOTRIN LOW STRENGTH) 81 mg EC tablet    atorvastatin (LIPITOR) 40 mg tablet    B-D INS SYRINGE 0 5CC/31GX5/16 31G X 5/16" 0 5 ML MISC    Catheters MISC    diphenhydrAMINE-acetaminophen (TYLENOL PM)  MG TABS    docusate sodium (COLACE) 100 mg capsule    ergocalciferol (VITAMIN D2) 50,000 units    glucose blood test strip    glucose blood test strip    insulin glargine (BASAGLAR KWIKPEN) 100 units/mL injection pen    Insulin Pen Needle (BD PEN NEEDLE LUNA U/F) 32G X 4 MM MISC    Lancets 28G MISC    lisinopril (ZESTRIL) 5 mg tablet    miconazole (MONISTAT 7 SIMPLY CURE) 2 % vaginal cream    nortriptyline (PAMELOR) 50 mg capsule    NOVOLOG MIX 70/30 FLEXPEN (70-30) 100 units/mL injection pen    nystatin (MYCOSTATIN) powder    omeprazole (PriLOSEC) 20 mg delayed release capsule    ONE TOUCH ULTRA TEST test strip    oxyCODONE-acetaminophen (PERCOCET) 5-325 mg per tablet    pregabalin (LYRICA) 50 mg capsule    senna (SENOKOT) 8 6 mg     Allergies   Allergen Reactions    Gabapentin      Other reaction(s): slurring of speech, falls         Objective   First Vitals:   Blood Pressure: 114/65 (12/03/18 2114)  Pulse: 96 (12/03/18 2114)  Temperature: (!) 96 3 °F (35 7 °C) (12/03/18 2114)  Temp Source: Tympanic (12/03/18 2114)  Respirations: 20 (12/03/18 2114)  Height: 5' 3" (160 cm) (12/04/18 0113)  Weight - Scale: 81 5 kg (179 lb 10 8 oz) (12/03/18 2114)  SpO2: 95 % (12/03/18 2114)    Current Vitals:   Blood Pressure: 110/55 (12/04/18 0726)  Pulse: 98 (12/04/18 0726)  Temperature: (!) 97 1 °F (36 2 °C) (12/04/18 0726)  Temp Source: Temporal (12/04/18 0726)  Respirations: 20 (12/04/18 0726)  Height: 5' 3" (160 cm) (12/04/18 0113)  Weight - Scale: 89 1 kg (196 lb 6 9 oz) (12/04/18 0600)  SpO2: 96 % (12/04/18 0726)    /55 (BP Location: Left arm)   Pulse 98   Temp (!) 97 1 °F (36 2 °C) (Temporal)   Resp 20   Ht 5' 3" (1 6 m)   Wt 89 1 kg (196 lb 6 9 oz)   SpO2 96%   BMI 34 80 kg/m²     General Appearance:    Alert, cooperative, no distress   Head:    Normocephalic, without obvious abnormality, atraumatic   Eyes:    PERRL, conjunctiva/corneas clear, EOM's intact            Nose:   Moist mucous membranes, no drainage or sinus tenderness   Throat:   No tenderness, no exudates   Neck:   Supple, symmetrical, trachea midline, no JVD   Back:     Symmetric, no CVA tenderness   Lungs:     Clear to auscultation bilaterally, respirations unlabored   Chest wall:    No tenderness or deformity   Heart:    Regular rate and rhythm, S1 and S2 normal, no murmur, rub   or gallop   Abdomen:     Soft, non-tender, bowel sounds active all four quadrants,     no masses, no organomegaly     Extremities:   MMT is 5/5 to all compartments of the LE, +1/4 edema B/L, Digital ROM is intact,    Pulses:   R DP is +1/4, R PT is +1/4, L DP is +1/4, L PT is +1/4, CFT< 3sec to all digits  Skin:   Dorsal L hallux wound, measuring 0 2cmx0 2cmx0 2cm, no drainage, no redness, no pain, no undermining, no tunneling, no probe to bone, no malodor, no signs of infection         Neurologic:   CNII-XII intact  Normal strength, sensation and reflexes       Throughout  Gross sensation is intact  Protective sensation is diminished          Lab Results:   Admission on 12/03/2018   Component Date Value    WBC 12/03/2018 6 60     RBC 12/03/2018 3 98*    Hemoglobin 12/03/2018 12 2     Hematocrit 12/03/2018 38 1     MCV 12/03/2018 96     MCH 12/03/2018 30 6     MCHC 12/03/2018 32 0     RDW 12/03/2018 12 8     MPV 12/03/2018 11 1     Platelets 81/35/3529 199     Neutrophils Relative 12/03/2018 55     Lymphocytes Relative 12/03/2018 30     Monocytes Relative 12/03/2018 6     Eosinophils Relative 12/03/2018 8*    Basophils Relative 12/03/2018 1     Neutrophils Absolute 12/03/2018 3 60     Lymphocytes Absolute 12/03/2018 1 90     Monocytes Absolute 12/03/2018 0 40     Eosinophils Absolute 12/03/2018 0 50*    Basophils Absolute 12/03/2018 0 10     Sodium 12/03/2018 130*    Potassium 12/03/2018 4 8     Chloride 12/03/2018 93*    CO2 12/03/2018 28     ANION GAP 12/03/2018 9     BUN 12/03/2018 30*    Creatinine 12/03/2018 1 61*    Glucose 12/03/2018 919*    Calcium 12/03/2018 8 7     AST 12/03/2018 19     ALT 12/03/2018 20     Alkaline Phosphatase 12/03/2018 165*    Total Protein 12/03/2018 6 6     Albumin 12/03/2018 3 5     Total Bilirubin 12/03/2018 0 50     eGFR 12/03/2018 41*    Troponin I 12/03/2018 <0 01     Lipase 12/03/2018 123     Phosphorus 12/03/2018 3 9     Magnesium 12/03/2018 2 3     LACTIC ACID 12/03/2018 1 5     Color, UA 12/03/2018 Straw     Clarity, UA 12/03/2018 Cloudy*    Specific Gravity, UA 12/03/2018 1 010     pH, UA 12/03/2018 5 0     Leukocytes, UA 12/03/2018 500 0*    Nitrite, UA 12/03/2018 Positive*    Protein, UA 12/03/2018 100 (2+)*    Glucose, UA 12/03/2018 >=1000 (1%)*    Ketones, UA 12/03/2018 Negative     Bilirubin, UA 12/03/2018 Negative     Blood, UA 12/03/2018 25 0*    UROBILINOGEN UA 12/03/2018 Negative     Acetone, Bld 12/03/2018 Negative     pH, Vasyl 12/03/2018 7 310*    pCO2, Vasyl 12/03/2018 52 0*    pO2, Vasyl 12/03/2018 55 0     HCO3, Vasyl 12/03/2018 26 2     Base Excess, Vasyl 12/03/2018 -0 7     O2 Content, Vasyl 12/03/2018 14 0     O2 HGB, VENOUS 12/03/2018 84 8  Protime 12/03/2018 9 6     INR 12/03/2018 0 90     PTT 12/03/2018 22*    INFLUENZA A AMPLIFIED RNA 12/03/2018 Not Detected     INFLUENZA B AMPLIFIED 12/03/2018 Not Detected     RBC, UA 12/03/2018 2-4*    WBC, UA 12/03/2018 30-50*    Epithelial Cells 12/03/2018 Occasional     Bacteria, UA 12/03/2018 Innumerable*    OTHER OBSERVATIONS 12/03/2018 Yeast Cells Present     Osmolality Serum 12/03/2018 329*    POC Glucose 12/04/2018 >600*    POC Glucose 12/04/2018 >600*    POC Glucose 12/04/2018 536*    Sodium 12/04/2018 139     Potassium 12/04/2018 3 4*    Chloride 12/04/2018 107     CO2 12/04/2018 26     ANION GAP 12/04/2018 6     BUN 12/04/2018 26*    Creatinine 12/04/2018 1 44*    Glucose 12/04/2018 267*    Calcium 12/04/2018 8 4     eGFR 12/04/2018 47*    POC Glucose 12/04/2018 155*    POC Glucose 12/04/2018 55*    POC Glucose 12/04/2018 206*    POC Glucose 12/03/2018 >600*    POC Glucose 12/04/2018 345*     Imaging: I have personally reviewed pertinent films in PACS  EKG, Pathology, and Other Studies: I have personally reviewed pertinent reports        Code Status: Level 1 - Full Code  Advance Directive and Living Will:      Power of :    POLST:

## 2018-12-04 NOTE — ASSESSMENT & PLAN NOTE
Improving, presented to ED with BS in 900's, negative acetone, vpH 7 310  Will continue management with insulin and IVF

## 2018-12-05 ENCOUNTER — ANESTHESIA EVENT (OUTPATIENT)
Dept: PERIOP | Facility: HOSPITAL | Age: 53
End: 2018-12-05
Payer: COMMERCIAL

## 2018-12-05 LAB
ANION GAP SERPL CALCULATED.3IONS-SCNC: 5 MMOL/L (ref 5–14)
BUN SERPL-MCNC: 27 MG/DL (ref 5–25)
CALCIUM SERPL-MCNC: 8.8 MG/DL (ref 8.4–10.2)
CHLORIDE SERPL-SCNC: 107 MMOL/L (ref 97–108)
CO2 SERPL-SCNC: 25 MMOL/L (ref 22–30)
CREAT SERPL-MCNC: 1.47 MG/DL (ref 0.6–1.2)
GFR SERPL CREATININE-BSD FRML MDRD: 46 ML/MIN/1.73SQ M
GLUCOSE SERPL-MCNC: 178 MG/DL (ref 70–99)
GLUCOSE SERPL-MCNC: 386 MG/DL (ref 70–99)
GLUCOSE SERPL-MCNC: 414 MG/DL (ref 70–99)
GLUCOSE SERPL-MCNC: 426 MG/DL (ref 70–99)
GLUCOSE SERPL-MCNC: 460 MG/DL (ref 70–99)
GLUCOSE SERPL-MCNC: 481 MG/DL (ref 70–99)
GLUCOSE SERPL-MCNC: 483 MG/DL (ref 70–99)
GLUCOSE SERPL-MCNC: 486 MG/DL (ref 70–99)
GLUCOSE SERPL-MCNC: 569 MG/DL (ref 70–99)
GLUCOSE SERPL-MCNC: 599 MG/DL (ref 70–99)
POTASSIUM SERPL-SCNC: 4.5 MMOL/L (ref 3.6–5)
SODIUM SERPL-SCNC: 137 MMOL/L (ref 137–147)

## 2018-12-05 PROCEDURE — 99232 SBSQ HOSP IP/OBS MODERATE 35: CPT | Performed by: FAMILY MEDICINE

## 2018-12-05 PROCEDURE — 82948 REAGENT STRIP/BLOOD GLUCOSE: CPT

## 2018-12-05 PROCEDURE — 80048 BASIC METABOLIC PNL TOTAL CA: CPT | Performed by: FAMILY MEDICINE

## 2018-12-05 RX ORDER — INSULIN GLARGINE 100 [IU]/ML
40 INJECTION, SOLUTION SUBCUTANEOUS
Status: DISCONTINUED | OUTPATIENT
Start: 2018-12-05 | End: 2018-12-06 | Stop reason: HOSPADM

## 2018-12-05 RX ORDER — OFLOXACIN 3 MG/ML
1 SOLUTION/ DROPS OPHTHALMIC 3 TIMES DAILY
Status: DISCONTINUED | OUTPATIENT
Start: 2018-12-05 | End: 2018-12-06

## 2018-12-05 RX ORDER — SODIUM CHLORIDE 9 MG/ML
75 INJECTION, SOLUTION INTRAVENOUS CONTINUOUS
Status: CANCELLED | OUTPATIENT
Start: 2018-12-06

## 2018-12-05 RX ORDER — INSULIN GLARGINE 100 [IU]/ML
35 INJECTION, SOLUTION SUBCUTANEOUS
Status: DISCONTINUED | OUTPATIENT
Start: 2018-12-05 | End: 2018-12-05

## 2018-12-05 RX ORDER — INSULIN ASPART 100 [IU]/ML
20 INJECTION, SUSPENSION SUBCUTANEOUS
Status: DISCONTINUED | OUTPATIENT
Start: 2018-12-05 | End: 2018-12-06 | Stop reason: HOSPADM

## 2018-12-05 RX ADMIN — INSULIN LISPRO 6 UNITS: 100 INJECTION, SOLUTION INTRAVENOUS; SUBCUTANEOUS at 06:05

## 2018-12-05 RX ADMIN — NORTRIPTYLINE HYDROCHLORIDE 50 MG: 25 CAPSULE ORAL at 08:18

## 2018-12-05 RX ADMIN — PREGABALIN 50 MG: 50 CAPSULE ORAL at 08:17

## 2018-12-05 RX ADMIN — INSULIN LISPRO 1 UNITS: 100 INJECTION, SOLUTION INTRAVENOUS; SUBCUTANEOUS at 21:04

## 2018-12-05 RX ADMIN — ASPIRIN 81 MG: 81 TABLET, COATED ORAL at 08:17

## 2018-12-05 RX ADMIN — SENNOSIDES 8.6 MG: 8.6 TABLET, FILM COATED ORAL at 21:04

## 2018-12-05 RX ADMIN — NYSTATIN: 100000 POWDER TOPICAL at 08:18

## 2018-12-05 RX ADMIN — INSULIN ASPART 20 UNITS: 100 INJECTION, SUSPENSION SUBCUTANEOUS at 11:52

## 2018-12-05 RX ADMIN — DOCUSATE SODIUM 100 MG: 100 CAPSULE, LIQUID FILLED ORAL at 08:18

## 2018-12-05 RX ADMIN — DOCUSATE SODIUM 100 MG: 100 CAPSULE, LIQUID FILLED ORAL at 17:34

## 2018-12-05 RX ADMIN — INSULIN LISPRO 6 UNITS: 100 INJECTION, SOLUTION INTRAVENOUS; SUBCUTANEOUS at 16:12

## 2018-12-05 RX ADMIN — INSULIN GLARGINE 40 UNITS: 100 INJECTION, SOLUTION SUBCUTANEOUS at 21:04

## 2018-12-05 RX ADMIN — ATORVASTATIN CALCIUM 40 MG: 40 TABLET, FILM COATED ORAL at 16:13

## 2018-12-05 RX ADMIN — OFLOXACIN 1 DROP: 3 SOLUTION/ DROPS OPHTHALMIC at 15:35

## 2018-12-05 RX ADMIN — PANTOPRAZOLE SODIUM 20 MG: 20 TABLET, DELAYED RELEASE ORAL at 05:30

## 2018-12-05 RX ADMIN — INSULIN ASPART 20 UNITS: 100 INJECTION, SUSPENSION SUBCUTANEOUS at 16:11

## 2018-12-05 RX ADMIN — SILVER SULFADIAZINE: 10 CREAM TOPICAL at 08:18

## 2018-12-05 RX ADMIN — INSULIN LISPRO 6 UNITS: 100 INJECTION, SOLUTION INTRAVENOUS; SUBCUTANEOUS at 12:22

## 2018-12-05 RX ADMIN — NYSTATIN: 100000 POWDER TOPICAL at 17:35

## 2018-12-05 RX ADMIN — OFLOXACIN 1 DROP: 3 SOLUTION/ DROPS OPHTHALMIC at 21:03

## 2018-12-05 RX ADMIN — PREGABALIN 50 MG: 50 CAPSULE ORAL at 17:35

## 2018-12-05 NOTE — ASSESSMENT & PLAN NOTE
Lab Results   Component Value Date    HGBA1C 9 4 (H) 08/13/2018       Recent Labs      12/05/18   0555  12/05/18   0556  12/05/18   0810  12/05/18   0812   POCGLU  460*  486*  481*  426*       Blood Sugar Average: Last 72 hrs:  (P) 081 [id number]     Evaluated by podiatry, she is to follow with them as outpatient

## 2018-12-05 NOTE — SEPSIS NOTE
Sepsis Note   Mary Lou Perkins 52 y o  female MRN: [id number]  Unit/Bed#: 5T -01 Encounter: [id number]            Initial Sepsis Screening     Row Name 12/04/18 7949                Is the patient's history suggestive of a new or worsening infection? (!)  Yes (Proceed)  -SG        Suspected source of infection urinary tract infection  -SG        Are two or more of the following signs & symptoms of infection both present and new to the patient? (!)  Yes (Proceed)  -SG        Indicate SIRS criteria Tachycardia > 90 bpm;Tachypnea > 20 resp per min  -SG        If the answer is yes to both questions, suspicion of sepsis is present  --        If severe sepsis is present AND tissue hypoperfusion perists in the hour after fluid resuscitation or lactate > 4, the patient meets criteria for SEPTIC SHOCK  --        Are any of the following organ dysfunction criteria present within 6 hours of suspected infection and SIRS criteria that are NOT considered to be chronic conditions?  No  -SG        Organ dysfunction  --        Date of presentation of severe sepsis  --        Time of presentation of severe sepsis  --        Tissue hypoperfusion persists in the hour after crystalloid fluid administration, evidenced, by either:  --        Was hypotension present within one hour of the conclusion of crystalloid fluid administration?  --        Date of presentation of septic shock  --        Time of presentation of septic shock  --          User Key  (r) = Recorded By, (t) = Taken By, (c) = Cosigned By    [address] Name Provider Type    FAWN Fernandes,  Physician

## 2018-12-05 NOTE — ASSESSMENT & PLAN NOTE
Lab Results   Component Value Date    HGBA1C 9 4 (H) 08/13/2018       Recent Labs      12/05/18   0555  12/05/18   0556  12/05/18   0810  12/05/18   0812   POCGLU  460*  486*  481*  426*       Blood Sugar Average: Last 72 hrs:  (P) 284 [id number]   Improved, continue lantus 40 qhs and 70/30 mix 20 units bid

## 2018-12-05 NOTE — ASSESSMENT & PLAN NOTE
Improved on 40 units QHS of lantus and 70/30 20 units bid  Will continue current regimen and she will follow within the week with PCP

## 2018-12-05 NOTE — PROGRESS NOTES
Progress Note    Radha Vance 52 y o  female MRN: [id number]  Unit/Bed#: 5T -01 Encounter: [id number]  Admitting Physician: Camilo Saldana MD  PCP: Camilo Saldana MD  Date of Admission:  12/3/2018  9:11 PM    Assessment and Plan    * Hyperglycemia   Assessment & Plan    Elevated again today to 599, basal insulin continues at 40units qhs  Restarted on home dose of 70/30 mix 20 units bid  Will follow BS overnight into the am         Type 2 diabetes mellitus with kidney complication, with long-term current use of insulin [facility])   Assessment & Plan    Lab Results   Component Value Date    HGBA1C 9 4 (H) 08/13/2018       Recent Labs      12/05/18   0555  12/05/18   0556  12/05/18   0810  12/05/18   0812   POCGLU  460*  486*  481*  426*       Blood Sugar Average: Last 72 hrs:  (P) 135 [id number]   BS remain in 400's, continue lantus 40 qhs and restart 70/30 mix 20 units bid     Diabetic ulcer of toe of left foot associated with type 2 diabetes mellitus, with fat layer exposed [facility])   Assessment & Plan    Lab Results   Component Value Date    HGBA1C 9 4 (H) 08/13/2018       Recent Labs      12/05/18   0555  12/05/18   0556  12/05/18   0810  12/05/18   0812   POCGLU  460*  486*  481*  426*       Blood Sugar Average: Last 72 hrs:  (P) 284 [id number]     Evaluated by podiatry, she is to follow with them as outpatient  Neurogenic bladder   Assessment & Plan    Suprapubic catheter exchanged on 12/4/2018  HTN (hypertension)   Assessment & Plan    Controlled at this time, will continue home dose of lisinopril  Chronic kidney disease   Assessment & Plan    Stable at this time, will avoid nephrotoxic medications  VTE Pharmacologic Prophylaxis:   Pharmacologic: SCD's  Mechanical VTE Prophylaxis in Place: Yes    Patient Centered Rounds: I have performed bedside rounds with nursing staff today      Discussions with Specialists or Other Care Team Provider: Dr Basil Gillespie    Education and Discussions with Family / Patient: Daughter and patient    Time Spent for Care: 30 minutes  More than 50% of total time spent on counseling and coordination of care as described above  Current Length of Stay: 1 day(s)    Current Patient Status: Inpatient   Certification Statement: The patient will continue to require additional inpatient hospital stay due to continued hyperglycemia    Discharge Plan: Tomorrow if blood sugars are under better control    Code Status: Level 1 - Full Code      Subjective:   Plan was to discharge patient today but her BS jumped to 599, will restart on home dose of 70/30 as well  Discussed cataract surgery with Dr Judith Stewart, he is ok to continue with the surgery tomorrow  Objective:     Vitals:   Temp (24hrs), Av 4 °F (36 3 °C), Min:96 5 °F (35 8 °C), Max:99 °F (37 2 °C)    Temp:  [96 5 °F (35 8 °C)-99 °F (37 2 °C)] 96 5 °F (35 8 °C)  HR:  [84-94] 94  Resp:  [18-20] 18  BP: (117-139)/(69-78) 139/76  SpO2:  [95 %-99 %] 98 %  Body mass index is 31 91 kg/m²  Input and Output Summary (last 24 hours): Intake/Output Summary (Last 24 hours) at 18 1652  Last data filed at 18 1300   Gross per 24 hour   Intake              960 ml   Output             1450 ml   Net             -490 ml       Physical Exam:     Physical Exam   Constitutional: She is oriented to person, place, and time  She appears well-developed and well-nourished  HENT:   Head: Normocephalic and atraumatic  Right Ear: External ear normal    Left Ear: External ear normal    Eyes: Pupils are equal, round, and reactive to light  EOM are normal  Right eye exhibits no discharge  Left eye exhibits no discharge  Neck: Normal range of motion  Neck supple  Cardiovascular: Normal rate, regular rhythm, normal heart sounds and intact distal pulses  Pulmonary/Chest: Effort normal and breath sounds normal  No respiratory distress  Abdominal: Soft  Bowel sounds are normal  She exhibits no distension   There is no tenderness  Suprapubic c/d/i   Musculoskeletal: Normal range of motion  She exhibits no edema  Lymphadenopathy:     She has no cervical adenopathy  Neurological: She is alert and oriented to person, place, and time  Skin: Skin is warm and dry  Left toe foot ulcer stable, no change, non malodorous, no discharge appreciated  Psychiatric: She has a normal mood and affect  Vitals reviewed  Additional Data:     Labs:      Results from last 7 days  Lab Units 12/03/18  2237   WBC Thousand/uL 6 60   HEMOGLOBIN g/dL 12 2   HEMATOCRIT % 38 1   PLATELETS Thousands/uL 199   NEUTROS PCT % 55   LYMPHS PCT % 30   MONOS PCT % 6   EOS PCT % 8*       Results from last 7 days  Lab Units 12/05/18  0449  12/03/18  2237   POTASSIUM mmol/L 4 5  < > 4 8   CHLORIDE mmol/L 107  < > 93*   CO2 mmol/L 25  < > 28   BUN mg/dL 27*  < > 30*   CREATININE mg/dL 1 47*  < > 1 61*   CALCIUM mg/dL 8 8  < > 8 7   ALK PHOS U/L  --   --  165*   ALT U/L  --   --  20   AST U/L  --   --  19   < > = values in this interval not displayed  Results from last 7 days  Lab Units 12/03/18  2237   INR  0 90       Results from last 7 days  Lab Units 12/05/18  1634 12/05/18  1141 12/05/18  1137 12/05/18  0812 12/05/18  0810 12/05/18  0556 12/05/18  0555 12/04/18  2057 12/04/18  1559 12/04/18  1136 12/04/18  0908 12/04/18  0739   POC GLUCOSE mg/dl 386* 569* 599* 426* 481* 486* 460* 380* 458* 345* 206* 55*             * I Have Reviewed All Lab Data Listed Above  * Additional Pertinent Lab Tests Reviewed:  [address] [facility] Admission Reviewed    Imaging:    Imaging Reports Reviewed Today Include: None  Imaging Personally Reviewed by Myself Includes:  None    Recent Cultures (last 7 days):       Results from last 7 days  Lab Units 12/03/18  2238 12/03/18  2237 12/03/18  2213   BLOOD CULTURE  No Growth at 24 hrs   --   --    Thomos Seats STAIN RESULT   --  Gram positive cocci in clusters  --    URINE CULTURE   --   --  >100,000 cfu/ml Escherichia coli*       Last 24 Hours Medication List:     Current Facility-Administered Medications:  aspirin 81 mg Oral Daily Bilal A Rozanne Spatz, MD   atorvastatin 40 mg Oral Daily With Ford Chowdary MD   docusate sodium 100 mg Oral BID Bilal A Rozanne Spatz, MD   insulin aspart protamine-insulin aspart 20 Units Subcutaneous BID AC Viola Tom MD   insulin glargine 40 Units Subcutaneous HS Viola Day, MD   insulin lispro 1-6 Units Subcutaneous TID AC Joellen Chowdary MD   insulin lispro 1-6 Units Subcutaneous HS Bilal A Rozanne Spatz, MD   nicotine 1 patch Transdermal Daily Bilal A Rozanne Spatz, MD   nortriptyline 50 mg Oral Daily Joellen Chowdary MD   nystatin  Topical BID Joellen Chowdary MD   ofloxacin 1 drop Right Eye TID Viola Day, MD   ondansetron 4 mg Intravenous Q6H PRN Joellen Chowdary MD   oxyCODONE-acetaminophen 1 tablet Oral Q8H PRN Joellen Chowdary MD   pantoprazole 20 mg Oral Early Morning Joellen Chowdary MD   pregabalin 50 mg Oral BID Joellen Chowdary MD   senna 1 tablet Oral HS Bilal A Rozanne Spatz, MD   silver sulfadiazine  Topical Daily Melany Peters DPM        ** Please Note: Dictation voice to text software may have been used in the creation of this document   David Tom MD  12/05/18  4:52 PM

## 2018-12-06 ENCOUNTER — ANESTHESIA (OUTPATIENT)
Dept: PERIOP | Facility: HOSPITAL | Age: 53
End: 2018-12-06
Payer: COMMERCIAL

## 2018-12-06 ENCOUNTER — HOSPITAL ENCOUNTER (OUTPATIENT)
Facility: HOSPITAL | Age: 53
Setting detail: OUTPATIENT SURGERY
Discharge: HOME | End: 2018-12-06
Attending: OPHTHALMOLOGY | Admitting: OPHTHALMOLOGY
Payer: COMMERCIAL

## 2018-12-06 ENCOUNTER — TRANSITIONAL CARE MANAGEMENT (OUTPATIENT)
Dept: FAMILY MEDICINE CLINIC | Facility: CLINIC | Age: 53
End: 2018-12-06

## 2018-12-06 VITALS
HEART RATE: 93 BPM | HEIGHT: 63 IN | WEIGHT: 182 LBS | RESPIRATION RATE: 18 BRPM | OXYGEN SATURATION: 97 % | SYSTOLIC BLOOD PRESSURE: 135 MMHG | TEMPERATURE: 96.6 F | BODY MASS INDEX: 32.25 KG/M2 | DIASTOLIC BLOOD PRESSURE: 83 MMHG

## 2018-12-06 VITALS
RESPIRATION RATE: 18 BRPM | DIASTOLIC BLOOD PRESSURE: 86 MMHG | SYSTOLIC BLOOD PRESSURE: 144 MMHG | OXYGEN SATURATION: 99 % | HEIGHT: 63 IN | TEMPERATURE: 98.1 F | HEART RATE: 84 BPM | WEIGHT: 182.98 LBS | BODY MASS INDEX: 32.42 KG/M2

## 2018-12-06 LAB
ANION GAP SERPL CALCULATED.3IONS-SCNC: 3 MMOL/L (ref 5–14)
ATRIAL RATE: 97 BPM
BACTERIA UR CULT: ABNORMAL
BUN SERPL-MCNC: 31 MG/DL (ref 5–25)
CALCIUM SERPL-MCNC: 8.9 MG/DL (ref 8.4–10.2)
CHLORIDE SERPL-SCNC: 105 MMOL/L (ref 97–108)
CO2 SERPL-SCNC: 28 MMOL/L (ref 22–30)
CREAT SERPL-MCNC: 1.26 MG/DL (ref 0.6–1.2)
GFR SERPL CREATININE-BSD FRML MDRD: 55 ML/MIN/1.73SQ M
GLUCOSE SERPL-MCNC: 206 MG/DL (ref 70–99)
GLUCOSE SERPL-MCNC: 256 MG/DL (ref 70–99)
GLUCOSE SERPL-MCNC: 270 MG/DL (ref 70–99)
P AXIS: 63 DEGREES
POTASSIUM SERPL-SCNC: 4.1 MMOL/L (ref 3.6–5)
PR INTERVAL: 162 MS
QRS AXIS: 11 DEGREES
QRSD INTERVAL: 92 MS
QT INTERVAL: 382 MS
QTC INTERVAL: 485 MS
SODIUM SERPL-SCNC: 136 MMOL/L (ref 137–147)
T WAVE AXIS: 58 DEGREES
VENTRICULAR RATE: 97 BPM

## 2018-12-06 PROCEDURE — 82948 REAGENT STRIP/BLOOD GLUCOSE: CPT

## 2018-12-06 PROCEDURE — 93010 ELECTROCARDIOGRAM REPORT: CPT | Performed by: INTERNAL MEDICINE

## 2018-12-06 PROCEDURE — V2632 POST CHMBR INTRAOCULAR LENS: HCPCS | Performed by: OPHTHALMOLOGY

## 2018-12-06 PROCEDURE — 99238 HOSP IP/OBS DSCHRG MGMT 30/<: CPT | Performed by: FAMILY MEDICINE

## 2018-12-06 PROCEDURE — 80048 BASIC METABOLIC PNL TOTAL CA: CPT | Performed by: FAMILY MEDICINE

## 2018-12-06 DEVICE — IOL SN60WF 19.5: Type: IMPLANTABLE DEVICE | Site: POSTERIOR CHAMBER | Status: FUNCTIONAL

## 2018-12-06 RX ORDER — ERGOCALCIFEROL 1.25 MG/1
50000 CAPSULE ORAL WEEKLY
Status: DISCONTINUED | OUTPATIENT
Start: 2018-12-06 | End: 2018-12-06 | Stop reason: HOSPADM

## 2018-12-06 RX ORDER — MAGNESIUM HYDROXIDE 1200 MG/15ML
LIQUID ORAL AS NEEDED
Status: DISCONTINUED | OUTPATIENT
Start: 2018-12-06 | End: 2018-12-06 | Stop reason: HOSPADM

## 2018-12-06 RX ORDER — MIDAZOLAM HYDROCHLORIDE 1 MG/ML
INJECTION INTRAMUSCULAR; INTRAVENOUS AS NEEDED
Status: DISCONTINUED | OUTPATIENT
Start: 2018-12-06 | End: 2018-12-06 | Stop reason: SURG

## 2018-12-06 RX ORDER — PROPOFOL 10 MG/ML
INJECTION, EMULSION INTRAVENOUS AS NEEDED
Status: DISCONTINUED | OUTPATIENT
Start: 2018-12-06 | End: 2018-12-06 | Stop reason: SURG

## 2018-12-06 RX ORDER — TROPICAMIDE 10 MG/ML
1 SOLUTION/ DROPS OPHTHALMIC
Status: ACTIVE | OUTPATIENT
Start: 2018-12-06 | End: 2018-12-06

## 2018-12-06 RX ORDER — FENTANYL CITRATE/PF 50 MCG/ML
25 SYRINGE (ML) INJECTION
Status: DISCONTINUED | OUTPATIENT
Start: 2018-12-06 | End: 2018-12-06 | Stop reason: HOSPADM

## 2018-12-06 RX ORDER — SODIUM CHLORIDE 9 MG/ML
75 INJECTION, SOLUTION INTRAVENOUS CONTINUOUS
Status: DISCONTINUED | OUTPATIENT
Start: 2018-12-06 | End: 2018-12-06 | Stop reason: HOSPADM

## 2018-12-06 RX ORDER — DEXAMETHASONE SODIUM PHOSPHATE 4 MG/ML
4 INJECTION, SOLUTION INTRA-ARTICULAR; INTRALESIONAL; INTRAMUSCULAR; INTRAVENOUS; SOFT TISSUE ONCE AS NEEDED
Status: DISCONTINUED | OUTPATIENT
Start: 2018-12-06 | End: 2018-12-06 | Stop reason: HOSPADM

## 2018-12-06 RX ORDER — BALANCED SALT SOLUTION 6.4; .75; .48; .3; 3.9; 1.7 MG/ML; MG/ML; MG/ML; MG/ML; MG/ML; MG/ML
SOLUTION OPHTHALMIC AS NEEDED
Status: DISCONTINUED | OUTPATIENT
Start: 2018-12-06 | End: 2018-12-06 | Stop reason: HOSPADM

## 2018-12-06 RX ORDER — TROPICAMIDE 10 MG/ML
1 SOLUTION/ DROPS OPHTHALMIC
Status: COMPLETED | OUTPATIENT
Start: 2018-12-06 | End: 2018-12-06

## 2018-12-06 RX ORDER — ACETAMINOPHEN 325 MG/1
650 TABLET ORAL EVERY 4 HOURS PRN
Status: DISCONTINUED | OUTPATIENT
Start: 2018-12-06 | End: 2018-12-06 | Stop reason: HOSPADM

## 2018-12-06 RX ORDER — LIDOCAINE HYDROCHLORIDE 10 MG/ML
INJECTION, SOLUTION EPIDURAL; INFILTRATION; INTRACAUDAL; PERINEURAL AS NEEDED
Status: DISCONTINUED | OUTPATIENT
Start: 2018-12-06 | End: 2018-12-06 | Stop reason: HOSPADM

## 2018-12-06 RX ORDER — PROPOFOL 10 MG/ML
INJECTION, EMULSION INTRAVENOUS CONTINUOUS PRN
Status: DISCONTINUED | OUTPATIENT
Start: 2018-12-06 | End: 2018-12-06 | Stop reason: SURG

## 2018-12-06 RX ORDER — NEOMYCIN SULFATE, POLYMYXIN B SULFATE, AND DEXAMETHASONE 3.5; 10000; 1 MG/G; [USP'U]/G; MG/G
OINTMENT OPHTHALMIC AS NEEDED
Status: DISCONTINUED | OUTPATIENT
Start: 2018-12-06 | End: 2018-12-06 | Stop reason: HOSPADM

## 2018-12-06 RX ORDER — PHENYLEPHRINE HCL 2.5 %
1 DROPS OPHTHALMIC (EYE)
Status: ACTIVE | OUTPATIENT
Start: 2018-12-06 | End: 2018-12-06

## 2018-12-06 RX ORDER — FENTANYL CITRATE 50 UG/ML
INJECTION, SOLUTION INTRAMUSCULAR; INTRAVENOUS AS NEEDED
Status: DISCONTINUED | OUTPATIENT
Start: 2018-12-06 | End: 2018-12-06 | Stop reason: SURG

## 2018-12-06 RX ORDER — DIPHENHYDRAMINE HYDROCHLORIDE 50 MG/ML
12.5 INJECTION INTRAMUSCULAR; INTRAVENOUS ONCE
Status: DISCONTINUED | OUTPATIENT
Start: 2018-12-06 | End: 2018-12-06 | Stop reason: HOSPADM

## 2018-12-06 RX ORDER — ONDANSETRON 2 MG/ML
INJECTION INTRAMUSCULAR; INTRAVENOUS AS NEEDED
Status: DISCONTINUED | OUTPATIENT
Start: 2018-12-06 | End: 2018-12-06 | Stop reason: SURG

## 2018-12-06 RX ORDER — PROPARACAINE HYDROCHLORIDE 5 MG/ML
SOLUTION/ DROPS OPHTHALMIC AS NEEDED
Status: DISCONTINUED | OUTPATIENT
Start: 2018-12-06 | End: 2018-12-06 | Stop reason: HOSPADM

## 2018-12-06 RX ORDER — PROPARACAINE HYDROCHLORIDE 5 MG/ML
1 SOLUTION/ DROPS OPHTHALMIC
Status: DISPENSED | OUTPATIENT
Start: 2018-12-06 | End: 2018-12-06

## 2018-12-06 RX ORDER — TETRACAINE HYDROCHLORIDE 5 MG/ML
1 SOLUTION OPHTHALMIC ONCE
Status: COMPLETED | OUTPATIENT
Start: 2018-12-06 | End: 2018-12-06

## 2018-12-06 RX ORDER — PHENYLEPHRINE HCL 2.5 %
1 DROPS OPHTHALMIC (EYE)
Status: COMPLETED | OUTPATIENT
Start: 2018-12-06 | End: 2018-12-06

## 2018-12-06 RX ORDER — LIDOCAINE HYDROCHLORIDE 10 MG/ML
INJECTION, SOLUTION INFILTRATION; PERINEURAL AS NEEDED
Status: DISCONTINUED | OUTPATIENT
Start: 2018-12-06 | End: 2018-12-06 | Stop reason: SURG

## 2018-12-06 RX ORDER — FENTANYL CITRATE/PF 50 MCG/ML
12.5 SYRINGE (ML) INJECTION
Status: DISCONTINUED | OUTPATIENT
Start: 2018-12-06 | End: 2018-12-06 | Stop reason: HOSPADM

## 2018-12-06 RX ORDER — LIDOCAINE HYDROCHLORIDE 20 MG/ML
JELLY TOPICAL AS NEEDED
Status: DISCONTINUED | OUTPATIENT
Start: 2018-12-06 | End: 2018-12-06 | Stop reason: HOSPADM

## 2018-12-06 RX ADMIN — SILVER SULFADIAZINE: 10 CREAM TOPICAL at 08:09

## 2018-12-06 RX ADMIN — TROPICAMIDE 1 DROP: 10 SOLUTION/ DROPS OPHTHALMIC at 11:05

## 2018-12-06 RX ADMIN — PHENYLEPHRINE HYDROCHLORIDE 1 DROP: 25 SOLUTION/ DROPS OPHTHALMIC at 11:10

## 2018-12-06 RX ADMIN — PHENYLEPHRINE HYDROCHLORIDE 1 DROP: 25 SOLUTION/ DROPS OPHTHALMIC at 11:05

## 2018-12-06 RX ADMIN — PROPOFOL 50 MG: 10 INJECTION, EMULSION INTRAVENOUS at 11:38

## 2018-12-06 RX ADMIN — FENTANYL CITRATE 50 MCG: 50 INJECTION INTRAMUSCULAR; INTRAVENOUS at 11:44

## 2018-12-06 RX ADMIN — PROPOFOL 100 MCG/KG/MIN: 10 INJECTION, EMULSION INTRAVENOUS at 11:38

## 2018-12-06 RX ADMIN — TETRACAINE HYDROCHLORIDE 1 DROP: 5 SOLUTION OPHTHALMIC at 11:00

## 2018-12-06 RX ADMIN — FENTANYL CITRATE 25 MCG: 50 INJECTION INTRAMUSCULAR; INTRAVENOUS at 13:04

## 2018-12-06 RX ADMIN — ONDANSETRON HYDROCHLORIDE 4 MG: 2 INJECTION, SOLUTION INTRAMUSCULAR; INTRAVENOUS at 12:02

## 2018-12-06 RX ADMIN — MIDAZOLAM HYDROCHLORIDE 2 MG: 1 INJECTION, SOLUTION INTRAMUSCULAR; INTRAVENOUS at 11:33

## 2018-12-06 RX ADMIN — FENTANYL CITRATE 50 MCG: 50 INJECTION INTRAMUSCULAR; INTRAVENOUS at 11:33

## 2018-12-06 RX ADMIN — INSULIN LISPRO 4 UNITS: 100 INJECTION, SOLUTION INTRAVENOUS; SUBCUTANEOUS at 06:05

## 2018-12-06 RX ADMIN — OFLOXACIN 1 DROP: 3 SOLUTION/ DROPS OPHTHALMIC at 08:12

## 2018-12-06 RX ADMIN — ACETAMINOPHEN 650 MG: 325 TABLET ORAL at 13:30

## 2018-12-06 RX ADMIN — SODIUM CHLORIDE 75 ML/HR: 9 INJECTION, SOLUTION INTRAVENOUS at 11:26

## 2018-12-06 RX ADMIN — NYSTATIN: 100000 POWDER TOPICAL at 08:19

## 2018-12-06 RX ADMIN — PHENYLEPHRINE HYDROCHLORIDE 1 DROP: 25 SOLUTION/ DROPS OPHTHALMIC at 11:00

## 2018-12-06 RX ADMIN — LIDOCAINE HYDROCHLORIDE 50 MG: 10 INJECTION, SOLUTION INFILTRATION; PERINEURAL at 11:38

## 2018-12-06 RX ADMIN — PROPOFOL 150 MG: 10 INJECTION, EMULSION INTRAVENOUS at 11:55

## 2018-12-06 RX ADMIN — TROPICAMIDE 1 DROP: 10 SOLUTION/ DROPS OPHTHALMIC at 11:00

## 2018-12-06 RX ADMIN — TROPICAMIDE 1 DROP: 10 SOLUTION/ DROPS OPHTHALMIC at 11:10

## 2018-12-06 RX ADMIN — DEXAMETHASONE SODIUM PHOSPHATE 4 MG: 10 INJECTION INTRAMUSCULAR; INTRAVENOUS at 12:01

## 2018-12-06 RX ADMIN — PANTOPRAZOLE SODIUM 20 MG: 20 TABLET, DELAYED RELEASE ORAL at 05:00

## 2018-12-06 RX ADMIN — PROPOFOL 50 MG: 10 INJECTION, EMULSION INTRAVENOUS at 11:49

## 2018-12-06 NOTE — ADDENDUM NOTE
Addendum  created 12/06/18 1236 by Zulma Falcon, ADAM    Anesthesia Intra LDAs edited, LDA properties accepted

## 2018-12-06 NOTE — NURSING NOTE
AOX3 HR regula Lungs clear + Bowel sounds x4 + PP ABD soft  Denies any pain  R great toe with dressing dry intact  Will continue to monitor call bell within reach 
AOX3 HR regular Lungs clear + bowel sounds x4 + PP ABD soft  R great toe with dressing dry intact  Denies any pain  Will continue to monitor call bell within reach 
Assessment unchanged  Denies any pain  PT resting comfortable no distress noted  Will continue to monitor call bell within reach 
Assessment unchanged  Denies any pain  PT resting comfortable no distress noted  Will continue to monitor call bell within reach 
Discharge Note  Patient leaves at this time will go to out patient register for her procedure  Left in stable condition, afebrile with all personal items after verbalizing understanding of discharge instructions  Right IJ remain intact as per [facility] patient has poor veins they will use it then remove it  Left via wheelchair with volunteer and daughter 
On initial rounds patient in no apparent distress  Skin warm and dry to touch  DR Emelyn Galeana aware blood sugar was 55 mg/dl patient called and reported that her sugar is low and it was taken, she was given orange juice and crackers and felt better and a little while later breakfast came and her retake sugar was 206 mg/dl  Suparpubic ann in place with a leg bag patient refused a bigger bag  Also refused Nicotine patch  All safety maintained  Will continue to monitor 
On initial rounds patient in no apparent distress  Skin warm and dry to touch  Denies pain but verbalizes understanding of pain scale 0-10  Ambulates well by self  Suprapubic Holliday in place with adequate amount of urine  Tolerating diet  No s/s of hypo/hyperglycemic reaction noted, none voiced  All safety maintained  Will continue to monitor 
On initial rounds patient in no apparent distress  Skin warm and dry to touch  NPO for cataract surgery today and understanding verbalized  All safety maintained  Will continue to monitor 
PT arrived on floor @ 0130 from ER AOX3 HR regular Lungs clear + bowel sounds x4 + PP ABD soft  C/O pain Percocet was given and was effective  Holliday to leg bag with tan urine sediments with odor  Will continue to monitor call bell within reach 
Patient remain in stable condition  No change in condition from this am  All safety maintained  Will continue to monitor 
Patient remain in stable condition  No change in condition from this am  No s/s of hypo/hyperglycemic reaction noted none voiced  Eye drops started at this time for cataract surgery tomorrow  All safety maintained  Will continue to monitor 
Detail Level: Generalized
Include Location In Plan?: No
Detail Level: Simple

## 2018-12-06 NOTE — DISCHARGE SUMMARY
Discharge Summary - Fercho Godoy    Patient Information: sIrael Yung 52 y o  female MRN: [id number]  Unit/Bed#: 5T -01 Encounter: [id number]    Discharging Physician / Practitioner: Fransisca Spears MD  PCP: Fransisca Spears MD  Admission Date:   Admission Orders     Ordered        12/04/18 0015  Inpatient Admission (expected length of stay for this patient is greater than two midnights)  Once             Discharge Date: 12/07/18    Reason for Admission: Hyperglycemia    Discharge Diagnoses:     Principal Problem (Resolved): Hyperglycemia  Active Problems:    Type 2 diabetes mellitus with kidney complication, with long-term current use of insulin (HCC)    Chronic kidney disease    HTN (hypertension)    Neurogenic bladder    Non-compliance    Diabetic ulcer of toe of left foot associated with type 2 diabetes mellitus, with fat layer exposed ([facility] [address] )    ESBL Escherichia coli carrier      Consultations During Hospital Stay:  · Podiatry    Procedures Performed:   · Suprapubic catheter exchange    Significant Findings / Test Results:   · Hyperglycemia    Incidental Findings:   · Diabetic foot ulcer of left great toe    Test Results Pending at Discharge (will require follow up): · None     Outpatient Tests Requested:  · None    Complications:  Hyperglycemia    Hospital Course:     Israel Yung is a 52 y o  female patient who originally presented to the hospital on 12/3/2018 due to hyperglycemia  Her home regimen was titrated up until her blood sugars improved and she was discharged on lantus 40 units QHS and 70/30 20 units BID      Condition at Discharge: stable     Discharge Day Visit / Exam:     Vitals: Blood Pressure: 144/86 (12/06/18 0746)  Pulse: 84 (12/06/18 0746)  Temperature: 98 1 °F (36 7 °C) (12/06/18 0746)  Temp Source: Temporal (12/06/18 0746)  Respirations: 18 (12/06/18 0746)  Height: 5' 3" (160 cm) (12/05/18 1017)  Weight - Scale: 83 kg (182 lb 15 7 oz) (12/06/18 0600)  SpO2: 99 % (12/06/18 7945)  Exam:   Physical Exam   Constitutional: She is oriented to person, place, and time  She appears well-developed and well-nourished  HENT:   Head: Normocephalic and atraumatic  Right Ear: External ear normal    Left Ear: External ear normal    Eyes: Pupils are equal, round, and reactive to light  Neck: Normal range of motion  Neck supple  Cardiovascular: Normal rate, regular rhythm, normal heart sounds and intact distal pulses  Pulmonary/Chest: Effort normal and breath sounds normal  No respiratory distress  Abdominal: Soft  Bowel sounds are normal  She exhibits no distension  There is no tenderness  Suprapubic catheter site c/d/i   Musculoskeletal: Normal range of motion  She exhibits no edema  Lymphadenopathy:     She has no cervical adenopathy  Neurological: She is alert and oriented to person, place, and time  Skin: Skin is warm and dry  Psychiatric: She has a normal mood and affect  Vitals reviewed  Discussion with Family: Daughter    Discharge instructions/Information to patient and family:   See after visit summary for information provided to patient and family        Discharge Medications:  aspirin 81 mg EC tablet   Commonly known as: ECOTRIN LOW STRENGTH   Take 1 tablet (81 mg total) by mouth daily   Refills: 0            atorvastatin 40 mg tablet   Commonly known as: LIPITOR   Take 1 tablet (40 mg total) by mouth daily for 180 days   Refills: 0           docusate sodium 100 mg capsule   Commonly known as: COLACE   Take 1 capsule (100 mg total) by mouth 2 (two) times a day   Refills: 0           ergocalciferol 50,000 units   Commonly known as: VITAMIN D2   TAKE ONE CAPSULE BY MOUTH ONCE WEEKLY   Refills: 0           insulin glargine 100 units/mL injection pen   Commonly known as: BASAGLAR KWIKPEN   Inject 40 Units under the skin daily at bedtime for 90 days   Refills: 0   What changed:   0 how much to take   0 when to take this          lisinopril 5 mg tablet   Commonly known as: ZESTRIL   TAKE ONE TABLET BY MOUTH EVERY DAY   Refills: 0           MONISTAT 7 SIMPLY CURE 2 % vaginal cream   Generic drug: miconazole   every 24 hours   Refills: 0           nortriptyline 50 mg capsule   Commonly known as: PAMELOR   TAKE ONE CAPSULE BY MOUTH EVERY DAY   Refills: 0           NOVOLOG MIX 70/30 FLEXPEN 100 Units/mL injection pen   Generic drug: insulin aspart protamine-insulin aspart   Inject 20 Units under the skin 2 (two) times a day before meals   Refills: 0             nystatin powder   Commonly known as: MYCOSTATIN   Every 12 hours   Refills: 0            omeprazole 20 mg delayed release capsule   Commonly known as: PriLOSEC   TAKE ONE CAPSULE BY MOUTH EVERY DAY BEFORE BREAKFAST   Refills: 0           ONE TOUCH ULTRA TEST test strip   Generic drug: glucose blood   100 strips by Device route 3 (three) times a day   Refills: 0   What changed: Another medication with the same name was removed  Continue taking this medication, and follow the directions you see here  pregabalin 50 mg capsule   Commonly known as: LYRICA   Take 1 capsule (50 mg total) by mouth 2 (two) times a day   Refills: 3            senna 8 6 mg   Commonly known as: SENOKOT   Take 1 tablet (8 6 mg total) by mouth daily at bedtime   Refills: 0           silver sulfadiazine 1 % cream   Commonly known as: SILVADENE,SSD   Apply topically daily   Refills: 0                Provisions for Follow-Up Care:  See after visit summary for information related to follow-up care and any pertinent home health orders  Disposition:     Home    For Discharges to [facility] SNF:   · Not Applicable to this Patient - Not Applicable to this Patient    Planned Readmission: None     Discharge Statement:  I spent 40 minutes discharging the patient  This time was spent on the day of discharge  I had direct contact with the patient on the day of discharge   Greater than 50% of the total time was spent examining patient, answering all patient questions, arranging and discussing plan of care with patient as well as directly providing post-discharge instructions  Additional time then spent on discharge activities  Discharge Medications:  See after visit summary for reconciled discharge medications provided to patient and family        ** Please Note: This note has been constructed using a voice recognition system **    George Tom MD  12/07/18  10:28 AM

## 2018-12-06 NOTE — OP NOTE
OPERATIVE REPORT  PATIENT NAME: Dominick Major    :  1965  MRN: [id number]  Pt Location:  OR ROOM 01    SURGERY DATE: 2018    Surgeon(s) and Role:     * Peter Marte MD - Primary    Preop Diagnosis:  Age-related nuclear cataract of right eye [H25 11]  Other visual disturbances [H53 8]    Post-Op Diagnosis Codes:     * Age-related nuclear cataract of right eye [H25 11]     * Other visual disturbances [H53 8]    Procedure(s) (LRB):  EXTRACAPSULAR CATARACT REMOVAL/INSERTION OF INTRAOCULAR LENS (Right)    Specimen(s):  * No specimens in log *    Estimated Blood Loss:   Minimal    Drains:  Suprapubic Catheter (Active)   Output (mL) 400 mL 2018  5:00 AM   Number of days: 196       Anesthesia Type:   IV Sedation with Anesthesia    Operative Indications:  Age-related nuclear cataract of right eye [H25 11]  Other visual disturbances [W62 5]    Complications:   None    Procedure and Technique:   The patient wasprepped and draped in the usual fashion Betadine solution was used to sterilize the conjunctival sac the entire procedure was done under the operating microscope positioned temporal to the patient  A Barraquer lid speculum was inserted a paracentesis was done about 30° to the left of the corneal incision using a microsurgical knife   Provisc was then injected into the anterior chamber  A 3 mm clear corneal incision was made with the angled phaco knife  A continuous capsulotomy was performed  Hydrodissection was done and the nucleus rotated  The Legacy phacoemulsification unit was used to remove the nucleus  Irrigation/ aspiration of cortical material was then performed leaving an intact posterior capsule  Viscoat was injected into the capsular bag  An acrylic foldable posterior chamber intraocular lens was inserted using a injector   Good centration of the intraocular lens was observed  Miochol was injected into the anterior chamber to induce miosis    Removal of the viscoelastic was done using the irrigation aspiration tip of the phaco unit  After that we made sure that the wound was self sealed  The Barraquer speculum was removed and antibiotic ointment was applied and a plastic shield was placed  in the operated eye  The patient tolerated procedure well and returned to the Recovery recovery room in good condition  The lens inserted has a power of 19 5 diopters,model [id number]  Company was [email or web address]    Patient Disposition:  PACU     SIGNATURE: Jatinder Villegas MD  DATE: December 6, 2018  TIME: 12:32 PM

## 2018-12-06 NOTE — DISCHARGE INSTRUCTIONS
Cataract Extraction   WHAT YOU NEED TO KNOW:   Cataract extraction is a procedure to remove a cloudy lens from your eye  An artificial lens called an intraocular lens (IOL) is put in its place  This will improve your vision  DISCHARGE INSTRUCTIONS:   Medicines:   · Eyedrops  contain medicine to help prevent infection and decrease inflammation  Wash your hands before you instill eyedrops  Do not touch the tip of the dropper to your eye  Ask your healthcare provider for more information about how to instill eyedrops  · Take your medicine as directed  Contact your healthcare provider if you think your medicine is not helping or if you have side effects  Tell him or her if you are allergic to any medicine  Keep a list of the medicines, vitamins, and herbs you take  Include the amounts, and when and why you take them  Bring the list or the pill bottles to follow-up visits  Carry your medicine list with you in case of an emergency  Follow up with your ophthalmologist within 24 hours: You will need to have your eye checked  Write down your questions so you remember to ask them during your visits  Eye care:   · Wear your eye shield when you sleep to prevent damage  · Do not rub your eye  · Keep dust, dirt, and water out of your eye to prevent infection  · Do not lift heavy objects or bend over  Both can increase the pressure in your eye  Ask your healthcare provider how much weight is safe for you to lift  You may be told not to lift anything heavier than 25 pounds for 3 weeks after your procedure  · Wear UVB protective sunglasses and a brimmed hat outside to help prevent sun damage to your eyes  · If you smoke, it is never too late to quit  Smoking can damage your eye and prevent it from healing after your procedure  Do not smoke or let anyone smoke around you  Ask your healthcare provider for information if you need help quitting    Contact your healthcare provider or ophthalmologist if:   · You have changes in your vision  · Your eye pain increases  · Your eye is red, swollen, or draining fluid or pus  · You have a headache or nausea, or you are vomiting  · You have questions or concerns about your condition or care  Seek care immediately or call 911 if:   · You suddenly see flashes or floaters, followed by partial vision loss like a curtain covered your eye  · Your eye suddenly goes blind  · You have severe eye pain with eye redness, swelling, new floaters, and more blurry vision  © 2017 [facility] Information is for End User's use only and may not be sold, redistributed or otherwise used for commercial purposes  All illustrations and images included in CareNotes® are the copyrighted property of A D A M , Inc  or Javi Gil  The above information is an  only  It is not intended as medical advice for individual conditions or treatments  Talk to your doctor, nurse or pharmacist before following any medical regimen to see if it is safe and effective for you

## 2018-12-06 NOTE — NURSING NOTE
Returned from PACU at 1315  Alert and oriented  Shield intact on right eye  No drainage  C/o 8/10 pain right eye due to surgery  HOB elevated  Respirations easy and non labored  Call bell in reach  Continue to monitor

## 2018-12-06 NOTE — ANESTHESIA PREPROCEDURE EVALUATION
Review of Systems/Medical History  Patient summary reviewed  Chart reviewed  No history of anesthetic complications     Cardiovascular  Exercise tolerance (METS): >4,  Hyperlipidemia, Hypertension controlled,    Pulmonary  Smoker cigarette smoker  , Tobacco cessation counseling given Cumulative Pack Years: 21, COPD , No shortness of breath, No sleep apnea ,        GI/Hepatic    GERD well controlled,          Comment: Suprapubic cath for neurogenic bladder     Endo/Other  Diabetes poorly controlled Insulin,      GYN      Comment: postmenopausal     Hematology  Anemia anemia of chronic disease,     Musculoskeletal       Neurology  Negative neurology ROS   Diabetic neuropathy,    Psychology           Physical Exam    Airway       Dental   upper dentures and lower dentures,     Cardiovascular  Cardiovascular exam normal    Pulmonary  Pulmonary exam normal     Other Findings        Anesthesia Plan  ASA Score- 3     Anesthesia Type- IV sedation with anesthesia with ASA Monitors  Additional Monitors:   Airway Plan:         Plan Factors-Patient not instructed to abstain from smoking on day of procedure  Patient did not smoke on day of surgery  Induction- intravenous  Postoperative Plan-     Informed Consent- Anesthetic plan and risks discussed with patient and daughter  I personally reviewed this patient with the CRNA  Discussed and agreed on the Anesthesia Plan with the CRNA  Jorge Thorpe

## 2018-12-06 NOTE — PLAN OF CARE
DISCHARGE PLANNING     Discharge to home or other facility with appropriate resources Completed        INFECTION - ADULT     Absence or prevention of progression during hospitalization Completed     Absence of fever/infection during neutropenic period Completed        Knowledge Deficit     Patient/family/caregiver demonstrates understanding of disease process, treatment plan, medications, and discharge instructions Completed        Nutrition/Hydration-ADULT     Nutrient/Hydration intake appropriate for improving, restoring or maintaining nutritional needs Completed        PAIN - ADULT     Verbalizes/displays adequate comfort level or baseline comfort level Completed        SAFETY ADULT     Patient will remain free of falls Completed     Maintain or return to baseline ADL function Completed     Maintain or return mobility status to optimal level Completed

## 2018-12-07 PROBLEM — Z22.39 ESBL ESCHERICHIA COLI CARRIER: Status: ACTIVE | Noted: 2018-12-07

## 2018-12-07 PROBLEM — R73.9 HYPERGLYCEMIA: Status: RESOLVED | Noted: 2018-12-04 | Resolved: 2018-12-07

## 2018-12-07 LAB — GLUCOSE SERPL-MCNC: 205 MG/DL (ref 70–99)

## 2018-12-07 NOTE — UTILIZATION REVIEW
Notification of Discharge  This is a Notification of Discharge from our facility [address]  Please be advised that this patient has been discharge from our facility  Below you will find the admission and discharge date and time including the patients disposition  PRESENTATION DATE: 12/3/2018  9:11 PM  IP ADMISSION DATE: 12/4/18 0015  DISCHARGE DATE: 12/6/2018  8:53 AM  DISPOSITION: Home/Self Care    [address]  Utilization Review Department  Phone: [phone]; Fax [phone]  ATTENTION: Please call with any questions or concerns to [phone]  and carefully listen to the prompts so that you are directed to the right person  Send all requests for admission clinical reviews, approved or denied determinations and any other requests to fax [phone]   All voicemails are confidential

## 2018-12-07 NOTE — ASSESSMENT & PLAN NOTE
Known carrier 2/2 suprapubic catheter  Treat only if has fever or symptoms, has remained afebrile and asymptomatic

## 2018-12-08 ENCOUNTER — TELEPHONE (OUTPATIENT)
Dept: FAMILY MEDICINE CLINIC | Facility: CLINIC | Age: 53
End: 2018-12-08

## 2018-12-08 ENCOUNTER — TELEPHONE (OUTPATIENT)
Dept: OTHER | Facility: OTHER | Age: 53
End: 2018-12-08

## 2018-12-08 NOTE — TELEPHONE ENCOUNTER
Received a health call page from the lab about the positive for Staphylococcus coagulase negative blood culture result drawn on 12/3/18 when patient was in the hospital  Called the patient at [phone] and was told that patient is asymptomatic, afebrile, no complains  Possible blood culture contamination is considered

## 2018-12-11 LAB
BACTERIA BLD CULT: ABNORMAL
GRAM STN SPEC: ABNORMAL

## 2018-12-26 DIAGNOSIS — I10 ESSENTIAL HYPERTENSION: ICD-10-CM

## 2018-12-26 DIAGNOSIS — K21.9 GASTROESOPHAGEAL REFLUX DISEASE WITHOUT ESOPHAGITIS: ICD-10-CM

## 2018-12-26 DIAGNOSIS — E11.9 DM2 (DIABETES MELLITUS, TYPE 2) (HCC): ICD-10-CM

## 2018-12-26 DIAGNOSIS — E55.9 VITAMIN D DEFICIENCY: ICD-10-CM

## 2018-12-26 DIAGNOSIS — M79.7 FIBROMYALGIA: ICD-10-CM

## 2018-12-27 DIAGNOSIS — M79.7 FIBROMYALGIA: ICD-10-CM

## 2018-12-27 DIAGNOSIS — Z79.4 TYPE 2 DIABETES MELLITUS WITH KIDNEY COMPLICATION, WITH LONG-TERM CURRENT USE OF INSULIN (HCC): ICD-10-CM

## 2018-12-27 DIAGNOSIS — E11.65 UNCONTROLLED TYPE 2 DIABETES MELLITUS WITH HYPERGLYCEMIA, WITH LONG-TERM CURRENT USE OF INSULIN (HCC): ICD-10-CM

## 2018-12-27 DIAGNOSIS — E11.9 DM2 (DIABETES MELLITUS, TYPE 2) (HCC): ICD-10-CM

## 2018-12-27 DIAGNOSIS — K21.9 GASTROESOPHAGEAL REFLUX DISEASE WITHOUT ESOPHAGITIS: ICD-10-CM

## 2018-12-27 DIAGNOSIS — N31.9 NEUROGENIC BLADDER: ICD-10-CM

## 2018-12-27 DIAGNOSIS — E11.621 DIABETIC ULCER OF TOE OF LEFT FOOT ASSOCIATED WITH TYPE 2 DIABETES MELLITUS, WITH FAT LAYER EXPOSED (HCC): ICD-10-CM

## 2018-12-27 DIAGNOSIS — E11.29 TYPE 2 DIABETES MELLITUS WITH KIDNEY COMPLICATION, WITH LONG-TERM CURRENT USE OF INSULIN (HCC): ICD-10-CM

## 2018-12-27 DIAGNOSIS — Z79.4 UNCONTROLLED TYPE 2 DIABETES MELLITUS WITH HYPERGLYCEMIA, WITH LONG-TERM CURRENT USE OF INSULIN (HCC): ICD-10-CM

## 2018-12-27 DIAGNOSIS — K59.00 CONSTIPATION, UNSPECIFIED CONSTIPATION TYPE: ICD-10-CM

## 2018-12-27 DIAGNOSIS — L97.522 DIABETIC ULCER OF TOE OF LEFT FOOT ASSOCIATED WITH TYPE 2 DIABETES MELLITUS, WITH FAT LAYER EXPOSED (HCC): ICD-10-CM

## 2018-12-27 DIAGNOSIS — I10 ESSENTIAL HYPERTENSION: ICD-10-CM

## 2018-12-27 DIAGNOSIS — E78.2 MIXED HYPERLIPIDEMIA: ICD-10-CM

## 2018-12-27 DIAGNOSIS — E55.9 VITAMIN D DEFICIENCY: ICD-10-CM

## 2018-12-27 RX ORDER — OMEPRAZOLE 20 MG/1
CAPSULE, DELAYED RELEASE ORAL
Qty: 30 CAPSULE | Refills: 0 | Status: SHIPPED | OUTPATIENT
Start: 2018-12-27 | End: 2018-12-27 | Stop reason: SDUPTHER

## 2018-12-27 RX ORDER — NORTRIPTYLINE HYDROCHLORIDE 50 MG/1
CAPSULE ORAL
Qty: 30 CAPSULE | Refills: 0 | Status: SHIPPED | OUTPATIENT
Start: 2018-12-27 | End: 2018-12-27 | Stop reason: SDUPTHER

## 2018-12-27 RX ORDER — ERGOCALCIFEROL 1.25 MG/1
CAPSULE ORAL
Qty: 4 CAPSULE | Refills: 0 | Status: SHIPPED | OUTPATIENT
Start: 2018-12-27 | End: 2018-12-27 | Stop reason: SDUPTHER

## 2018-12-27 RX ORDER — LISINOPRIL 5 MG/1
TABLET ORAL
Qty: 30 TABLET | Refills: 0 | Status: SHIPPED | OUTPATIENT
Start: 2018-12-27 | End: 2018-12-27 | Stop reason: SDUPTHER

## 2018-12-27 RX ORDER — PEN NEEDLE, DIABETIC 32GX 5/32"
NEEDLE, DISPOSABLE MISCELLANEOUS 3 TIMES DAILY
Qty: 100 EACH | Refills: 0 | Status: SHIPPED | OUTPATIENT
Start: 2018-12-27 | End: 2018-12-27 | Stop reason: SDUPTHER

## 2018-12-31 RX ORDER — SENNOSIDES 8.6 MG
1 TABLET ORAL
Qty: 120 EACH | Refills: 0 | Status: SHIPPED | OUTPATIENT
Start: 2018-12-31 | End: 2019-03-07 | Stop reason: SDUPTHER

## 2018-12-31 RX ORDER — ATORVASTATIN CALCIUM 40 MG/1
40 TABLET, FILM COATED ORAL DAILY
Qty: 30 TABLET | Refills: 0 | Status: SHIPPED | OUTPATIENT
Start: 2018-12-31 | End: 2019-03-08 | Stop reason: SDUPTHER

## 2018-12-31 RX ORDER — OMEPRAZOLE 20 MG/1
20 CAPSULE, DELAYED RELEASE ORAL
Qty: 90 CAPSULE | Refills: 0 | Status: SHIPPED | OUTPATIENT
Start: 2018-12-31 | End: 2019-05-31 | Stop reason: SDUPTHER

## 2018-12-31 RX ORDER — DOCUSATE SODIUM 100 MG/1
100 CAPSULE, LIQUID FILLED ORAL 2 TIMES DAILY
Qty: 10 CAPSULE | Refills: 0 | Status: SHIPPED | OUTPATIENT
Start: 2018-12-31 | End: 2019-03-08 | Stop reason: SDUPTHER

## 2018-12-31 RX ORDER — ERGOCALCIFEROL 1.25 MG/1
50000 CAPSULE ORAL WEEKLY
Qty: 4 CAPSULE | Refills: 0 | Status: SHIPPED | OUTPATIENT
Start: 2018-12-31 | End: 2019-03-08 | Stop reason: SDUPTHER

## 2018-12-31 RX ORDER — LISINOPRIL 5 MG/1
5 TABLET ORAL DAILY
Qty: 90 TABLET | Refills: 0 | Status: SHIPPED | OUTPATIENT
Start: 2018-12-31 | End: 2019-03-07 | Stop reason: SDUPTHER

## 2018-12-31 RX ORDER — NORTRIPTYLINE HYDROCHLORIDE 50 MG/1
50 CAPSULE ORAL DAILY
Qty: 90 CAPSULE | Refills: 0 | Status: SHIPPED | OUTPATIENT
Start: 2018-12-31 | End: 2019-03-07 | Stop reason: SDUPTHER

## 2019-01-01 ENCOUNTER — TELEPHONE (OUTPATIENT)
Dept: FAMILY MEDICINE CLINIC | Facility: CLINIC | Age: 54
End: 2019-01-01

## 2019-01-01 ENCOUNTER — APPOINTMENT (INPATIENT)
Dept: ULTRASOUND IMAGING | Facility: HOSPITAL | Age: 54
DRG: 469 | End: 2019-01-01
Payer: COMMERCIAL

## 2019-01-01 ENCOUNTER — TELEPHONE (OUTPATIENT)
Dept: PAIN MEDICINE | Facility: MEDICAL CENTER | Age: 54
End: 2019-01-01

## 2019-01-01 ENCOUNTER — ANESTHESIA (OUTPATIENT)
Dept: PERIOP | Facility: HOSPITAL | Age: 54
End: 2019-01-01
Payer: COMMERCIAL

## 2019-01-01 ENCOUNTER — APPOINTMENT (EMERGENCY)
Dept: CT IMAGING | Facility: HOSPITAL | Age: 54
DRG: 469 | End: 2019-01-01
Payer: COMMERCIAL

## 2019-01-01 ENCOUNTER — CONSULT (OUTPATIENT)
Dept: FAMILY MEDICINE CLINIC | Facility: CLINIC | Age: 54
End: 2019-01-01

## 2019-01-01 ENCOUNTER — HOSPITAL ENCOUNTER (EMERGENCY)
Facility: HOSPITAL | Age: 54
Discharge: HOME | End: 2019-12-14
Attending: EMERGENCY MEDICINE | Admitting: EMERGENCY MEDICINE
Payer: COMMERCIAL

## 2019-01-01 ENCOUNTER — HOSPITAL ENCOUNTER (EMERGENCY)
Facility: HOSPITAL | Age: 54
Discharge: HOME | End: 2019-09-23
Attending: EMERGENCY MEDICINE
Payer: COMMERCIAL

## 2019-01-01 ENCOUNTER — HOSPITAL ENCOUNTER (OUTPATIENT)
Facility: HOSPITAL | Age: 54
Setting detail: OUTPATIENT SURGERY
Discharge: HOME | End: 2019-10-17
Attending: OPHTHALMOLOGY | Admitting: OPHTHALMOLOGY
Payer: COMMERCIAL

## 2019-01-01 ENCOUNTER — TELEPHONE (OUTPATIENT)
Dept: UROLOGY | Facility: MEDICAL CENTER | Age: 54
End: 2019-01-01

## 2019-01-01 ENCOUNTER — DOCUMENTATION (OUTPATIENT)
Dept: RHEUMATOLOGY | Facility: CLINIC | Age: 54
End: 2019-01-01

## 2019-01-01 ENCOUNTER — APPOINTMENT (EMERGENCY)
Dept: RADIOLOGY | Facility: HOSPITAL | Age: 54
End: 2019-01-01
Payer: COMMERCIAL

## 2019-01-01 ENCOUNTER — ANESTHESIA EVENT (OUTPATIENT)
Dept: PERIOP | Facility: HOSPITAL | Age: 54
End: 2019-01-01
Payer: COMMERCIAL

## 2019-01-01 ENCOUNTER — APPOINTMENT (INPATIENT)
Dept: INTERVENTIONAL RADIOLOGY/VASCULAR | Facility: HOSPITAL | Age: 54
DRG: 469 | End: 2019-01-01
Payer: COMMERCIAL

## 2019-01-01 ENCOUNTER — TELEPHONE (OUTPATIENT)
Dept: EMERGENCY DEPT | Facility: HOSPITAL | Age: 54
End: 2019-01-01

## 2019-01-01 ENCOUNTER — HOSPITAL ENCOUNTER (INPATIENT)
Facility: HOSPITAL | Age: 54
LOS: 9 days | Discharge: HOME HEALTH | DRG: 469 | End: 2020-01-09
Attending: EMERGENCY MEDICINE | Admitting: FAMILY MEDICINE
Payer: COMMERCIAL

## 2019-01-01 ENCOUNTER — HOSPITAL ENCOUNTER (EMERGENCY)
Facility: HOSPITAL | Age: 54
Discharge: HOME | End: 2019-12-12
Attending: EMERGENCY MEDICINE | Admitting: EMERGENCY MEDICINE
Payer: COMMERCIAL

## 2019-01-01 ENCOUNTER — OFFICE VISIT (OUTPATIENT)
Dept: FAMILY MEDICINE CLINIC | Facility: CLINIC | Age: 54
End: 2019-01-01

## 2019-01-01 VITALS
HEART RATE: 89 BPM | DIASTOLIC BLOOD PRESSURE: 74 MMHG | TEMPERATURE: 96.8 F | WEIGHT: 200 LBS | SYSTOLIC BLOOD PRESSURE: 136 MMHG | RESPIRATION RATE: 16 BRPM | HEIGHT: 63 IN | OXYGEN SATURATION: 98 % | BODY MASS INDEX: 35.44 KG/M2

## 2019-01-01 VITALS
OXYGEN SATURATION: 97 % | DIASTOLIC BLOOD PRESSURE: 73 MMHG | TEMPERATURE: 96.9 F | BODY MASS INDEX: 36.44 KG/M2 | SYSTOLIC BLOOD PRESSURE: 153 MMHG | HEART RATE: 81 BPM | RESPIRATION RATE: 16 BRPM | WEIGHT: 205.69 LBS

## 2019-01-01 VITALS
WEIGHT: 186.1 LBS | TEMPERATURE: 97.4 F | OXYGEN SATURATION: 98 % | RESPIRATION RATE: 18 BRPM | HEART RATE: 85 BPM | DIASTOLIC BLOOD PRESSURE: 81 MMHG | SYSTOLIC BLOOD PRESSURE: 153 MMHG | BODY MASS INDEX: 32.97 KG/M2

## 2019-01-01 VITALS
HEART RATE: 85 BPM | BODY MASS INDEX: 33.23 KG/M2 | RESPIRATION RATE: 18 BRPM | OXYGEN SATURATION: 98 % | WEIGHT: 187.61 LBS | DIASTOLIC BLOOD PRESSURE: 68 MMHG | TEMPERATURE: 97.5 F | SYSTOLIC BLOOD PRESSURE: 135 MMHG

## 2019-01-01 VITALS
SYSTOLIC BLOOD PRESSURE: 158 MMHG | WEIGHT: 198 LBS | OXYGEN SATURATION: 96 % | BODY MASS INDEX: 35.08 KG/M2 | RESPIRATION RATE: 18 BRPM | HEIGHT: 63 IN | HEART RATE: 98 BPM | TEMPERATURE: 98.6 F | DIASTOLIC BLOOD PRESSURE: 90 MMHG

## 2019-01-01 VITALS
HEART RATE: 76 BPM | RESPIRATION RATE: 20 BRPM | DIASTOLIC BLOOD PRESSURE: 70 MMHG | OXYGEN SATURATION: 92 % | SYSTOLIC BLOOD PRESSURE: 114 MMHG | TEMPERATURE: 97.9 F

## 2019-01-01 DIAGNOSIS — Z79.4 TYPE 2 DIABETES MELLITUS WITH KIDNEY COMPLICATION, WITH LONG-TERM CURRENT USE OF INSULIN (HCC): ICD-10-CM

## 2019-01-01 DIAGNOSIS — F32.89 OTHER DEPRESSION: ICD-10-CM

## 2019-01-01 DIAGNOSIS — M79.604 PAIN OF RIGHT LOWER EXTREMITY: ICD-10-CM

## 2019-01-01 DIAGNOSIS — E11.22 TYPE 2 DIABETES MELLITUS WITH STAGE 3 CHRONIC KIDNEY DISEASE, WITH LONG-TERM CURRENT USE OF INSULIN (HCC): ICD-10-CM

## 2019-01-01 DIAGNOSIS — N17.9 ACUTE KIDNEY INJURY (HCC): Primary | ICD-10-CM

## 2019-01-01 DIAGNOSIS — N82.1: ICD-10-CM

## 2019-01-01 DIAGNOSIS — K72.90 LIVER FAILURE (HCC): ICD-10-CM

## 2019-01-01 DIAGNOSIS — E11.29 TYPE 2 DIABETES MELLITUS WITH KIDNEY COMPLICATION, WITH LONG-TERM CURRENT USE OF INSULIN (HCC): ICD-10-CM

## 2019-01-01 DIAGNOSIS — N18.30 TYPE 2 DIABETES MELLITUS WITH STAGE 3 CHRONIC KIDNEY DISEASE, WITH LONG-TERM CURRENT USE OF INSULIN (HCC): Primary | ICD-10-CM

## 2019-01-01 DIAGNOSIS — M79.7 FIBROMYALGIA: ICD-10-CM

## 2019-01-01 DIAGNOSIS — R45.851 SUICIDAL IDEATION: ICD-10-CM

## 2019-01-01 DIAGNOSIS — I25.10 CORONARY ARTERY DISEASE DUE TO LIPID RICH PLAQUE: ICD-10-CM

## 2019-01-01 DIAGNOSIS — M79.604 PAIN OF RIGHT LOWER EXTREMITY: Primary | ICD-10-CM

## 2019-01-01 DIAGNOSIS — N39.0 COMPLICATED UTI (URINARY TRACT INFECTION): ICD-10-CM

## 2019-01-01 DIAGNOSIS — B37.2 CANDIDIASIS OF SKIN: ICD-10-CM

## 2019-01-01 DIAGNOSIS — R10.13 EPIGASTRIC PAIN: ICD-10-CM

## 2019-01-01 DIAGNOSIS — N89.8 VAGINAL DISCHARGE: Primary | ICD-10-CM

## 2019-01-01 DIAGNOSIS — K59.00 CONSTIPATION, UNSPECIFIED CONSTIPATION TYPE: ICD-10-CM

## 2019-01-01 DIAGNOSIS — Z79.4 TYPE 2 DIABETES MELLITUS WITH STAGE 3 CHRONIC KIDNEY DISEASE, WITH LONG-TERM CURRENT USE OF INSULIN (HCC): ICD-10-CM

## 2019-01-01 DIAGNOSIS — S80.00XA KNEE CONTUSION: ICD-10-CM

## 2019-01-01 DIAGNOSIS — I25.83 CORONARY ARTERY DISEASE DUE TO LIPID RICH PLAQUE: ICD-10-CM

## 2019-01-01 DIAGNOSIS — N76.0 ACUTE VAGINITIS: ICD-10-CM

## 2019-01-01 DIAGNOSIS — I10 ESSENTIAL HYPERTENSION: ICD-10-CM

## 2019-01-01 DIAGNOSIS — Z01.818 PRE-OP EXAMINATION: Primary | ICD-10-CM

## 2019-01-01 DIAGNOSIS — B37.3 VAGINAL CANDIDIASIS: Primary | ICD-10-CM

## 2019-01-01 DIAGNOSIS — N17.9 ACUTE KIDNEY INJURY SUPERIMPOSED ON CKD (HCC): ICD-10-CM

## 2019-01-01 DIAGNOSIS — D64.9 ANEMIA, UNSPECIFIED TYPE: ICD-10-CM

## 2019-01-01 DIAGNOSIS — N18.9 ACUTE KIDNEY INJURY SUPERIMPOSED ON CKD (HCC): ICD-10-CM

## 2019-01-01 DIAGNOSIS — E11.22 TYPE 2 DIABETES MELLITUS WITH STAGE 3 CHRONIC KIDNEY DISEASE, WITH LONG-TERM CURRENT USE OF INSULIN (HCC): Primary | ICD-10-CM

## 2019-01-01 DIAGNOSIS — E78.2 MIXED HYPERLIPIDEMIA: ICD-10-CM

## 2019-01-01 DIAGNOSIS — R10.11 RIGHT UPPER QUADRANT ABDOMINAL PAIN: ICD-10-CM

## 2019-01-01 DIAGNOSIS — N18.30 TYPE 2 DIABETES MELLITUS WITH STAGE 3 CHRONIC KIDNEY DISEASE, WITH LONG-TERM CURRENT USE OF INSULIN (HCC): ICD-10-CM

## 2019-01-01 DIAGNOSIS — Z79.4 TYPE 2 DIABETES MELLITUS WITH STAGE 3 CHRONIC KIDNEY DISEASE, WITH LONG-TERM CURRENT USE OF INSULIN (HCC): Primary | ICD-10-CM

## 2019-01-01 DIAGNOSIS — E11.9 DM2 (DIABETES MELLITUS, TYPE 2) (HCC): ICD-10-CM

## 2019-01-01 DIAGNOSIS — N31.9 NEUROGENIC BLADDER: ICD-10-CM

## 2019-01-01 DIAGNOSIS — E55.9 VITAMIN D DEFICIENCY: ICD-10-CM

## 2019-01-01 DIAGNOSIS — T83.9XXA PROBLEM WITH FOLEY CATHETER, INITIAL ENCOUNTER (HCC): Primary | ICD-10-CM

## 2019-01-01 DIAGNOSIS — R63.4 WEIGHT LOSS, UNINTENTIONAL: ICD-10-CM

## 2019-01-01 DIAGNOSIS — D50.0 IRON DEFICIENCY ANEMIA DUE TO CHRONIC BLOOD LOSS: ICD-10-CM

## 2019-01-01 DIAGNOSIS — Z12.11 SCREENING FOR COLON CANCER: ICD-10-CM

## 2019-01-01 DIAGNOSIS — Z22.39 ESBL ESCHERICHIA COLI CARRIER: ICD-10-CM

## 2019-01-01 LAB
ALBUMIN SERPL BCP-MCNC: 2.4 G/DL (ref 3–5.2)
ALBUMIN SERPL BCP-MCNC: 3 G/DL (ref 3–5.2)
ALBUMIN SERPL BCP-MCNC: 3.6 G/DL (ref 3–5.2)
ALP SERPL-CCNC: 1016 U/L (ref 43–122)
ALP SERPL-CCNC: 563 U/L (ref 43–122)
ALP SERPL-CCNC: 755 U/L (ref 43–122)
ALT SERPL W P-5'-P-CCNC: 33 U/L (ref 9–52)
ALT SERPL W P-5'-P-CCNC: 37 U/L (ref 9–52)
ALT SERPL W P-5'-P-CCNC: 42 U/L (ref 9–52)
AMORPH URATE CRY URNS QL MICRO: ABNORMAL /HPF
AMPHETAMINES SERPL QL SCN: NEGATIVE
ANION GAP SERPL CALCULATED.3IONS-SCNC: 13 MMOL/L (ref 5–14)
ANION GAP SERPL CALCULATED.3IONS-SCNC: 17 MMOL/L (ref 5–14)
ANION GAP SERPL CALCULATED.3IONS-SCNC: 6 MMOL/L (ref 5–14)
AST SERPL W P-5'-P-CCNC: 31 U/L (ref 14–36)
AST SERPL W P-5'-P-CCNC: 45 U/L (ref 14–36)
AST SERPL W P-5'-P-CCNC: 54 U/L (ref 14–36)
ATRIAL RATE: 78 BPM
BACTERIA UR CULT: NORMAL
BACTERIA UR QL AUTO: ABNORMAL /HPF
BACTERIA UR QL AUTO: ABNORMAL /HPF
BACTERIA WND AEROBE CULT: ABNORMAL
BACTERIA WND AEROBE CULT: ABNORMAL
BARBITURATES UR QL: NEGATIVE
BASOPHILS # BLD AUTO: 0.1 THOUSANDS/ΜL (ref 0–0.1)
BASOPHILS # BLD AUTO: 0.1 THOUSANDS/ΜL (ref 0–0.1)
BASOPHILS NFR BLD AUTO: 1 % (ref 0–1)
BASOPHILS NFR BLD AUTO: 1 % (ref 0–1)
BENZODIAZ UR QL: NEGATIVE
BETA-HYDROXYBUTYRATE: 4.2 MMOL/L
BILIRUB SERPL-MCNC: 0.6 MG/DL
BILIRUB SERPL-MCNC: 1 MG/DL
BILIRUB SERPL-MCNC: 1.4 MG/DL
BILIRUB UR QL STRIP: NEGATIVE
BILIRUB UR QL STRIP: NEGATIVE
BUN SERPL-MCNC: 25 MG/DL (ref 5–25)
BUN SERPL-MCNC: 33 MG/DL (ref 5–25)
BUN SERPL-MCNC: 35 MG/DL (ref 5–25)
C TRACH DNA SPEC QL NAA+PROBE: NEGATIVE
CALCIUM SERPL-MCNC: 7.4 MG/DL (ref 8.4–10.2)
CALCIUM SERPL-MCNC: 8.7 MG/DL (ref 8.4–10.2)
CALCIUM SERPL-MCNC: 9.5 MG/DL (ref 8.4–10.2)
CANDIDA RRNA VAG QL PROBE: NEGATIVE
CHLORIDE SERPL-SCNC: 109 MMOL/L (ref 97–108)
CHLORIDE SERPL-SCNC: 114 MMOL/L (ref 97–108)
CHLORIDE SERPL-SCNC: 114 MMOL/L (ref 97–108)
CLARITY UR: ABNORMAL
CLARITY UR: ABNORMAL
CO2 SERPL-SCNC: 15 MMOL/L (ref 22–30)
CO2 SERPL-SCNC: 17 MMOL/L (ref 22–30)
CO2 SERPL-SCNC: 20 MMOL/L (ref 22–30)
COARSE GRAN CASTS URNS QL MICRO: ABNORMAL /LPF
COCAINE UR QL: NEGATIVE
COLOR UR: ABNORMAL
COLOR UR: ABNORMAL
CREAT SERPL-MCNC: 2.41 MG/DL (ref 0.6–1.2)
CREAT SERPL-MCNC: 2.82 MG/DL (ref 0.6–1.2)
CREAT SERPL-MCNC: 2.89 MG/DL (ref 0.6–1.2)
EOSINOPHIL # BLD AUTO: 0.3 THOUSAND/ΜL (ref 0–0.4)
EOSINOPHIL # BLD AUTO: 0.3 THOUSAND/ΜL (ref 0–0.4)
EOSINOPHIL NFR BLD AUTO: 5 % (ref 0–6)
EOSINOPHIL NFR BLD AUTO: 5 % (ref 0–6)
ERYTHROCYTE [DISTWIDTH] IN BLOOD BY AUTOMATED COUNT: 15.5 %
ERYTHROCYTE [DISTWIDTH] IN BLOOD BY AUTOMATED COUNT: 15.5 %
ETHANOL EXG-MCNC: 0 MG/DL
G VAGINALIS RRNA GENITAL QL PROBE: NEGATIVE
GFR SERPL CREATININE-BSD FRML MDRD: 17 ML/MIN/1.73SQ M
GFR SERPL CREATININE-BSD FRML MDRD: 18 ML/MIN/1.73SQ M
GFR SERPL CREATININE-BSD FRML MDRD: 22 ML/MIN/1.73SQ M
GLUCOSE SERPL-MCNC: 141 MG/DL (ref 65–140)
GLUCOSE SERPL-MCNC: 143 MG/DL (ref 65–140)
GLUCOSE SERPL-MCNC: 148 MG/DL (ref 70–99)
GLUCOSE SERPL-MCNC: 161 MG/DL (ref 65–140)
GLUCOSE SERPL-MCNC: 185 MG/DL (ref 65–140)
GLUCOSE SERPL-MCNC: 191 MG/DL (ref 70–99)
GLUCOSE SERPL-MCNC: 237 MG/DL (ref 70–99)
GLUCOSE SERPL-MCNC: 250 MG/DL (ref 65–140)
GLUCOSE SERPL-MCNC: 281 MG/DL (ref 65–140)
GLUCOSE UR STRIP-MCNC: ABNORMAL MG/DL
GLUCOSE UR STRIP-MCNC: ABNORMAL MG/DL
GRAM STN SPEC: ABNORMAL
HCT VFR BLD AUTO: 26.3 % (ref 36–46)
HCT VFR BLD AUTO: 31.2 % (ref 36–46)
HGB BLD-MCNC: 10 G/DL (ref 12–16)
HGB BLD-MCNC: 8.5 G/DL (ref 12–16)
HGB UR QL STRIP.AUTO: 150
HGB UR QL STRIP.AUTO: 50
HSV SPEC CULT: NORMAL
HYALINE CASTS #/AREA URNS LPF: ABNORMAL /LPF
KETONES UR STRIP-MCNC: ABNORMAL MG/DL
KETONES UR STRIP-MCNC: NEGATIVE MG/DL
LACTATE SERPL-SCNC: 0.6 MMOL/L (ref 0.7–2)
LEUKOCYTE ESTERASE UR QL STRIP: 500
LEUKOCYTE ESTERASE UR QL STRIP: 500
LYMPHOCYTES # BLD AUTO: 1.9 THOUSANDS/ΜL (ref 0.5–4)
LYMPHOCYTES # BLD AUTO: 2 THOUSANDS/ΜL (ref 0.5–4)
LYMPHOCYTES NFR BLD AUTO: 29 % (ref 25–45)
LYMPHOCYTES NFR BLD AUTO: 33 % (ref 25–45)
MACROCYTES BLD QL AUTO: PRESENT
MCH RBC QN AUTO: 32.5 PG (ref 26–34)
MCH RBC QN AUTO: 33 PG (ref 26–34)
MCHC RBC AUTO-ENTMCNC: 31.9 G/DL (ref 31–36)
MCHC RBC AUTO-ENTMCNC: 32.2 G/DL (ref 31–36)
MCV RBC AUTO: 102 FL (ref 80–100)
MCV RBC AUTO: 103 FL (ref 80–100)
METHADONE UR QL: NEGATIVE
MONOCYTES # BLD AUTO: 0.3 THOUSAND/ΜL (ref 0.2–0.9)
MONOCYTES # BLD AUTO: 0.5 THOUSAND/ΜL (ref 0.2–0.9)
MONOCYTES NFR BLD AUTO: 6 % (ref 1–10)
MONOCYTES NFR BLD AUTO: 7 % (ref 1–10)
MUCOUS THREADS UR QL AUTO: ABNORMAL
MUCOUS THREADS UR QL AUTO: ABNORMAL
N GONORRHOEA DNA SPEC QL NAA+PROBE: NEGATIVE
NEUTROPHILS # BLD AUTO: 3.2 THOUSANDS/ΜL (ref 1.8–7.8)
NEUTROPHILS # BLD AUTO: 4 THOUSANDS/ΜL (ref 1.8–7.8)
NEUTS SEG NFR BLD AUTO: 55 % (ref 45–65)
NEUTS SEG NFR BLD AUTO: 59 % (ref 45–65)
NITRITE UR QL STRIP: NEGATIVE
NITRITE UR QL STRIP: POSITIVE
NON-SQ EPI CELLS URNS QL MICRO: ABNORMAL /HPF
NON-SQ EPI CELLS URNS QL MICRO: ABNORMAL /HPF
OPIATES UR QL SCN: NEGATIVE
OTHER STN SPEC: ABNORMAL
P AXIS: 67 DEGREES
PCP UR QL: NEGATIVE
PH UR STRIP.AUTO: 5 [PH]
PH UR STRIP.AUTO: 5 [PH]
PLATELET # BLD AUTO: 286 THOUSANDS/UL (ref 150–450)
PLATELET # BLD AUTO: 364 THOUSANDS/UL (ref 150–450)
PLATELET BLD QL SMEAR: ADEQUATE
PMV BLD AUTO: 9.1 FL (ref 8.9–12.7)
PMV BLD AUTO: 9.3 FL (ref 8.9–12.7)
POTASSIUM SERPL-SCNC: 2.9 MMOL/L (ref 3.6–5)
POTASSIUM SERPL-SCNC: 3.5 MMOL/L (ref 3.6–5)
POTASSIUM SERPL-SCNC: 4.1 MMOL/L (ref 3.6–5)
PR INTERVAL: 136 MS
PROT SERPL-MCNC: 5.4 G/DL (ref 5.9–8.4)
PROT SERPL-MCNC: 6.5 G/DL (ref 5.9–8.4)
PROT SERPL-MCNC: 7.7 G/DL (ref 5.9–8.4)
PROT UR STRIP-MCNC: >=500 MG/DL
PROT UR STRIP-MCNC: >=500 MG/DL
QRS AXIS: 18 DEGREES
QRSD INTERVAL: 80 MS
QT INTERVAL: 400 MS
QTC INTERVAL: 456 MS
RBC # BLD AUTO: 2.57 MILLION/UL (ref 4–5.2)
RBC # BLD AUTO: 3.07 MILLION/UL (ref 4–5.2)
RBC #/AREA URNS AUTO: ABNORMAL /HPF
RBC #/AREA URNS AUTO: ABNORMAL /HPF
RBC MORPH BLD: NORMAL
SODIUM SERPL-SCNC: 140 MMOL/L (ref 137–147)
SODIUM SERPL-SCNC: 142 MMOL/L (ref 137–147)
SODIUM SERPL-SCNC: 143 MMOL/L (ref 137–147)
SP GR UR STRIP.AUTO: 1.02 (ref 1–1.04)
SP GR UR STRIP.AUTO: 1.02 (ref 1–1.04)
T VAGINALIS RRNA GENITAL QL PROBE: NEGATIVE
T WAVE AXIS: 45 DEGREES
THC UR QL: NEGATIVE
UROBILINOGEN UA: NEGATIVE MG/DL
UROBILINOGEN UA: NEGATIVE MG/DL
VENTRICULAR RATE: 78 BPM
WBC # BLD AUTO: 5.8 THOUSAND/UL (ref 4.5–11)
WBC # BLD AUTO: 6.8 THOUSAND/UL (ref 4.5–11)
WBC #/AREA URNS AUTO: ABNORMAL /HPF
WBC #/AREA URNS AUTO: ABNORMAL /HPF

## 2019-01-01 PROCEDURE — 99213 OFFICE O/P EST LOW 20 MIN: CPT | Performed by: FAMILY MEDICINE

## 2019-01-01 PROCEDURE — 87186 SC STD MICRODIL/AGAR DIL: CPT | Performed by: EMERGENCY MEDICINE

## 2019-01-01 PROCEDURE — 99222 1ST HOSP IP/OBS MODERATE 55: CPT | Performed by: NURSE PRACTITIONER

## 2019-01-01 PROCEDURE — 80053 COMPREHEN METABOLIC PANEL: CPT | Performed by: FAMILY MEDICINE

## 2019-01-01 PROCEDURE — 87147 CULTURE TYPE IMMUNOLOGIC: CPT | Performed by: PHYSICIAN ASSISTANT

## 2019-01-01 PROCEDURE — 90682 RIV4 VACC RECOMBINANT DNA IM: CPT | Performed by: FAMILY MEDICINE

## 2019-01-01 PROCEDURE — 76937 US GUIDE VASCULAR ACCESS: CPT

## 2019-01-01 PROCEDURE — 93000 ELECTROCARDIOGRAM COMPLETE: CPT | Performed by: FAMILY MEDICINE

## 2019-01-01 PROCEDURE — 85025 COMPLETE CBC W/AUTO DIFF WBC: CPT | Performed by: EMERGENCY MEDICINE

## 2019-01-01 PROCEDURE — 87205 SMEAR GRAM STAIN: CPT | Performed by: PHYSICIAN ASSISTANT

## 2019-01-01 PROCEDURE — 82075 ASSAY OF BREATH ETHANOL: CPT | Performed by: EMERGENCY MEDICINE

## 2019-01-01 PROCEDURE — 05HY33Z INSERTION OF INFUSION DEVICE INTO UPPER VEIN, PERCUTANEOUS APPROACH: ICD-10-PCS | Performed by: FAMILY MEDICINE

## 2019-01-01 PROCEDURE — 83605 ASSAY OF LACTIC ACID: CPT | Performed by: FAMILY MEDICINE

## 2019-01-01 PROCEDURE — 76705 ECHO EXAM OF ABDOMEN: CPT

## 2019-01-01 PROCEDURE — ND001 PR NO DOCUMENTATION: Performed by: FAMILY MEDICINE

## 2019-01-01 PROCEDURE — 82948 REAGENT STRIP/BLOOD GLUCOSE: CPT

## 2019-01-01 PROCEDURE — 87491 CHLMYD TRACH DNA AMP PROBE: CPT | Performed by: PHYSICIAN ASSISTANT

## 2019-01-01 PROCEDURE — 87660 TRICHOMONAS VAGIN DIR PROBE: CPT | Performed by: PHYSICIAN ASSISTANT

## 2019-01-01 PROCEDURE — 36415 COLL VENOUS BLD VENIPUNCTURE: CPT | Performed by: EMERGENCY MEDICINE

## 2019-01-01 PROCEDURE — 3008F BODY MASS INDEX DOCD: CPT | Performed by: FAMILY MEDICINE

## 2019-01-01 PROCEDURE — 87077 CULTURE AEROBIC IDENTIFY: CPT | Performed by: EMERGENCY MEDICINE

## 2019-01-01 PROCEDURE — G0124 SCREEN C/V THIN LAYER BY MD: HCPCS | Performed by: PATHOLOGY

## 2019-01-01 PROCEDURE — 87591 N.GONORRHOEAE DNA AMP PROB: CPT | Performed by: PHYSICIAN ASSISTANT

## 2019-01-01 PROCEDURE — 87480 CANDIDA DNA DIR PROBE: CPT | Performed by: PHYSICIAN ASSISTANT

## 2019-01-01 PROCEDURE — 87255 GENET VIRUS ISOLATE HSV: CPT | Performed by: PHYSICIAN ASSISTANT

## 2019-01-01 PROCEDURE — 99284 EMERGENCY DEPT VISIT MOD MDM: CPT | Performed by: PHYSICIAN ASSISTANT

## 2019-01-01 PROCEDURE — 87086 URINE CULTURE/COLONY COUNT: CPT

## 2019-01-01 PROCEDURE — 99285 EMERGENCY DEPT VISIT HI MDM: CPT

## 2019-01-01 PROCEDURE — 99283 EMERGENCY DEPT VISIT LOW MDM: CPT

## 2019-01-01 PROCEDURE — 93010 ELECTROCARDIOGRAM REPORT: CPT | Performed by: INTERNAL MEDICINE

## 2019-01-01 PROCEDURE — G0145 SCR C/V CYTO,THINLAYER,RESCR: HCPCS | Performed by: PATHOLOGY

## 2019-01-01 PROCEDURE — 76830 TRANSVAGINAL US NON-OB: CPT

## 2019-01-01 PROCEDURE — 80053 COMPREHEN METABOLIC PANEL: CPT | Performed by: EMERGENCY MEDICINE

## 2019-01-01 PROCEDURE — 87070 CULTURE OTHR SPECIMN AEROBIC: CPT | Performed by: PHYSICIAN ASSISTANT

## 2019-01-01 PROCEDURE — 99284 EMERGENCY DEPT VISIT MOD MDM: CPT | Performed by: EMERGENCY MEDICINE

## 2019-01-01 PROCEDURE — 73564 X-RAY EXAM KNEE 4 OR MORE: CPT

## 2019-01-01 PROCEDURE — C9113 INJ PANTOPRAZOLE SODIUM, VIA: HCPCS | Performed by: FAMILY MEDICINE

## 2019-01-01 PROCEDURE — 87086 URINE CULTURE/COLONY COUNT: CPT | Performed by: EMERGENCY MEDICINE

## 2019-01-01 PROCEDURE — 99285 EMERGENCY DEPT VISIT HI MDM: CPT | Performed by: EMERGENCY MEDICINE

## 2019-01-01 PROCEDURE — 80307 DRUG TEST PRSMV CHEM ANLYZR: CPT | Performed by: EMERGENCY MEDICINE

## 2019-01-01 PROCEDURE — 81003 URINALYSIS AUTO W/O SCOPE: CPT | Performed by: EMERGENCY MEDICINE

## 2019-01-01 PROCEDURE — 74176 CT ABD & PELVIS W/O CONTRAST: CPT

## 2019-01-01 PROCEDURE — 85025 COMPLETE CBC W/AUTO DIFF WBC: CPT | Performed by: FAMILY MEDICINE

## 2019-01-01 PROCEDURE — 93005 ELECTROCARDIOGRAM TRACING: CPT

## 2019-01-01 PROCEDURE — V2632 POST CHMBR INTRAOCULAR LENS: HCPCS | Performed by: OPHTHALMOLOGY

## 2019-01-01 PROCEDURE — C1751 CATH, INF, PER/CENT/MIDLINE: HCPCS

## 2019-01-01 PROCEDURE — 81001 URINALYSIS AUTO W/SCOPE: CPT

## 2019-01-01 PROCEDURE — 82010 KETONE BODYS QUAN: CPT | Performed by: FAMILY MEDICINE

## 2019-01-01 PROCEDURE — 87510 GARDNER VAG DNA DIR PROBE: CPT | Performed by: PHYSICIAN ASSISTANT

## 2019-01-01 PROCEDURE — 81003 URINALYSIS AUTO W/O SCOPE: CPT

## 2019-01-01 PROCEDURE — 90471 IMMUNIZATION ADMIN: CPT | Performed by: FAMILY MEDICINE

## 2019-01-01 PROCEDURE — 99243 OFF/OP CNSLTJ NEW/EST LOW 30: CPT | Performed by: FAMILY MEDICINE

## 2019-01-01 PROCEDURE — 81001 URINALYSIS AUTO W/SCOPE: CPT | Performed by: EMERGENCY MEDICINE

## 2019-01-01 PROCEDURE — 87624 HPV HI-RISK TYP POOLED RSLT: CPT | Performed by: FAMILY MEDICINE

## 2019-01-01 PROCEDURE — 87181 SC STD AGAR DILUTION PER AGT: CPT | Performed by: EMERGENCY MEDICINE

## 2019-01-01 DEVICE — IOL SN60WF 20.0: Type: IMPLANTABLE DEVICE | Site: POSTERIOR CHAMBER | Status: FUNCTIONAL

## 2019-01-01 RX ORDER — METRONIDAZOLE 500 MG/1
500 TABLET ORAL ONCE
Status: COMPLETED | OUTPATIENT
Start: 2019-01-01 | End: 2019-01-01

## 2019-01-01 RX ORDER — INSULIN GLARGINE 100 [IU]/ML
INJECTION, SOLUTION SUBCUTANEOUS
Qty: 15 ML | Refills: 0 | Status: ON HOLD | OUTPATIENT
Start: 2019-01-01 | End: 2020-01-01 | Stop reason: SDUPTHER

## 2019-01-01 RX ORDER — PANTOPRAZOLE SODIUM 40 MG/1
40 INJECTION, POWDER, FOR SOLUTION INTRAVENOUS
Status: DISCONTINUED | OUTPATIENT
Start: 2019-01-01 | End: 2020-01-01

## 2019-01-01 RX ORDER — ACETAMINOPHEN 500 MG
500 TABLET ORAL EVERY 6 HOURS PRN
Status: CANCELLED | OUTPATIENT
Start: 2019-01-01

## 2019-01-01 RX ORDER — NYSTATIN 100000 [USP'U]/G
POWDER TOPICAL 2 TIMES DAILY
Qty: 15 G | Refills: 0 | Status: SHIPPED | OUTPATIENT
Start: 2019-01-01 | End: 2019-01-01 | Stop reason: SDUPTHER

## 2019-01-01 RX ORDER — BACITRACIN, NEOMYCIN, POLYMYXIN B 400; 3.5; 5 [USP'U]/G; MG/G; [USP'U]/G
1 OINTMENT TOPICAL ONCE
Status: COMPLETED | OUTPATIENT
Start: 2019-01-01 | End: 2019-01-01

## 2019-01-01 RX ORDER — SODIUM CHLORIDE 9 MG/ML
INJECTION, SOLUTION INTRAVENOUS CONTINUOUS PRN
Status: DISCONTINUED | OUTPATIENT
Start: 2019-01-01 | End: 2019-01-01 | Stop reason: SURG

## 2019-01-01 RX ORDER — BALANCED SALT SOLUTION 6.4; .75; .48; .3; 3.9; 1.7 MG/ML; MG/ML; MG/ML; MG/ML; MG/ML; MG/ML
SOLUTION OPHTHALMIC AS NEEDED
Status: DISCONTINUED | OUTPATIENT
Start: 2019-01-01 | End: 2019-01-01 | Stop reason: HOSPADM

## 2019-01-01 RX ORDER — ACETAMINOPHEN 325 MG/1
650 TABLET ORAL EVERY 4 HOURS PRN
Status: DISCONTINUED | OUTPATIENT
Start: 2019-01-01 | End: 2019-01-01 | Stop reason: HOSPADM

## 2019-01-01 RX ORDER — LIDOCAINE HYDROCHLORIDE 20 MG/ML
INJECTION, SOLUTION EPIDURAL; INFILTRATION; INTRACAUDAL; PERINEURAL AS NEEDED
Status: DISCONTINUED | OUTPATIENT
Start: 2019-01-01 | End: 2019-01-01 | Stop reason: HOSPADM

## 2019-01-01 RX ORDER — HEPARIN SODIUM 5000 [USP'U]/ML
5000 INJECTION, SOLUTION INTRAVENOUS; SUBCUTANEOUS EVERY 8 HOURS SCHEDULED
Status: DISCONTINUED | OUTPATIENT
Start: 2019-01-01 | End: 2020-01-01

## 2019-01-01 RX ORDER — SODIUM CHLORIDE 9 MG/ML
50 INJECTION, SOLUTION INTRAVENOUS CONTINUOUS
Status: DISCONTINUED | OUTPATIENT
Start: 2019-01-01 | End: 2019-01-01 | Stop reason: HOSPADM

## 2019-01-01 RX ORDER — ATORVASTATIN CALCIUM 40 MG/1
40 TABLET, FILM COATED ORAL DAILY
Qty: 30 TABLET | Refills: 4 | Status: SHIPPED | OUTPATIENT
Start: 2019-01-01 | End: 2020-01-01 | Stop reason: SDUPTHER

## 2019-01-01 RX ORDER — HYDROMORPHONE HCL/PF 1 MG/ML
0.5 SYRINGE (ML) INJECTION
Status: DISCONTINUED | OUTPATIENT
Start: 2019-01-01 | End: 2019-01-01 | Stop reason: HOSPADM

## 2019-01-01 RX ORDER — PIPERACILLIN SODIUM, TAZOBACTAM SODIUM 2; .25 G/10ML; G/10ML
INJECTION, POWDER, LYOPHILIZED, FOR SOLUTION INTRAVENOUS
Status: DISPENSED
Start: 2019-01-01 | End: 2019-01-01

## 2019-01-01 RX ORDER — AMLODIPINE BESYLATE 5 MG/1
5 TABLET ORAL DAILY
Status: CANCELLED | OUTPATIENT
Start: 2019-01-01

## 2019-01-01 RX ORDER — MIRTAZAPINE 15 MG/1
15 TABLET, FILM COATED ORAL
Status: DISCONTINUED | OUTPATIENT
Start: 2019-01-01 | End: 2020-01-01

## 2019-01-01 RX ORDER — TETRACAINE HYDROCHLORIDE 5 MG/ML
1 SOLUTION OPHTHALMIC ONCE
Status: COMPLETED | OUTPATIENT
Start: 2019-01-01 | End: 2019-01-01

## 2019-01-01 RX ORDER — MIDAZOLAM HYDROCHLORIDE 1 MG/ML
INJECTION INTRAMUSCULAR; INTRAVENOUS AS NEEDED
Status: DISCONTINUED | OUTPATIENT
Start: 2019-01-01 | End: 2019-01-01 | Stop reason: SURG

## 2019-01-01 RX ORDER — INSULIN LISPRO 100 U/ML
INJECTION, SOLUTION SUBCUTANEOUS
Qty: 15 ML | Refills: 0 | Status: SHIPPED | OUTPATIENT
Start: 2019-01-01 | End: 2020-01-01 | Stop reason: HOSPADM

## 2019-01-01 RX ORDER — ASPIRIN 81 MG/1
81 TABLET ORAL DAILY
Qty: 30 TABLET | Refills: 0 | Status: SHIPPED | OUTPATIENT
Start: 2019-01-01 | End: 2019-01-01 | Stop reason: SDUPTHER

## 2019-01-01 RX ORDER — SULFAMETHOXAZOLE AND TRIMETHOPRIM 800; 160 MG/1; MG/1
1 TABLET ORAL ONCE
Status: COMPLETED | OUTPATIENT
Start: 2019-01-01 | End: 2019-01-01

## 2019-01-01 RX ORDER — FLUCONAZOLE 100 MG/1
200 TABLET ORAL ONCE
Status: COMPLETED | OUTPATIENT
Start: 2019-01-01 | End: 2019-01-01

## 2019-01-01 RX ORDER — PREGABALIN 50 MG/1
50 CAPSULE ORAL 2 TIMES DAILY
Qty: 60 CAPSULE | Refills: 3 | Status: SHIPPED | OUTPATIENT
Start: 2019-01-01 | End: 2019-01-01 | Stop reason: SDUPTHER

## 2019-01-01 RX ORDER — INSULIN GLARGINE 100 [IU]/ML
8 INJECTION, SOLUTION SUBCUTANEOUS
Status: DISCONTINUED | OUTPATIENT
Start: 2019-01-01 | End: 2020-01-01

## 2019-01-01 RX ORDER — NICOTINE 21 MG/24HR
1 PATCH, TRANSDERMAL 24 HOURS TRANSDERMAL DAILY
Status: DISCONTINUED | OUTPATIENT
Start: 2019-01-01 | End: 2020-01-01 | Stop reason: HOSPADM

## 2019-01-01 RX ORDER — INSULIN GLARGINE 100 [IU]/ML
INJECTION, SOLUTION SUBCUTANEOUS
Qty: 15 ML | Refills: 1 | Status: SHIPPED | OUTPATIENT
Start: 2019-01-01 | End: 2019-01-01 | Stop reason: SDUPTHER

## 2019-01-01 RX ORDER — SENNOSIDES 8.6 MG
1 TABLET ORAL
Qty: 120 EACH | Refills: 0 | Status: SHIPPED | OUTPATIENT
Start: 2019-01-01 | End: 2020-01-01 | Stop reason: HOSPADM

## 2019-01-01 RX ORDER — ACETAMINOPHEN 325 MG/1
650 TABLET ORAL EVERY 4 HOURS PRN
Status: CANCELLED | OUTPATIENT
Start: 2019-01-01

## 2019-01-01 RX ORDER — PREGABALIN 50 MG/1
50 CAPSULE ORAL 2 TIMES DAILY
Qty: 60 CAPSULE | Refills: 3 | Status: SHIPPED | OUTPATIENT
Start: 2019-01-01 | End: 2020-01-01 | Stop reason: HOSPADM

## 2019-01-01 RX ORDER — NEOMYCIN SULFATE, POLYMYXIN B SULFATE, AND DEXAMETHASONE 3.5; 10000; 1 MG/G; [USP'U]/G; MG/G
OINTMENT OPHTHALMIC AS NEEDED
Status: DISCONTINUED | OUTPATIENT
Start: 2019-01-01 | End: 2019-01-01 | Stop reason: HOSPADM

## 2019-01-01 RX ORDER — NYSTATIN 100000 [USP'U]/G
POWDER TOPICAL 2 TIMES DAILY
Qty: 15 G | Refills: 0 | Status: SHIPPED | OUTPATIENT
Start: 2019-01-01

## 2019-01-01 RX ORDER — AMLODIPINE BESYLATE 5 MG/1
5 TABLET ORAL DAILY
Status: DISCONTINUED | OUTPATIENT
Start: 2019-01-01 | End: 2020-01-01

## 2019-01-01 RX ORDER — ACETAMINOPHEN 325 MG/1
650 TABLET ORAL ONCE
Status: COMPLETED | OUTPATIENT
Start: 2019-01-01 | End: 2019-01-01

## 2019-01-01 RX ORDER — MAGNESIUM HYDROXIDE 1200 MG/15ML
LIQUID ORAL AS NEEDED
Status: DISCONTINUED | OUTPATIENT
Start: 2019-01-01 | End: 2019-01-01 | Stop reason: HOSPADM

## 2019-01-01 RX ORDER — SODIUM CHLORIDE AND POTASSIUM CHLORIDE .9; .15 G/100ML; G/100ML
125 SOLUTION INTRAVENOUS CONTINUOUS
Status: DISCONTINUED | OUTPATIENT
Start: 2019-01-01 | End: 2020-01-01

## 2019-01-01 RX ORDER — FENTANYL CITRATE 50 UG/ML
INJECTION, SOLUTION INTRAMUSCULAR; INTRAVENOUS AS NEEDED
Status: DISCONTINUED | OUTPATIENT
Start: 2019-01-01 | End: 2019-01-01 | Stop reason: SURG

## 2019-01-01 RX ORDER — ASPIRIN 81 MG/1
TABLET, COATED ORAL
Qty: 30 TABLET | Refills: 5 | Status: SHIPPED | OUTPATIENT
Start: 2019-01-01 | End: 2020-01-01 | Stop reason: HOSPADM

## 2019-01-01 RX ORDER — TRAMADOL HYDROCHLORIDE 50 MG/1
50 TABLET ORAL ONCE
Status: COMPLETED | OUTPATIENT
Start: 2019-01-01 | End: 2019-01-01

## 2019-01-01 RX ORDER — TROPICAMIDE 10 MG/ML
1 SOLUTION/ DROPS OPHTHALMIC
Status: COMPLETED | OUTPATIENT
Start: 2019-01-01 | End: 2019-01-01

## 2019-01-01 RX ORDER — TETRACAINE HYDROCHLORIDE 5 MG/ML
SOLUTION OPHTHALMIC AS NEEDED
Status: DISCONTINUED | OUTPATIENT
Start: 2019-01-01 | End: 2019-01-01 | Stop reason: HOSPADM

## 2019-01-01 RX ORDER — NYSTATIN 100000 [USP'U]/G
POWDER TOPICAL ONCE
Status: COMPLETED | OUTPATIENT
Start: 2019-01-01 | End: 2019-01-01

## 2019-01-01 RX ORDER — ACETAMINOPHEN 500 MG
500 TABLET ORAL EVERY 6 HOURS PRN
Qty: 30 TABLET | Refills: 0 | Status: SHIPPED | OUTPATIENT
Start: 2019-01-01 | End: 2020-01-01 | Stop reason: HOSPADM

## 2019-01-01 RX ORDER — MAGNESIUM HYDROXIDE/ALUMINUM HYDROXICE/SIMETHICONE 120; 1200; 1200 MG/30ML; MG/30ML; MG/30ML
30 SUSPENSION ORAL EVERY 4 HOURS PRN
Status: DISCONTINUED | OUTPATIENT
Start: 2019-01-01 | End: 2020-01-01 | Stop reason: HOSPADM

## 2019-01-01 RX ORDER — PHENYLEPHRINE HCL 2.5 %
1 DROPS OPHTHALMIC (EYE)
Status: ACTIVE | OUTPATIENT
Start: 2019-01-01 | End: 2019-01-01

## 2019-01-01 RX ORDER — PREDNISONE 20 MG/1
40 TABLET ORAL DAILY
Qty: 14 TABLET | Refills: 0 | OUTPATIENT
Start: 2019-01-01

## 2019-01-01 RX ORDER — TROPICAMIDE 10 MG/ML
1 SOLUTION/ DROPS OPHTHALMIC
Status: ACTIVE | OUTPATIENT
Start: 2019-01-01 | End: 2019-01-01

## 2019-01-01 RX ORDER — SULFAMETHOXAZOLE AND TRIMETHOPRIM 800; 160 MG/1; MG/1
1 TABLET ORAL 2 TIMES DAILY
Qty: 14 TABLET | Refills: 0 | Status: SHIPPED | OUTPATIENT
Start: 2019-01-01 | End: 2019-01-01

## 2019-01-01 RX ORDER — PHENYLEPHRINE HCL 2.5 %
1 DROPS OPHTHALMIC (EYE)
Status: COMPLETED | OUTPATIENT
Start: 2019-01-01 | End: 2019-01-01

## 2019-01-01 RX ORDER — INSULIN LISPRO 100 U/ML
INJECTION, SOLUTION SUBCUTANEOUS
Qty: 15 ML | Refills: 1 | Status: SHIPPED | OUTPATIENT
Start: 2019-01-01 | End: 2019-01-01 | Stop reason: SDUPTHER

## 2019-01-01 RX ORDER — AMLODIPINE BESYLATE 5 MG/1
TABLET ORAL
Qty: 30 TABLET | Refills: 3 | Status: SHIPPED | OUTPATIENT
Start: 2019-01-01 | End: 2020-01-01 | Stop reason: SDUPTHER

## 2019-01-01 RX ORDER — LIDOCAINE HYDROCHLORIDE 20 MG/ML
JELLY TOPICAL AS NEEDED
Status: DISCONTINUED | OUTPATIENT
Start: 2019-01-01 | End: 2019-01-01 | Stop reason: HOSPADM

## 2019-01-01 RX ORDER — ONDANSETRON 2 MG/ML
INJECTION INTRAMUSCULAR; INTRAVENOUS AS NEEDED
Status: DISCONTINUED | OUTPATIENT
Start: 2019-01-01 | End: 2019-01-01 | Stop reason: SURG

## 2019-01-01 RX ORDER — METRONIDAZOLE 500 MG/1
500 TABLET ORAL EVERY 12 HOURS SCHEDULED
Qty: 14 TABLET | Refills: 0 | Status: SHIPPED | OUTPATIENT
Start: 2019-01-01 | End: 2019-01-01

## 2019-01-01 RX ORDER — POTASSIUM CHLORIDE 14.9 MG/ML
20 INJECTION INTRAVENOUS
Status: COMPLETED | OUTPATIENT
Start: 2019-01-01 | End: 2020-01-01

## 2019-01-01 RX ORDER — ERGOCALCIFEROL 1.25 MG/1
50000 CAPSULE ORAL WEEKLY
Qty: 4 CAPSULE | Refills: 5 | Status: SHIPPED | OUTPATIENT
Start: 2019-01-01 | End: 2020-01-01 | Stop reason: HOSPADM

## 2019-01-01 RX ORDER — ONDANSETRON 2 MG/ML
4 INJECTION INTRAMUSCULAR; INTRAVENOUS EVERY 6 HOURS PRN
Status: DISCONTINUED | OUTPATIENT
Start: 2019-01-01 | End: 2020-01-01 | Stop reason: HOSPADM

## 2019-01-01 RX ORDER — ACETAMINOPHEN 325 MG/1
650 TABLET ORAL EVERY 8 HOURS PRN
Status: DISCONTINUED | OUTPATIENT
Start: 2019-01-01 | End: 2020-01-01

## 2019-01-01 RX ORDER — SODIUM CHLORIDE 9 MG/ML
125 INJECTION, SOLUTION INTRAVENOUS CONTINUOUS
Status: DISCONTINUED | OUTPATIENT
Start: 2019-01-01 | End: 2019-01-01

## 2019-01-01 RX ADMIN — TRAMADOL HYDROCHLORIDE 50 MG: 50 TABLET, FILM COATED ORAL at 03:58

## 2019-01-01 RX ADMIN — PHENYLEPHRINE HYDROCHLORIDE 1 DROP: 25 SOLUTION/ DROPS OPHTHALMIC at 09:57

## 2019-01-01 RX ADMIN — PHENYLEPHRINE HYDROCHLORIDE 1 DROP: 25 SOLUTION/ DROPS OPHTHALMIC at 09:36

## 2019-01-01 RX ADMIN — ONDANSETRON 4 MG: 2 INJECTION INTRAMUSCULAR; INTRAVENOUS at 06:07

## 2019-01-01 RX ADMIN — Medication 2.25 G: at 02:07

## 2019-01-01 RX ADMIN — ACETAMINOPHEN 650 MG: 325 TABLET ORAL at 17:27

## 2019-01-01 RX ADMIN — NYSTATIN: 100000 POWDER TOPICAL at 20:09

## 2019-01-01 RX ADMIN — FLUCONAZOLE 200 MG: 100 TABLET ORAL at 20:01

## 2019-01-01 RX ADMIN — TROPICAMIDE 1 DROP: 10 SOLUTION/ DROPS OPHTHALMIC at 10:30

## 2019-01-01 RX ADMIN — HEPARIN SODIUM 5000 UNITS: 5000 INJECTION INTRAVENOUS; SUBCUTANEOUS at 22:46

## 2019-01-01 RX ADMIN — MIRTAZAPINE 15 MG: 15 TABLET, FILM COATED ORAL at 22:46

## 2019-01-01 RX ADMIN — POTASSIUM CHLORIDE AND SODIUM CHLORIDE 100 ML/HR: 900; 150 INJECTION, SOLUTION INTRAVENOUS at 22:46

## 2019-01-01 RX ADMIN — PANTOPRAZOLE SODIUM 40 MG: 40 INJECTION, POWDER, FOR SOLUTION INTRAVENOUS at 08:19

## 2019-01-01 RX ADMIN — BACITRACIN ZINC, NEOMYCIN SULFATE, POLYMYXIN B SULFATE 1 SMALL APPLICATION: 400; 3.5; 5 OINTMENT TOPICAL at 18:36

## 2019-01-01 RX ADMIN — HEPARIN SODIUM 5000 UNITS: 5000 INJECTION INTRAVENOUS; SUBCUTANEOUS at 06:01

## 2019-01-01 RX ADMIN — SULFAMETHOXAZOLE AND TRIMETHOPRIM 1 TABLET: 800; 160 TABLET ORAL at 17:30

## 2019-01-01 RX ADMIN — METRONIDAZOLE 500 MG: 500 TABLET, FILM COATED ORAL at 17:30

## 2019-01-01 RX ADMIN — TROPICAMIDE 1 DROP: 10 SOLUTION/ DROPS OPHTHALMIC at 09:37

## 2019-01-01 RX ADMIN — MIDAZOLAM HYDROCHLORIDE 2 MG: 1 INJECTION, SOLUTION INTRAMUSCULAR; INTRAVENOUS at 12:01

## 2019-01-01 RX ADMIN — INSULIN LISPRO 3 UNITS: 100 INJECTION, SOLUTION INTRAVENOUS; SUBCUTANEOUS at 18:23

## 2019-01-01 RX ADMIN — AMLODIPINE BESYLATE 5 MG: 5 TABLET ORAL at 08:19

## 2019-01-01 RX ADMIN — INFLUENZA A VIRUS A/BRISBANE/02/2018 (H1N1) RECOMBINANT HEMAGGLUTININ ANTIGEN, INFLUENZA A VIRUS A/KANSAS/14/2017 (H3N2) RECOMBINANT HEMAGGLUTININ ANTIGEN, INFLUENZA B VIRUS B/PHUKET/3073/2013 RECOMBINANT HEMAGGLUTININ ANTIGEN, AND INFLUENZA B VIRUS B/MARYLAND/15/2016 RECOMBINANT HEMAGGLUTININ ANTIGEN 0.5 ML: 45; 45; 45; 45 INJECTION INTRAMUSCULAR at 03:15

## 2019-01-01 RX ADMIN — TROPICAMIDE 1 DROP: 10 SOLUTION/ DROPS OPHTHALMIC at 09:57

## 2019-01-01 RX ADMIN — SODIUM CHLORIDE 125 ML/HR: 0.9 INJECTION, SOLUTION INTRAVENOUS at 03:35

## 2019-01-01 RX ADMIN — INSULIN GLARGINE 8 UNITS: 100 INJECTION, SOLUTION SUBCUTANEOUS at 22:49

## 2019-01-01 RX ADMIN — POTASSIUM CHLORIDE 20 MEQ: 200 INJECTION, SOLUTION INTRAVENOUS at 22:45

## 2019-01-01 RX ADMIN — SODIUM CHLORIDE 1000 ML: 0.9 INJECTION, SOLUTION INTRAVENOUS at 02:06

## 2019-01-01 RX ADMIN — PHENYLEPHRINE HYDROCHLORIDE 1 DROP: 25 SOLUTION/ DROPS OPHTHALMIC at 10:30

## 2019-01-01 RX ADMIN — SODIUM CHLORIDE 125 ML/HR: 0.9 INJECTION, SOLUTION INTRAVENOUS at 12:19

## 2019-01-01 RX ADMIN — NICOTINE 1 PATCH: 14 PATCH, EXTENDED RELEASE TRANSDERMAL at 08:19

## 2019-01-01 RX ADMIN — ONDANSETRON HYDROCHLORIDE 4 MG: 2 INJECTION, SOLUTION INTRAMUSCULAR; INTRAVENOUS at 12:05

## 2019-01-01 RX ADMIN — HYDROMORPHONE HYDROCHLORIDE 0.5 MG: 1 INJECTION, SOLUTION INTRAMUSCULAR; INTRAVENOUS; SUBCUTANEOUS at 12:51

## 2019-01-01 RX ADMIN — SODIUM CHLORIDE: 0.9 INJECTION, SOLUTION INTRAVENOUS at 11:55

## 2019-01-01 RX ADMIN — TETRACAINE HYDROCHLORIDE 1 DROP: 5 SOLUTION OPHTHALMIC at 09:36

## 2019-01-01 RX ADMIN — FENTANYL CITRATE 100 MCG: 50 INJECTION INTRAMUSCULAR; INTRAVENOUS at 12:05

## 2019-01-03 NOTE — TELEPHONE ENCOUNTER
Order for catheters placed by Dr Kianna Pagan on 12/31/18 by Dr Kianna Pagan did not go through  Printed instead  I tried to get it to go through, and it printed again  Will inform Dr Kianna Pagan

## 2019-02-15 ENCOUNTER — TELEPHONE (OUTPATIENT)
Dept: FAMILY MEDICINE CLINIC | Facility: CLINIC | Age: 54
End: 2019-02-15

## 2019-02-15 NOTE — TELEPHONE ENCOUNTER
I receive a form from [facility]  To be review and sign  Dr Michelle Vee sign the form and I faxed back  I make copy to be scan

## 2019-02-28 ENCOUNTER — HOSPITAL ENCOUNTER (EMERGENCY)
Facility: HOSPITAL | Age: 54
Discharge: HOME | End: 2019-02-28
Attending: EMERGENCY MEDICINE
Payer: COMMERCIAL

## 2019-02-28 VITALS
HEART RATE: 110 BPM | DIASTOLIC BLOOD PRESSURE: 83 MMHG | OXYGEN SATURATION: 99 % | SYSTOLIC BLOOD PRESSURE: 127 MMHG | TEMPERATURE: 98.1 F | RESPIRATION RATE: 18 BRPM

## 2019-02-28 DIAGNOSIS — T83.010A SUPRAPUBIC CATHETER DYSFUNCTION, INITIAL ENCOUNTER (HCC): Primary | ICD-10-CM

## 2019-02-28 PROCEDURE — 99283 EMERGENCY DEPT VISIT LOW MDM: CPT

## 2019-03-01 NOTE — ED PROVIDER NOTES
History  Chief Complaint   Patient presents with    Urinary Catheter Problem     pt states that her urinary bag broke  pt denies any pain  pt denies any urinary problems      Patient is a 57-year-old female with long-standing suprapubic catheter who presents requesting a new leg bag  Patient states that she was change in the back frequently on but due to insurance issues has not been able change her leg bag  Noticed that today her leg bag had broken  Is here today requesting a new leg bag  Denies any other pain no fevers sweats chills no abdominal pain  No nausea vomiting diarrhea  No urinary symptoms  Prior to Admission Medications   Prescriptions Last Dose Informant Patient Reported? Taking? Alcohol Swabs (ALCOHOL PREP) 70 % PADS   Yes No   Sig: Apply 1 applicator topically as needed   B-D INS SYRINGE 0 5CC/31GX5/16 31G X 5/16" 0 5 ML MISC   Yes No   Catheters MISC   No No   Sig: Please change patients suprapubic catheter as soon as possible and then every three months      Dx: N31 9 Neurogenic Bladder   Insulin Pen Needle (UNIFINE PENTIPS) 32G X 4 MM MISC   No No   Sig: Inject under the skin 3 (three) times a day   Lancets 28G MISC   Yes No   Sig: Test blood sugars three times daily   ONE TOUCH ULTRA TEST test strip   Yes No   Si strips by Device route 3 (three) times a day   aspirin (ECOTRIN LOW STRENGTH) 81 mg EC tablet   No No   Sig: Take 1 tablet (81 mg total) by mouth daily   atorvastatin (LIPITOR) 40 mg tablet   No No   Sig: Take 1 tablet (40 mg total) by mouth daily for 180 days   docusate sodium (COLACE) 100 mg capsule   No No   Sig: Take 1 capsule (100 mg total) by mouth 2 (two) times a day   ergocalciferol (VITAMIN D2) 50,000 units   No No   Sig: Take 1 capsule (50,000 Units total) by mouth once a week   insulin aspart protamine-insulin aspart (NOVOLOG MIX 70/30 FLEXPEN) 100 Units/mL injection pen   No No   Sig: Inject 20 Units under the skin 2 (two) times a day before meals insulin glargine (BASAGLAR KWIKPEN) 100 units/mL injection pen   No No   Sig: Inject 40 Units under the skin daily at bedtime for 90 days   lisinopril (ZESTRIL) 5 mg tablet   No No   Sig: Take 1 tablet (5 mg total) by mouth daily   miconazole (MONISTAT 7 SIMPLY CURE) 2 % vaginal cream   Yes No   Sig: every 24 hours   nortriptyline (PAMELOR) 50 mg capsule   No No   Sig: Take 1 capsule (50 mg total) by mouth daily   nystatin (MYCOSTATIN) powder   Yes No   Sig: Every 12 hours   omeprazole (PriLOSEC) 20 mg delayed release capsule   No No   Sig: Take 1 capsule (20 mg total) by mouth daily before breakfast   pregabalin (LYRICA) 50 mg capsule   No No   Sig: Take 1 capsule (50 mg total) by mouth 2 (two) times a day   senna (SENOKOT) 8 6 mg   No No   Sig: Take 1 tablet (8 6 mg total) by mouth daily at bedtime   silver sulfadiazine (SILVADENE,SSD) 1 % cream   No No   Sig: Apply topically daily      Facility-Administered Medications: None       Past Medical History:   Diagnosis Date    Anemia     macrocyctic    Chronic kidney disease 5/24/2018    Diabetes mellitus (HCC)     Dyslipidemia 5/24/2018    GERD (gastroesophageal reflux disease)     History of frequent urinary tract infections     HTN (hypertension) 5/24/2018    Hyperlipidemia     Neurogenic bladder     Neuropathy     Pedal edema     Vitamin D deficiency        Past Surgical History:   Procedure Laterality Date    AR REMV CATARACT EXTRACAP,INSERT LENS Right 12/6/2018    Procedure: EXTRACAPSULAR CATARACT REMOVAL/INSERTION OF INTRAOCULAR LENS;  Surgeon: Apryl Zambrano MD;  [facility] MA[facility] OR;  Service: Ophthalmology    SUPRAPUBIC CATHETER INSERTION         Family History   Problem Relation Age of Onset   Clifm Jorge Breast cancer Mother     Hypertension Mother     Diabetes Mother         Mellitus    Parkinsonism Mother     Cancer Maternal Aunt         Unknown     I have reviewed and agree with the history as documented      Social History     Tobacco Use    Smoking status: Current Every Day Smoker     Packs/day: 0 50     Years: 40 00     Pack years: 20 00     Types: Cigarettes    Smokeless tobacco: Current User    Tobacco comment: Per [employer]: Heavy tobacco smoker/12 cigs per day   Substance Use Topics    Alcohol use: No    Drug use: No        Review of Systems   Constitutional: Negative  HENT: Negative  Eyes: Negative  Respiratory: Negative  Cardiovascular: Negative  Gastrointestinal: Negative  Endocrine: Negative  Genitourinary: Negative  Needs a new leg bag  Musculoskeletal: Negative  Skin: Negative  Allergic/Immunologic: Negative  Neurological: Negative  Hematological: Negative  Psychiatric/Behavioral: Negative  All other systems reviewed and are negative  Physical Exam  Physical Exam   Constitutional: She appears well-developed and well-nourished  HENT:   Head: Normocephalic  Nose: Nose normal    Neck: Normal range of motion  Neck supple  Cardiovascular: Normal rate, regular rhythm, normal heart sounds and intact distal pulses  Pulmonary/Chest: Effort normal and breath sounds normal    Abdominal: Soft  Bowel sounds are normal    Super pubic catheter in place  No surrounding erythema warmth fluctuance  Skin: Skin is warm and dry  Capillary refill takes less than 2 seconds  Psychiatric: She has a normal mood and affect  Her behavior is normal    Nursing note and vitals reviewed  Vital Signs  ED Triage Vitals   Temp Pulse Resp BP SpO2   -- -- -- -- --      Temp src Heart Rate Source Patient Position - Orthostatic VS BP Location FiO2 (%)   -- -- -- -- --      Pain Score       --           There were no vitals filed for this visit      Visual Acuity      ED Medications  Medications - No data to display    Diagnostic Studies  Results Reviewed     None                 No orders to display              Procedures  Procedures       Phone Contacts  ED Phone Contact    ED Course MDM  Number of Diagnoses or Management Options  Suprapubic catheter dysfunction, initial encounter [facility]):       Disposition  Final diagnoses:   None     ED Disposition     None      Follow-up Information    None         Patient's Medications   Discharge Prescriptions    No medications on file     No discharge procedures on file      ED Provider  Electronically Signed by           Татьяна Glynn MD  02/28/19 [id number]

## 2019-03-07 DIAGNOSIS — I25.10 CORONARY ARTERY DISEASE DUE TO LIPID RICH PLAQUE: ICD-10-CM

## 2019-03-07 DIAGNOSIS — I25.83 CORONARY ARTERY DISEASE DUE TO LIPID RICH PLAQUE: ICD-10-CM

## 2019-03-07 DIAGNOSIS — I10 ESSENTIAL HYPERTENSION: ICD-10-CM

## 2019-03-07 DIAGNOSIS — K59.00 CONSTIPATION, UNSPECIFIED CONSTIPATION TYPE: ICD-10-CM

## 2019-03-07 DIAGNOSIS — M79.7 FIBROMYALGIA: ICD-10-CM

## 2019-03-08 DIAGNOSIS — E55.9 VITAMIN D DEFICIENCY: ICD-10-CM

## 2019-03-08 DIAGNOSIS — E78.2 MIXED HYPERLIPIDEMIA: ICD-10-CM

## 2019-03-08 DIAGNOSIS — K59.00 CONSTIPATION, UNSPECIFIED CONSTIPATION TYPE: ICD-10-CM

## 2019-03-11 DIAGNOSIS — M79.7 FIBROMYALGIA: ICD-10-CM

## 2019-03-11 RX ORDER — SENNOSIDES 8.6 MG
1 TABLET ORAL
Qty: 120 EACH | Refills: 0 | Status: SHIPPED | OUTPATIENT
Start: 2019-03-11 | End: 2019-01-01 | Stop reason: SDUPTHER

## 2019-03-11 RX ORDER — ERGOCALCIFEROL 1.25 MG/1
50000 CAPSULE ORAL WEEKLY
Qty: 4 CAPSULE | Refills: 0 | Status: SHIPPED | OUTPATIENT
Start: 2019-03-11 | End: 2019-05-31 | Stop reason: SDUPTHER

## 2019-03-11 RX ORDER — LISINOPRIL 5 MG/1
5 TABLET ORAL DAILY
Qty: 90 TABLET | Refills: 0 | Status: SHIPPED | OUTPATIENT
Start: 2019-03-11 | End: 2019-05-30 | Stop reason: HOSPADM

## 2019-03-11 RX ORDER — NORTRIPTYLINE HYDROCHLORIDE 50 MG/1
50 CAPSULE ORAL DAILY
Qty: 90 CAPSULE | Refills: 0 | Status: SHIPPED | OUTPATIENT
Start: 2019-03-11 | End: 2020-01-01 | Stop reason: HOSPADM

## 2019-03-11 RX ORDER — ATORVASTATIN CALCIUM 40 MG/1
TABLET, FILM COATED ORAL
Qty: 30 TABLET | Refills: 0 | Status: SHIPPED | OUTPATIENT
Start: 2019-03-11 | End: 2019-05-31 | Stop reason: SDUPTHER

## 2019-03-11 RX ORDER — DOCUSATE SODIUM 100 MG/1
CAPSULE ORAL
Qty: 10 CAPSULE | Refills: 0 | Status: SHIPPED | OUTPATIENT
Start: 2019-03-11 | End: 2020-01-01 | Stop reason: SDUPTHER

## 2019-03-11 RX ORDER — PREGABALIN 50 MG/1
50 CAPSULE ORAL 2 TIMES DAILY
Qty: 60 CAPSULE | Refills: 3 | Status: SHIPPED | OUTPATIENT
Start: 2019-03-11 | End: 2019-01-01 | Stop reason: SDUPTHER

## 2019-03-11 RX ORDER — ASPIRIN 81 MG/1
81 TABLET ORAL DAILY
Qty: 30 TABLET | Refills: 0 | Status: SHIPPED | OUTPATIENT
Start: 2019-03-11 | End: 2019-01-01 | Stop reason: SDUPTHER

## 2019-03-11 RX ORDER — PREGABALIN 50 MG/1
50 CAPSULE ORAL 2 TIMES DAILY
Qty: 60 CAPSULE | Refills: 3 | OUTPATIENT
Start: 2019-03-11

## 2019-03-13 ENCOUNTER — TELEPHONE (OUTPATIENT)
Dept: FAMILY MEDICINE CLINIC | Facility: CLINIC | Age: 54
End: 2019-03-13

## 2019-03-13 NOTE — TELEPHONE ENCOUNTER
Pls be advised    PT called stating she was discharged from home health care, spoke w/home health care and they stated they discharged pt because she was non compliant (pt disagrees)  They informed pt she can reach out to her pcp and have them issue a new script for at home care and take it to a different home care agency for services  Which is wht PT is calling in for today

## 2019-03-18 ENCOUNTER — TELEPHONE (OUTPATIENT)
Dept: FAMILY MEDICINE CLINIC | Facility: CLINIC | Age: 54
End: 2019-03-18

## 2019-03-18 NOTE — TELEPHONE ENCOUNTER
PT is calling to check in on status for script for at home care services  She is desperately seeking for someone to do something about her catheter  Pls provide update

## 2019-03-19 NOTE — TELEPHONE ENCOUNTER
Patient has called twice regarding her catheter that needs to be changed as pre patient she has been with it for 4 months  Her PCP needs to call [facility] ph: [phone] since she was terminated she needs to be re-established for home care services

## 2019-03-20 ENCOUNTER — TELEPHONE (OUTPATIENT)
Dept: FAMILY MEDICINE CLINIC | Facility: CLINIC | Age: 54
End: 2019-03-20

## 2019-03-20 NOTE — TELEPHONE ENCOUNTER
Unable to reach patient, if she has supplies I can change it in clinic  Please call her back and let her know

## 2019-03-22 ENCOUNTER — TELEPHONE (OUTPATIENT)
Dept: FAMILY MEDICINE CLINIC | Facility: CLINIC | Age: 54
End: 2019-03-22

## 2019-03-22 NOTE — TELEPHONE ENCOUNTER
Pt called stating she lost the cap to her catheter while showering and cap went down drain  We verified if she can be seen in office and then She was ref to visit the ER

## 2019-03-25 ENCOUNTER — APPOINTMENT (OUTPATIENT)
Dept: LAB | Facility: HOSPITAL | Age: 54
End: 2019-03-25
Payer: COMMERCIAL

## 2019-03-25 ENCOUNTER — TELEPHONE (OUTPATIENT)
Dept: FAMILY MEDICINE CLINIC | Facility: CLINIC | Age: 54
End: 2019-03-25

## 2019-03-25 ENCOUNTER — OFFICE VISIT (OUTPATIENT)
Dept: FAMILY MEDICINE CLINIC | Facility: CLINIC | Age: 54
End: 2019-03-25

## 2019-03-25 VITALS
HEIGHT: 63 IN | OXYGEN SATURATION: 97 % | WEIGHT: 183 LBS | HEART RATE: 89 BPM | BODY MASS INDEX: 32.43 KG/M2 | RESPIRATION RATE: 18 BRPM | DIASTOLIC BLOOD PRESSURE: 100 MMHG | SYSTOLIC BLOOD PRESSURE: 152 MMHG | TEMPERATURE: 97.6 F

## 2019-03-25 DIAGNOSIS — Z12.11 COLON CANCER SCREENING: Primary | ICD-10-CM

## 2019-03-25 DIAGNOSIS — E11.22 TYPE 2 DIABETES MELLITUS WITH STAGE 3 CHRONIC KIDNEY DISEASE, WITH LONG-TERM CURRENT USE OF INSULIN (HCC): ICD-10-CM

## 2019-03-25 DIAGNOSIS — D53.9 MACROCYTIC ANEMIA: ICD-10-CM

## 2019-03-25 DIAGNOSIS — Z79.4 TYPE 2 DIABETES MELLITUS WITH STAGE 3 CHRONIC KIDNEY DISEASE, WITH LONG-TERM CURRENT USE OF INSULIN (HCC): ICD-10-CM

## 2019-03-25 DIAGNOSIS — N31.9 NEUROGENIC BLADDER: ICD-10-CM

## 2019-03-25 DIAGNOSIS — E55.9 VITAMIN D DEFICIENCY: ICD-10-CM

## 2019-03-25 DIAGNOSIS — N18.30 TYPE 2 DIABETES MELLITUS WITH STAGE 3 CHRONIC KIDNEY DISEASE, WITH LONG-TERM CURRENT USE OF INSULIN (HCC): ICD-10-CM

## 2019-03-25 LAB
25(OH)D3 SERPL-MCNC: 56 NG/ML (ref 30–100)
ANION GAP SERPL CALCULATED.3IONS-SCNC: 9 MMOL/L (ref 5–14)
BASOPHILS # BLD AUTO: 0.1 THOUSANDS/ΜL (ref 0–0.1)
BASOPHILS NFR BLD AUTO: 1 % (ref 0–1)
BUN SERPL-MCNC: 46 MG/DL (ref 5–25)
CALCIUM SERPL-MCNC: 9.2 MG/DL (ref 8.4–10.2)
CHLORIDE SERPL-SCNC: 104 MMOL/L (ref 97–108)
CO2 SERPL-SCNC: 23 MMOL/L (ref 22–30)
CREAT SERPL-MCNC: 2.2 MG/DL (ref 0.6–1.2)
EOSINOPHIL # BLD AUTO: 0.4 THOUSAND/ΜL (ref 0–0.4)
EOSINOPHIL NFR BLD AUTO: 6 % (ref 0–6)
ERYTHROCYTE [DISTWIDTH] IN BLOOD BY AUTOMATED COUNT: 13.3 %
EST. AVERAGE GLUCOSE BLD GHB EST-MCNC: 324 MG/DL
FOLATE SERPL-MCNC: 6.4 NG/ML (ref 3.1–17.5)
GFR SERPL CREATININE-BSD FRML MDRD: 28 ML/MIN/1.73SQ M
GLUCOSE SERPL-MCNC: 266 MG/DL (ref 70–99)
HBA1C MFR BLD: 12.9 % (ref 4.2–6.3)
HCT VFR BLD AUTO: 30.4 % (ref 36–46)
HGB BLD-MCNC: 9.7 G/DL (ref 12–16)
HYPERCHROMIA BLD QL SMEAR: PRESENT
LYMPHOCYTES # BLD AUTO: 2.2 THOUSANDS/ΜL (ref 0.5–4)
LYMPHOCYTES NFR BLD AUTO: 33 % (ref 25–45)
MCH RBC QN AUTO: 32 PG (ref 26–34)
MCHC RBC AUTO-ENTMCNC: 32 G/DL (ref 31–36)
MCV RBC AUTO: 100 FL (ref 80–100)
MONOCYTES # BLD AUTO: 0.3 THOUSAND/ΜL (ref 0.2–0.9)
MONOCYTES NFR BLD AUTO: 5 % (ref 1–10)
NEUTROPHILS # BLD AUTO: 3.7 THOUSANDS/ΜL (ref 1.8–7.8)
NEUTS SEG NFR BLD AUTO: 56 % (ref 45–65)
PLATELET # BLD AUTO: 226 THOUSANDS/UL (ref 150–450)
PLATELET BLD QL SMEAR: ADEQUATE
PMV BLD AUTO: 9.5 FL (ref 8.9–12.7)
POTASSIUM SERPL-SCNC: 4.7 MMOL/L (ref 3.6–5)
RBC # BLD AUTO: 3.04 MILLION/UL (ref 4–5.2)
RBC MORPH BLD: NORMAL
SL AMB POCT GLUCOSE BLD: 281
SL AMB POCT HEMOGLOBIN AIC: 14 (ref ?–6.5)
SODIUM SERPL-SCNC: 136 MMOL/L (ref 137–147)
VIT B12 SERPL-MCNC: 336 PG/ML (ref 100–900)
WBC # BLD AUTO: 6.7 THOUSAND/UL (ref 4.5–11)

## 2019-03-25 PROCEDURE — 36415 COLL VENOUS BLD VENIPUNCTURE: CPT

## 2019-03-25 PROCEDURE — 82607 VITAMIN B-12: CPT

## 2019-03-25 PROCEDURE — 80048 BASIC METABOLIC PNL TOTAL CA: CPT

## 2019-03-25 PROCEDURE — 99213 OFFICE O/P EST LOW 20 MIN: CPT | Performed by: FAMILY MEDICINE

## 2019-03-25 PROCEDURE — 82948 REAGENT STRIP/BLOOD GLUCOSE: CPT | Performed by: FAMILY MEDICINE

## 2019-03-25 PROCEDURE — 83036 HEMOGLOBIN GLYCOSYLATED A1C: CPT

## 2019-03-25 PROCEDURE — 82746 ASSAY OF FOLIC ACID SERUM: CPT

## 2019-03-25 PROCEDURE — 82306 VITAMIN D 25 HYDROXY: CPT

## 2019-03-25 PROCEDURE — 83036 HEMOGLOBIN GLYCOSYLATED A1C: CPT | Performed by: FAMILY MEDICINE

## 2019-03-25 PROCEDURE — 85025 COMPLETE CBC W/AUTO DIFF WBC: CPT

## 2019-03-25 NOTE — PROGRESS NOTES
Assessment/Plan:    Neurogenic bladder  Suprapubic catheter change in clinic without any complaints  Type 2 diabetes mellitus with kidney complication, with long-term current use of insulin (HCC)  Lab Results   Component Value Date    HGBA1C 9 4 (H) 08/13/2018       No results for input(s): POCGLU in the last 72 hours  Blood Sugar Average: Last 72 hrs:     Age of care testing showed hemoglobin A1c of 12  Patient was sent for immediate labs will return to clinic within a week or 2 to follow up all labs and counseled to bring all her medications to next appointment  Patient verbalized understanding  Diagnoses and all orders for this visit:    Colon cancer screening  -     Ambulatory referral to Gastroenterology; Future    Type 2 diabetes mellitus with stage 3 chronic kidney disease, with long-term current use of insulin (HCC)  -     POCT blood glucose  -     POCT hemoglobin A1c  -     Basic metabolic panel; Future  -     Hemoglobin A1C; Future    Neurogenic bladder          Subjective:      Patient ID: Gemini Bernal is a 52 y o  female  Patient presents today for a sick visit  Patient was supposed to have her catheter changed every 12 weeks however due to insurance issues they are not going to her house [facility] agreed to have the patient come into change in clinic if she had the supplies  Patient presents today with her catheter ready to be exchanged  She denies any purulent drainage or change in the urine in her bag  Patient does have a catheter to exchange however does not have a drainage bag agreed to order some once we find out worsen order to  She denies any systemic symptoms such as fever, nausea, vomiting, abdominal pain, or chills  Patient also found to have point of care hemoglobin A1c of 14 today  Will sent to lab data after clinic to check serum and to get a BMP on return patient to clinic within next week or 2   She does report compliant with her insulin Bacid blocker 40 units q h s  And her NovoLog 20 units before meals b i d  She denies any symptoms of fatigue, dizziness, or change in mental status  Denies any change in her energy level states she feels just fine  Discussed low      The following portions of the patient's history were reviewed and updated as appropriate: allergies, current medications, past family history, past medical history, past social history, past surgical history and problem list     Review of Systems   Constitutional: Negative for activity change, appetite change, fatigue and fever  HENT: Negative for drooling and sore throat  Eyes: Negative for pain and visual disturbance  Respiratory: Negative for cough, chest tightness and shortness of breath  Cardiovascular: Negative for chest pain and palpitations  Gastrointestinal: Negative for abdominal pain, constipation, diarrhea and nausea  Genitourinary: Negative for dysuria and hematuria  Musculoskeletal: Negative for back pain and myalgias  Skin: Negative for color change  Neurological: Negative for numbness and headaches  Psychiatric/Behavioral: Negative for hallucinations and suicidal ideas  Objective:      /100 (BP Location: Left arm, Patient Position: Sitting, Cuff Size: Standard)   Pulse 89   Temp 97 6 °F (36 4 °C) (Temporal)   Resp 18   Ht 5' 3" (1 6 m)   Wt 83 kg (183 lb)   SpO2 97%   BMI 32 42 kg/m²          Physical Exam   Constitutional: She is oriented to person, place, and time  She appears well-developed and well-nourished  HENT:   Head: Normocephalic and atraumatic  Right Ear: External ear normal    Left Ear: External ear normal    Eyes: Pupils are equal, round, and reactive to light  Neck: Normal range of motion  Neck supple  Cardiovascular: Normal rate, regular rhythm, normal heart sounds and intact distal pulses  Pulmonary/Chest: Effort normal and breath sounds normal  No respiratory distress  Abdominal: Soft  Bowel sounds are normal  She exhibits no distension  There is no tenderness  Musculoskeletal: Normal range of motion  She exhibits no edema  Lymphadenopathy:     She has no cervical adenopathy  Neurological: She is alert and oriented to person, place, and time  Skin: Skin is warm and dry  Psychiatric: She has a normal mood and affect

## 2019-03-25 NOTE — ASSESSMENT & PLAN NOTE
Lab Results   Component Value Date    HGBA1C 9 4 (H) 08/13/2018       No results for input(s): POCGLU in the last 72 hours  Blood Sugar Average: Last 72 hrs:     Age of care testing showed hemoglobin A1c of 14  Patient was sent for immediate labs will return to clinic within a week or 2 to follow up all labs and counseled to bring all her medications to next appointment  Patient verbalized understanding

## 2019-03-25 NOTE — PATIENT INSTRUCTIONS
How to Care for Your Suprapubic Catheter   WHAT YOU NEED TO KNOW:   A suprapubic catheter is a sterile (germ-free) tube that drains urine out of your bladder  It is inserted through a stoma (created opening) in your abdomen and into the bladder  The catheter has a small balloon filled with solution that holds the catheter inside your bladder  Suprapubic catheters are used when you have problems urinating because of a medical condition  A suprapubic catheter is also called an indwelling urinary catheter  DISCHARGE INSTRUCTIONS:   Catheter problem signs:  Know the signs of problems related to your catheter:  · Lower abdomen pain:  This can be a sign of infection  You may also have pain if the catheter is in the wrong place  · Urine leaking from your stoma or urethra:  Wet clothes or a wet bed may be signs that your catheter is not draining as it should  You may get some leaking of urine from your stoma or urethra if you have bladder spasms  A lot of urine leaking is not normal  The catheter may be blocked or in the wrong position  The drainage tubing may be blocked or kinked  Leaking urine can also be a sign of infection  · No urine draining from the catheter:  No urine drainage for 6 to 8 hours is a sign that your catheter is not working properly  Pain or fullness in you lower abdomen can also be signs of catheter blockage or infection  You may also feel restless  If you have any of these signs, do the following:     ¨ Check to see if the urine tubing is twisted or bent or if you are lying on the catheter or tubing  ¨ Make sure the urine bag and tubing are located below the level of your waist    ¨ Move to a different position  ¨ Contact your healthcare provider immediately if there is still no urine draining or if you continue to have pain or fullness or feel restless  Suprapubic catheter change problems:  Know what to do if you have any of these problems:  · Unplanned catheter change:   Your catheter may accidently fall out or be pulled out  You or a person who helps care for you will need to learn how to put in a new catheter  This must be done right away  Your stoma can start to close up quickly if it does not have a catheter in it  · Old catheter does not come out easily:  Some types of catheter balloons get ridges on them or may hold their inflated shape after the water is removed  You may need a different type of catheter if this happens  A catheter that does not come out easily can damage your stoma and increase your risk for infection  Tell your healthcare provider if you have problems with removing your old catheter  · Not able to insert the new catheter: If you have trouble, cover the stoma with a sterile bandage and call your healthcare provider right away  · New catheter balloon in the wrong place:  A catheter balloon that is in the wrong place may cause pain when you try to fill it  ¨ Not inserted deep enough: This can cause pain and may damage your stoma if the catheter balloon is in the stoma when the balloon is filled  Damage to your stoma can make inserting a new catheter harder or lead to an infection  Insert more of the catheter and try to fill the balloon again  ¨ Inserted too deep:  A catheter that is inserted too far into your bladder can pass into your urethra  This can cause pain and leaking urine when the balloon is filled  In women the end of the new catheter may poke out of the urethra  Your catheter will not drain urine as it should if this happens and may damage your urethra  Pull the catheter back several inches and try to fill the balloon again  Tell your healthcare provider if this happens  Prevent infections:  Urinary catheter-based infections are common and can lead to serious illness and death  Proper care and cleaning of the catheter, the insertion site, and the urine drainage bag can help prevent infection   The following are ways you can help prevent catheter-based infections:  · Drinking liquids:  Adults should drink about 9 to 13 cups of liquid each day  One cup is 8 ounces  Good choices of liquids for most people include water, juice, and milk  Coffee, soup, and fruit may be counted in your daily liquid amount  Ask your caregiver how much liquid you should drink each day  · Good hand hygiene is the best way to prevent infections   Always wash your hands with soap and water before and after you touch your catheter, tubing, or drainage bag  Do this to remove germs on your hands before you touch these items  Do this after you touch these items to remove germs that may have been on them  Wear clean medical gloves when you care for your catheter or disconnect the drainage bag  This will help stop germs from getting into your catheter  Remind anyone who cares for your catheter or drainage system to wash his or her hands  · Ask your healthcare provider when your catheter will be removed or replaced with a new one  Your risk for infection is greater the longer you have a catheter  · Always do proper care and cleaning of the catheter, its insertion site, and the drainage bag  · Keep the catheter drainage system closed  · Keep the catheter tube secured to your skin or leg so that it drains well  Prevent drainage bag problems:   · Use good hand hygiene:  Keep your hands clean and as free of germs as possible  Always wash your hands before and after you touch the catheter or the insertion site  Wear clean medical gloves when you care for your catheter  · Allow gravity drainage and position the drainage bag properly:  Do not loop or kink the tubing so urine can flow out  Keep the drainage bag below the level of your waist  This helps stop urine from moving back up the tubing and into your bladder  · Keep the bag off the floor:  Do not let the drainage bag touch or lie on the floor  · Empty the drainage bag when needed:   The weight of a full drainage bag can pull on and hurt your stoma  Empty the drainage bag every 3 to 6 hours or when it is ½ to ? full  · Clean and change the drainage bag as directed:  Ask your healthcare provider how often you should change the drainage bag  You may buy a special solution to clean the drainage bag, or you may make a solution with household bleach or vinegar and tap water  Wear medical gloves if you must disconnect the tubing  Do not allow the end of the catheter or tubing to touch anything  Clean the ends with a new alcohol pad or as directed before you reconnect them  Prevent catheter and stoma problems:   · Stoma site care:  Clean the skin around your stoma every day or as directed by your healthcare provider  Keep the area clean and dry to prevent skin problems  Look for redness, skin injuries, red spots, drainage, and swelling  Report any skin changes to your healthcare provider  · Know how far to insert your new catheter:  Know how far to insert the new catheter to prevent it from being in the stoma or urethra  Check the catheter position if you have discomfort or pain when you fill the catheter balloon  · Prevent stoma damage or loss of stoma:  Tell your healthcare provider if you have problems removing a catheter  A catheter that does not come out easily can damage your stoma and increases your risk for infection  You may need a different type of catheter  Your stoma can start to close off quickly if you wait longer than 5 to 10 minutes to insert a new one  Make sure you always have enough supplies to be able to change your catheter  Always keep an extra catheter with you  Healthcare providers may be able to save your stoma if you seek care immediately   · Prevent blockage of catheter or tubing:  Signs that your catheter or tubing is blocked or kinked include urine leaking from your stoma or urethra or urine not draining at all  Your risk for infection increases if the tube is blocked  Keep the tubing in a straight line when you hang your drainage bag to prevent it from getting blocked  · Secure your catheter well:  Catheters that are not secured can pull at the insertion site  This causes the stoma to get bigger and urine may start to leak out of the stoma  Make sure the device holding your catheter does not block the tubing  Change how you secure the catheter if you develop an overgrowth of skin at the insertion site  Secure the catheter in a different direction than usual, or secure the drainage bag to your other leg  Take your medicine as directed  Contact your healthcare provider if you think your medicine is not helping or if you have side effects  Tell him of her if you are allergic to any medicine  Keep a list of the medicines, vitamins, and herbs you take  Include the amounts, and when and why you take them  Bring the list or the pill bottles to follow-up visits  Carry your medicine list with you in case of an emergency  Follow up with your healthcare provider as directed:  Write down your questions so you remember to ask them during your visits  Contact your healthcare provider if:   · You have a fever  · You have changes in how your urine looks or smells, or you have blood in your urine  · You have overgrowth of skin at the insertion site that is getting larger  · Urine keeps draining out of the catheter insertion site or from your urethra  · There is less urine than usual or no urine draining into the drainage bag  · The closed drainage system has accidently come open or apart  · Your catheter keeps getting blocked  · Your catheter came out and you have replaced it with a new one  Return to the emergency department if:   · Your catheter becomes blocked and you cannot reach your healthcare provider  · Your catheter comes out and you cannot replace it yourself      · Your insertion site is red, has green or yellow discharge, smells bad, or is bleeding more than usual     · You have pain in your hip, back, pelvis, or lower abdomen  · You are confused or have other changes in the way that you think  © 2017 [employer] Information is for End User's use only and may not be sold, redistributed or otherwise used for commercial purposes  All illustrations and images included in CareNotes® are the copyrighted property of A [employer]  or [employer]  The above information is an  only  It is not intended as medical advice for individual conditions or treatments  Talk to your doctor, nurse or pharmacist before following any medical regimen to see if it is safe and effective for you

## 2019-03-26 ENCOUNTER — TELEPHONE (OUTPATIENT)
Dept: OTHER | Facility: OTHER | Age: 54
End: 2019-03-26

## 2019-03-26 NOTE — TELEPHONE ENCOUNTER
The patient is requesting a call from the office to schedule a appointment and to confirm her order of gauze and tubes/ bags for her  Catheter

## 2019-03-27 NOTE — TELEPHONE ENCOUNTER
Pt is already booked but I left her a message to call the office back  I need the number to [employer] which supplies her with her supplies  There are no previous forms on file as I thought there may have been but they're probably in [employer] which I no longer have access to

## 2019-04-05 ENCOUNTER — TELEPHONE (OUTPATIENT)
Dept: FAMILY MEDICINE CLINIC | Facility: CLINIC | Age: 54
End: 2019-04-05

## 2019-04-08 ENCOUNTER — TELEPHONE (OUTPATIENT)
Dept: FAMILY MEDICINE CLINIC | Facility: CLINIC | Age: 54
End: 2019-04-08

## 2019-04-25 ENCOUNTER — APPOINTMENT (OUTPATIENT)
Dept: LAB | Facility: HOSPITAL | Age: 54
End: 2019-04-25
Payer: COMMERCIAL

## 2019-04-25 ENCOUNTER — OFFICE VISIT (OUTPATIENT)
Dept: FAMILY MEDICINE CLINIC | Facility: CLINIC | Age: 54
End: 2019-04-25

## 2019-04-25 ENCOUNTER — HOSPITAL ENCOUNTER (OUTPATIENT)
Dept: RADIOLOGY | Facility: HOSPITAL | Age: 54
Discharge: HOME | End: 2019-04-25
Payer: COMMERCIAL

## 2019-04-25 VITALS
SYSTOLIC BLOOD PRESSURE: 146 MMHG | RESPIRATION RATE: 16 BRPM | OXYGEN SATURATION: 95 % | WEIGHT: 185 LBS | HEART RATE: 99 BPM | TEMPERATURE: 98.4 F | BODY MASS INDEX: 32.77 KG/M2 | DIASTOLIC BLOOD PRESSURE: 94 MMHG

## 2019-04-25 DIAGNOSIS — E11.9 DM2 (DIABETES MELLITUS, TYPE 2) (HCC): ICD-10-CM

## 2019-04-25 DIAGNOSIS — N31.9 NEUROGENIC BLADDER: ICD-10-CM

## 2019-04-25 DIAGNOSIS — E11.29 TYPE 2 DIABETES MELLITUS WITH KIDNEY COMPLICATION, WITH LONG-TERM CURRENT USE OF INSULIN (HCC): ICD-10-CM

## 2019-04-25 DIAGNOSIS — E11.22 TYPE 2 DIABETES MELLITUS WITH STAGE 3 CHRONIC KIDNEY DISEASE, WITH LONG-TERM CURRENT USE OF INSULIN (HCC): ICD-10-CM

## 2019-04-25 DIAGNOSIS — D53.9 MACROCYTIC ANEMIA: ICD-10-CM

## 2019-04-25 DIAGNOSIS — Z79.4 TYPE 2 DIABETES MELLITUS WITH KIDNEY COMPLICATION, WITH LONG-TERM CURRENT USE OF INSULIN (HCC): ICD-10-CM

## 2019-04-25 DIAGNOSIS — N18.30 STAGE 3 CHRONIC KIDNEY DISEASE (HCC): ICD-10-CM

## 2019-04-25 DIAGNOSIS — D53.9 MACROCYTIC ANEMIA: Primary | ICD-10-CM

## 2019-04-25 DIAGNOSIS — N18.30 TYPE 2 DIABETES MELLITUS WITH STAGE 3 CHRONIC KIDNEY DISEASE, WITH LONG-TERM CURRENT USE OF INSULIN (HCC): ICD-10-CM

## 2019-04-25 DIAGNOSIS — Z79.4 TYPE 2 DIABETES MELLITUS WITH STAGE 3 CHRONIC KIDNEY DISEASE, WITH LONG-TERM CURRENT USE OF INSULIN (HCC): ICD-10-CM

## 2019-04-25 DIAGNOSIS — I10 ESSENTIAL HYPERTENSION: ICD-10-CM

## 2019-04-25 DIAGNOSIS — M25.551 ACUTE RIGHT HIP PAIN: ICD-10-CM

## 2019-04-25 PROCEDURE — 99213 OFFICE O/P EST LOW 20 MIN: CPT | Performed by: FAMILY MEDICINE

## 2019-04-25 PROCEDURE — 3066F NEPHROPATHY DOC TX: CPT | Performed by: FAMILY MEDICINE

## 2019-04-25 PROCEDURE — 36415 COLL VENOUS BLD VENIPUNCTURE: CPT

## 2019-04-25 PROCEDURE — 73502 X-RAY EXAM HIP UNI 2-3 VIEWS: CPT

## 2019-04-25 PROCEDURE — 83918 ORGANIC ACIDS TOTAL QUANT: CPT

## 2019-04-25 RX ORDER — LANOLIN ALCOHOL/MO/W.PET/CERES
1000 CREAM (GRAM) TOPICAL DAILY
Qty: 90 TABLET | Refills: 0 | Status: SHIPPED | OUTPATIENT
Start: 2019-04-25 | End: 2020-01-01 | Stop reason: HOSPADM

## 2019-04-28 LAB
METHYLMALONATE SERPL-SCNC: 421 NMOL/L (ref 0–378)
SL AMB DISCLAIMER: ABNORMAL

## 2019-05-02 ENCOUNTER — PATIENT OUTREACH (OUTPATIENT)
Dept: FAMILY MEDICINE CLINIC | Facility: CLINIC | Age: 54
End: 2019-05-02

## 2019-05-06 ENCOUNTER — TELEPHONE (OUTPATIENT)
Dept: FAMILY MEDICINE CLINIC | Facility: CLINIC | Age: 54
End: 2019-05-06

## 2019-05-08 ENCOUNTER — TELEPHONE (OUTPATIENT)
Dept: FAMILY MEDICINE CLINIC | Facility: CLINIC | Age: 54
End: 2019-05-08

## 2019-05-08 ENCOUNTER — HOSPITAL ENCOUNTER (EMERGENCY)
Facility: HOSPITAL | Age: 54
Discharge: HOME | End: 2019-05-08
Attending: EMERGENCY MEDICINE
Payer: COMMERCIAL

## 2019-05-08 VITALS
TEMPERATURE: 97.2 F | OXYGEN SATURATION: 99 % | HEART RATE: 98 BPM | RESPIRATION RATE: 18 BRPM | WEIGHT: 180.78 LBS | SYSTOLIC BLOOD PRESSURE: 148 MMHG | BODY MASS INDEX: 32.02 KG/M2 | DIASTOLIC BLOOD PRESSURE: 85 MMHG

## 2019-05-08 DIAGNOSIS — T83.9XXA PROBLEM WITH FOLEY CATHETER, INITIAL ENCOUNTER (HCC): Primary | ICD-10-CM

## 2019-05-08 PROCEDURE — 99282 EMERGENCY DEPT VISIT SF MDM: CPT | Performed by: EMERGENCY MEDICINE

## 2019-05-08 PROCEDURE — 99283 EMERGENCY DEPT VISIT LOW MDM: CPT

## 2019-05-28 ENCOUNTER — HOSPITAL ENCOUNTER (OUTPATIENT)
Facility: HOSPITAL | Age: 54
Setting detail: OBSERVATION
Discharge: HOME HEALTH | End: 2019-05-30
Attending: EMERGENCY MEDICINE | Admitting: FAMILY MEDICINE
Payer: COMMERCIAL

## 2019-05-28 ENCOUNTER — APPOINTMENT (EMERGENCY)
Dept: CT IMAGING | Facility: HOSPITAL | Age: 54
End: 2019-05-28
Payer: COMMERCIAL

## 2019-05-28 DIAGNOSIS — I10 ESSENTIAL HYPERTENSION: ICD-10-CM

## 2019-05-28 DIAGNOSIS — R10.9 ABDOMINAL PAIN: Primary | ICD-10-CM

## 2019-05-28 DIAGNOSIS — F43.20 ADJUSTMENT DISORDER: ICD-10-CM

## 2019-05-28 DIAGNOSIS — R26.2 AMBULATORY DYSFUNCTION: ICD-10-CM

## 2019-05-28 DIAGNOSIS — K21.9 GASTROESOPHAGEAL REFLUX DISEASE WITHOUT ESOPHAGITIS: ICD-10-CM

## 2019-05-28 LAB
ALBUMIN SERPL BCP-MCNC: 2.9 G/DL (ref 3–5.2)
ALP SERPL-CCNC: 89 U/L (ref 43–122)
ALT SERPL W P-5'-P-CCNC: <6 U/L (ref 9–52)
ANION GAP SERPL CALCULATED.3IONS-SCNC: 1 MMOL/L (ref 5–14)
AST SERPL W P-5'-P-CCNC: 13 U/L (ref 14–36)
BACTERIA UR QL AUTO: ABNORMAL /HPF
BASOPHILS # BLD AUTO: 0.1 THOUSANDS/ΜL (ref 0–0.1)
BASOPHILS NFR BLD AUTO: 1 % (ref 0–1)
BILIRUB SERPL-MCNC: 0.3 MG/DL
BILIRUB UR QL STRIP: NEGATIVE
BUN SERPL-MCNC: 30 MG/DL (ref 5–25)
CALCIUM SERPL-MCNC: 8.9 MG/DL (ref 8.4–10.2)
CHLORIDE SERPL-SCNC: 108 MMOL/L (ref 97–108)
CLARITY UR: ABNORMAL
CO2 SERPL-SCNC: 30 MMOL/L (ref 22–30)
COLOR UR: YELLOW
CREAT SERPL-MCNC: 1.68 MG/DL (ref 0.6–1.2)
EOSINOPHIL # BLD AUTO: 0.4 THOUSAND/ΜL (ref 0–0.4)
EOSINOPHIL NFR BLD AUTO: 5 % (ref 0–6)
ERYTHROCYTE [DISTWIDTH] IN BLOOD BY AUTOMATED COUNT: 12.8 %
GFR SERPL CREATININE-BSD FRML MDRD: 39 ML/MIN/1.73SQ M
GLUCOSE SERPL-MCNC: 55 MG/DL (ref 65–140)
GLUCOSE SERPL-MCNC: 87 MG/DL (ref 70–99)
GLUCOSE UR STRIP-MCNC: ABNORMAL MG/DL
HCT VFR BLD AUTO: 32.2 % (ref 36–46)
HGB BLD-MCNC: 10.9 G/DL (ref 12–16)
HGB UR QL STRIP.AUTO: 50
KETONES UR STRIP-MCNC: NEGATIVE MG/DL
LACTATE SERPL-SCNC: 1.1 MMOL/L (ref 0.7–2)
LEUKOCYTE ESTERASE UR QL STRIP: 100
LIPASE SERPL-CCNC: 104 U/L (ref 23–300)
LYMPHOCYTES # BLD AUTO: 2.3 THOUSANDS/ΜL (ref 0.5–4)
LYMPHOCYTES NFR BLD AUTO: 29 % (ref 25–45)
MCH RBC QN AUTO: 32.4 PG (ref 26–34)
MCHC RBC AUTO-ENTMCNC: 33.9 G/DL (ref 31–36)
MCV RBC AUTO: 96 FL (ref 80–100)
MONOCYTES # BLD AUTO: 0.3 THOUSAND/ΜL (ref 0.2–0.9)
MONOCYTES NFR BLD AUTO: 4 % (ref 1–10)
NEUTROPHILS # BLD AUTO: 4.9 THOUSANDS/ΜL (ref 1.8–7.8)
NEUTS SEG NFR BLD AUTO: 61 % (ref 45–65)
NITRITE UR QL STRIP: POSITIVE
NON-SQ EPI CELLS URNS QL MICRO: ABNORMAL /HPF
OTHER STN SPEC: ABNORMAL
PH UR STRIP.AUTO: 5 [PH]
PLATELET # BLD AUTO: 211 THOUSANDS/UL (ref 150–450)
PMV BLD AUTO: 9.2 FL (ref 8.9–12.7)
POTASSIUM SERPL-SCNC: 3.6 MMOL/L (ref 3.6–5)
PROT SERPL-MCNC: 6.3 G/DL (ref 5.9–8.4)
PROT UR STRIP-MCNC: >=500 MG/DL
RBC # BLD AUTO: 3.37 MILLION/UL (ref 4–5.2)
RBC #/AREA URNS AUTO: ABNORMAL /HPF
SODIUM SERPL-SCNC: 139 MMOL/L (ref 137–147)
SP GR UR STRIP.AUTO: 1.02 (ref 1–1.04)
UROBILINOGEN UA: NEGATIVE MG/DL
WBC # BLD AUTO: 8 THOUSAND/UL (ref 4.5–11)
WBC #/AREA URNS AUTO: ABNORMAL /HPF

## 2019-05-28 PROCEDURE — 82948 REAGENT STRIP/BLOOD GLUCOSE: CPT

## 2019-05-28 PROCEDURE — 99285 EMERGENCY DEPT VISIT HI MDM: CPT

## 2019-05-28 PROCEDURE — 99284 EMERGENCY DEPT VISIT MOD MDM: CPT | Performed by: EMERGENCY MEDICINE

## 2019-05-28 PROCEDURE — 81001 URINALYSIS AUTO W/SCOPE: CPT | Performed by: EMERGENCY MEDICINE

## 2019-05-28 PROCEDURE — 96375 TX/PRO/DX INJ NEW DRUG ADDON: CPT

## 2019-05-28 PROCEDURE — 85025 COMPLETE CBC W/AUTO DIFF WBC: CPT | Performed by: EMERGENCY MEDICINE

## 2019-05-28 PROCEDURE — 83605 ASSAY OF LACTIC ACID: CPT | Performed by: EMERGENCY MEDICINE

## 2019-05-28 PROCEDURE — 96365 THER/PROPH/DIAG IV INF INIT: CPT

## 2019-05-28 PROCEDURE — 83690 ASSAY OF LIPASE: CPT | Performed by: EMERGENCY MEDICINE

## 2019-05-28 PROCEDURE — 80053 COMPREHEN METABOLIC PANEL: CPT | Performed by: EMERGENCY MEDICINE

## 2019-05-28 PROCEDURE — 81003 URINALYSIS AUTO W/O SCOPE: CPT | Performed by: EMERGENCY MEDICINE

## 2019-05-28 PROCEDURE — 87086 URINE CULTURE/COLONY COUNT: CPT | Performed by: EMERGENCY MEDICINE

## 2019-05-28 PROCEDURE — 87077 CULTURE AEROBIC IDENTIFY: CPT | Performed by: EMERGENCY MEDICINE

## 2019-05-28 PROCEDURE — 96374 THER/PROPH/DIAG INJ IV PUSH: CPT

## 2019-05-28 PROCEDURE — 74176 CT ABD & PELVIS W/O CONTRAST: CPT

## 2019-05-28 PROCEDURE — 36415 COLL VENOUS BLD VENIPUNCTURE: CPT | Performed by: EMERGENCY MEDICINE

## 2019-05-28 PROCEDURE — 96361 HYDRATE IV INFUSION ADD-ON: CPT

## 2019-05-28 PROCEDURE — 87186 SC STD MICRODIL/AGAR DIL: CPT | Performed by: EMERGENCY MEDICINE

## 2019-05-28 RX ORDER — LIDOCAINE HYDROCHLORIDE 10 MG/ML
5 INJECTION, SOLUTION EPIDURAL; INFILTRATION; INTRACAUDAL; PERINEURAL ONCE
Status: COMPLETED | OUTPATIENT
Start: 2019-05-28 | End: 2019-05-28

## 2019-05-28 RX ORDER — ONDANSETRON 2 MG/ML
4 INJECTION INTRAMUSCULAR; INTRAVENOUS ONCE
Status: COMPLETED | OUTPATIENT
Start: 2019-05-28 | End: 2019-05-28

## 2019-05-28 RX ADMIN — ONDANSETRON 4 MG: 2 INJECTION INTRAMUSCULAR; INTRAVENOUS at 21:31

## 2019-05-28 RX ADMIN — FAMOTIDINE 20 MG: 10 INJECTION, SOLUTION INTRAVENOUS at 21:31

## 2019-05-28 RX ADMIN — PIPERACILLIN SODIUM,TAZOBACTAM SODIUM 3.38 G: 3; .375 INJECTION, POWDER, FOR SOLUTION INTRAVENOUS at 22:52

## 2019-05-28 RX ADMIN — SODIUM CHLORIDE 1000 ML: 9 INJECTION, SOLUTION INTRAVENOUS at 21:28

## 2019-05-28 RX ADMIN — LIDOCAINE HYDROCHLORIDE 5 ML: 10 INJECTION, SOLUTION EPIDURAL; INFILTRATION; INTRACAUDAL; PERINEURAL at 20:02

## 2019-05-29 PROBLEM — F17.200 TOBACCO DEPENDENCE: Status: ACTIVE | Noted: 2019-05-29

## 2019-05-29 PROBLEM — N39.0 COMPLICATED URINARY TRACT INFECTION: Status: ACTIVE | Noted: 2019-05-29

## 2019-05-29 PROBLEM — R26.2 AMBULATORY DYSFUNCTION: Status: ACTIVE | Noted: 2019-05-29

## 2019-05-29 PROBLEM — R10.13 EPIGASTRIC PAIN: Status: ACTIVE | Noted: 2019-05-29

## 2019-05-29 LAB
ANION GAP SERPL CALCULATED.3IONS-SCNC: 5 MMOL/L (ref 5–14)
BACTERIA UR QL AUTO: ABNORMAL /HPF
BASOPHILS # BLD AUTO: 0.1 THOUSANDS/ΜL (ref 0–0.1)
BASOPHILS NFR BLD AUTO: 1 % (ref 0–1)
BILIRUB UR QL STRIP: NEGATIVE
BUN SERPL-MCNC: 26 MG/DL (ref 5–25)
CALCIUM SERPL-MCNC: 8.5 MG/DL (ref 8.4–10.2)
CHLORIDE SERPL-SCNC: 108 MMOL/L (ref 97–108)
CLARITY UR: ABNORMAL
CO2 SERPL-SCNC: 26 MMOL/L (ref 22–30)
COLOR UR: ABNORMAL
CREAT SERPL-MCNC: 1.58 MG/DL (ref 0.6–1.2)
EOSINOPHIL # BLD AUTO: 0.4 THOUSAND/ΜL (ref 0–0.4)
EOSINOPHIL NFR BLD AUTO: 5 % (ref 0–6)
ERYTHROCYTE [DISTWIDTH] IN BLOOD BY AUTOMATED COUNT: 12.8 %
FOLATE SERPL-MCNC: 18.7 NG/ML (ref 3.1–17.5)
GFR SERPL CREATININE-BSD FRML MDRD: 42 ML/MIN/1.73SQ M
GLUCOSE SERPL-MCNC: 151 MG/DL (ref 65–140)
GLUCOSE SERPL-MCNC: 164 MG/DL (ref 65–140)
GLUCOSE SERPL-MCNC: 256 MG/DL (ref 70–99)
GLUCOSE SERPL-MCNC: 287 MG/DL (ref 65–140)
GLUCOSE SERPL-MCNC: 84 MG/DL (ref 65–140)
GLUCOSE SERPL-MCNC: 96 MG/DL (ref 65–140)
GLUCOSE UR STRIP-MCNC: ABNORMAL MG/DL
HCT VFR BLD AUTO: 32.2 % (ref 36–46)
HGB BLD-MCNC: 10.8 G/DL (ref 12–16)
HGB UR QL STRIP.AUTO: 25
KETONES UR STRIP-MCNC: NEGATIVE MG/DL
LEUKOCYTE ESTERASE UR QL STRIP: 500
LYMPHOCYTES # BLD AUTO: 2.5 THOUSANDS/ΜL (ref 0.5–4)
LYMPHOCYTES NFR BLD AUTO: 33 % (ref 25–45)
MCH RBC QN AUTO: 32.6 PG (ref 26–34)
MCHC RBC AUTO-ENTMCNC: 33.7 G/DL (ref 31–36)
MCV RBC AUTO: 97 FL (ref 80–100)
MONOCYTES # BLD AUTO: 0.3 THOUSAND/ΜL (ref 0.2–0.9)
MONOCYTES NFR BLD AUTO: 4 % (ref 1–10)
NEUTROPHILS # BLD AUTO: 4.2 THOUSANDS/ΜL (ref 1.8–7.8)
NEUTS SEG NFR BLD AUTO: 57 % (ref 45–65)
NITRITE UR QL STRIP: POSITIVE
NON-SQ EPI CELLS URNS QL MICRO: ABNORMAL /HPF
OTHER STN SPEC: ABNORMAL
PH UR STRIP.AUTO: 6 [PH]
PLATELET # BLD AUTO: 209 THOUSANDS/UL (ref 150–450)
PMV BLD AUTO: 9.5 FL (ref 8.9–12.7)
POTASSIUM SERPL-SCNC: 3.5 MMOL/L (ref 3.6–5)
PROT UR STRIP-MCNC: >=500 MG/DL
RBC # BLD AUTO: 3.32 MILLION/UL (ref 4–5.2)
RBC #/AREA URNS AUTO: ABNORMAL /HPF
SODIUM SERPL-SCNC: 139 MMOL/L (ref 137–147)
SP GR UR STRIP.AUTO: 1.01 (ref 1–1.04)
UROBILINOGEN UA: NEGATIVE MG/DL
VIT B12 SERPL-MCNC: 1220 PG/ML (ref 100–900)
WBC # BLD AUTO: 7.4 THOUSAND/UL (ref 4.5–11)
WBC #/AREA URNS AUTO: ABNORMAL /HPF

## 2019-05-29 PROCEDURE — 81003 URINALYSIS AUTO W/O SCOPE: CPT | Performed by: FAMILY MEDICINE

## 2019-05-29 PROCEDURE — 82607 VITAMIN B-12: CPT | Performed by: FAMILY MEDICINE

## 2019-05-29 PROCEDURE — 85025 COMPLETE CBC W/AUTO DIFF WBC: CPT | Performed by: FAMILY MEDICINE

## 2019-05-29 PROCEDURE — 87077 CULTURE AEROBIC IDENTIFY: CPT | Performed by: FAMILY MEDICINE

## 2019-05-29 PROCEDURE — 87186 SC STD MICRODIL/AGAR DIL: CPT | Performed by: FAMILY MEDICINE

## 2019-05-29 PROCEDURE — G8989 SELF CARE D/C STATUS: HCPCS

## 2019-05-29 PROCEDURE — G8978 MOBILITY CURRENT STATUS: HCPCS

## 2019-05-29 PROCEDURE — 99219 PR INITIAL OBSERVATION CARE/DAY 50 MINUTES: CPT | Performed by: FAMILY MEDICINE

## 2019-05-29 PROCEDURE — 81001 URINALYSIS AUTO W/SCOPE: CPT | Performed by: FAMILY MEDICINE

## 2019-05-29 PROCEDURE — 82948 REAGENT STRIP/BLOOD GLUCOSE: CPT

## 2019-05-29 PROCEDURE — G8988 SELF CARE GOAL STATUS: HCPCS

## 2019-05-29 PROCEDURE — C9113 INJ PANTOPRAZOLE SODIUM, VIA: HCPCS | Performed by: FAMILY MEDICINE

## 2019-05-29 PROCEDURE — G8987 SELF CARE CURRENT STATUS: HCPCS

## 2019-05-29 PROCEDURE — 97166 OT EVAL MOD COMPLEX 45 MIN: CPT

## 2019-05-29 PROCEDURE — 97163 PT EVAL HIGH COMPLEX 45 MIN: CPT

## 2019-05-29 PROCEDURE — G8979 MOBILITY GOAL STATUS: HCPCS

## 2019-05-29 PROCEDURE — 80048 BASIC METABOLIC PNL TOTAL CA: CPT | Performed by: FAMILY MEDICINE

## 2019-05-29 PROCEDURE — 82746 ASSAY OF FOLIC ACID SERUM: CPT | Performed by: FAMILY MEDICINE

## 2019-05-29 PROCEDURE — 87086 URINE CULTURE/COLONY COUNT: CPT | Performed by: FAMILY MEDICINE

## 2019-05-29 PROCEDURE — NC001 PR NO CHARGE: Performed by: FAMILY MEDICINE

## 2019-05-29 RX ORDER — SUCRALFATE ORAL 1 G/10ML
1000 SUSPENSION ORAL
Status: DISCONTINUED | OUTPATIENT
Start: 2019-05-29 | End: 2019-05-30 | Stop reason: HOSPADM

## 2019-05-29 RX ORDER — SENNOSIDES 8.6 MG
1 TABLET ORAL
Status: DISCONTINUED | OUTPATIENT
Start: 2019-05-29 | End: 2019-05-30 | Stop reason: HOSPADM

## 2019-05-29 RX ORDER — PANTOPRAZOLE SODIUM 40 MG/1
40 INJECTION, POWDER, FOR SOLUTION INTRAVENOUS DAILY
Status: DISCONTINUED | OUTPATIENT
Start: 2019-05-29 | End: 2019-05-30 | Stop reason: HOSPADM

## 2019-05-29 RX ORDER — MAGNESIUM HYDROXIDE/ALUMINUM HYDROXICE/SIMETHICONE 120; 1200; 1200 MG/30ML; MG/30ML; MG/30ML
30 SUSPENSION ORAL EVERY 4 HOURS PRN
Status: DISCONTINUED | OUTPATIENT
Start: 2019-05-29 | End: 2019-05-30 | Stop reason: HOSPADM

## 2019-05-29 RX ORDER — PREGABALIN 50 MG/1
50 CAPSULE ORAL 2 TIMES DAILY
Status: DISCONTINUED | OUTPATIENT
Start: 2019-05-29 | End: 2019-05-30 | Stop reason: HOSPADM

## 2019-05-29 RX ORDER — ASPIRIN 81 MG/1
81 TABLET ORAL DAILY
Status: DISCONTINUED | OUTPATIENT
Start: 2019-05-29 | End: 2019-05-30 | Stop reason: HOSPADM

## 2019-05-29 RX ORDER — NICOTINE 21 MG/24HR
1 PATCH, TRANSDERMAL 24 HOURS TRANSDERMAL DAILY
Status: DISCONTINUED | OUTPATIENT
Start: 2019-05-29 | End: 2019-05-30 | Stop reason: HOSPADM

## 2019-05-29 RX ORDER — ATORVASTATIN CALCIUM 40 MG/1
40 TABLET, FILM COATED ORAL
Status: DISCONTINUED | OUTPATIENT
Start: 2019-05-29 | End: 2019-05-30 | Stop reason: HOSPADM

## 2019-05-29 RX ORDER — LISINOPRIL 5 MG/1
5 TABLET ORAL DAILY
Status: DISCONTINUED | OUTPATIENT
Start: 2019-05-29 | End: 2019-05-29

## 2019-05-29 RX ORDER — LANOLIN ALCOHOL/MO/W.PET/CERES
1000 CREAM (GRAM) TOPICAL DAILY
Status: DISCONTINUED | OUTPATIENT
Start: 2019-05-29 | End: 2019-05-30 | Stop reason: HOSPADM

## 2019-05-29 RX ORDER — ONDANSETRON 2 MG/ML
4 INJECTION INTRAMUSCULAR; INTRAVENOUS EVERY 8 HOURS PRN
Status: DISCONTINUED | OUTPATIENT
Start: 2019-05-29 | End: 2019-05-30 | Stop reason: HOSPADM

## 2019-05-29 RX ORDER — SODIUM CHLORIDE 9 MG/ML
120 INJECTION, SOLUTION INTRAVENOUS CONTINUOUS
Status: DISCONTINUED | OUTPATIENT
Start: 2019-05-29 | End: 2019-05-29

## 2019-05-29 RX ORDER — INSULIN GLARGINE 100 [IU]/ML
40 INJECTION, SOLUTION SUBCUTANEOUS
Status: DISCONTINUED | OUTPATIENT
Start: 2019-05-30 | End: 2019-05-30 | Stop reason: HOSPADM

## 2019-05-29 RX ORDER — DOCUSATE SODIUM 100 MG/1
100 CAPSULE, LIQUID FILLED ORAL 2 TIMES DAILY
Status: DISCONTINUED | OUTPATIENT
Start: 2019-05-29 | End: 2019-05-30 | Stop reason: HOSPADM

## 2019-05-29 RX ORDER — AMLODIPINE BESYLATE 5 MG/1
5 TABLET ORAL DAILY
Status: DISCONTINUED | OUTPATIENT
Start: 2019-05-29 | End: 2019-05-30 | Stop reason: HOSPADM

## 2019-05-29 RX ORDER — AMLODIPINE BESYLATE 5 MG/1
5 TABLET ORAL DAILY
Qty: 30 TABLET | Refills: 0 | Status: CANCELLED | OUTPATIENT
Start: 2019-05-30 | End: 2019-06-29

## 2019-05-29 RX ADMIN — SUCRALFATE 1000 MG: 1 SUSPENSION ORAL at 06:45

## 2019-05-29 RX ADMIN — INSULIN LISPRO 1 UNITS: 100 INJECTION, SOLUTION INTRAVENOUS; SUBCUTANEOUS at 11:24

## 2019-05-29 RX ADMIN — SENNOSIDES 8.6 MG: 8.6 TABLET, FILM COATED ORAL at 01:55

## 2019-05-29 RX ADMIN — PREGABALIN 50 MG: 50 CAPSULE ORAL at 21:42

## 2019-05-29 RX ADMIN — DOCUSATE SODIUM 100 MG: 100 CAPSULE, LIQUID FILLED ORAL at 18:25

## 2019-05-29 RX ADMIN — ATORVASTATIN CALCIUM 40 MG: 40 TABLET, FILM COATED ORAL at 16:19

## 2019-05-29 RX ADMIN — SUCRALFATE 1000 MG: 1 SUSPENSION ORAL at 16:19

## 2019-05-29 RX ADMIN — ASPIRIN 81 MG: 81 TABLET, COATED ORAL at 08:13

## 2019-05-29 RX ADMIN — PREGABALIN 50 MG: 50 CAPSULE ORAL at 08:14

## 2019-05-29 RX ADMIN — ALUMINUM HYDROXIDE, MAGNESIUM HYDROXIDE, AND SIMETHICONE 30 ML: 200; 200; 20 SUSPENSION ORAL at 01:37

## 2019-05-29 RX ADMIN — NICOTINE 1 PATCH: 14 PATCH, EXTENDED RELEASE TRANSDERMAL at 08:14

## 2019-05-29 RX ADMIN — ENOXAPARIN SODIUM 40 MG: 40 INJECTION SUBCUTANEOUS at 08:14

## 2019-05-29 RX ADMIN — INSULIN LISPRO 3 UNITS: 100 INJECTION, SOLUTION INTRAVENOUS; SUBCUTANEOUS at 08:14

## 2019-05-29 RX ADMIN — CYANOCOBALAMIN TAB 1000 MCG 1000 MCG: 1000 TAB at 08:13

## 2019-05-29 RX ADMIN — SENNOSIDES 8.6 MG: 8.6 TABLET, FILM COATED ORAL at 21:42

## 2019-05-29 RX ADMIN — DOCUSATE SODIUM 100 MG: 100 CAPSULE, LIQUID FILLED ORAL at 08:13

## 2019-05-29 RX ADMIN — AMLODIPINE BESYLATE 5 MG: 5 TABLET ORAL at 08:13

## 2019-05-29 RX ADMIN — SUCRALFATE 1000 MG: 1 SUSPENSION ORAL at 11:23

## 2019-05-29 RX ADMIN — INSULIN LISPRO 1 UNITS: 100 INJECTION, SOLUTION INTRAVENOUS; SUBCUTANEOUS at 16:19

## 2019-05-29 RX ADMIN — PANTOPRAZOLE SODIUM 40 MG: 40 INJECTION, POWDER, FOR SOLUTION INTRAVENOUS at 08:14

## 2019-05-29 RX ADMIN — SODIUM CHLORIDE 120 ML/HR: 9 INJECTION, SOLUTION INTRAVENOUS at 10:45

## 2019-05-29 RX ADMIN — SODIUM CHLORIDE 120 ML/HR: 9 INJECTION, SOLUTION INTRAVENOUS at 01:39

## 2019-05-29 RX ADMIN — SUCRALFATE 1000 MG: 1 SUSPENSION ORAL at 21:42

## 2019-05-30 VITALS
SYSTOLIC BLOOD PRESSURE: 151 MMHG | BODY MASS INDEX: 31.76 KG/M2 | TEMPERATURE: 98.7 F | OXYGEN SATURATION: 96 % | HEIGHT: 63 IN | DIASTOLIC BLOOD PRESSURE: 98 MMHG | RESPIRATION RATE: 18 BRPM | HEART RATE: 85 BPM | WEIGHT: 179.23 LBS

## 2019-05-30 LAB — GLUCOSE SERPL-MCNC: 283 MG/DL (ref 65–140)

## 2019-05-30 PROCEDURE — C9113 INJ PANTOPRAZOLE SODIUM, VIA: HCPCS | Performed by: FAMILY MEDICINE

## 2019-05-30 PROCEDURE — 82948 REAGENT STRIP/BLOOD GLUCOSE: CPT

## 2019-05-30 RX ORDER — AMLODIPINE BESYLATE 5 MG/1
5 TABLET ORAL DAILY
Qty: 30 TABLET | Refills: 0 | Status: SHIPPED | OUTPATIENT
Start: 2019-05-30 | End: 2019-06-26 | Stop reason: SDUPTHER

## 2019-05-30 RX ORDER — MAGNESIUM HYDROXIDE/ALUMINUM HYDROXICE/SIMETHICONE 120; 1200; 1200 MG/30ML; MG/30ML; MG/30ML
30 SUSPENSION ORAL EVERY 4 HOURS PRN
Qty: 355 ML | Refills: 0 | Status: SHIPPED | OUTPATIENT
Start: 2019-05-30 | End: 2020-01-01 | Stop reason: HOSPADM

## 2019-05-30 RX ADMIN — CYANOCOBALAMIN TAB 1000 MCG 1000 MCG: 1000 TAB at 09:13

## 2019-05-30 RX ADMIN — ENOXAPARIN SODIUM 40 MG: 40 INJECTION SUBCUTANEOUS at 09:12

## 2019-05-30 RX ADMIN — AMLODIPINE BESYLATE 5 MG: 5 TABLET ORAL at 09:13

## 2019-05-30 RX ADMIN — ASPIRIN 81 MG: 81 TABLET, COATED ORAL at 09:13

## 2019-05-30 RX ADMIN — INSULIN LISPRO 3 UNITS: 100 INJECTION, SOLUTION INTRAVENOUS; SUBCUTANEOUS at 07:43

## 2019-05-30 RX ADMIN — DOCUSATE SODIUM 100 MG: 100 CAPSULE, LIQUID FILLED ORAL at 09:13

## 2019-05-30 RX ADMIN — PANTOPRAZOLE SODIUM 40 MG: 40 INJECTION, POWDER, FOR SOLUTION INTRAVENOUS at 09:12

## 2019-05-30 RX ADMIN — SUCRALFATE 1000 MG: 1 SUSPENSION ORAL at 06:27

## 2019-05-30 RX ADMIN — PREGABALIN 50 MG: 50 CAPSULE ORAL at 09:13

## 2019-05-30 RX ADMIN — NICOTINE 1 PATCH: 14 PATCH, EXTENDED RELEASE TRANSDERMAL at 09:15

## 2019-05-31 ENCOUNTER — TRANSITIONAL CARE MANAGEMENT (OUTPATIENT)
Dept: FAMILY MEDICINE CLINIC | Facility: CLINIC | Age: 54
End: 2019-05-31

## 2019-05-31 DIAGNOSIS — E78.2 MIXED HYPERLIPIDEMIA: ICD-10-CM

## 2019-05-31 DIAGNOSIS — E55.9 VITAMIN D DEFICIENCY: ICD-10-CM

## 2019-05-31 DIAGNOSIS — K21.9 GASTROESOPHAGEAL REFLUX DISEASE WITHOUT ESOPHAGITIS: ICD-10-CM

## 2019-05-31 LAB
BACTERIA UR CULT: ABNORMAL

## 2019-05-31 RX ORDER — OMEPRAZOLE 20 MG/1
20 CAPSULE, DELAYED RELEASE ORAL
Qty: 90 CAPSULE | Refills: 0 | Status: SHIPPED | OUTPATIENT
Start: 2019-05-31 | End: 2020-01-01 | Stop reason: HOSPADM

## 2019-05-31 RX ORDER — ERGOCALCIFEROL 1.25 MG/1
50000 CAPSULE ORAL WEEKLY
Qty: 4 CAPSULE | Refills: 0 | Status: SHIPPED | OUTPATIENT
Start: 2019-05-31 | End: 2019-06-26 | Stop reason: SDUPTHER

## 2019-05-31 RX ORDER — ATORVASTATIN CALCIUM 40 MG/1
40 TABLET, FILM COATED ORAL DAILY
Qty: 30 TABLET | Refills: 2 | Status: SHIPPED | OUTPATIENT
Start: 2019-05-31 | End: 2019-01-01 | Stop reason: SDUPTHER

## 2019-06-24 DIAGNOSIS — Z65.8 PSYCHOSOCIAL STRESSORS: Primary | ICD-10-CM

## 2019-06-26 DIAGNOSIS — E55.9 VITAMIN D DEFICIENCY: ICD-10-CM

## 2019-06-26 DIAGNOSIS — I10 ESSENTIAL HYPERTENSION: ICD-10-CM

## 2019-06-27 ENCOUNTER — PATIENT OUTREACH (OUTPATIENT)
Dept: OBGYN CLINIC | Facility: CLINIC | Age: 54
End: 2019-06-27

## 2019-06-27 RX ORDER — ERGOCALCIFEROL 1.25 MG/1
50000 CAPSULE ORAL WEEKLY
Qty: 4 CAPSULE | Refills: 0 | Status: SHIPPED | OUTPATIENT
Start: 2019-06-27 | End: 2019-01-01 | Stop reason: SDUPTHER

## 2019-06-27 RX ORDER — AMLODIPINE BESYLATE 5 MG/1
5 TABLET ORAL DAILY
Qty: 30 TABLET | Refills: 0 | Status: SHIPPED | OUTPATIENT
Start: 2019-06-27 | End: 2019-01-01 | Stop reason: SDUPTHER

## 2019-07-02 ENCOUNTER — PATIENT OUTREACH (OUTPATIENT)
Dept: FAMILY MEDICINE CLINIC | Facility: CLINIC | Age: 54
End: 2019-07-02

## 2019-07-02 NOTE — PROGRESS NOTES
Received request to contact patient to provide information regarding outpatient mental health options  Call to patient regarding same  Patient confirms that she is interested in outpatient counseling  She reports that she resides with her daughter and her grandson  She reports that her daughter is a supportive person in her life  Patient states that she is "going through a difficult time" but cannot discuss details at present  Patient reports she has thought about hurting herself but does not feel she would act on it  Informed patient of [facility] ([facility]) AT [place] 24 hr  Hotline however she declines contact number at this time  Supportive counseling provided to patient  Reviewed outpatient counseling options and she requests that information be mailed to her  List of in network outpatient counseling options mailed to patient today along with contact information for [facility] ([facility]) AT [place] number included as well  Patient denies further needs at this time  Will continue to be available to provide support

## 2019-07-08 ENCOUNTER — OFFICE VISIT (OUTPATIENT)
Dept: FAMILY MEDICINE CLINIC | Facility: CLINIC | Age: 54
End: 2019-07-08

## 2019-07-08 ENCOUNTER — HOSPITAL ENCOUNTER (OUTPATIENT)
Dept: RADIOLOGY | Facility: HOSPITAL | Age: 54
Discharge: HOME | End: 2019-07-08
Payer: COMMERCIAL

## 2019-07-08 VITALS
HEART RATE: 95 BPM | RESPIRATION RATE: 16 BRPM | OXYGEN SATURATION: 94 % | HEIGHT: 63 IN | TEMPERATURE: 96.9 F | SYSTOLIC BLOOD PRESSURE: 130 MMHG | DIASTOLIC BLOOD PRESSURE: 80 MMHG | BODY MASS INDEX: 34.91 KG/M2 | WEIGHT: 197 LBS

## 2019-07-08 DIAGNOSIS — E11.22 TYPE 2 DIABETES MELLITUS WITH STAGE 3 CHRONIC KIDNEY DISEASE, WITH LONG-TERM CURRENT USE OF INSULIN (HCC): ICD-10-CM

## 2019-07-08 DIAGNOSIS — M79.604 PAIN OF RIGHT LOWER EXTREMITY: Primary | ICD-10-CM

## 2019-07-08 DIAGNOSIS — M79.604 PAIN OF RIGHT LOWER EXTREMITY: ICD-10-CM

## 2019-07-08 DIAGNOSIS — N18.30 TYPE 2 DIABETES MELLITUS WITH STAGE 3 CHRONIC KIDNEY DISEASE, WITH LONG-TERM CURRENT USE OF INSULIN (HCC): ICD-10-CM

## 2019-07-08 DIAGNOSIS — E11.8 TYPE 2 DIABETES MELLITUS WITH COMPLICATION, UNSPECIFIED WHETHER LONG TERM INSULIN USE: ICD-10-CM

## 2019-07-08 DIAGNOSIS — Z79.4 TYPE 2 DIABETES MELLITUS WITH STAGE 3 CHRONIC KIDNEY DISEASE, WITH LONG-TERM CURRENT USE OF INSULIN (HCC): ICD-10-CM

## 2019-07-08 LAB
CREAT UR-MCNC: 72.7 MG/DL
MICROALBUMIN UR-MCNC: 5510 MG/L (ref 0–20)
MICROALBUMIN/CREAT 24H UR: 7579 MG/G CREATININE (ref 0–30)
SL AMB POCT HEMOGLOBIN AIC: 9.1 (ref ?–6.5)

## 2019-07-08 PROCEDURE — 99213 OFFICE O/P EST LOW 20 MIN: CPT | Performed by: FAMILY MEDICINE

## 2019-07-08 PROCEDURE — 82043 UR ALBUMIN QUANTITATIVE: CPT | Performed by: FAMILY MEDICINE

## 2019-07-08 PROCEDURE — 82570 ASSAY OF URINE CREATININE: CPT | Performed by: FAMILY MEDICINE

## 2019-07-08 PROCEDURE — 3046F HEMOGLOBIN A1C LEVEL >9.0%: CPT | Performed by: FAMILY MEDICINE

## 2019-07-08 PROCEDURE — 83036 HEMOGLOBIN GLYCOSYLATED A1C: CPT | Performed by: FAMILY MEDICINE

## 2019-07-08 PROCEDURE — 73552 X-RAY EXAM OF FEMUR 2/>: CPT

## 2019-07-08 PROCEDURE — 3062F POS MACROALBUMINURIA REV: CPT | Performed by: FAMILY MEDICINE

## 2019-07-08 RX ORDER — PREDNISONE 20 MG/1
40 TABLET ORAL DAILY
Qty: 14 TABLET | Refills: 0 | Status: SHIPPED | OUTPATIENT
Start: 2019-07-08 | End: 2020-01-01 | Stop reason: HOSPADM

## 2019-07-08 NOTE — PROGRESS NOTES
Assessment/Plan:    Type 2 diabetes mellitus with kidney complication, with long-term current use of insulin (HCC)  Lab Results   Component Value Date    HGBA1C 9 1 (A) 07/08/2019       No results for input(s): POCGLU in the last 72 hours  Blood Sugar Average: Last 72 hrs:   patient states that her sugar has been more under control recently it usually runs in the low 100s  She is currently on insulin lispro 10 units 3 times a day and insulin Basaglar or 40 units daily  Will continue this regimen for now  Diabetic diet discussed    Pain of right lower extremity  Patient complains of the pain in her right thigh that has been going on for couple months and getting progressively worse  X-ray of the hip showed Right hip joint space narrowing, femoral head spur, and slight acetabular subchondral sclerosis  Patient is not a candidate for NSAID due to her kidney disease  She does have periodic tingling in her feet, but states that has been chronic, no circulatory abnormalities or skin changes noted  Will give a trial of steroids 40 mg of prednisone for 7 days, will order Xray of the femur, follow up in 1 week, will consider Nortriptiline if no improvement       Diagnoses and all orders for this visit:    Pain of right lower extremity  -     XR femur 2 vw right; Future  -     predniSONE 20 mg tablet; Take 2 tablets (40 mg total) by mouth daily    Type 2 diabetes mellitus with complication, unspecified whether long term insulin use (HCC)  -     Microalbumin / creatinine urine ratio  -     POCT hemoglobin A1c    Type 2 diabetes mellitus with stage 3 chronic kidney disease, with long-term current use of insulin ([facility])          Subjective:      Patient ID: Yessica Edwards is a 62 y o  female  Patient presented to follow-up on diabetes  She complains of the pain in her leg at this visit also  She described this pain as in knowing, dull, constant     She is not able to say if it is worse while standing versus walking vs sitting, she yells saying that it is bad all the time  She admits to having periodically tingling and numbness sensation in both feet  She is taking Tylenol for the pain with minimal relief  Patient denies any open wounds, skin changes, fever or other complaints  The following portions of the patient's history were reviewed and updated as appropriate: allergies, current medications, past family history, past medical history, past social history, past surgical history and problem list     Review of Systems   Constitutional: Negative for activity change, appetite change, fatigue and fever  HENT: Negative for drooling and sore throat  Eyes: Negative for pain and visual disturbance  Respiratory: Negative for cough, chest tightness and shortness of breath  Cardiovascular: Negative for chest pain and palpitations  Gastrointestinal: Negative for abdominal pain, constipation, diarrhea and nausea  Genitourinary: Negative for dysuria and hematuria  Musculoskeletal: Positive for myalgias (righ thigh pain)  Negative for back pain  Skin: Negative for color change  Neurological: Negative for weakness, numbness and headaches  Psychiatric/Behavioral: Negative for suicidal ideas  Objective:      /80 (BP Location: Left arm, Patient Position: Sitting, Cuff Size: Adult)   Pulse 95   Temp (!) 96 9 °F (36 1 °C) (Tympanic)   Resp 16   Ht 5' 3" (1 6 m)   Wt 89 4 kg (197 lb)   SpO2 94%   BMI 34 90 kg/m²          Physical Exam   Constitutional: She appears well-developed  HENT:   Head: Normocephalic  Cardiovascular: Normal rate  Pulmonary/Chest: Effort normal  No respiratory distress  Abdominal: Soft  She exhibits no distension  Musculoskeletal: Normal range of motion  Right hip: She exhibits normal range of motion, no swelling and no deformity  Legs:  Skin: Skin is warm and dry  Capillary refill takes less than 2 seconds  No rash noted  No erythema

## 2019-07-08 NOTE — ASSESSMENT & PLAN NOTE
Lab Results   Component Value Date    HGBA1C 9 1 (A) 07/08/2019       No results for input(s): POCGLU in the last 72 hours      Blood Sugar Average: Last 72 hrs:   patient states that her sugar has been more under control recently it usually runs in the low 100s  She is currently on insulin lispro 10 units 3 times a day and insulin Basaglar or 40 units daily  Will continue this regimen for now  Diabetic diet discussed

## 2019-07-08 NOTE — ASSESSMENT & PLAN NOTE
Patient complains of the pain in her right thigh that has been going on for couple months and getting progressively worse  X-ray of the hip showed Right hip joint space narrowing, femoral head spur, and slight acetabular subchondral sclerosis  Patient is not a candidate for NSAID due to her kidney disease  She does have periodic tingling in her feet, but states that has been chronic, no circulatory abnormalities or skin changes noted  Will give a trial of steroids 40 mg of prednisone for 7 days, will order Xray of the femur, follow up in 1 week, will consider Nortriptiline if no improvement

## 2019-07-15 ENCOUNTER — TELEPHONE (OUTPATIENT)
Dept: FAMILY MEDICINE CLINIC | Facility: CLINIC | Age: 54
End: 2019-07-15

## 2019-07-15 NOTE — TELEPHONE ENCOUNTER
Prednisone was only temporal trial, she can't be on it for long term  She has schedulled appointment with me and we will discuss options

## 2019-07-15 NOTE — TELEPHONE ENCOUNTER
Pt notified, she said Tylenol doesn't help  I told her she has to try it now that she's finishing the steroid, it might be different with the addition of the steroid treatment

## 2019-07-15 NOTE — TELEPHONE ENCOUNTER
She is asking what she is supposed to do for pain until the 29th  States she prednisone was the only thing helping the pain

## 2019-07-15 NOTE — TELEPHONE ENCOUNTER
After the coarse of steroids the pain may not be as bad, she can take Tylenol around the clock every 6 hours

## 2019-07-18 ENCOUNTER — TELEPHONE (OUTPATIENT)
Dept: FAMILY MEDICINE CLINIC | Facility: CLINIC | Age: 54
End: 2019-07-18

## 2019-07-18 DIAGNOSIS — M79.604 PAIN OF RIGHT LOWER EXTREMITY: Primary | ICD-10-CM

## 2019-07-18 DIAGNOSIS — R26.2 AMBULATORY DYSFUNCTION: ICD-10-CM

## 2019-07-18 NOTE — TELEPHONE ENCOUNTER
Pt states she's been taking Tylenol for the last 3 days and her pain is worsening  She would like to know if you would be willing to give her more prednisone  States she can't walk again

## 2019-07-18 NOTE — TELEPHONE ENCOUNTER
I called the patient and explained that steroids can't be used long term to avoid side effects, and can't prescribe NSAIDs due to her CKD  Referred to pain management , patient agreed

## 2019-07-30 NOTE — TELEPHONE ENCOUNTER
Form filled out, placed in the "fax" bin to be faxed to [address] [place] [address]   Fax # [phone]

## 2019-08-02 NOTE — TELEPHONE ENCOUNTER
Form for Home Health Certification and Plan of Care received, filled out and placed in the "fax" bin

## 2019-08-05 NOTE — TELEPHONE ENCOUNTER
Pt says doctor needs to call insurance so she can get refill for following medication      pregabalin (LYRICA) 50 mg capsule

## 2019-08-19 NOTE — ASSESSMENT & PLAN NOTE
Patient continues to complain of the pain in her right thigh that has been going on for about 6 months months and is getting progressively worse to the point that she is not able to ambulate without limping  She is able to bear weight, but that increases the pain  X-ray of the hip from 4/2019 showed right hip joint space narrowing, femoral head spur, and slight acetabular subchondral sclerosis  Xray of the femur showed no fracture or dislocation, but degenerative changes of the right hip noted  Patient is not a candidate for NSAID due to her kidney disease  She received a trial of steroids with moderate relief, but pain came back after she finished the coarse     She is currently taking Tylenol and Lyrica  The etiology of the pain is most likely from degenerative joint disease of the right hip, patient referred to pain management, appointment pending

## 2019-08-19 NOTE — PROGRESS NOTES
Assessment/Plan:    Pain of right lower extremity  Patient continues to complain of the pain in her right thigh that has been going on for about 6 months months and is getting progressively worse to the point that she is not able to ambulate without limping  She is able to bear weight, but that increases the pain  X-ray of the hip from 4/2019 showed right hip joint space narrowing, femoral head spur, and slight acetabular subchondral sclerosis  Xray of the femur showed no fracture or dislocation, but degenerative changes of the right hip noted  Patient is not a candidate for NSAID due to her kidney disease  She received a trial of steroids with moderate relief, but pain came back after she finished the coarse  She is currently taking Tylenol and Lyrica  The etiology of the pain is most likely from degenerative joint disease of the right hip, patient referred to pain management, appointment pending        Diagnoses and all orders for this visit:    Screening for colon cancer    Constipation, unspecified constipation type  -     senna (SENOKOT) 8 6 mg; Take 1 tablet (8 6 mg total) by mouth daily at bedtime    Coronary artery disease due to lipid rich plaque  -     aspirin (ECOTRIN LOW STRENGTH) 81 mg EC tablet; Take 1 tablet (81 mg total) by mouth daily    Other orders  -     Cancel: Ambulatory referral to Gastroenterology; Future          Subjective:      Patient ID: Lavonne White is a 62 y o  female  Patient presented to follow-up the pain in her right thigh this started about 6 months ago in gets progressively worse  She is not able to ambulate without a walker at this point  She describes pain as aching, starting from her hip and radiating to the upper thigh  She tried a trial of steroids, that helped with pain intermittently, but pain came back as soon as she finished the steroids        The following portions of the patient's history were reviewed and updated as appropriate: allergies, current medications, past family history, past medical history, past social history, past surgical history and problem list     Review of Systems   Constitutional: Negative for activity change, appetite change, fatigue and fever  HENT: Negative for drooling and sore throat  Eyes: Negative for pain and visual disturbance  Respiratory: Negative for cough, chest tightness and shortness of breath  Cardiovascular: Negative for chest pain and palpitations  Gastrointestinal: Negative for abdominal pain, constipation, diarrhea and nausea  Genitourinary: Negative for dysuria and hematuria  Musculoskeletal: Positive for arthralgias (right hip) and myalgias (righ thigh pain)  Negative for back pain  Skin: Negative for color change  Neurological: Negative for weakness, numbness and headaches  Psychiatric/Behavioral: Negative for suicidal ideas  Objective:      /90 (BP Location: Left arm, Patient Position: Sitting, Cuff Size: Standard)   Pulse 98   Temp 98 6 °F (37 °C) (Temporal)   Resp 18   Ht 5' 3" (1 6 m)   Wt 89 8 kg (198 lb)   SpO2 96%   Breastfeeding? No   BMI 35 07 kg/m²          Physical Exam   Constitutional: She appears well-developed  HENT:   Head: Normocephalic  Cardiovascular: Normal rate  Pulmonary/Chest: Effort normal  No respiratory distress  Abdominal: Soft  She exhibits no distension  Musculoskeletal:        Right hip: She exhibits decreased range of motion and tenderness  She exhibits no swelling and no deformity  Skin: Skin is warm and dry  Capillary refill takes less than 2 seconds  No rash noted  No erythema

## 2019-09-24 NOTE — ED PROVIDER NOTES
History  Chief Complaint   Patient presents with    Fall     pt staets that she tripped and fell 3 days ago and hurt her rt knee    Urinary Catheter Problem     pt states that catheter has been leaking from the bag since yesterday     Patient is a 57-year-old female here with 2 complaints  Complaint 1  Is a he constant achy nonradiating right knee pain status post a trip and fall 3 days ago  Patient has been able to ambulate since  Does make the pain worse  Better with rest   Denies any numbness weakness or tingling ice  Patient also has small abrasion on the knee as well  Has been putting Vaseline on it with good results  Went over to that patient states that she had a leaking leg bag  since today  Patient has had similar symptoms in the past           Prior to Admission Medications   Prescriptions Last Dose Informant Patient Reported? Taking? ADMELOG SOLOSTAR 100 units/mL injection pen   No No   Sig: Inject 10 Units under the skin 3 (three) times a day with meals   Alcohol Swabs (ALCOHOL PREP) 70 % PADS   Yes No   Sig: Apply 1 applicator topically as needed   B-D INS SYRINGE 0 5CC/31GX5/16 31G X 5/16" 0 5 ML MISC   Yes No   BASAGLAR KWIKPEN 100 units/mL injection pen   No No   Sig: INJECT 40 UNIST SUBCUTANEOUSLY DAILY AT BEDTIME   Catheters MISC   No No   Sig: Please change patients suprapubic catheter as soon as possible and then every three months      Dx: N31 9 Neurogenic Bladder   DOCQLACE 100 MG capsule   No No   Sig: Take 1 capsule (100 mg total) by mouth 2 (two) times a day   Insulin Pen Needle (UNIFINE PENTIPS) 32G X 4 MM MISC   No No   Sig: Inject under the skin 4 (four) times a day for 90 days   Lancets 28G MISC   Yes No   Sig: Test blood sugars three times daily   ONE TOUCH ULTRA TEST test strip   Yes No   Si strips by Device route 3 (three) times a day   aluminum-magnesium hydroxide-simethicone (MYLANTA) 200-200-20 mg/5 mL suspension   No No   Sig: Take 30 mL by mouth every 4 (four) hours as needed for indigestion or heartburn   amLODIPine (NORVASC) 5 mg tablet   No No   Sig: TAKE 1 TABLET BY MOUTH ONCE DAILY     aspirin (ECOTRIN LOW STRENGTH) 81 mg EC tablet   No No   Sig: Take 1 tablet (81 mg total) by mouth daily   atorvastatin (LIPITOR) 40 mg tablet   No No   Sig: Take 1 tablet (40 mg total) by mouth daily   cyanocobalamin (VITAMIN B-12) 1,000 mcg tablet   No No   Sig: Take 1 tablet (1,000 mcg total) by mouth daily for 90 days   ergocalciferol (VITAMIN D2) 50,000 units   No No   Sig: Take 1 capsule (50,000 Units total) by mouth once a week   nortriptyline (PAMELOR) 50 mg capsule   No No   Sig: Take 1 capsule (50 mg total) by mouth daily   Patient not taking: Reported on 7/8/2019   omeprazole (PriLOSEC) 20 mg delayed release capsule   No No   Sig: Take 1 capsule (20 mg total) by mouth daily before breakfast   predniSONE 20 mg tablet   No No   Sig: Take 2 tablets (40 mg total) by mouth daily   pregabalin (LYRICA) 50 mg capsule   No No   Sig: Take 1 capsule (50 mg total) by mouth 2 (two) times a day   senna (SENOKOT) 8 6 mg   No No   Sig: Take 1 tablet (8 6 mg total) by mouth daily at bedtime      Facility-Administered Medications: None       Past Medical History:   Diagnosis Date    Anemia     macrocyctic    Chronic kidney disease 5/24/2018    Diabetes mellitus ([address] )     Dyslipidemia 5/24/2018    GERD (gastroesophageal reflux disease)     History of frequent urinary tract infections     HTN (hypertension) 5/24/2018    Hyperlipidemia     Neurogenic bladder     Neuropathy     Pedal edema     Vitamin D deficiency        Past Surgical History:   Procedure Laterality Date    OR REMV CATARACT EXTRACAP,INSERT LENS Right 12/6/2018    Procedure: EXTRACAPSULAR CATARACT REMOVAL/INSERTION OF INTRAOCULAR LENS;  Surgeon: Onita Collet, MD;  Location: [address] [place] [address];  Service: Ophthalmology    SUPRAPUBIC CATHETER INSERTION         Family History   Problem Relation Age of Onset    Breast cancer Mother     Hypertension Mother     Diabetes Mother         Mellitus    Parkinsonism Mother     Cancer Maternal Aunt         Unknown     I have reviewed and agree with the history as documented  Social History     Tobacco Use    Smoking status: Current Every Day Smoker     Packs/day: 0 50     Years: 40 00     Pack years: 20 00     Types: Cigarettes    Smokeless tobacco: Never Used   Substance Use Topics    Alcohol use: Not Currently     Alcohol/week: 0 0 standard drinks     Comment: last alcoholic drink was about 40 yrs ago as of 7/8/19    Drug use: Not Currently        Review of Systems   Constitutional: Negative  HENT: Negative  Eyes: Negative  Respiratory: Negative  Cardiovascular: Negative  Gastrointestinal: Negative  Endocrine: Negative  Genitourinary: Negative  Musculoskeletal: Positive for arthralgias  Skin: Positive for wound  Allergic/Immunologic: Negative  Neurological: Negative  Hematological: Negative  Psychiatric/Behavioral: Negative  All other systems reviewed and are negative  Physical Exam  Physical Exam   Constitutional: She is oriented to person, place, and time  She appears well-developed and well-nourished  Neck: Normal range of motion  Neck supple  Cardiovascular: Normal rate, regular rhythm, normal heart sounds and intact distal pulses  Pulmonary/Chest: Effort normal and breath sounds normal    Abdominal: Soft  Bowel sounds are normal    Musculoskeletal: Normal range of motion  She exhibits tenderness  Mild tenderness to the infrapatellar area  Full range of motion  Patient does have pain with flexion and extension  No ankle or hip tenderness palpation  Neurological: She is alert and oriented to person, place, and time  No sensory deficit  Skin: Skin is warm  Capillary refill takes less than 2 seconds  Patient with a 2 cm circular well-healing abrasion just inferior to the patella on the right     Psychiatric: She has a normal mood and affect  Her behavior is normal    Nursing note and vitals reviewed  Vital Signs  ED Triage Vitals   Temperature Pulse Respirations Blood Pressure SpO2   09/23/19 1737 09/23/19 1737 09/23/19 1737 09/23/19 1739 09/23/19 1737   (!) 96 9 °F (36 1 °C) 81 16 153/73 97 %      Temp Source Heart Rate Source Patient Position - Orthostatic VS BP Location FiO2 (%)   09/23/19 1737 09/23/19 1737 09/23/19 1737 09/23/19 1737 --   Tympanic Monitor Sitting Left arm       Pain Score       09/23/19 1737       8           Vitals:    09/23/19 1737 09/23/19 1739   BP:  153/73   Pulse: 81    Patient Position - Orthostatic VS: Sitting          Visual Acuity      ED Medications  Medications   neomycin-bacitracin-polymyxin b (NEOSPORIN) ointment 1 small application (1 small application Topical Given 9/23/19 1836)       Diagnostic Studies  Results Reviewed     None                 XR knee 4+ vw right injury   ED Interpretation by Jovani Sandy MD (09/23 1813)   No fx, no dislocation                 Procedures  Procedures       ED Course  ED Course as of Sep 23 2305   Mon Sep 23, 2019   1825 Went over x-ray results with patient  Will call back for differently by x-ray  Will discharge with new leg bag follow up with PCP return the ER for any concerns  MDM  Number of Diagnoses or Management Options  Knee contusion:   Problem with Holliday catheter, initial encounter [facility]):      Amount and/or Complexity of Data Reviewed  Tests in the radiology section of CPT®: reviewed and ordered  Independent visualization of images, tracings, or specimens: yes        Disposition  Final diagnoses:   Problem with Holliday catheter, initial encounter [facility])   Knee contusion     Time reflects when diagnosis was documented in both MDM as applicable and the Disposition within this note     Time User Action Codes Description Comment    9/23/2019  6:24 PM Dora Cowden  9XXA] Problem with Holliday catheter, initial encounter [facility])     9/23/2019  6:24 PM Garo Amaro Add [S80 [id number]] Knee contusion       ED Disposition     ED Disposition Condition Date/Time Comment    Discharge Stable Mon Sep 23, 2019  6:24 PM Angelina Cervantes discharge to home/self care  Follow-up Information     Follow up With Specialties Details Why [address] [facility]  [place] [address] [facility]  [phone]            Discharge Medication List as of 9/23/2019  6:25 PM      CONTINUE these medications which have NOT CHANGED    Details   ADMELOG SOLOSTAR 100 units/mL injection pen Inject 10 Units under the skin 3 (three) times a day with meals, Normal      Alcohol Swabs (ALCOHOL PREP) 70 % PADS Apply 1 applicator topically as needed, Starting Tue 6/26/2018, Historical Med      aluminum-magnesium hydroxide-simethicone (MYLANTA) 200-200-20 mg/5 mL suspension Take 30 mL by mouth every 4 (four) hours as needed for indigestion or heartburn, Starting Thu 5/30/2019, Normal      amLODIPine (NORVASC) 5 mg tablet TAKE 1 TABLET BY MOUTH ONCE DAILY , Normal      aspirin (ECOTRIN LOW STRENGTH) 81 mg EC tablet Take 1 tablet (81 mg total) by mouth daily, Starting Mon 8/19/2019, Normal      atorvastatin (LIPITOR) 40 mg tablet Take 1 tablet (40 mg total) by mouth daily, Starting Fri 5/31/2019, Normal      B-D INS SYRINGE 0 5CC/31GX5/16 31G X 5/16" 0 5 ML MISC Starting Wed 10/3/2018, Historical Med      BASAGLAR KWIKPEN 100 units/mL injection pen INJECT 40 UNIST SUBCUTANEOUSLY DAILY AT BEDTIME, Normal      Catheters MISC Please change patients suprapubic catheter as soon as possible and then every three months      Dx: N31 9 Neurogenic Bladder, Print      cyanocobalamin (VITAMIN B-12) 1,000 mcg tablet Take 1 tablet (1,000 mcg total) by mouth daily for 90 days, Starting Thu 4/25/2019, Until Wed 7/24/2019, Normal      DOCQLACE 100 MG capsule Take 1 capsule (100 mg total) by mouth 2 (two) times a day, Normal      ergocalciferol (VITAMIN D2) 50,000 units Take 1 capsule (50,000 Units total) by mouth once a week, Starting Thu 6/27/2019, Normal      Insulin Pen Needle (UNIFINE PENTIPS) 32G X 4 MM MISC Inject under the skin 4 (four) times a day for 90 days, Starting Thu 4/25/2019, Until Wed 7/24/2019, Normal      Lancets 28G MISC Test blood sugars three times daily, Historical Med      nortriptyline (PAMELOR) 50 mg capsule Take 1 capsule (50 mg total) by mouth daily, Starting Mon 3/11/2019, Normal      omeprazole (PriLOSEC) 20 mg delayed release capsule Take 1 capsule (20 mg total) by mouth daily before breakfast, Starting Fri 5/31/2019, Normal      ONE TOUCH ULTRA TEST test strip 100 strips by Device route 3 (three) times a day, Starting Tue 6/12/2018, Historical Med      predniSONE 20 mg tablet Take 2 tablets (40 mg total) by mouth daily, Starting Mon 7/8/2019, Normal      pregabalin (LYRICA) 50 mg capsule Take 1 capsule (50 mg total) by mouth 2 (two) times a day, Starting Tue 8/6/2019, Normal      senna (SENOKOT) 8 6 mg Take 1 tablet (8 6 mg total) by mouth daily at bedtime, Starting Mon 8/19/2019, Normal           No discharge procedures on file      ED Provider  Electronically Signed by           Akanksha Davalos MD  09/23/19 9841

## 2019-09-25 NOTE — TELEPHONE ENCOUNTER
Genoveva Bingham called today for this pt stating that the following medications were missing from the list   She also stated that pt was using Glargine insulin 24units three times a day  Genoveva Bingham wanted to be advised that this was okay    [facility] #[phone]

## 2019-09-27 NOTE — H&P (VIEW-ONLY)
FAMILY PRACTICE PRE-OPERATIVE EVALUATION  [facility] PHYSICIAN [facility] NAFISA    NAME: Jaleesa Cervantes  AGE: 62 y o  SEX: female  : 1965     DATE: 2019    Family Practice Pre-Operative Evaluation      Chief Complaint: Pre-operative Evaluation     Surgery: left eye cataract  Anticipated Date of Surgery: 10/17/2019     History of Present Illness:     Patient has no prior history of bleeding issues or blood clots  Chronic conditions, medications and allergies were reviewed  Medical history significant for diabetes, controlled with insulin; neurogenic bladder, requiring suprapubic catheterization, osteoarthritis  There is no currently active infectious process  Assessment of Cardiac Risk:  · No unstable or severe angina or MI in the last 6 weeks or history of stent placement in the last year   · No decompensated heart failure (e g  New onset heart failure, NYHA functional class IV heart failure, or worsening existing heart failure) in past 3 mos  · No severe heart valve disease including aortic stenosis or symptomatic mitral stenosis     Exercise Capacity:  · Able to walk 4 blocks without symptoms  · Able to walk 2 flights without symptoms    NO Prior Anesthesia Reactions       No prolonged steroid use in past 6 mos  P A T  If done reviewed  Review of Systems:     Review of Systems   Constitutional: Negative for activity change, appetite change, fatigue and fever  HENT: Negative for drooling and sore throat  Eyes: Negative for pain and visual disturbance  Respiratory: Negative for cough, chest tightness and shortness of breath  Cardiovascular: Negative for chest pain and palpitations  Gastrointestinal: Negative for abdominal pain, constipation, diarrhea and nausea  Genitourinary: Negative for dysuria and hematuria  Musculoskeletal: Positive for arthralgias (right hip)  Negative for back pain and myalgias  Skin: Negative for color change  Neurological: Negative for weakness, numbness and headaches  Psychiatric/Behavioral: Negative for suicidal ideas  Current Problem List:     Patient Active Problem List   Diagnosis    Chronic kidney disease    HTN (hypertension)    Dyslipidemia    Diabetic peripheral neuropathy (HCC)    Gastroesophageal reflux disease    Type 2 diabetes mellitus with kidney complication, with long-term current use of insulin (HCC)    Vitamin D deficiency    Pain of right lower extremity    Other constipation    Pedal edema    Neurogenic bladder    Macrocytic anemia    Preop examination    Encounter for administration of vaccine    Non-compliance    Diabetic ulcer of toe of left foot associated with type 2 diabetes mellitus, with fat layer exposed ([facility] [address] )    ESBL Escherichia coli carrier    Acute right hip pain    Epigastric pain    Ambulatory dysfunction    Tobacco dependence       Allergies: Allergies   Allergen Reactions    Gabapentin      Other reaction(s): slurring of speech, falls  Current Medications:       Current Outpatient Medications:     ADMELOG SOLOSTAR 100 units/mL injection pen, Inject 10 Units under the skin 3 (three) times a day with meals, Disp: 15 mL, Rfl: 1    Alcohol Swabs (ALCOHOL PREP) 70 % PADS, Apply 1 applicator topically as needed, Disp: , Rfl:     aluminum-magnesium hydroxide-simethicone (MYLANTA) 200-200-20 mg/5 mL suspension, Take 30 mL by mouth every 4 (four) hours as needed for indigestion or heartburn, Disp: 355 mL, Rfl: 0    amLODIPine (NORVASC) 5 mg tablet, TAKE 1 TABLET BY MOUTH ONCE DAILY  , Disp: 30 tablet, Rfl: 3    aspirin (ECOTRIN LOW STRENGTH) 81 mg EC tablet, Take 1 tablet (81 mg total) by mouth daily, Disp: 30 tablet, Rfl: 0    atorvastatin (LIPITOR) 40 mg tablet, Take 1 tablet (40 mg total) by mouth daily, Disp: 30 tablet, Rfl: 2    B-D INS SYRINGE 0 5CC/31GX5/16 31G X 5/16" 0 5 ML MISC, , Disp: , Rfl:     BASAGLAR KWIKPEN 100 units/mL injection pen, INJECT 40 UNIST SUBCUTANEOUSLY DAILY AT BEDTIME, Disp: 15 mL, Rfl: 1    Catheters MISC, Please change patients suprapubic catheter as soon as possible and then every three months    Dx: N31 9 Neurogenic Bladder, Disp: 6 each, Rfl: 0    DOCQLACE 100 MG capsule, Take 1 capsule (100 mg total) by mouth 2 (two) times a day, Disp: 10 capsule, Rfl: 0    ergocalciferol (VITAMIN D2) 50,000 units, Take 1 capsule (50,000 Units total) by mouth once a week, Disp: 4 capsule, Rfl: 0    Lancets 28G MISC, Test blood sugars three times daily, Disp: , Rfl:     nortriptyline (PAMELOR) 50 mg capsule, Take 1 capsule (50 mg total) by mouth daily, Disp: 90 capsule, Rfl: 0    omeprazole (PriLOSEC) 20 mg delayed release capsule, Take 1 capsule (20 mg total) by mouth daily before breakfast, Disp: 90 capsule, Rfl: 0    ONE TOUCH ULTRA TEST test strip, 100 strips by Device route 3 (three) times a day, Disp: , Rfl:     predniSONE 20 mg tablet, Take 2 tablets (40 mg total) by mouth daily, Disp: 14 tablet, Rfl: 0    pregabalin (LYRICA) 50 mg capsule, Take 1 capsule (50 mg total) by mouth 2 (two) times a day, Disp: 60 capsule, Rfl: 3    senna (SENOKOT) 8 6 mg, Take 1 tablet (8 6 mg total) by mouth daily at bedtime, Disp: 120 each, Rfl: 0    cyanocobalamin (VITAMIN B-12) 1,000 mcg tablet, Take 1 tablet (1,000 mcg total) by mouth daily for 90 days, Disp: 90 tablet, Rfl: 0    Insulin Pen Needle (UNIFINE PENTIPS) 32G X 4 MM MISC, Inject under the skin 4 (four) times a day for 90 days, Disp: 400 each, Rfl: 0    Past Medical History:       Past Medical History:   Diagnosis Date    Anemia     macrocyctic    Chronic kidney disease 5/24/2018    Diabetes mellitus ([address] )     Dyslipidemia 5/24/2018    GERD (gastroesophageal reflux disease)     History of frequent urinary tract infections     HTN (hypertension) 5/24/2018    Hyperlipidemia     Neurogenic bladder     Neuropathy     Pedal edema     Vitamin D deficiency Past Surgical History:   Procedure Laterality Date    [place] REMV CATARACT EXTRACAP,INSERT LENS Right 12/6/2018    Procedure: EXTRACAPSULAR CATARACT REMOVAL/INSERTION OF INTRAOCULAR LENS;  Surgeon: Severino Howard MD;  Location: [address] [place] [address];  Service: Ophthalmology    SUPRAPUBIC CATHETER INSERTION          Family History   Problem Relation Age of Onset   Maritza Ash Breast cancer Mother     Hypertension Mother     Diabetes Mother         Mellitus    Parkinsonism Mother     Cancer Maternal Aunt         Unknown        Social History     Socioeconomic History    Marital status: Single     Spouse name: Not on file    Number of children: Not on file    Years of education: Not on file    Highest education level: Not on file   Occupational History    Not on file   Social Needs    Financial resource strain: Somewhat hard    Food insecurity:     Worry: Not on file     Inability: Not on file    Transportation needs:     Medical: No     Non-medical: No   Tobacco Use    Smoking status: Current Every Day Smoker     Packs/day: 0 50     Years: 40 00     Pack years: 20 00     Types: Cigarettes    Smokeless tobacco: Never Used   Substance and Sexual Activity    Alcohol use: Not Currently     Alcohol/week: 0 0 standard drinks     Comment: last alcoholic drink was about 40 yrs ago as of 7/8/19    Drug use: Not Currently    Sexual activity: Yes     Partners: Male     Birth control/protection: None   Lifestyle    Physical activity:     Days per week: Not on file     Minutes per session: Not on file    Stress:  To some extent   Relationships    Social connections:     Talks on phone: Not on file     Gets together: More than three times a week     Attends Gnosticist service: Not on file     Active member of club or organization: Not on file     Attends meetings of clubs or organizations: Not on file     Relationship status: Not on file    Intimate partner violence:     Fear of current or ex partner: Not on file     Emotionally abused: Not on file     Physically abused: Not on file     Forced sexual activity: Not on file   Other Topics Concern    Not on file   Social History Narrative    Hospitalization: 4/28/18    Flu shot:yes        Physical Exam:     /74 (BP Location: Left arm, Patient Position: Sitting, Cuff Size: Adult)   Pulse 89   Temp (!) 96 8 °F (36 °C) (Tympanic)   Resp 16   Ht 5' 3" (1 6 m)   Wt 90 7 kg (200 lb)   SpO2 98%   BMI 35 43 kg/m²     Physical Exam   Constitutional: She is oriented to person, place, and time  She appears well-developed  HENT:   Head: Normocephalic  Eyes: Pupils are equal, round, and reactive to light  EOM are normal    Neck: Normal range of motion  Neck supple  Cardiovascular: Normal rate, regular rhythm and normal heart sounds  Exam reveals no gallop and no friction rub  No murmur heard  Pulmonary/Chest: Effort normal and breath sounds normal  No stridor  No respiratory distress  She has no wheezes  She exhibits no tenderness  Abdominal: Soft  She exhibits no distension  There is no tenderness  There is no rebound  Musculoskeletal:        Right hip: She exhibits decreased range of motion and tenderness  She exhibits no swelling and no deformity  Neurological: She is alert and oriented to person, place, and time  Skin: Skin is warm and dry  Capillary refill takes less than 2 seconds  No rash noted  No erythema  Operative site has been examined and clear of skin infection and inflammation  Assessment & Recommendations:   1  Pre-op examination    - POCT ECG  - CBC and differential; Future  - Basic metabolic panel; Future  - HEMOGLOBIN A1C W/ EAG ESTIMATION; Future    Patient is cleared for surgery as detailed above  Blood work pending   ECG performed in the office: NSR, HR-78, QRS-0 08, WV-0 14, QT-0 40    Surgical Procedure risk category: low    Patient specific operative risk categegory: class 2 risk category based on revised cardiac risk index, 6% of 30 day risk of death, MI or cardiac arrest

## 2019-09-27 NOTE — PROGRESS NOTES
FAMILY PRACTICE PRE-OPERATIVE EVALUATION  [facility] PHYSICIAN GROUP [facility]    NAME: Mally Cervantes  AGE: 62 y o  SEX: female  : 1965     DATE: 2019    Family Practice Pre-Operative Evaluation      Chief Complaint: Pre-operative Evaluation     Surgery: left eye cataract  Anticipated Date of Surgery: 10/17/2019     History of Present Illness:     Patient has no prior history of bleeding issues or blood clots  Chronic conditions, medications and allergies were reviewed  Medical history significant for diabetes, controlled with insulin; neurogenic bladder, requiring suprapubic catheterization, osteoarthritis  There is no currently active infectious process  Assessment of Cardiac Risk:  · No unstable or severe angina or MI in the last 6 weeks or history of stent placement in the last year   · No decompensated heart failure (e g  New onset heart failure, NYHA functional class IV heart failure, or worsening existing heart failure) in past 3 mos  · No severe heart valve disease including aortic stenosis or symptomatic mitral stenosis     Exercise Capacity:  · Able to walk 4 blocks without symptoms  · Able to walk 2 flights without symptoms    NO Prior Anesthesia Reactions       No prolonged steroid use in past 6 mos  P A T  If done reviewed  Review of Systems:     Review of Systems   Constitutional: Negative for activity change, appetite change, fatigue and fever  HENT: Negative for drooling and sore throat  Eyes: Negative for pain and visual disturbance  Respiratory: Negative for cough, chest tightness and shortness of breath  Cardiovascular: Negative for chest pain and palpitations  Gastrointestinal: Negative for abdominal pain, constipation, diarrhea and nausea  Genitourinary: Negative for dysuria and hematuria  Musculoskeletal: Positive for arthralgias (right hip)  Negative for back pain and myalgias  Skin: Negative for color change  Neurological: Negative for weakness, numbness and headaches  Psychiatric/Behavioral: Negative for suicidal ideas  Current Problem List:     Patient Active Problem List   Diagnosis    Chronic kidney disease    HTN (hypertension)    Dyslipidemia    Diabetic peripheral neuropathy (HCC)    Gastroesophageal reflux disease    Type 2 diabetes mellitus with kidney complication, with long-term current use of insulin (HCC)    Vitamin D deficiency    Pain of right lower extremity    Other constipation    Pedal edema    Neurogenic bladder    Macrocytic anemia    Preop examination    Encounter for administration of vaccine    Non-compliance    Diabetic ulcer of toe of left foot associated with type 2 diabetes mellitus, with fat layer exposed ([facility] [address] )    ESBL Escherichia coli carrier    Acute right hip pain    Epigastric pain    Ambulatory dysfunction    Tobacco dependence       Allergies: Allergies   Allergen Reactions    Gabapentin      Other reaction(s): slurring of speech, falls  Current Medications:       Current Outpatient Medications:     ADMELOG SOLOSTAR 100 units/mL injection pen, Inject 10 Units under the skin 3 (three) times a day with meals, Disp: 15 mL, Rfl: 1    Alcohol Swabs (ALCOHOL PREP) 70 % PADS, Apply 1 applicator topically as needed, Disp: , Rfl:     aluminum-magnesium hydroxide-simethicone (MYLANTA) 200-200-20 mg/5 mL suspension, Take 30 mL by mouth every 4 (four) hours as needed for indigestion or heartburn, Disp: 355 mL, Rfl: 0    amLODIPine (NORVASC) 5 mg tablet, TAKE 1 TABLET BY MOUTH ONCE DAILY  , Disp: 30 tablet, Rfl: 3    aspirin (ECOTRIN LOW STRENGTH) 81 mg EC tablet, Take 1 tablet (81 mg total) by mouth daily, Disp: 30 tablet, Rfl: 0    atorvastatin (LIPITOR) 40 mg tablet, Take 1 tablet (40 mg total) by mouth daily, Disp: 30 tablet, Rfl: 2    B-D INS SYRINGE 0 5CC/31GX5/16 31G X 5/16" 0 5 ML MISC, , Disp: , Rfl:     BASAGLAR KWIKPEN 100 units/mL injection pen, INJECT 40 UNIST SUBCUTANEOUSLY DAILY AT BEDTIME, Disp: 15 mL, Rfl: 1    Catheters MISC, Please change patients suprapubic catheter as soon as possible and then every three months    Dx: N31 9 Neurogenic Bladder, Disp: 6 each, Rfl: 0    DOCQLACE 100 MG capsule, Take 1 capsule (100 mg total) by mouth 2 (two) times a day, Disp: 10 capsule, Rfl: 0    ergocalciferol (VITAMIN D2) 50,000 units, Take 1 capsule (50,000 Units total) by mouth once a week, Disp: 4 capsule, Rfl: 0    Lancets 28G MISC, Test blood sugars three times daily, Disp: , Rfl:     nortriptyline (PAMELOR) 50 mg capsule, Take 1 capsule (50 mg total) by mouth daily, Disp: 90 capsule, Rfl: 0    omeprazole (PriLOSEC) 20 mg delayed release capsule, Take 1 capsule (20 mg total) by mouth daily before breakfast, Disp: 90 capsule, Rfl: 0    ONE TOUCH ULTRA TEST test strip, 100 strips by Device route 3 (three) times a day, Disp: , Rfl:     predniSONE 20 mg tablet, Take 2 tablets (40 mg total) by mouth daily, Disp: 14 tablet, Rfl: 0    pregabalin (LYRICA) 50 mg capsule, Take 1 capsule (50 mg total) by mouth 2 (two) times a day, Disp: 60 capsule, Rfl: 3    senna (SENOKOT) 8 6 mg, Take 1 tablet (8 6 mg total) by mouth daily at bedtime, Disp: 120 each, Rfl: 0    cyanocobalamin (VITAMIN B-12) 1,000 mcg tablet, Take 1 tablet (1,000 mcg total) by mouth daily for 90 days, Disp: 90 tablet, Rfl: 0    Insulin Pen Needle (UNIFINE PENTIPS) 32G X 4 MM MISC, Inject under the skin 4 (four) times a day for 90 days, Disp: 400 each, Rfl: 0    Past Medical History:       Past Medical History:   Diagnosis Date    Anemia     macrocyctic    Chronic kidney disease 5/24/2018    Diabetes mellitus ([address] )     Dyslipidemia 5/24/2018    GERD (gastroesophageal reflux disease)     History of frequent urinary tract infections     HTN (hypertension) 5/24/2018    Hyperlipidemia     Neurogenic bladder     Neuropathy     Pedal edema     Vitamin D deficiency Past Surgical History:   Procedure Laterality Date    CO REMV CATARACT EXTRACAP,INSERT LENS Right 12/6/2018    Procedure: EXTRACAPSULAR CATARACT REMOVAL/INSERTION OF INTRAOCULAR LENS;  Surgeon: Ham Harman MD;  Location: [address] [place] [address];  Service: Ophthalmology    SUPRAPUBIC CATHETER INSERTION          Family History   Problem Relation Age of Onset   [facility] Breast cancer Mother     Hypertension Mother     Diabetes Mother         Mellitus    Parkinsonism Mother     Cancer Maternal Aunt         Unknown        Social History     Socioeconomic History    Marital status: Single     Spouse name: Not on file    Number of children: Not on file    Years of education: Not on file    Highest education level: Not on file   Occupational History    Not on file   Social Needs    Financial resource strain: Somewhat hard    Food insecurity:     Worry: Not on file     Inability: Not on file    Transportation needs:     Medical: No     Non-medical: No   Tobacco Use    Smoking status: Current Every Day Smoker     Packs/day: 0 50     Years: 40 00     Pack years: 20 00     Types: Cigarettes    Smokeless tobacco: Never Used   Substance and Sexual Activity    Alcohol use: Not Currently     Alcohol/week: 0 0 standard drinks     Comment: last alcoholic drink was about 40 yrs ago as of 7/8/19    Drug use: Not Currently    Sexual activity: Yes     Partners: Male     Birth control/protection: None   Lifestyle    Physical activity:     Days per week: Not on file     Minutes per session: Not on file    Stress:  To some extent   Relationships    Social connections:     Talks on phone: Not on file     Gets together: More than three times a week     Attends Sabianism service: Not on file     Active member of club or organization: Not on file     Attends meetings of clubs or organizations: Not on file     Relationship status: Not on file    Intimate partner violence:     Fear of current or ex partner: Not on file     Emotionally abused: Not on file     Physically abused: Not on file     Forced sexual activity: Not on file   Other Topics Concern    Not on file   Social History Narrative    Hospitalization: 4/28/18    Flu shot:yes        Physical Exam:     /74 (BP Location: Left arm, Patient Position: Sitting, Cuff Size: Adult)   Pulse 89   Temp (!) 96 8 °F (36 °C) (Tympanic)   Resp 16   Ht 5' 3" (1 6 m)   Wt 90 7 kg (200 lb)   SpO2 98%   BMI 35 43 kg/m²     Physical Exam   Constitutional: She is oriented to person, place, and time  She appears well-developed  HENT:   Head: Normocephalic  Eyes: Pupils are equal, round, and reactive to light  EOM are normal    Neck: Normal range of motion  Neck supple  Cardiovascular: Normal rate, regular rhythm and normal heart sounds  Exam reveals no gallop and no friction rub  No murmur heard  Pulmonary/Chest: Effort normal and breath sounds normal  No stridor  No respiratory distress  She has no wheezes  She exhibits no tenderness  Abdominal: Soft  She exhibits no distension  There is no tenderness  There is no rebound  Musculoskeletal:        Right hip: She exhibits decreased range of motion and tenderness  She exhibits no swelling and no deformity  Neurological: She is alert and oriented to person, place, and time  Skin: Skin is warm and dry  Capillary refill takes less than 2 seconds  No rash noted  No erythema  Operative site has been examined and clear of skin infection and inflammation  Assessment & Recommendations:   1  Pre-op examination    - POCT ECG  - CBC and differential; Future  - Basic metabolic panel; Future  - HEMOGLOBIN A1C W/ EAG ESTIMATION; Future    Patient is cleared for surgery as detailed above  Blood work pending   ECG performed in the office: NSR, HR-78, QRS-0 08, SC-0 14, QT-0 40    Surgical Procedure risk category: low    Patient specific operative risk categegory: class 2 risk category based on revised cardiac risk index, 6% of 30 day risk of death, MI or cardiac arrest

## 2019-09-30 NOTE — TELEPHONE ENCOUNTER
Spoke to William and discussed that patient is not taking insulin as prescribed, she was supposed to take 40 U of long acting Basaglar and 10 U TID of Admelog, but as was discussed during the visit with patient on 9/27, she was taking 24 U of "short acting insulin", patient was not able to provide the name at that time  Checked with William and found out that patient is taking actually long acting insulin 24 U TID as she doesn't like the effects of "other" insulin  Discussed with William that patient doesn't realize the difference between insulins; advised that it is OK to split the long acting Glargine (Basaglar) insulin in 2 doses, and patient can take 24 U in the morning and 24 U in the evening, but don't take the lunch dose  She can hold the short actin Admelog insulin for now

## 2019-10-03 NOTE — TELEPHONE ENCOUNTER
Patient called stating her transportation failed to pick her up today & she missed her appointment  She also added she never received her new patient paperwork through the mail  Unfortunately, patient doesn't have a computer or access to the internet & wouldn't be able to print it out or fill-out online  She states if she doesn't receive it by her rescheduled date on 10/22/19, she'll come in 20 minutes earlier to fill out in office  I verified her mailing address to make sure its current   Please advisewinifred    Call back# [phone]

## 2019-10-16 NOTE — TELEPHONE ENCOUNTER
Patient needs insurance referral per [facility] guidelines      [facility] Provider # [id number]    Please fax referral to [phone]

## 2019-10-16 NOTE — ANESTHESIA PREPROCEDURE EVALUATION
Review of Systems/Medical History  Patient summary reviewed  Chart reviewed  No history of anesthetic complications     Cardiovascular  Exercise tolerance (METS): >4,  Hyperlipidemia, Hypertension controlled,   Comment: Not very mobile   Uses walker to walk longer distances ,  Pulmonary  Smoker cigarette smoker  , Tobacco cessation counseling given Cumulative Pack Years: 21, COPD , No shortness of breath, No sleep apnea ,        GI/Hepatic    GERD well controlled,        Chronic kidney disease stage 3,   Comment: Suprapubic cath for neurogenic bladder     Endo/Other  Diabetes poorly controlled Insulin,      GYN      Comment: postmenopausal     Hematology  Anemia anemia of chronic disease,     Musculoskeletal  Negative musculoskeletal ROS        Neurology  Negative neurology ROS   Diabetic neuropathy,    Psychology           Physical Exam    Airway       Dental   No notable dental hx upper dentures and lower dentures,     Cardiovascular  Cardiovascular exam normal    Pulmonary  Pulmonary exam normal     Other Findings        Anesthesia Plan  ASA Score- 3     Anesthesia Type- IV sedation with anesthesia with ASA Monitors  Additional Monitors:   Airway Plan:         Plan Factors-Patient not instructed to abstain from smoking on day of procedure  Patient did not smoke on day of surgery  Induction- intravenous  Postoperative Plan-     Informed Consent- Anesthetic plan and risks discussed with patient  I personally reviewed this patient with the CRNA  Discussed and agreed on the Anesthesia Plan with the CRNA             Lab Results   Component Value Date    HGBA1C 9 1 (A) 07/08/2019       Lab Results   Component Value Date     (L) 05/24/2018    K 3 5 (L) 05/29/2019     05/29/2019    CO2 26 05/29/2019    ANIONGAP 16 (H) 05/24/2018    BUN 26 (H) 05/29/2019    CREATININE 1 58 (H) 05/29/2019    GLUCOSE 912 (AA) 05/24/2018    GLUF 209 (H) 05/09/2018    CALCIUM 8 5 05/29/2019    AST 13 (L) 05/28/2019 ALT <6 (L) 05/28/2019    ALKPHOS 89 05/28/2019    PROT 6 9 05/24/2018    BILITOT 0 4 05/24/2018    EGFR 42 (L) 05/29/2019       Lab Results   Component Value Date    WBC 7 40 05/29/2019    HGB 10 8 (L) 05/29/2019    HCT 32 2 (L) 05/29/2019    MCV 97 05/29/2019     05/29/2019

## 2019-10-16 NOTE — PRE-PROCEDURE INSTRUCTIONS
Pre-Surgery Instructions:   Medication Instructions    amLODIPine (NORVASC) 5 mg tablet Instructed patient per Anesthesia Guidelines   omeprazole (PriLOSEC) 20 mg delayed release capsule Instructed patient per Anesthesia Guidelines   predniSONE 20 mg tablet Instructed patient per Anesthesia Guidelines   pregabalin (LYRICA) 50 mg capsule Instructed patient per Anesthesia Guidelines

## 2019-10-17 NOTE — DISCHARGE INSTRUCTIONS
Cataract Extraction   WHAT YOU NEED TO KNOW:   Cataract extraction is a procedure to remove a cloudy lens from your eye  An artificial lens called an intraocular lens (IOL) is put in its place  This will improve your vision  DISCHARGE INSTRUCTIONS:   Medicines:   · Eyedrops  contain medicine to help prevent infection and decrease inflammation  Wash your hands before you instill eyedrops  Do not touch the tip of the dropper to your eye  Ask your healthcare provider for more information about how to instill eyedrops  · Take your medicine as directed  Contact your healthcare provider if you think your medicine is not helping or if you have side effects  Tell him or her if you are allergic to any medicine  Keep a list of the medicines, vitamins, and herbs you take  Include the amounts, and when and why you take them  Bring the list or the pill bottles to follow-up visits  Carry your medicine list with you in case of an emergency  Follow up with your ophthalmologist within 24 hours: You will need to have your eye checked  Write down your questions so you remember to ask them during your visits  Eye care:   · Wear your eye shield when you sleep to prevent damage  · Do not rub your eye  · Keep dust, dirt, and water out of your eye to prevent infection  · Do not lift heavy objects or bend over  Both can increase the pressure in your eye  Ask your healthcare provider how much weight is safe for you to lift  You may be told not to lift anything heavier than 25 pounds for 3 weeks after your procedure  · Wear UVB protective sunglasses and a brimmed hat outside to help prevent sun damage to your eyes  · If you smoke, it is never too late to quit  Smoking can damage your eye and prevent it from healing after your procedure  Do not smoke or let anyone smoke around you  Ask your healthcare provider for information if you need help quitting    Contact your healthcare provider or ophthalmologist if:   · You have changes in your vision  · Your eye pain increases  · Your eye is red, swollen, or draining fluid or pus  · You have a headache or nausea, or you are vomiting  · You have questions or concerns about your condition or care  Seek care immediately or call 911 if:   · You suddenly see flashes or floaters, followed by partial vision loss like a curtain covered your eye  · Your eye suddenly goes blind  · You have severe eye pain with eye redness, swelling, new floaters, and more blurry vision  © 2017 [address] [facility] Information is for End User's use only and may not be sold, redistributed or otherwise used for commercial purposes  All illustrations and images included in CareNotes® are the copyrighted property of A D A M , Inc  or Javi Gil  The above information is an  only  It is not intended as medical advice for individual conditions or treatments  Talk to your doctor, nurse or pharmacist before following any medical regimen to see if it is safe and effective for you

## 2019-10-17 NOTE — NURSING NOTE
Pt returned to [facility] awake,alert,IV infusing, taking po  C/O 5/10 operative pain but does not want analgesic for same

## 2019-10-17 NOTE — PERIOPERATIVE NURSING NOTE
oob to br  Wants to go home  Discharged via w/c after discharge instructions given and verbalized an understanding of same

## 2019-10-17 NOTE — INTERVAL H&P NOTE
H&P reviewed  After examining the patient I find no changes in the patients condition since the H&P had been written  Left cataract    Vitals:    10/17/19 0928   BP: 127/64   Pulse: 82   Resp: 20   Temp: (!) 97 2 °F (36 2 °C)   SpO2: 96%

## 2019-10-17 NOTE — OP NOTE
OPERATIVE REPORT  PATIENT NAME: Ana Noble    :  1965  MRN: [id number]  Pt Location:  OR ROOM 01    SURGERY DATE: 10/17/2019    Surgeon(s) and Role:     * Kade Carlin MD - Primary    Preop Diagnosis:  Age-related nuclear cataract, left eye [H25 12]  Other visual disturbances [H53 8]    Post-Op Diagnosis Codes:     * Age-related nuclear cataract, left eye [H25 12]     * Other visual disturbances [H53 8]    Procedure(s) (LRB):  EXTRACAPSULAR CATARACT REMOVAL/INSERTION OF INTRAOCULAR LENS (Left)    Specimen(s):  * No specimens in log *    Estimated Blood Loss:   Minimal    Drains:  Suprapubic Catheter (Active)   Number of days: 511       Anesthesia Type:   IV Sedation with Anesthesia    Operative Indications:  Age-related nuclear cataract, left eye [H25 12]  Other visual disturbances [G48 5]    Complications:   None    Procedure and Technique:   The patient wasprepped and draped in the usual fashion Betadine solution was used to sterilize the conjunctival sac the entire procedure was done under the operating microscope positioned temporal to the patient  A Barraquer lid speculum was inserted a paracentesis was done about 30° to the left of the corneal incision using a microsurgical knife   Provisc was then injected into the anterior chamber  A 3 mm clear corneal incision was made with the angled phaco knife  A continuous capsulotomy was performed  Hydrodissection was done and the nucleus rotated  The Legacy phacoemulsification unit was used to remove the nucleus  Irrigation/ aspiration of cortical material was then performed leaving an intact posterior capsule  Viscoat was injected into the capsular bag  An acrylic foldable posterior chamber intraocular lens was inserted using a injector   Good centration of the intraocular lens was observed  Miochol was injected into the anterior chamber to induce miosis    Removal of the viscoelastic was done using the irrigation aspiration tip of the phaco unit After that we made sure that the wound was self sealed  The Barraquer speculum was removed and antibiotic ointment was applied and a plastic shield was placed  in the operated eye  The patient tolerated procedure well and returned to the Recovery recovery room in good condition  The lens inserted has a power of 20 diopters,model [id number]  Company was [employer]    Patient Disposition:  PACU     SIGNATURE: Kade Carlin MD  DATE: October 17, 2019  TIME: 12:51 PM

## 2019-10-22 NOTE — TELEPHONE ENCOUNTER
Patient no showed 2x  Per Dr Lisette Jerez, please do not reschedule patient Please attach DNS to patients chart  Patient has not been notified as patient has not been seen

## 2019-11-04 NOTE — TELEPHONE ENCOUNTER
Per Dr Kennard Peabody documentation, it was placed in the fax bin  Those are not handled by me so I don't know whether it was faxed yet

## 2019-11-06 NOTE — TELEPHONE ENCOUNTER
Patient called into the office I gave her an update on PA  Pt understood and states she will contact her medical insurance plan

## 2019-12-03 NOTE — TELEPHONE ENCOUNTER
SIGNATURES NEEDED FOR [facility]  FORM RECEIVED VIA FAX  Syeda Valencia WILL BE PLACED IN YOUR BIN        ORDER NUMBER [id number]

## 2019-12-03 NOTE — TELEPHONE ENCOUNTER
[facility]

## 2019-12-06 NOTE — TELEPHONE ENCOUNTER
[phone] I did receive confirmation         Order number [id number]

## 2019-12-06 NOTE — TELEPHONE ENCOUNTER
[facility] to the following fax number [phone] I did receive confirmation

## 2019-12-13 NOTE — ED PROVIDER NOTES
History  Chief Complaint   Patient presents with    Urinary Catheter Problem     pt  reporting pain/discharge/itching/burning from indwelling urinary catheter     Patient is a 25-year-old female with 5 year history of a suprapubic catheter who is complaining of some itchiness around the site as well as a thick white vaginal discharge  Patient states that she believes she has a yeast infection  Did not taking medication for  Has had similar infections the past   Denies any fevers sweats chills  No nausea vomiting diarrhea  No other complaints  Nothing makes it better or worse  Prior to Admission Medications   Prescriptions Last Dose Informant Patient Reported? Taking? ADMELOG SOLOSTAR 100 units/mL injection pen   No No   Sig: INJECT 10 UNITS SUBCUTANEOUSLY 3 TIMES DAILY WITH MEALS   ASPIRIN LOW DOSE 81 MG EC tablet   No No   Sig: TAKE 1 TABLET BY MOUTH ONCE DAILY  Alcohol Swabs (ALCOHOL PREP) 70 % PADS   Yes No   Sig: Apply 1 applicator topically as needed   B-D INS SYRINGE 0 5CC/31GX5/16 31G X 5/16" 0 5 ML MISC   Yes No   BASAGLAR KWIKPEN 100 units/mL injection pen   No No   Sig: INJECT 40 UNIST SUBCUTANEOUSLY DAILY AT BEDTIME   Catheters MISC   No No   Sig: Please change patients suprapubic catheter as soon as possible and then every three months      Dx: N31 9 Neurogenic Bladder   DOCQLACE 100 MG capsule   No No   Sig: Take 1 capsule (100 mg total) by mouth 2 (two) times a day   Insulin Pen Needle (UNIFINE PENTIPS) 32G X 4 MM MISC   No No   Sig: Inject under the skin 4 (four) times a day for 90 days   Lancets 28G MISC   Yes No   Sig: Test blood sugars three times daily   ONE TOUCH ULTRA TEST test strip   Yes No   Si strips by Device route 3 (three) times a day   aluminum-magnesium hydroxide-simethicone (MYLANTA) 200-200-20 mg/5 mL suspension   No No   Sig: Take 30 mL by mouth every 4 (four) hours as needed for indigestion or heartburn   amLODIPine (NORVASC) 5 mg tablet   No No   Sig: TAKE 1 TABLET BY MOUTH ONCE DAILY     atorvastatin (LIPITOR) 40 mg tablet   No No   Sig: Take 1 tablet (40 mg total) by mouth daily   cyanocobalamin (VITAMIN B-12) 1,000 mcg tablet   No No   Sig: Take 1 tablet (1,000 mcg total) by mouth daily for 90 days   ergocalciferol (VITAMIN D2) 50,000 units   No No   Sig: Take 1 capsule (50,000 Units total) by mouth once a week   nortriptyline (PAMELOR) 50 mg capsule   No No   Sig: Take 1 capsule (50 mg total) by mouth daily   omeprazole (PriLOSEC) 20 mg delayed release capsule   No No   Sig: Take 1 capsule (20 mg total) by mouth daily before breakfast   predniSONE 20 mg tablet   No No   Sig: Take 2 tablets (40 mg total) by mouth daily   pregabalin (LYRICA) 50 mg capsule   No No   Sig: Take 1 capsule (50 mg total) by mouth 2 (two) times a day   senna (SENOKOT) 8 6 mg   No No   Sig: Take 1 tablet (8 6 mg total) by mouth daily at bedtime      Facility-Administered Medications: None       Past Medical History:   Diagnosis Date    Ambulatory dysfunction     Anemia     macrocyctic    Chronic kidney disease 5/24/2018    Chronic pain disorder     right hip    Diabetes mellitus ([address] )     Diabetic foot ulcer (HCC)     left with fat layer exposed    Dyslipidemia 5/24/2018    ESBL E  coli carrier     GERD (gastroesophageal reflux disease)     History of frequent urinary tract infections     HTN (hypertension) 5/24/2018    Hyperlipidemia     Irritable bowel syndrome     constipation    Neurogenic bladder     Neuropathy     Pedal edema     Vitamin D deficiency        Past Surgical History:   Procedure Laterality Date    CATARACT EXTRACTION      ND REMV CATARACT EXTRACAP,INSERT LENS Right 12/6/2018    Procedure: EXTRACAPSULAR CATARACT REMOVAL/INSERTION OF INTRAOCULAR LENS;  Surgeon: Sloane Boone MD;  Location: [address] [place] [address];  Service: Ophthalmology     [facility] CATARACT EXTRACAP,INSERT LENS Left 10/17/2019    Procedure: EXTRACAPSULAR CATARACT REMOVAL/INSERTION OF INTRAOCULAR LENS; Surgeon: Reg Waters MD;  Location: [address] [place] [address] OR;  Service: Ophthalmology    SUPRAPUBIC CATHETER INSERTION         Family History   Problem Relation Age of Onset   Svetlana Medrano Breast cancer Mother     Hypertension Mother     Diabetes Mother         Mellitus    Parkinsonism Mother     Cancer Maternal Aunt         Unknown     I have reviewed and agree with the history as documented  Social History     Tobacco Use    Smoking status: Current Every Day Smoker     Packs/day: 0 50     Years: 40 00     Pack years: 20 00     Types: Cigarettes    Smokeless tobacco: Never Used   Substance Use Topics    Alcohol use: Not Currently     Alcohol/week: 0 0 standard drinks     Comment: no alcohol for 16 yrs    Drug use: Not Currently        Review of Systems   Constitutional: Negative  HENT: Negative  Eyes: Negative  Respiratory: Negative  Cardiovascular: Negative  Gastrointestinal: Negative  Endocrine: Negative  Genitourinary: Positive for vaginal discharge  Musculoskeletal: Negative  Skin: Positive for rash  Allergic/Immunologic: Negative  Neurological: Negative  Hematological: Negative  Psychiatric/Behavioral: Negative  All other systems reviewed and are negative  Physical Exam  Physical Exam   Constitutional: She is oriented to person, place, and time  She appears well-developed and well-nourished  HENT:   Head: Normocephalic  Nose: Nose normal    Mouth/Throat: Oropharynx is clear and moist    Eyes: Pupils are equal, round, and reactive to light  Conjunctivae are normal    Neck: Normal range of motion  Neck supple  Cardiovascular: Normal rate, regular rhythm, normal heart sounds and intact distal pulses  Pulmonary/Chest: Effort normal and breath sounds normal    Abdominal: Soft  Bowel sounds are normal    Mild erythema and moist skin around suprapubic catheter  No excoriations  No fluctuance  Genitourinary:   Genitourinary Comments:  Thick white vaginal discharge in the vault Vennie Favre, RN  Musculoskeletal: Normal range of motion  Neurological: She is alert and oriented to person, place, and time  Skin: Skin is warm  Capillary refill takes less than 2 seconds  Rash noted  See abdominal exam    Psychiatric: She has a normal mood and affect  Her behavior is normal    Nursing note and vitals reviewed  Vital Signs  ED Triage Vitals [12/12/19 1940]   Temperature Pulse Respirations Blood Pressure SpO2   (!) 97 4 °F (36 3 °C) 85 18 153/81 98 %      Temp Source Heart Rate Source Patient Position - Orthostatic VS BP Location FiO2 (%)   Tympanic -- Sitting Left arm --      Pain Score       9           Vitals:    12/12/19 1940   BP: 153/81   Pulse: 85   Patient Position - Orthostatic VS: Sitting         Visual Acuity      ED Medications  Medications   fluconazole (DIFLUCAN) tablet 200 mg (200 mg Oral Given 12/12/19 2001)   nystatin (MYCOSTATIN) powder ( Topical Given 12/12/19 2009)       Diagnostic Studies  Results Reviewed     None                 No orders to display              Procedures  Procedures         ED Course                               MDM  Number of Diagnoses or Management Options  Candidiasis of skin:   Vaginal candidiasis:      Amount and/or Complexity of Data Reviewed  Review and summarize past medical records: yes          Disposition  Final diagnoses:   Vaginal candidiasis   Candidiasis of skin     Time reflects when diagnosis was documented in both MDM as applicable and the Disposition within this note     Time User Action Codes Description Comment    12/12/2019  8:06 PM Scott Woodson Add [B37 3] Vaginal candidiasis     12/12/2019  8:07 PM Scott Woodson Add [B37 2] Candidiasis of skin       ED Disposition     ED Disposition Condition Date/Time Comment    Discharge Stable Thu Dec 12, 2019  8:05 PM Cam Cervantes discharge to home/self care              Follow-up Information     Follow up With Specialties Details Why [id number] [facility]   [id number]            Discharge Medication List as of 12/12/2019  8:07 PM      CONTINUE these medications which have CHANGED    Details   nystatin (MYCOSTATIN) powder Apply topically 2 (two) times a day Apply to lower abdomen twice a day, Starting Thu 12/12/2019, Print         CONTINUE these medications which have NOT CHANGED    Details   ADMELOG SOLOSTAR 100 units/mL injection pen INJECT 10 UNITS SUBCUTANEOUSLY 3 TIMES DAILY WITH MEALS, Normal      Alcohol Swabs (ALCOHOL PREP) 70 % PADS Apply 1 applicator topically as needed, Starting Tue 6/26/2018, Historical Med      aluminum-magnesium hydroxide-simethicone (MYLANTA) 200-200-20 mg/5 mL suspension Take 30 mL by mouth every 4 (four) hours as needed for indigestion or heartburn, Starting Thu 5/30/2019, Normal      amLODIPine (NORVASC) 5 mg tablet TAKE 1 TABLET BY MOUTH ONCE DAILY , Normal      ASPIRIN LOW DOSE 81 MG EC tablet TAKE 1 TABLET BY MOUTH ONCE DAILY , Normal      atorvastatin (LIPITOR) 40 mg tablet Take 1 tablet (40 mg total) by mouth daily, Starting Fri 10/4/2019, Normal      B-D INS SYRINGE 0 5CC/31GX5/16 31G X 5/16" 0 5 ML MISC Starting Wed 10/3/2018, Historical Med      BASAGLAR KWIKPEN 100 units/mL injection pen INJECT 40 UNIST SUBCUTANEOUSLY DAILY AT BEDTIME, Normal      Catheters MISC Please change patients suprapubic catheter as soon as possible and then every three months      Dx: N31 9 Neurogenic Bladder, Print      cyanocobalamin (VITAMIN B-12) 1,000 mcg tablet Take 1 tablet (1,000 mcg total) by mouth daily for 90 days, Starting Thu 4/25/2019, Until Wed 7/24/2019, Normal      DOCQLACE 100 MG capsule Take 1 capsule (100 mg total) by mouth 2 (two) times a day, Normal      ergocalciferol (VITAMIN D2) 50,000 units Take 1 capsule (50,000 Units total) by mouth once a week, Starting Mon 9/30/2019, Normal      Insulin Pen Needle (UNIFINE PENTIPS) 32G X 4 MM MISC Inject under the skin 4 (four) times a day for 90 days, Starting Thu 4/25/2019, Until Wed 7/24/2019, Normal      Lancets 28G MISC Test blood sugars three times daily, Historical Med      nortriptyline (PAMELOR) 50 mg capsule Take 1 capsule (50 mg total) by mouth daily, Starting Mon 3/11/2019, Normal      omeprazole (PriLOSEC) 20 mg delayed release capsule Take 1 capsule (20 mg total) by mouth daily before breakfast, Starting Fri 5/31/2019, Normal      ONE TOUCH ULTRA TEST test strip 100 strips by Device route 3 (three) times a day, Starting Tue 6/12/2018, Historical Med      predniSONE 20 mg tablet Take 2 tablets (40 mg total) by mouth daily, Starting Mon 7/8/2019, Normal      pregabalin (LYRICA) 50 mg capsule Take 1 capsule (50 mg total) by mouth 2 (two) times a day, Starting Mon 12/9/2019, Normal      senna (SENOKOT) 8 6 mg Take 1 tablet (8 6 mg total) by mouth daily at bedtime, Starting Mon 8/19/2019, Normal           No discharge procedures on file      ED Provider  Electronically Signed by           Sugey Williamson MD  12/12/19 9764

## 2019-12-15 NOTE — ED ATTENDING ATTESTATION
I was the attending physician on duty at the time the patient visited the emergency department  The patient was evaluated and dispositioned by the APC  I was personally available for consultation  I am administratively signing the chart after the fact      Renée Castillo MD

## 2019-12-17 NOTE — TELEPHONE ENCOUNTER
Called the number on file and was sent directly to voicemail  Patient's voicemail is not set up  Will attempt to call again later on

## 2019-12-17 NOTE — TELEPHONE ENCOUNTER
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 office would be preferred    No personal history of cancer  She can be reached at [phone]  Thank you

## 2019-12-18 NOTE — TELEPHONE ENCOUNTER
Called the number on file and was sent directly to voicemail  Patient's voicemail is not set up  Will attempt to call again later on  Also, call the phone number for the  listed in pt's demographics  This also immediately went to , outgoing message too difficult to understand to be sure I was calling correct person

## 2019-12-20 NOTE — TELEPHONE ENCOUNTER
Dr Zoë Mayer records requested from [facility] to be scanned to [facility] upon receipt

## 2019-12-31 PROBLEM — E87.2 HIGH ANION GAP METABOLIC ACIDOSIS: Status: ACTIVE | Noted: 2019-01-01

## 2019-12-31 PROBLEM — N89.8 VAGINAL DISCHARGE: Status: ACTIVE | Noted: 2019-01-01

## 2019-12-31 PROBLEM — R82.90 ABNORMAL URINALYSIS: Status: ACTIVE | Noted: 2019-01-01

## 2019-12-31 PROBLEM — D64.9 ANEMIA: Status: ACTIVE | Noted: 2018-10-10

## 2019-12-31 PROBLEM — R10.11 RIGHT UPPER QUADRANT ABDOMINAL PAIN: Status: ACTIVE | Noted: 2019-01-01

## 2019-12-31 PROBLEM — R45.851 SUICIDAL IDEATION: Status: ACTIVE | Noted: 2019-01-01

## 2019-12-31 PROBLEM — N39.0 UTI (URINARY TRACT INFECTION): Status: ACTIVE | Noted: 2019-01-01

## 2019-12-31 PROBLEM — R63.4 WEIGHT LOSS, UNINTENTIONAL: Status: ACTIVE | Noted: 2019-01-01

## 2019-12-31 PROBLEM — N18.9 ACUTE KIDNEY INJURY SUPERIMPOSED ON CKD (HCC): Status: ACTIVE | Noted: 2018-05-24

## 2019-12-31 NOTE — PROCEDURES
Midline Insertion (Bedside)  Date/Time: 12/31/2019 3:10 PM  Performed by: Jeny Bynum RN  Authorized by: Kehinde Rodriguez DO     Patient location:  Other (comment) (IR)  Consent:     Consent obtained:  Written    Consent given by:  Patient  Universal protocol:     Procedure explained and questions answered to patient or proxy's satisfaction: yes      Relevant documents present and verified: yes      Site/side marked: yes      Immediately prior to procedure, a time out was called: yes      Patient identity confirmed:  Verbally with patient, arm band and hospital-assigned identification number  Pre-procedure details:     Hand hygiene: Hand hygiene performed prior to insertion      Sterile barrier technique: All elements of maximal sterile technique followed      Skin preparation:  ChloraPrep    Skin preparation agent: Skin preparation agent completely dried prior to procedure    Indications:     Midline indications: other (comment)      Midline indications comment:  Long term antibiotics  Anesthesia (see MAR for exact dosages): Anesthesia method:  Local infiltration    Local anesthetic:  Lidocaine 1% w/o epi  Procedure details:     Location:  Right basilic    Laterality:  Right    Site selection rationale:  IV in left AC    Catheter size:  4 Fr    Landmarks identified: yes      Ultrasound guidance: yes      Sterile ultrasound techniques: Sterile gel and sterile probe covers were used      Number of attempts:  1    Successful placement: yes      Catheter length (cm):  20 (Cut to 18cm)    Exposed catheter length:  0    Arm circumference:  32cm    Lot number:  [id number]  Post-procedure details:     Post-procedure:  Securement device placed and dressing applied    Assessment:  Blood return through all ports    Post-procedure complications: none      Patient tolerance of procedure:   Tolerated well, no immediate complications

## 2019-12-31 NOTE — ASSESSMENT & PLAN NOTE
Macrocytic anemia- likely from Vitamin b12 deficiency  Patient was receiving 1000mcg Vitamin B12 at home  Baseline Hb 10    - Hb 7 7 today, patient asymptomatic   - FOBT   - Folate, Vit B 12 and Iron panel pending   - CBC in AM  -Will resume Vitamin B12 once renal function improves

## 2019-12-31 NOTE — ASSESSMENT & PLAN NOTE
Lab Results   Component Value Date    HGBA1C 9 1 (A) 07/08/2019       Recent Labs     12/31/19  1113 12/31/19  1545 12/31/19 2047 01/01/20  0553   POCGLU 143* 281* 250* 339*       Blood Sugar Average: Last 72 hrs:  (P) 230 8     Home lantus 40 U qhs, hasnt taken in two weeks because of poor po intake  Lantus 8 U last night and 10 U this AM after 339 glucose this AM    - Increase Lantus tonight to 15 U  - Diabetic diet with ISS and accuchek achs  - HbA1C pending

## 2019-12-31 NOTE — QUICK NOTE
Assessment and Plan  * Acute kidney injury superimposed on CKD ([facility] [address] )  -kidney function slightly improving  -continue maintenance fluid of 125 ml/hr  -BMP in am    Right upper quadrant abdominal pain  -ultrasound of the abdomen pending    Vaginal discharge  -speculum exam performed, scant amount of white discharge noted, on bimanual exam felt a nodule at the anterior vaginal wall  -ultrasound of the pelvis ordered    Weight loss, unintentional  Noted on the chart 36 lb weight loss in last 3 months  Patient states that she has not been eating anything in the last 2 weeks only drinking water due to not feeling well and also states that she had poor appetite in the last few months  Patient states that her last colonoscopy was many years ago ( can't remember exactly) and normal, patient also can't remember when her last PAP was performed, she states that she had mammogram recently, but no records available on any of those tests    Will perform PAP test inpatient to r/o cervical malignancy due to patient's chronic vaginal complains and rapid weight loss      Subjective:   Patient seen and examined at bedside  She continue to complain of RUQ abdominal pain, but denies nausea/vomiting/diarrhea  Her mood is very flat during assessment  No new complains at time of assessment  Objective:     Vitals:   Temp (24hrs), Av 9 °F (36 1 °C), Min:96 °F (35 6 °C), Max:97 6 °F (36 4 °C)    Temp:  [96 °F (35 6 °C)-97 6 °F (36 4 °C)] 97 2 °F (36 2 °C)  HR:  [76-80] 78  Resp:  [16-18] 18  BP: (144-152)/(75-86) 152/83  SpO2:  [94 %-100 %] 94 %  Body mass index is 29 09 kg/m²  Input and Output Summary (last 24 hours): Intake/Output Summary (Last 24 hours) at 2019 1146  Last data filed at 2019 0829  Gross per 24 hour   Intake 1000 ml   Output 450 ml   Net 550 ml       Physical Exam:     Physical Exam   Constitutional: She is oriented to person, place, and time  She appears well-developed and well-nourished  Cardiovascular: Normal rate  Pulmonary/Chest: Effort normal  Right breast exhibits no mass, no nipple discharge, no skin change and no tenderness  Left breast exhibits no mass, no nipple discharge, no skin change and no tenderness  No breast discharge  Breasts are symmetrical    Abdominal: Soft  She exhibits no distension  There is tenderness in the right upper quadrant  There is no rigidity, no rebound and no guarding  Suprapubic catheter in place, no signs of infection at the site of insertion   Genitourinary: No breast discharge  Pelvic exam was performed with patient supine  There is no rash, tenderness or lesion on the right labia  There is no rash, tenderness or lesion on the left labia  Uterus is not tender  Cervix exhibits friability  Cervix exhibits no motion tenderness and no discharge  Right adnexum displays no tenderness  Left adnexum displays no tenderness  No erythema, tenderness or bleeding in the vagina  No signs of injury around the vagina  No vaginal discharge found  Genitourinary Comments: Irregular nontender firm about 5 mm nodule palpated at the anterior vaginal wall   Neurological: She is alert and oriented to person, place, and time  Skin: Skin is warm and dry  No rash noted  No erythema  Additional Data:     Labs:    Results from last 7 days   Lab Units 12/31/19  0635   WBC Thousand/uL 5 80   HEMOGLOBIN g/dL 8 5*   HEMATOCRIT % 26 3*   PLATELETS Thousands/uL 286   NEUTROS PCT % 55   LYMPHS PCT % 33   MONOS PCT % 6   EOS PCT % 5     Results from last 7 days   Lab Units 12/31/19  0635   POTASSIUM mmol/L 3 5*   CHLORIDE mmol/L 114*   CO2 mmol/L 15*   BUN mg/dL 33*   CREATININE mg/dL 2 82*   CALCIUM mg/dL 8 7   ALK PHOS U/L 755*   ALT U/L 37   AST U/L 45*         Results from last 7 days   Lab Units 12/31/19  1113 12/31/19  0548   POC GLUCOSE mg/dl 143* 141*              ** Please Note: Dictation voice to text software may have been used in the creation of this document  Waqas Bunch MD  12/31/19  11:46 AM

## 2019-12-31 NOTE — NURSING NOTE
Pt resting comfortably in room, 1:1 continual observation for suicidal ideation  Pt denies suicidal thoughts at this time but says she has had them recently  Pt has suprapubic catheter in place, draining kya cloudy urine  Unable to obtain pts ordered blood work, pt asking for no more sticks at this time  Pt c/o RUQ pain, 10/10, residents made aware, tramadol ordered and administered  All needs in reach, will continue to monitor

## 2019-12-31 NOTE — NURSING NOTE
Removed old dressing from around super pubic which had some tan/yellow drainage  Cleaned by aide with plain water and new dressing applied  Offered tylenol for 9/10 abd pain but refused because it doesn't help  Patient remains NPO for US this morning   Patient aware of test   Will monitor

## 2019-12-31 NOTE — PLAN OF CARE
Problem: Potential for Falls  Goal: Patient will remain free of falls  Description  INTERVENTIONS:  - Assess patient frequently for physical needs  -  Identify cognitive and physical deficits and behaviors that affect risk of falls    -  [place] fall precautions as indicated by assessment   - Educate patient/family on patient safety including physical limitations  - Instruct patient to call for assistance with activity based on assessment  - Modify environment to reduce risk of injury  - Consider OT/PT consult to assist with strengthening/mobility  Outcome: Progressing     Problem: PAIN - ADULT  Goal: Verbalizes/displays adequate comfort level or baseline comfort level  Description  Interventions:  - Encourage patient to monitor pain and request assistance  - Assess pain using appropriate pain scale  - Administer analgesics based on type and severity of pain and evaluate response  - Implement non-pharmacological measures as appropriate and evaluate response  - Consider cultural and social influences on pain and pain management  - Notify physician/advanced practitioner if interventions unsuccessful or patient reports new pain  Outcome: Progressing     Problem: INFECTION - ADULT  Goal: Absence or prevention of progression during hospitalization  Description  INTERVENTIONS:  - Assess and monitor for signs and symptoms of infection  - Monitor lab/diagnostic results  - Monitor all insertion sites, i e  indwelling lines, tubes, and drains  - Monitor endotracheal if appropriate and nasal secretions for changes in amount and color  - [place] appropriate cooling/warming therapies per order  - Administer medications as ordered  - Instruct and encourage patient and family to use good hand hygiene technique  - Identify and instruct in appropriate isolation precautions for identified infection/condition  Outcome: Progressing  Goal: Absence of fever/infection during neutropenic period  Description  INTERVENTIONS:  - Monitor WBC    Outcome: Progressing     Problem: SAFETY ADULT  Goal: Patient will remain free of falls  Description  INTERVENTIONS:  - Assess patient frequently for physical needs  -  Identify cognitive and physical deficits and behaviors that affect risk of falls    -  [place] fall precautions as indicated by assessment   - Educate patient/family on patient safety including physical limitations  - Instruct patient to call for assistance with activity based on assessment  - Modify environment to reduce risk of injury  - Consider OT/PT consult to assist with strengthening/mobility  Outcome: Progressing  Goal: Maintain or return to baseline ADL function  Description  INTERVENTIONS:  -  Assess patient's ability to carry out ADLs; assess patient's baseline for ADL function and identify physical deficits which impact ability to perform ADLs (bathing, care of mouth/teeth, toileting, grooming, dressing, etc )  - Assess/evaluate cause of self-care deficits   - Assess range of motion  - Assess patient's mobility; develop plan if impaired  - Assess patient's need for assistive devices and provide as appropriate  - Encourage maximum independence but intervene and supervise when necessary  - Involve family in performance of ADLs  - Assess for home care needs following discharge   - Consider OT consult to assist with ADL evaluation and planning for discharge  - Provide patient education as appropriate  Outcome: Progressing  Goal: Maintain or return mobility status to optimal level  Description  INTERVENTIONS:  - Assess patient's baseline mobility status (ambulation, transfers, stairs, etc )    - Identify cognitive and physical deficits and behaviors that affect mobility  - Identify mobility aids required to assist with transfers and/or ambulation (gait belt, sit-to-stand, lift, walker, cane, etc )  - [place] fall precautions as indicated by assessment  - Record patient progress and toleration of activity level on Mobility SBAR; progress patient to next Phase/Stage  - Instruct patient to call for assistance with activity based on assessment  - Consider rehabilitation consult to assist with strengthening/weightbearing, etc   Outcome: Progressing     Problem: DISCHARGE PLANNING  Goal: Discharge to home or other facility with appropriate resources  Description  INTERVENTIONS:  - Identify barriers to discharge w/patient and caregiver  - Arrange for needed discharge resources and transportation as appropriate  - Identify discharge learning needs (meds, wound care, etc )  - Arrange for interpretive services to assist at discharge as needed  - Refer to Case Management Department for coordinating discharge planning if the patient needs post-hospital services based on physician/advanced practitioner order or complex needs related to functional status, cognitive ability, or social support system  Outcome: Progressing     Problem: Knowledge Deficit  Goal: Patient/family/caregiver demonstrates understanding of disease process, treatment plan, medications, and discharge instructions  Description  Complete learning assessment and assess knowledge base    Interventions:  - Provide teaching at level of understanding  - Provide teaching via preferred learning methods  Outcome: Progressing

## 2019-12-31 NOTE — ASSESSMENT & PLAN NOTE
Chronic, Stable  Home meds: mylanta and prilosec 20 mg daily  - Protonix 40 mg IV during admission    - Mylanta 30 mL daily prn  - Switch to PO prilosec

## 2019-12-31 NOTE — ED NOTES
Awaiting line placement prior to transfer to [facility]       Jose Roberto Coleen  12/31/19 8349

## 2019-12-31 NOTE — NURSING NOTE
Patient happy with new midline and old IV d/c by me with catheter intact  Fluids disconnected currently while patient is off the unit for test  Tolerating diet of clear with no nausea  Alert and oriented x4  No c/o pain   Will monitor They are planning with weaning medication down- grandmom concerned about side effects of medication.  He has nightmares.  Seems to be sleep terrors discussed about the same.    They will try to wean in 6 months.

## 2019-12-31 NOTE — ASSESSMENT & PLAN NOTE
Stable, reports intermittent RUQ pain/discomfort that responds to tylenol   Alk phos trending down --> 515  Diet advanced  GGT pending  RUQ US: Gallbladder sludge without calculi   Dilated common bile duct at 11 mm without definite choledocholithiasis     - May consider MRI with MRCP per radiologist suggestion if clinically indicated  - CMP in AM   - Pain managment

## 2019-12-31 NOTE — NURSING NOTE
Pt agreeable to labs at this time, labs were successfully drawn and being sent down now, residents aware

## 2019-12-31 NOTE — ASSESSMENT & PLAN NOTE
/97, not at goal (possible 2/2 to pain and fluids as is well controlled in outpatient setting)  Goal < 140/90   Amlodipine 5 mg daily    - Continue amlodipine   - Monitor

## 2019-12-31 NOTE — DISCHARGE SUMMARY
Discharge Summary - Fercho Godoy    Patient Information: Washington Bowman 62 y o  female MRN: [id number]  Unit/Bed#: 7T [facility] 702-02 Encounter: [id number]    Discharging Physician / Practitioner: Jd Mancera MD  PCP: Timbo Yeh MD  Admission Date: 12/30/2019    Admission Orders (From admission, onward)     Ordered        12/31/19 0057  Inpatient Admission (expected length of stay for this patient Order details is greater than two midnights)  Once                   Discharge Date: 01/09/20    Reason for Admission: JULIO on CKD, and RUQ abdominal pain      Discharge Diagnoses:     Principal Problem:    Acute kidney injury superimposed on CKD ([address] )  Active Problems:    Weight loss, unintentional    Type 2 diabetes mellitus with kidney complication, with long-term current use of insulin (HCC)    Anemia    Right upper quadrant abdominal pain    HTN (hypertension)    Neurogenic bladder    Abnormal urinalysis    Gastroesophageal reflux disease    Tobacco dependence    Stage 3 chronic kidney disease (HCC)    Proteinuria  Resolved Problems:    High anion gap metabolic acidosis    Vaginal discharge    Suicidal ideation      Consultations During Hospital Stay:  · Nephrology  · Hematology-oncology  · Gastroenterology     Procedures Performed:   · None     Significant Findings / Test Results:   · US abdomen: Gallbladder sludge without calculi  No wall thickening, pericholecystic fluid, or sonographic Michael sign  Dilated common bile duct at 11 mm without definite choledocholithiasis identified    Recommend correlation with liver function studies and, if clinically warranted, consider MRI/MRCP for further evaluation  · Transaminitis   · ASCUS    Incidental Findings:   · None     Test Results Pending at Discharge (will require follow up):   · HPV  · Hematological labs      Outpatient Tests Requested:  · BMP in 1 week     Outpatient follow-up Requested:  [facility] Nephrology   [place] [id number] surgery   Hematology-oncology   Gastroenterology     Complications: None     Hospital Course:     Rich Mariee is a 62 y o  female patient who originally presented to the hospital on 12/30/2019 due to  chief complaint of generalized weakness, abdomen pain, nausea, vaginal discharge for about 2 weeks  On arrival in the ED, patient endorsed suicidal ideation and placed one on one which was then discontinued  Lab work in the ED noted for JULIO on CKD, AGMA, and elevated ALP  UA from suprapubic catheter showed 500 leukocytes, negative nitrite, 2 + ketones, 50 glucose  CT w/o contrast r/o cholecystitis but showed stable 9 mm CBD  Patient received IVF bolus OT and Zosyn OT and was admitted to [facility] Medicine service for further management  RUQ US showed dilated CBD with sludge, but no stones  GI was consulted due to drop in hemoglobin and intermittent RUQ pain  Patient developed anemia with Hg dropping to 6 6 requiring 2 U of transfusion  Hematology was involved as well who ordered blood dyscrasia workup  Due to poor renal function, MRCP was placed on hold  Patient continued to improve clinically and her liver enzymes and alkaline phosphate continued to trend down  Renal function oscillated with improvement and deterioration and GFR stayed constantly bellow 30  Nephrology was following the patient and recommended close follow up outpatient with repeat labs  Due to significant weight loss, patient was also worked up further including pelvic US which was unrevealing  PAP test done inpatient showed ASCUS, HPV pending  Patient will follow up with PCP for further management  At the time of discharge, patient was stable clinically and pain was controlled  She was sent home with pain medication and asked to follow up outpatient with nephrology, general surgery (for MRCP w/o contrast and possible cholecystectomy), hematology-oncology, GI and PCP  No further concerns, patient agreeable to plan         Condition at Discharge: stable     Discharge Day Visit / Exam:     Vitals: Blood Pressure: 163/97 (01/09/20 0734)  Pulse: 69 (01/09/20 0734)  Temperature: (!) 96 6 °F (35 9 °C) (01/09/20 0829)  Temp Source: Temporal (01/08/20 2314)  Respirations: 20 (01/09/20 0734)  Height: 5' 3" (160 cm) (12/31/19 0229)  Weight - Scale: 82 5 kg (181 lb 14 1 oz) (01/09/20 0553)  SpO2: 99 % (01/09/20 0734)  Exam:   Physical Exam   Constitutional: She is oriented to person, place, and time  She appears well-developed and well-nourished  No distress  HENT:   Head: Normocephalic and atraumatic  Nose: Nose normal    Mouth/Throat: Oropharynx is clear and moist    Eyes: Pupils are equal, round, and reactive to light  Conjunctivae and EOM are normal    Neck: Normal range of motion  Neck supple  Cardiovascular: Normal rate, regular rhythm and normal heart sounds  Pulmonary/Chest: Effort normal and breath sounds normal  No respiratory distress  Abdominal: Soft  Bowel sounds are normal  She exhibits no distension  There is tenderness  There is no guarding  RUQ mild discomfort   Musculoskeletal: Normal range of motion  She exhibits edema  +2 pitting edema BLE to mid calf, stable    Neurological: She is alert and oriented to person, place, and time  She exhibits normal muscle tone  Skin: Skin is warm  No rash noted  She is not diaphoretic  Psychiatric: She has a normal mood and affect  Her behavior is normal  Judgment and thought content normal    Nursing note and vitals reviewed  Discussion with Family: Patient     Discharge instructions/Information to patient and family:   See after visit summary for information provided to patient and family  Discharge Medications:     aluminum-magnesium hydroxide-simethicone 200-200-20 mg/5 mL suspension   Commonly known as: MYLANTA   Take 30 mL by mouth every 4 (four) hours as needed for indigestion or heartburn     amLODIPine 5 mg tablet   Commonly known as: NORVASC   TAKE 1 TABLET BY MOUTH ONCE DAILY  atorvastatin 40 mg tablet   Commonly known as: LIPITOR   Take 1 tablet (40 mg total) by mouth daily     DOCQLACE 100 mg capsule   Generic drug: docusate sodium   Take 1 capsule (100 mg total) by mouth 2 (two) times a day     insulin glargine 100 units/mL injection pen   Commonly known as: BASAGLAR KWIKPEN   Inject 10 Units under the skin daily at bedtime     nystatin powder   Commonly known as: MYCOSTATIN   Apply topically 2 (two) times a day Apply to lower abdomen twice a day     omeprazole 40 MG capsule   Commonly known as: PriLOSEC   Take 1 capsule (40 mg total) by mouth daily     ONE TOUCH ULTRA TEST test strip   Generic drug: glucose blood   100 strips by Device route 3 (three) times a day    pregabalin 50 mg capsule   Commonly known as: LYRICA   Take 1 capsule (50 mg total) by mouth 2 (two) times a day     sertraline 50 mg tablet   Commonly known as: ZOLOFT   Take 1 tablet (50 mg total) by mouth daily at bedtime     traMADol 50 mg tablet   Commonly known as: ULTRAM   Take 1 tablet (50 mg total) by mouth 2 (two) times a day as needed for moderate pain or severe pain        Provisions for Follow-Up Care:  See after visit summary for information related to follow-up care and any pertinent home health orders  Disposition:     Home    For Discharges to [facility] SNF:   · Not Applicable to this Patient - Not Applicable to this Patient    Planned Readmission: No      Discharge Statement:  I spent 40 minutes discharging the patient  This time was spent on the day of discharge  I had direct contact with the patient on the day of discharge  Greater than 50% of the total time was spent examining patient, answering all patient questions, arranging and discussing plan of care with patient as well as directly providing post-discharge instructions  Additional time then spent on discharge activities      ** Please Note: This note has been constructed using a voice recognition system **    Joelle Esparza MD  01/09/20  1:40 PM

## 2019-12-31 NOTE — ASSESSMENT & PLAN NOTE
H/o chronic neurogenic bladder with a suprapubic catheter in place, changed every 6 weeks       - Catheter site was examined, no signs of infection was noted

## 2019-12-31 NOTE — ASSESSMENT & PLAN NOTE
Cr 2 08 (2 89 on admission)   MIVF 100cc/hr normal saline (midline)  Reports good urine output     - Continue MIVF   - CMP in AM  -Avoid nephrotoxic agents

## 2019-12-31 NOTE — ED PROVIDER NOTES
History  Chief Complaint   Patient presents with    Weakness - Generalized     pt c/o generalized weakness, pt states she was here last week for a bladder infection  During triage pt reports SI at this time HI "sometimes"  "I just don't want to be here anymore"     63 yo female with a complicated past medical history including CKD, DM , HTN, hyperlipidemia, and chronic suprapubic urinary catheter presents to the ED complaining of generalized weakness and suicidal ideation  The patient says she feels "weak all over"  (+) SI --> "I just don't want to live anymore" but no specific plan  (+) Vague, intermittent HI x "a while now"  No chest pain or shortness of breath  She denies fevers/chills  No N/V/D  No other specific complaints  Prior to Admission Medications   Prescriptions Last Dose Informant Patient Reported? Taking? ADMELOG SOLOSTAR 100 units/mL injection pen Past Week at Unknown time  No Yes   Sig: INJECT 10 UNITS SUBCUTANEOUSLY 3 TIMES DAILY WITH MEALS   ASPIRIN LOW DOSE 81 MG EC tablet Past Week at Unknown time  No Yes   Sig: TAKE 1 TABLET BY MOUTH ONCE DAILY  Alcohol Swabs (ALCOHOL PREP) 70 % PADS   Yes No   Sig: Apply 1 applicator topically as needed   B-D INS SYRINGE 0 5CC/31GX5/16 31G X 5/16" 0 5 ML MISC   Yes No   BASAGLAR KWIKPEN 100 units/mL injection pen Past Week at Unknown time  No Yes   Sig: INJECT 40 UNIST SUBCUTANEOUSLY DAILY AT BEDTIME   Catheters MISC   No No   Sig: Please change patients suprapubic catheter as soon as possible and then every three months      Dx: N31 9 Neurogenic Bladder   DOCQLACE 100 MG capsule More than a month at Unknown time  No No   Sig: Take 1 capsule (100 mg total) by mouth 2 (two) times a day   Insulin Pen Needle (UNIFINE PENTIPS) 32G X 4 MM MISC   No No   Sig: Inject under the skin 4 (four) times a day for 90 days   Lancets 28G MISC   Yes No   Sig: Test blood sugars three times daily   ONE TOUCH ULTRA TEST test strip   Yes No   Si strips by Device route 3 (three) times a day   acetaminophen (TYLENOL) 500 mg tablet Past Week at Unknown time  No Yes   Sig: Take 1 tablet (500 mg total) by mouth every 6 (six) hours as needed for moderate pain   aluminum-magnesium hydroxide-simethicone (MYLANTA) 200-200-20 mg/5 mL suspension More than a month at Unknown time  No No   Sig: Take 30 mL by mouth every 4 (four) hours as needed for indigestion or heartburn   amLODIPine (NORVASC) 5 mg tablet Past Week at Unknown time  No Yes   Sig: TAKE 1 TABLET BY MOUTH ONCE DAILY     atorvastatin (LIPITOR) 40 mg tablet Past Week at Unknown time  No Yes   Sig: Take 1 tablet (40 mg total) by mouth daily   cyanocobalamin (VITAMIN B-12) 1,000 mcg tablet   No No   Sig: Take 1 tablet (1,000 mcg total) by mouth daily for 90 days   ergocalciferol (VITAMIN D2) 50,000 units Past Week at Unknown time  No Yes   Sig: Take 1 capsule (50,000 Units total) by mouth once a week   nortriptyline (PAMELOR) 50 mg capsule Past Week at Unknown time  No Yes   Sig: Take 1 capsule (50 mg total) by mouth daily   nystatin (MYCOSTATIN) powder Past Week at Unknown time  No Yes   Sig: Apply topically 2 (two) times a day Apply to lower abdomen twice a day   omeprazole (PriLOSEC) 20 mg delayed release capsule Past Week at Unknown time  No Yes   Sig: Take 1 capsule (20 mg total) by mouth daily before breakfast   predniSONE 20 mg tablet More than a month at Unknown time  No No   Sig: Take 2 tablets (40 mg total) by mouth daily   pregabalin (LYRICA) 50 mg capsule Past Week at Unknown time  No Yes   Sig: Take 1 capsule (50 mg total) by mouth 2 (two) times a day   senna (SENOKOT) 8 6 mg Past Week at Unknown time  No Yes   Sig: Take 1 tablet (8 6 mg total) by mouth daily at bedtime      Facility-Administered Medications: None       Past Medical History:   Diagnosis Date    Ambulatory dysfunction     Anemia     macrocyctic    Chronic kidney disease 5/24/2018    Chronic pain disorder     right hip    Diabetes mellitus [facility])     Diabetic foot ulcer ([address] )     left with fat layer exposed    Dyslipidemia 5/24/2018    ESBL E  coli carrier     GERD (gastroesophageal reflux disease)     History of frequent urinary tract infections     HTN (hypertension) 5/24/2018    Hyperlipidemia     Irritable bowel syndrome     constipation    Neurogenic bladder     Neuropathy     Pedal edema     Vitamin D deficiency        Past Surgical History:   Procedure Laterality Date    CATARACT EXTRACTION      WV XCAPSL CTRC RMVL INSJ IO LENS PROSTH W/O ECP Right 12/6/2018    Procedure: EXTRACAPSULAR CATARACT REMOVAL/INSERTION OF INTRAOCULAR LENS;  Surgeon: Gricelda Kate MD;  Location: [address] [place] [address];  Service: Ophthalmology    W[place] XCAPSL CTRC RMVL INSJ IO LENS PROSTH W/O ECP Left 10/17/2019    Procedure: EXTRACAPSULAR CATARACT REMOVAL/INSERTION OF INTRAOCULAR LENS;  Surgeon: Gricelda Kate MD;  Location: [address] [place] [address];  Service: Ophthalmology    SUPRAPUBIC CATHETER INSERTION         Family History   Problem Relation Age of Onset   [employer] Breast cancer Mother     Hypertension Mother     Diabetes Mother         Mellitus    Parkinsonism Mother     Cancer Maternal Aunt         Unknown     I have reviewed and agree with the history as documented  Social History     Tobacco Use    Smoking status: Current Every Day Smoker     Packs/day: 0 50     Years: 40 00     Pack years: 20 00     Types: Cigarettes    Smokeless tobacco: Never Used   Substance Use Topics    Alcohol use: Not Currently     Alcohol/week: 0 0 standard drinks     Comment: no alcohol for 16 yrs    Drug use: Not Currently        Review of Systems   Constitutional: Positive for fatigue  Negative for chills and fever  HENT: Negative for sore throat  Eyes: Negative for visual disturbance  Respiratory: Negative for shortness of breath  Cardiovascular: Negative for chest pain  Gastrointestinal: Negative for abdominal pain, diarrhea and vomiting     Endocrine: Negative for cold intolerance and heat intolerance  Genitourinary: Negative for dysuria and frequency  Musculoskeletal: Negative for back pain  Skin: Negative for rash  Allergic/Immunologic: Negative for immunocompromised state  Neurological: Positive for weakness  Negative for numbness  Hematological: Negative for adenopathy  Psychiatric/Behavioral: Positive for dysphoric mood and suicidal ideas  Negative for self-injury  Physical Exam  Physical Exam   Constitutional: She is oriented to person, place, and time  She appears well-developed and well-nourished  No distress  HENT:   Head: Normocephalic and atraumatic  Eyes: Pupils are equal, round, and reactive to light  EOM are normal    Neck: Normal range of motion  Neck supple  Cardiovascular: Normal rate and regular rhythm  Pulmonary/Chest: Effort normal and breath sounds normal    Abdominal: Soft  She exhibits no distension  There is no tenderness  Suprapubic catheter in place --> no surrounding erythema or warmth  Musculoskeletal: Normal range of motion  She exhibits no edema  Neurological: She is alert and oriented to person, place, and time  Skin: Skin is warm and dry  Psychiatric: She is not actively hallucinating  She exhibits a depressed mood  She expresses homicidal and suicidal ideation  She expresses no suicidal plans and no homicidal plans         Vital Signs  ED Triage Vitals   Temperature Pulse Respirations Blood Pressure SpO2   12/30/19 2228 12/30/19 2228 12/30/19 2228 12/30/19 2228 12/30/19 2228   (!) 96 °F (35 6 °C) 76 16 144/86 100 %      Temp Source Heart Rate Source Patient Position - Orthostatic VS BP Location FiO2 (%)   12/30/19 2228 12/30/19 2228 12/30/19 2228 12/30/19 2228 --   Tympanic Monitor Lying Left arm       Pain Score       12/31/19 0244       Worst Possible Pain           Vitals:    12/31/19 0229 12/31/19 0746 12/31/19 1546 12/31/19 2300   BP: 144/75 152/83 145/86 135/88   Pulse: 80 78 75 82   Patient Position - Orthostatic VS: Lying Lying Lying Lying         Visual Acuity      ED Medications  Medications   ondansetron (ZOFRAN) injection 4 mg (4 mg Intravenous Given 12/31/19 0607)   nicotine (NICODERM CQ) 14 mg/24hr TD 24 hr patch 1 patch (1 patch Transdermal Medication Applied 12/31/19 0819)   piperacillin-tazobactam (ZOSYN) 2 25 g injection **ADS Override Pull** (has no administration in time range)   amLODIPine (NORVASC) tablet 5 mg (5 mg Oral Given 12/31/19 0819)   acetaminophen (TYLENOL) tablet 650 mg (has no administration in time range)   heparin (porcine) subcutaneous injection 5,000 Units (5,000 Units Subcutaneous Given 12/31/19 2246)   pantoprazole (PROTONIX) injection 40 mg (40 mg Intravenous Given 12/31/19 0819)   aluminum-magnesium hydroxide-simethicone (MYLANTA) 200-200-20 mg/5 mL oral suspension 30 mL (has no administration in time range)   mirtazapine (REMERON) tablet 15 mg (15 mg Oral Given 12/31/19 2246)   insulin lispro (HumaLOG) 100 units/mL subcutaneous injection 1-5 Units (2 Units Subcutaneous Given 12/31/19 2248)   sodium chloride 0 9 % with KCl 20 mEq/L infusion (premix) (100 mL/hr Intravenous New Bag 12/31/19 2246)   potassium chloride 20 mEq IVPB (premix) (20 mEq Intravenous New Bag 1/1/20 0058)   insulin glargine (LANTUS) subcutaneous injection 8 Units 0 08 mL (8 Units Subcutaneous Given 12/31/19 2249)   piperacillin-tazobactam (ZOSYN) 2 25 g in sodium chloride 0 9 % 50 mL IVPB (0 g Intravenous Stopped 12/31/19 0323)   sodium chloride 0 9 % bolus 1,000 mL (0 mL Intravenous Stopped 12/31/19 0334)   influenza vaccine, recombinant, quadrivalent (FLUBLOK) IM injection 0 5 mL (0 5 mL Intramuscular Given 12/31/19 0315)   traMADol (ULTRAM) tablet 50 mg (50 mg Oral Given 12/31/19 0358)       Diagnostic Studies  Results Reviewed     Procedure Component Value Units Date/Time    Rapid drug screen, urine [[id number]]  (Normal) Collected:  12/30/19 4132    Lab Status:  Final result Specimen:  Urine, Catheter Updated:  12/30/19 2326     Amph/Meth UR Negative     Barbiturate Ur Negative     Benzodiazepine Urine Negative     Cocaine Urine Negative     Methadone Urine Negative     Opiate Urine Negative     PCP Ur Negative     THC Urine Negative    Narrative:       FOR MEDICAL PURPOSES ONLY  IF CONFIRMATION NEEDED PLEASE CONTACT THE LAB WITHIN 5 DAYS  Drug Screen Cutoff Levels:  AMPHETAMINE/METHAMPHETAMINES  1000 ng/mL  BARBITURATES     200 ng/mL  BENZODIAZEPINES     200 ng/mL  COCAINE      300 ng/mL  METHADONE      300 ng/mL  OPIATES      300 ng/mL  PHENCYCLIDINE     25 ng/mL  THC       50 ng/mL      Urine Microscopic [[id number]]  (Abnormal) Collected:  12/30/19 2249    Lab Status:  Final result Specimen:  Urine, Indwelling Holliday Catheter Updated:  12/30/19 2313     RBC, UA 0-1 /hpf      WBC, UA Innumerable /hpf      Epithelial Cells Moderate /hpf      Bacteria, UA Innumerable /hpf      Hyaline Casts, UA 0-1 /lpf      COARSE GRANULAR CASTS 0-1 /lpf      OTHER OBSERVATIONS Yeast Cells Present     MUCUS THREADS Moderate    Urine culture [[id number]] Collected:  12/30/19 2249    Lab Status:   In process Specimen:  Urine, Indwelling Holliday Catheter Updated:  12/30/19 2312    Comprehensive metabolic panel [[id number]]  (Abnormal) Collected:  12/30/19 2245    Lab Status:  Final result Specimen:  Blood from Arm, Left Updated:  12/30/19 2304     Sodium 143 mmol/L      Potassium 4 1 mmol/L      Chloride 109 mmol/L      CO2 17 mmol/L      ANION GAP 17 mmol/L      BUN 35 mg/dL      Creatinine 2 89 mg/dL      Glucose 191 mg/dL      Calcium 9 5 mg/dL      AST 54 U/L      ALT 42 U/L      Alkaline Phosphatase 1,016 U/L      Total Protein 7 7 g/dL      Albumin 3 6 g/dL      Total Bilirubin 1 40 mg/dL      eGFR 17 ml/min/1 73sq m     Narrative:       Hemolysis  National Kidney Disease Foundation guidelines for Chronic Kidney Disease (CKD):     Stage 1 with normal or high GFR (GFR > 90 mL/min/1 73 square meters)    Stage 2 Mild CKD (GFR = 60-89 mL/min/1 73 square meters)    Stage 3A Moderate CKD (GFR = 45-59 mL/min/1 73 square meters)    Stage 3B Moderate CKD (GFR = 30-44 mL/min/1 73 square meters)    Stage 4 Severe CKD (GFR = 15-29 mL/min/1 73 square meters)    Stage 5 End Stage CKD (GFR <15 mL/min/1 73 square meters)  Note: GFR calculation is accurate only with a steady state creatinine    UA w Reflex to Microscopic w Reflex to Culture [[id number]]  (Abnormal) Collected:  12/30/19 2249    Lab Status:  Final result Specimen:  Urine, Indwelling Holliday Catheter Updated:  12/30/19 2300     Color, UA Qian     Clarity, UA Cloudy     Specific [place], UA 1 020     pH, UA 5 0     Leukocytes,  0     Nitrite, UA Negative     Protein, UA >=500 mg/dl      Glucose, UA 50 (1/25%) mg/dl      Ketones, UA 50 (2+) mg/dl      Bilirubin, UA Negative     Blood, UA 50 0     UROBILINOGEN UA Negative mg/dL     POCT alcohol breath test [[id number]]  (Normal) Resulted:  12/30/19 2254    Lab Status:  Final result Updated:  12/30/19 2254     EXTBreath Alcohol 0 00    CBC and differential [[id number]]  (Abnormal) Collected:  12/30/19 2245    Lab Status:  Final result Specimen:  Blood from Arm, Left Updated:  12/30/19 2254     WBC 6 80 Thousand/uL      RBC 3 07 Million/uL      Hemoglobin 10 0 g/dL      Hematocrit 31 2 %       fL      MCH 32 5 pg      MCHC 31 9 g/dL      RDW 15 5 %      MPV 9 1 fL      Platelets 097 Thousands/uL      Neutrophils Relative 59 %      Lymphocytes Relative 29 %      Monocytes Relative 7 %      Eosinophils Relative 5 %      Basophils Relative 1 %      Neutrophils Absolute 4 00 Thousands/µL      Lymphocytes Absolute 2 00 Thousands/µL      Monocytes Absolute 0 50 Thousand/µL      Eosinophils Absolute 0 30 Thousand/µL      Basophils Absolute 0 10 Thousands/µL                  US transvaginal non ob complete (does not inc pelvis)   Final Result by Traci Clinton MD (12/31 2217)       The endometrium measures 4 mm        There is minimal free fluid in the pelvis  Workstation performed: [id number]         US abdomen limited   Final Result by Celeste Solorzano MD (12/31 1013)         1  Gallbladder sludge without calculi  No wall thickening, pericholecystic fluid, or sonographic Michael sign  2   Dilated common bile duct at 11 mm without definite choledocholithiasis identified  Recommend correlation with liver function studies and, if clinically warranted, consider MRI/MRCP for further evaluation  3   Additional findings as noted  Workstation performed: [id number]         CT abdomen pelvis wo contrast   Final Result by Iliana Kennedy MD (12/31 0113)      Suprapubic catheter in place  Mild thickening of the left ureteral wall  Correlate with urinalysis for ascending urinary tract infection  Workstation performed: [id number]         IR other    (Results Pending)              Procedures  ECG 12 Lead Documentation Only  Date/Time: 12/31/2019 12:38 AM  Performed by: Anabella Mark MD  Authorized by: Anabella Mark MD     Indications / Diagnosis:  Weakness  ECG reviewed by me, the ED Provider: yes    Patient location:  ED  Interpretation:     Interpretation: normal    Rate:     ECG rate:  78 bpm    ECG rate assessment: normal    Rhythm:     Rhythm: sinus rhythm    Ectopy:     Ectopy: none    QRS:     QRS axis:  Normal  Conduction:     Conduction: normal    ST segments:     ST segments:  Normal  T waves:     T waves: normal               ED Course                               MDM  Number of Diagnoses or Management Options  Acute kidney injury [facility]):   Complicated UTI (urinary tract infection):   Liver failure [facility]):   Diagnosis management comments: The patient is obviously depressed but comfortable appearing  Vital signs are stable  (+) Multiple significant lab abnormalities including a markedly elevated alk phos and creatinine   Case discussed with Family Practice --> will admit to [facility] for further management  Inpatient bed requested  Amount and/or Complexity of Data Reviewed  Clinical lab tests: ordered and reviewed  Tests in the radiology section of CPT®: ordered and reviewed  Tests in the medicine section of CPT®: ordered and reviewed    Patient Progress  Patient progress: stable        Disposition  Final diagnoses:   Acute kidney injury ([facility] )   Liver failure ([place] [address] )   Complicated UTI (urinary tract infection)     Time reflects when diagnosis was documented in both MDM as applicable and the Disposition within this note     Time User Action Codes Description Comment    12/31/2019 12:56 AM Tigist Peel Add [N17 9] Acute kidney injury ([facility] )     12/31/2019 12:56 AM Tigist Peel Add [K72 90] Liver failure ([place] [address] )     12/31/2019 12:56 AM Rob Mitchell Add [K50 3] Complicated UTI (urinary tract infection)     12/31/2019  2:47 AM Neno Sommer Add [R45 851] Suicidal ideation     12/31/2019  3:53 AM Paramjit Feng Add [N17 9,  N18 9] Acute kidney injury superimposed on CKD ([place] [address] )     12/31/2019  3:13 PM Yelena Carlos Add [Z22 39] ESBL Escherichia coli carrier       ED Disposition     ED Disposition Condition Date/Time Comment    Admit Stable Tue Dec 31, 2019 12:56 AM Case was discussed with Family Practice and the patient's admission status was agreed to be Admission Status: inpatient status to the service of Dr Marcela Naylor           Follow-up Information    None         Current Discharge Medication List      CONTINUE these medications which have NOT CHANGED    Details   acetaminophen (TYLENOL) 500 mg tablet Take 1 tablet (500 mg total) by mouth every 6 (six) hours as needed for moderate pain  Qty: 30 tablet, Refills: 0    Associated Diagnoses: Vaginal discharge; 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      ADMELOG SOLOSTAR 100 units/mL injection pen INJECT 10 UNITS SUBCUTANEOUSLY 3 TIMES DAILY WITH MEALS  Qty: 15 mL, Refills: 0    Associated Diagnoses: Type 2 diabetes mellitus with stage 3 chronic kidney disease, with long-term current use of insulin ([facility] ); Type 2 diabetes mellitus with kidney complication, with long-term current use of insulin ([facility]); DM2 (diabetes mellitus, type 2) ([facility])      amLODIPine (NORVASC) 5 mg tablet TAKE 1 TABLET BY MOUTH ONCE DAILY  Qty: 30 tablet, Refills: 3    Associated Diagnoses: Essential hypertension      ASPIRIN LOW DOSE 81 MG EC tablet TAKE 1 TABLET BY MOUTH ONCE DAILY    Qty: 30 tablet, Refills: 5    Comments: $  Associated Diagnoses: Coronary artery disease due to lipid rich plaque      atorvastatin (LIPITOR) 40 mg tablet Take 1 tablet (40 mg total) by mouth daily  Qty: 30 tablet, Refills: 4    Associated Diagnoses: Mixed hyperlipidemia      BASAGLAR KWIKPEN 100 units/mL injection pen INJECT 40 UNIST SUBCUTANEOUSLY DAILY AT BEDTIME  Qty: 15 mL, Refills: 0    Associated Diagnoses: Type 2 diabetes mellitus with kidney complication, with long-term current use of insulin ([facility])      ergocalciferol (VITAMIN D2) 50,000 units Take 1 capsule (50,000 Units total) by mouth once a week  Qty: 4 capsule, Refills: 5    Associated Diagnoses: Vitamin D deficiency      nortriptyline (PAMELOR) 50 mg capsule Take 1 capsule (50 mg total) by mouth daily  Qty: 90 capsule, Refills: 0    Associated Diagnoses: Fibromyalgia      nystatin (MYCOSTATIN) powder Apply topically 2 (two) times a day Apply to lower abdomen twice a day  Qty: 15 g, Refills: 0    Associated Diagnoses: Candidiasis of skin      omeprazole (PriLOSEC) 20 mg delayed release capsule Take 1 capsule (20 mg total) by mouth daily before breakfast  Qty: 90 capsule, Refills: 0    Associated Diagnoses: Gastroesophageal reflux disease without esophagitis      pregabalin (LYRICA) 50 mg capsule Take 1 capsule (50 mg total) by mouth 2 (two) times a day  Qty: 60 capsule, Refills: 3    Associated Diagnoses: Fibromyalgia      senna (SENOKOT) 8 6 mg Take 1 tablet (8 6 mg total) by mouth daily at bedtime  Qty: 120 each, Refills: 0    Associated Diagnoses: Constipation, unspecified constipation type      Alcohol Swabs (ALCOHOL PREP) 70 % PADS Apply 1 applicator topically as needed      aluminum-magnesium hydroxide-simethicone (MYLANTA) 200-200-20 mg/5 mL suspension Take 30 mL by mouth every 4 (four) hours as needed for indigestion or heartburn  Qty: 355 mL, Refills: 0    Associated Diagnoses: Gastroesophageal reflux disease without esophagitis      B-D INS SYRINGE 0 5CC/31GX5/16 31G X 5/16" 0 5 ML MISC       Catheters MISC Please change patients suprapubic catheter as soon as possible and then every three months  Dx: N31 9 Neurogenic Bladder  Qty: 6 each, Refills: 0    Associated Diagnoses: Neurogenic bladder      cyanocobalamin (VITAMIN B-12) 1,000 mcg tablet Take 1 tablet (1,000 mcg total) by mouth daily for 90 days  Qty: 90 tablet, Refills: 0    Associated Diagnoses: Macrocytic anemia      DOCQLACE 100 MG capsule Take 1 capsule (100 mg total) by mouth 2 (two) times a day  Qty: 10 capsule, Refills: 0    Associated Diagnoses: Constipation, unspecified constipation type      Insulin Pen Needle (UNIFINE PENTIPS) 32G X 4 MM MISC Inject under the skin 4 (four) times a day for 90 days  Qty: 400 each, Refills: 0    Associated Diagnoses: DM2 (diabetes mellitus, type 2) ([facility])      Lancets 28G MISC Test blood sugars three times daily      ONE TOUCH ULTRA TEST test strip 100 strips by Device route 3 (three) times a day      predniSONE 20 mg tablet Take 2 tablets (40 mg total) by mouth daily  Qty: 14 tablet, Refills: 0    Associated Diagnoses: Pain of right lower extremity           No discharge procedures on file      ED Provider  Electronically Signed by           Lluvia Gong MD  01/01/20 7224

## 2019-12-31 NOTE — H&P
History and Physical - Fercho Godoy    Patient Information: Oj Charles 62 y o  female MRN: [id number]  Unit/Bed#: 7T U 702-02 Encounter: [id number]  Admitting Physician: Leslie Whitney MD  PCP: Ge Baez MD  Date of Admission:  12/31/19    Assessment and Plan    * Acute kidney injury superimposed on CKD [facility])  Assessment & Plan  Patient presented with creatinine of 2 89  Her baseline is around 1 5  Pateint endorses multiple episodes of nausea and vomiting and generalized abdominal pain worse on the right upper quadrant since 1 week ago  Patient has not been able to tolerate po  intake  She also reports fatigue in the past week as well  She denies fever, chills, diarhea and constipation  Her JULIO is likely due to prerenal JULIO from volume depletion  There was some difficulty obtaining Peripheral IV access on patient  After multiple attempt, US guided peripheral IV was successfully placed  -1L bolus normal saline [id number] cc of maintenance fluid  -Avoid nephrotoxic agents  -CMP in am  -Picc team consulted due to difficult IV access    High anion gap metabolic acidosis  Assessment & Plan  Patient presented to the ED with JULIO  She has anion gap of 17 and low bicarb of 17  Her Anion gap metabolic acidosis is likely due to Uremia but given elevated glucose, will check for betahydroxybuterate     - Received 1 L bolus Normal saline and is 125 cc/hr maintenance fluid  - f/u on Betahydroxybutarate    Right upper quadrant abdominal pain  Assessment & Plan  Patient endorses some right upper quadrant abdominal pain that radiates to the back and worsens with drinking fluids  She has also experienced nausea and vomiting for the past 3 days  On admission she had elevated Alkaline phosphotase at 1016 and Total bilirubin of 1 4  CT scan of the abdomen/pelvis showed prominent but stable common bile duct measuring up to 9 mm in diameter  D/w radiology for concerns of CBD stone   Per radiologist this was previously noted in CT from 5/2019      - Check GGT   - Given clinical suspicion and in light of markedly elevated ALP will obtain RUQ US to assess CBD diameter and to look for any gallbladder sludge  - If repeat CMP shows persistent or worsening ALP levels would consider MRI with MRCP per radiologist suggestion  - Place patient on clear liquid diet for now  If CMP and US shows less concern for CBD pathology will advance as tolerated   - IV zofran prn for nausea  - Repeat CMP     Suicidal ideation  Assessment & Plan  Patient reports suicidal ideation  She reports that she has been dealing with a lot of issues with regards to her health  She denies any homicidal ideation     -Psych consult placed  -Patient placed on 1:1  -Suicide precaution    Vaginal discharge  Assessment & Plan  Patient endorses vaginal discharge that has been ongoing for over 2 weeks  Seen in ER on 12/12/2019, treated with diflucan  Returned on 12/14  BV probe negative for candida, gardnerella, trichomonas 12/14/2019  GC/CT negative  Concern for urethrovaginal fistula on exam by NASIMA JANG in ER on 12/14/2019  Wound C/S from catheter site grew polymicrobial joycelyn with PCN susceptibility  Was referred to urology but did not see them  - Speculum exam not performed at time of HPI due to patient discomfort  Patient agreeable to exam when IV line in place and after analgesia for her RUQ pain  - Received Zosyn OT  - CT with unremarkable reproductive organs noted  D/w radiology  If on exam any suspicion for urethrovaginal fistula recommended cystogram study  Abnormal urinalysis  Assessment & Plan  Patient has a history of recurrent UTI  On admission patient presented with bacteruria and elevated leukocytes  She denies any dysuria and urgency but she does complain of ongoing vaginal discharge  Her symptoms is consistent with UTI  Patient has an indwelling suprapubic catheter that is being changed every 6 weeks        -currently being managed with On zosyn 2 25g IV      Gastroesophageal reflux disease  Assessment & Plan  Patient denies any recent heartburn  Started on protonix 40 mg IV          HTN (hypertension)  Assessment & Plan  Stable    On amlodipine 5mg    Neurogenic bladder  Assessment & Plan  Patient has a history of chrnonic neurogenic bladder with a suorapubic catheter in place  She reports that her Catheter is changed every 6 weeks  Catheter site was examined, no signs of infection was noted  Tobacco dependence  Assessment & Plan  Smokes 1/2 PPD of cigarettes    -On nicotine patch    Type 2 diabetes mellitus with kidney complication, with long-term current use of insulin (HCC)  Assessment & Plan  Lab Results   Component Value Date    HGBA1C 9 1 (A) 07/08/2019       No results for input(s): POCGLU in the last 72 hours  Blood Sugar Average: Last 72 hrs:  - ISS  - Accuchecks Q6 for now,  Will place on ACHS once intake improves  - Will resume basaglar when renal parameter improves to prevent any hypoglycemic episodes with long acting insulin at this point       Anemia  Assessment & Plan  Macrocytic anemia- likely from Vitamin b12 deficiency  Currently asymptomatic  Patient was receiving 1000mcg Vitamin B12 at home    -Will resume Vitamin B12 once renal function improves        VTE Prophylaxis: Heparin  Code Status: Level 1 - Full Code  Anticipated Length of Stay:  Patient will be admitted on an Inpatient basis with an anticipated length of stay of  more than 2 midnights  Justification for Hospital Stay: Acute kidney injury, Abdominal pain with dilated common bile duct  Total Time for Visit, including Counseling / Coordination of Care: 30 mins  Greater than 50% of this total time spent on direct patient counseling and coordination of care  Chief Complaint:     Chief Complaint   Patient presents with    Weakness - Generalized     pt c/o generalized weakness, pt states she was here last week for a bladder infection   During triage pt reports SI at this time HI "sometimes"  "I just don't want to be here anymore"     History of Present Illness:    Rich Mariee is a 62 y o  female with PMH of CKD, ESBL carrier, recurrent UTI, neurogenic bladder with indwelling catheter presents to the ED for generalized weakness  According to patient she started having multiple episodes of recurrent nausea and non-biliious non bloody emesis about a week ago  Patient stated that she has not been able to tolerate anything PO  She also endorses associated generalized abdominal pain that is worse on the Right side  She stated that her abdominal pain radiates to the back  She states that all her symptoms began about a 1 week ago  She denies any fever, chills, chest pain, shortness of breath, dysuria, urgency, constipation, diarrhea, melena, and hematochezia  She denies any recent travel and sick contacts  Of note, patient endorses some feeling of suicidal ideation  She reports that she has been going through a lot with her personal life as well as her health  Review of Systems:  Review of Systems   Constitutional: Positive for appetite change and fatigue  Negative for chills and fever  Eyes: Negative for visual disturbance  Respiratory: Negative for shortness of breath and wheezing  Cardiovascular: Negative for chest pain, palpitations and leg swelling  Gastrointestinal: Positive for abdominal pain, nausea and vomiting  Negative for blood in stool, constipation and diarrhea  Genitourinary: Negative for dysuria, flank pain, frequency and hematuria  Musculoskeletal: Negative for arthralgias  Neurological: Negative for dizziness, facial asymmetry, light-headedness, numbness and headaches  Psychiatric/Behavioral: Positive for suicidal ideas         Past Medical and Surgical History:   Past Medical History:   Diagnosis Date    Ambulatory dysfunction     Anemia     macrocyctic    Chronic kidney disease 5/24/2018    Chronic pain disorder right hip    Diabetes mellitus ([address] )     Diabetic foot ulcer ([address] )     left with fat layer exposed    Dyslipidemia 5/24/2018    ESBL E  coli carrier     GERD (gastroesophageal reflux disease)     History of frequent urinary tract infections     HTN (hypertension) 5/24/2018    Hyperlipidemia     Irritable bowel syndrome     constipation    Neurogenic bladder     Neuropathy     Pedal edema     Vitamin D deficiency      Past Surgical History:   Procedure Laterality Date    CATARACT EXTRACTION      DC XCAPSL CTRC RMVL INSJ IO LENS PROSTH W/O ECP Right 12/6/2018    Procedure: EXTRACAPSULAR CATARACT REMOVAL/INSERTION OF INTRAOCULAR LENS;  Surgeon: Ashley Pena MD;  Location: [address] [place] [address] MAIN OR;  Service: Ophthalmology    DC XCAPSL CTRC RMVL INSJ IO LENS PROSTH W/O ECP Left 10/17/2019    Procedure: EXTRACAPSULAR CATARACT REMOVAL/INSERTION OF INTRAOCULAR LENS;  Surgeon: Ashley Pena MD;  Location:  MAIN OR;  Service: Ophthalmology    SUPRAPUBIC CATHETER INSERTION       Meds/Allergies: Allergies: Allergies   Allergen Reactions    Gabapentin      Other reaction(s): slurring of speech, falls  Prior to Admission Medications   Prescriptions Last Dose Informant Patient Reported? Taking? ADMELOG SOLOSTAR 100 units/mL injection pen Past Week at Unknown time  No Yes   Sig: INJECT 10 UNITS SUBCUTANEOUSLY 3 TIMES DAILY WITH MEALS   ASPIRIN LOW DOSE 81 MG EC tablet Past Week at Unknown time  No Yes   Sig: TAKE 1 TABLET BY MOUTH ONCE DAILY  Alcohol Swabs (ALCOHOL PREP) 70 % PADS   Yes No   Sig: Apply 1 applicator topically as needed   B-D INS SYRINGE 0 5CC/31GX5/16 31G X 5/16" 0 5 ML MISC   Yes No   BASAGLAR KWIKPEN 100 units/mL injection pen Past Week at Unknown time  No Yes   Sig: INJECT 40 UNIST SUBCUTANEOUSLY DAILY AT BEDTIME   Catheters MISC   No No   Sig: Please change patients suprapubic catheter as soon as possible and then every three months      Dx: N31 9 Neurogenic Bladder   DOCQLACE 100 MG capsule More than a month at Unknown time  No No   Sig: Take 1 capsule (100 mg total) by mouth 2 (two) times a day   Insulin Pen Needle (UNIFINE PENTIPS) 32G X 4 MM MISC   No No   Sig: Inject under the skin 4 (four) times a day for 90 days   Lancets 28G MISC   Yes No   Sig: Test blood sugars three times daily   ONE TOUCH ULTRA TEST test strip   Yes No   Si strips by Device route 3 (three) times a day   acetaminophen (TYLENOL) 500 mg tablet Past Week at Unknown time  No Yes   Sig: Take 1 tablet (500 mg total) by mouth every 6 (six) hours as needed for moderate pain   aluminum-magnesium hydroxide-simethicone (MYLANTA) 200-200-20 mg/5 mL suspension More than a month at Unknown time  No No   Sig: Take 30 mL by mouth every 4 (four) hours as needed for indigestion or heartburn   amLODIPine (NORVASC) 5 mg tablet Past Week at Unknown time  No Yes   Sig: TAKE 1 TABLET BY MOUTH ONCE DAILY     atorvastatin (LIPITOR) 40 mg tablet Past Week at Unknown time  No Yes   Sig: Take 1 tablet (40 mg total) by mouth daily   cyanocobalamin (VITAMIN B-12) 1,000 mcg tablet   No No   Sig: Take 1 tablet (1,000 mcg total) by mouth daily for 90 days   ergocalciferol (VITAMIN D2) 50,000 units Past Week at Unknown time  No Yes   Sig: Take 1 capsule (50,000 Units total) by mouth once a week   nortriptyline (PAMELOR) 50 mg capsule Past Week at Unknown time  No Yes   Sig: Take 1 capsule (50 mg total) by mouth daily   nystatin (MYCOSTATIN) powder Past Week at Unknown time  No Yes   Sig: Apply topically 2 (two) times a day Apply to lower abdomen twice a day   omeprazole (PriLOSEC) 20 mg delayed release capsule Past Week at Unknown time  No Yes   Sig: Take 1 capsule (20 mg total) by mouth daily before breakfast   predniSONE 20 mg tablet More than a month at Unknown time  No No   Sig: Take 2 tablets (40 mg total) by mouth daily   pregabalin (LYRICA) 50 mg capsule Past Week at Unknown time  No Yes   Sig: Take 1 capsule (50 mg total) by mouth 2 (two) times a day senna (SENOKOT) 8 6 mg Past Week at Unknown time  No Yes   Sig: Take 1 tablet (8 6 mg total) by mouth daily at bedtime      Facility-Administered Medications: None     Social History:     Social History     Socioeconomic History    Marital status: Single     Spouse name: Not on file    Number of children: Not on file    Years of education: Not on file    Highest education level: Not on file   Occupational History    Not on file   Social Needs    Financial resource strain: Somewhat hard    Food insecurity:     Worry: Not on file     Inability: Not on file    Transportation needs:     Medical: No     Non-medical: No   Tobacco Use    Smoking status: Current Every Day Smoker     Packs/day: 0 50     Years: 40 00     Pack years: 20 00     Types: Cigarettes    Smokeless tobacco: Never Used   Substance and Sexual Activity    Alcohol use: Not Currently     Alcohol/week: 0 0 standard drinks     Comment: no alcohol for 16 yrs    Drug use: Not Currently    Sexual activity: Not on file   Lifestyle    Physical activity:     Days per week: Not on file     Minutes per session: Not on file    Stress:  To some extent   Relationships    Social connections:     Talks on phone: Not on file     Gets together: More than three times a week     Attends [employer] service: Not on file     Active member of club or organization: Not on file     Attends meetings of clubs or organizations: Not on file     Relationship status: Not on file    Intimate partner violence:     Fear of current or ex partner: Not on file     Emotionally abused: Not on file     Physically abused: Not on file     Forced sexual activity: Not on file   Other Topics Concern    Not on file   Social History Narrative    Hospitalization: 4/28/18    Flu shot:yes     Patient Pre-hospital Living Situation: Lives with daughter  Patient Pre-hospital Level of Mobility: Ambulates with walker  Patient Pre-hospital Diet Restrictions: none    Family History:  Family History   Problem Relation Age of Onset    Breast cancer Mother     Hypertension Mother     Diabetes Mother         Mellitus    Parkinsonism Mother     Cancer Maternal Aunt         Unknown     Physical Exam:   Vitals:   Blood Pressure: 144/75 (12/31/19 0229)  Pulse: 80 (12/31/19 0229)  Temperature: 97 6 °F (36 4 °C) (12/31/19 0229)  Temp Source: Temporal (12/31/19 0229)  Respirations: 16 (12/31/19 0229)  Height: 5' 3" (160 cm) (12/31/19 0229)  Weight - Scale: 74 5 kg (164 lb 3 9 oz) (12/31/19 0229)  SpO2: 96 % (12/31/19 0229)    Physical Exam   Constitutional: She is oriented to person, place, and time  She appears well-developed and well-nourished  HENT:   Head: Normocephalic  Right Ear: External ear normal    Left Ear: External ear normal    Nose: Nose normal    Mouth/Throat: Oropharynx is clear and moist    Eyes: Pupils are equal, round, and reactive to light  EOM are normal    Neck: Normal range of motion  No JVD present  No tracheal deviation present  No thyromegaly present  Cardiovascular: Normal rate, regular rhythm, normal heart sounds and intact distal pulses  Exam reveals no gallop and no friction rub  No murmur heard  Pulmonary/Chest: Effort normal and breath sounds normal  No stridor  No respiratory distress  She has no wheezes  Abdominal: Soft  Bowel sounds are normal  She exhibits no distension and no mass  There is tenderness  There is no rebound and no guarding  Right upper quadrant   Genitourinary:   Genitourinary Comments: Suprapubic catheter in place, no erythema or drainages around   Musculoskeletal: Normal range of motion  She exhibits no edema  Neurological: She is alert and oriented to person, place, and time  No cranial nerve deficit  Coordination normal    Skin: Skin is warm  Capillary refill takes less than 2 seconds  No rash noted  Psychiatric: She has a normal mood and affect  Her behavior is normal        Lab Results: I have personally reviewed pertinent reports  Results from last 7 days   Lab Units 12/30/19  2245   WBC Thousand/uL 6 80   HEMOGLOBIN g/dL 10 0*   HEMATOCRIT % 31 2*   PLATELETS Thousands/uL 364   NEUTROS PCT % 59   LYMPHS PCT % 29   MONOS PCT % 7   EOS PCT % 5     Results from last 7 days   Lab Units 12/30/19  2245   POTASSIUM mmol/L 4 1   CHLORIDE mmol/L 109*   CO2 mmol/L 17*   BUN mg/dL 35*   CREATININE mg/dL 2 89*   CALCIUM mg/dL 9 5   ALK PHOS U/L 1,016*   ALT U/L 42   AST U/L 54*   EGFR ml/min/1 73sq m 17*                          Results from last 7 days   Lab Units 12/30/19  2249   COLOR UA  Qian*   CLARITY UA  Cloudy*   SPEC GRAV UA  1 020   PH UA  5 0   LEUKOCYTES UA  500 0*   NITRITE UA  Negative   GLUCOSE UA mg/dl 50 (1/25%)*   KETONES UA mg/dl 50 (2+)*   BILIRUBIN UA  Negative   BLOOD UA  50 0*      Results from last 7 days   Lab Units 12/30/19  2249   RBC UA /hpf 0-1*   WBC UA /hpf Innumerable*   EPITHELIAL CELLS WET PREP /hpf Moderate*   BACTERIA UA /hpf Innumerable*        Imaging: I have personally reviewed pertinent reports  Ct Abdomen Pelvis Wo Contrast    Result Date: 12/31/2019  Narrative: CT ABDOMEN AND PELVIS WITHOUT IV CONTRAST INDICATION:   Abdominal pain, acute, nonlocalized  COMPARISON:  CT of abdomen pelvis on May 28, 2019, March 31, 2017  TECHNIQUE:  CT examination of the abdomen and pelvis was performed without intravenous contrast   Axial, sagittal, and coronal 2D reformatted images were created from the source data and submitted for interpretation  Radiation dose length product (DLP) for this visit:  711 mGy-cm   This examination, like all CT scans performed in the [facility], was performed utilizing techniques to minimize radiation dose exposure, including the use of iterative reconstruction and automated exposure control  Enteric contrast was administered  FINDINGS: ABDOMEN LOWER CHEST:  3 mm nodule in the right lower lobe is stable since March 31, 2017, likely benign   LIVER/BILIARY TREE:  Prominent common bile duct measuring up to 9 mm in diameter, stable  GALLBLADDER:  No calcified gallstones  No pericholecystic inflammatory change  SPLEEN:  Unremarkable  PANCREAS:  Unremarkable  ADRENAL GLANDS:  Unremarkable  KIDNEYS/URETERS:  Mild scarring of the left kidney  Mild thickening of the left ureteral wall  No significant hydronephrosis  STOMACH AND BOWEL:  Unremarkable  APPENDIX:  No findings to suggest appendicitis  ABDOMINOPELVIC CAVITY:  No ascites or free intraperitoneal air  No lymphadenopathy  VESSELS:  Mild atherosclerotic calcifications  PELVIS REPRODUCTIVE ORGANS:  Unremarkable for patient's age  URINARY BLADDER:  Bladder is collapsed around a suprapubic catheter balloon  ABDOMINAL WALL/INGUINAL REGIONS:  Unremarkable  OSSEOUS STRUCTURES:  No acute fracture or destructive osseous lesion  Impression: Suprapubic catheter in place  Mild thickening of the left ureteral wall  Correlate with urinalysis for ascending urinary tract infection   Workstation performed: [id number]       EKG, Pathology, and Other Studies Reviewed on Admission:   EKG  Result Date: 12/31/19  Impression:  Normal sinus rythym    [employer] Records Reviewed: Yes    Entire H&P was discussed with Dr Ailyn Roblero who agreed to what is noted above    Rosea Shone, MD  12/31/19  5:35 AM

## 2019-12-31 NOTE — ASSESSMENT & PLAN NOTE
speculum exam performed yesterday  ultrasound of the pelvis showed endometrium 4 mm, no suspicion for adnexal mass or loculated collection  Minimal free fluid in pelvis, no fistula noted       Patient did not report any vaginal symptoms this AM

## 2019-12-31 NOTE — CONSULTS
Consultation - Behavioral Health     Identification Data: Megan Louise 62 y o  female MRN: [id number]  Unit/Bed#: 7T [facility] 702-02 Encounter: [id number]    12/31/19  12:37 PM    Inpatient consult to Psychiatry  Consult performed by: OMAYRA Olivera  Consult ordered by: Danni Tom MD        Physician Requesting Consult: Rehab Amanda Franks DO  Principal Problem:Acute kidney injury superimposed on CKD [facility])    Reason for Consult:  "I don't want to go on living like this", depression, anxiety and suicidal ideation    History of Present Illness     Megan Louise is a 62 y o  female with a history of depression and anxiety who was admitted to the medical service on 12/30/2019 due to Acute on chronic kidney injury and UTI  Psychiatric consultation was requested due to suicidal ideation  Psychiatric symptoms prior to admission included worsening depression, suicidal behavior, passive death wish, self-abusive behavior, hopelessness and helplessness  Onset of symptoms was gradual starting 5 years ago with progressively worsening course since that time  Stressors preceding admission included death of family member (Son and Mother)   Patient reports that for the past two weeks patient has delayed seeking care for worsening psychical condition because "I was hoping I would just die "      Assessment/Plan     Principal Problem:    Acute kidney injury superimposed on CKD ([address] )  Active Problems:    HTN (hypertension)    Gastroesophageal reflux disease    Type 2 diabetes mellitus with kidney complication, with long-term current use of insulin (HCC)    Neurogenic bladder    Anemia    Tobacco dependence    Abnormal urinalysis    High anion gap metabolic acidosis    Vaginal discharge    Right upper quadrant abdominal pain    Suicidal ideation    Weight loss, unintentional      Assessment: Patient has had worsening Depression with hopelessness, anhedonia, feelings of guilt and SI with plan to not seek medical care for worsening health over past two weeks  Patient states her brother and mother both  in last 7 months and that this triggered worsening depression and passive death wish  Patient has been chronically ill for past 5 years and states she has had suicidal ideation intermittently during this time  However, she states she does not believe she would ever go through with an active suicide attempt  No evidence of delusions, symptoms of psychosis, history of manic symptoms, OCD or panic  However, patient reports that anxiety about leaving the house prevents her from doing some things she would like to do  Her anxiety about leaving the home is not associated with panic attacks  Patient has never sought psychiatric treatment in the past and is willing to begin Mirtazapine 15mg PO HS, for depression anxiety, sleep and decreased appetite, while she being treated medically in hospital        Plan/Recommendation: D/c 1:1 observation  Initiate Mirtazapine 15mg PO HS for treatment of depression, anxiety and sleep difficulties  Recommend follow-up with PCP with referral to therapist and psychiatrist if depression persists or SI returns  Patient is in agreement with this plan       Psychiatric Review Of Systems:    sleep changes: yes, decreased  appetite changes: decreased  weight changes: unknown  energy/anergy: decreased  interest/pleasure/anhedonia: yes, decreased pleasure  somatic symptoms: no  anxiety/panic: yes, without panic attacks  fitz: no, Denies ever having symptoms of fitz in past  guilty/hopeless: yes; patient reports feeling a burden on her family   self injurious behavior/risky behavior: Risky behavior; delayed seeking medical care  Suicidal ideation: No current suicidal ideation  Homicidal ideation: no  Auditory hallucinations: no  Visual hallucinations: no  Other hallucinations: no  Delusional thinking: no  Eating disorder history: no  Obsessive/compulsive symptoms: no    Historical Information     Past Psychiatric History:     Past Inpatient Psychiatric Treatment:   No history of past inpatient psychiatric admissions  Past Outpatient Psychiatric Treatment:    No history of past outpatient psychiatric treatment  Past Suicide Attempts: None  Past Violent Behavior: None  Past Psychiatric Medication Trials: none     Substance Abuse History:    Social History     Tobacco History     Smoking Status  Current Every Day Smoker Smoking Frequency  0 5 packs/day for 40 years (20 pk yrs) Smoking Tobacco Type  Cigarettes    Smokeless Tobacco Use  Never Used          Alcohol History     Alcohol Use Status  Not Currently Drinks/Week  0 Glasses of wine, 0 Cans of beer, 0 Shots of liquor, 0 Standard drinks or equivalent per week Amount  0 0 standard drinks of alcohol/wk Comment  no alcohol for 16 yrs          Drug Use     Drug Use Status  Not Currently          Sexual Activity     Sexually Active  Not Asked          Activities of Daily Living    Not Asked                 I have assessed this patient for substance use within the past 12 months    Alcohol use: denies use, Quit 16 years ago  Recreational drug use:   Cocaine:  denies use, last use was 16 years ago  Heroin:  last use was 16 years ago  Marijuana:  denies use  Other drugs: denies use     Longest clean time: 16 years  History of Inpatient/Outpatient rehabilitation program: unable to obtain  Smoking history: denies current use  Use of caffeine: none    Family Psychiatric History:     Psychiatric Illness:  no family history of psychiatric illness  Substance Abuse:   Mother - substance abuse, Father - substance abuse, Brother - alcohol abuse  Suicide Attempts:  patient denies    Social History:    Education: 9th grade  Learning Disabilities: learning disability and special education  Marital History:   Children: 9 adult son, 3 daughter  Living Arrangement: The patient lives in home with daughter  Occupational History: unknown occupation  Functioning Relationships: limited support system  Legal History: no current legal problems   History: None    Traumatic History:     Abuse: none  Other Traumatic Events:none     Past Medical History:    History of Seizures: no  History of Head injury with loss of consciousness: no    Past Medical History:   Diagnosis Date    Ambulatory dysfunction     Anemia     macrocyctic    Chronic kidney disease 5/24/2018    Chronic pain disorder     right hip    Diabetes mellitus ([address] )     Diabetic foot ulcer [address] [address] )     left with fat layer exposed    Dyslipidemia 5/24/2018    ESBL E  coli carrier     GERD (gastroesophageal reflux disease)     History of frequent urinary tract infections     HTN (hypertension) 5/24/2018    Hyperlipidemia     Irritable bowel syndrome     constipation    Neurogenic bladder     Neuropathy     Pedal edema     Vitamin D deficiency      Past Surgical History:   Procedure Laterality Date    CATARACT EXTRACTION      NM XCAPSL CTRC RMVL INSJ IO LENS PROSTH W/O ECP Right 12/6/2018    Procedure: EXTRACAPSULAR CATARACT REMOVAL/INSERTION OF INTRAOCULAR LENS;  Surgeon: Kathrin Eli MD;  Location: [address] [place] [address];  Service: Ophthalmology    NM XCAPSL CTRC RMVL INSJ IO LENS PROSTH W/O ECP Left 10/17/2019    Procedure: EXTRACAPSULAR CATARACT REMOVAL/INSERTION OF INTRAOCULAR LENS;  Surgeon: Kathrin Eli MD;  Location: [address] [place] [address];  Service: Ophthalmology    SUPRAPUBIC CATHETER INSERTION           Medical Review Of Systems:    Pertinent items are noted in HPI  Allergies: Allergies   Allergen Reactions    Gabapentin      Other reaction(s): slurring of speech, falls  Medications: All current active medications have been reviewed      Objective     Vital signs in last 24 hours:    Temp:  [96 °F (35 6 °C)-97 6 °F (36 4 °C)] 97 2 °F (36 2 °C)  HR:  [76-80] 78  Resp:  [16-18] 18  BP: (144-152)/(75-86) 152/83    Intake/Output Summary (Last 24 hours) at 12/31/2019 1237  Last data filed at 12/31/2019 1219  Gross per 24 hour   Intake 2091 67 ml   Output 450 ml   Net 1641 67 ml       Mental Status Evaluation:    Appearance:  dressed in hospital attire   Behavior:  pleasant, cooperative, calm   Speech:  normal rate, normal volume, normal pitch   Mood:  depressed   Affect:  normal range and intensity   Language: naming objects   Thought Process:  organized   Associations: intact associations   Thought Content:  normal   Perceptual Disturbances: none   Risk Potential: Suicidal ideation - None at present  Homicidal ideation - None at present  Potential for aggression - Not at present   Sensorium:  oriented to person, place and time/date   Memory:  recent and remote memory grossly intact   Consciousness:  alert and awake    Attention: attention span and concentration are age appropriate   Intellect: below average   Fund of Knowledge: awareness of current events: yes   Insight:  fair   Judgment: fair   Muscle Strength Muscle Tone: normal  normal   Gait/Station: unstable gait, uses walker   Motor Activity: no abnormal movements     Laboratory Results: I have personally reviewed all pertinent laboratory/tests results  Imaging Studies: Ct Abdomen Pelvis Wo Contrast    Result Date: 12/31/2019  Narrative: CT ABDOMEN AND PELVIS WITHOUT IV CONTRAST INDICATION:   Abdominal pain, acute, nonlocalized  COMPARISON:  CT of abdomen pelvis on May 28, 2019, March 31, 2017  TECHNIQUE:  CT examination of the abdomen and pelvis was performed without intravenous contrast   Axial, sagittal, and coronal 2D reformatted images were created from the source data and submitted for interpretation  Radiation dose length product (DLP) for this visit:  711 mGy-cm   This examination, like all CT scans performed in the [facility], was performed utilizing techniques to minimize radiation dose exposure, including the use of iterative reconstruction and automated exposure control  Enteric contrast was administered   FINDINGS: ABDOMEN LOWER CHEST: 3 mm nodule in the right lower lobe is stable since March 31, 2017, likely benign  LIVER/BILIARY TREE:  Prominent common bile duct measuring up to 9 mm in diameter, stable  GALLBLADDER:  No calcified gallstones  No pericholecystic inflammatory change  SPLEEN:  Unremarkable  PANCREAS:  Unremarkable  ADRENAL GLANDS:  Unremarkable  KIDNEYS/URETERS:  Mild scarring of the left kidney  Mild thickening of the left ureteral wall  No significant hydronephrosis  STOMACH AND BOWEL:  Unremarkable  APPENDIX:  No findings to suggest appendicitis  ABDOMINOPELVIC CAVITY:  No ascites or free intraperitoneal air  No lymphadenopathy  VESSELS:  Mild atherosclerotic calcifications  PELVIS REPRODUCTIVE ORGANS:  Unremarkable for patient's age  URINARY BLADDER:  Bladder is collapsed around a suprapubic catheter balloon  ABDOMINAL WALL/INGUINAL REGIONS:  Unremarkable  OSSEOUS STRUCTURES:  No acute fracture or destructive osseous lesion  Impression: Suprapubic catheter in place  Mild thickening of the left ureteral wall  Correlate with urinalysis for ascending urinary tract infection  Workstation performed: [id number]     Us Abdomen Limited    Result Date: 12/31/2019  Narrative: RIGHT UPPER QUADRANT ULTRASOUND INDICATION:    RUQ PAIN, NO FEVER, NO ELEVATED WBC CBD 9mm on CT  Check CBD diameter  COMPARISON:  None  TECHNIQUE:   Real-time ultrasound of the right upper quadrant was performed with a curvilinear transducer with both volumetric sweeps and still imaging techniques  FINDINGS: PANCREAS:  Visualized portions of the pancreas are within normal limits  AORTA AND IVC:  Visualized portions are normal for patient age  LIVER: Size:  Mildly enlarged  The liver measures 19 1 cm in the midclavicular line  Contour:  Surface contour is smooth  Parenchyma:  Echogenicity and echotexture are within normal limits  No evidence of suspicious mass   Limited imaging of the main portal vein shows it to be patent and hepatopetal   BILIARY: The gallbladder is normal in caliber  No wall thickening or pericholecystic fluid  Gallbladder sludge is present without calculi identified  No sonographic Michael's sign  No intrahepatic biliary dilatation  CBD measures 11 mm  No choledocholithiasis  KIDNEY: Right kidney measures 10 0 x 4 3 cm  Within normal limits  ASCITES:   None  Impression: 1  Gallbladder sludge without calculi  No wall thickening, pericholecystic fluid, or sonographic Michael sign  2   Dilated common bile duct at 11 mm without definite choledocholithiasis identified  Recommend correlation with liver function studies and, if clinically warranted, consider MRI/MRCP for further evaluation  3   Additional findings as noted  Workstation performed: [id number]       Code Status: Level 1 - Full Code  Advance Directive and Living Will:       Power of :      Treatment Plan:     Planned Medication Changes: All current active medications have been reviewed  Add Mirtazapine 15mg PO HS    Current Facility-Administered Medications:  acetaminophen 650 mg Oral Q8H PRN Jacquelyn Mo MD    aluminum-magnesium hydroxide-simethicone 30 mL Oral Q4H PRN Jacquelyn Mo MD    amLODIPine 5 mg Oral Daily Jacquelyn Mo MD    heparin (porcine) 5,000 Units Subcutaneous [facility] Jacquelyn Mo MD    insulin lispro 1-5 Units Subcutaneous Q6H [facility] Jacquelyn Mo MD    mirtazapine 15 mg Oral HS OMAYRA Murphy    nicotine 1 patch Transdermal Daily Lyn Sky MD    ondansetron 4 mg Intravenous Q6H PRN Lyn Sky MD    pantoprazole 40 mg Intravenous Q24H [facility] Jacquelyn Mo MD    piperacillin-tazobactam        sodium chloride 125 mL/hr Intravenous Continuous Jacquelyn Mo MD Last Rate: 125 mL/hr (12/31/19 1219)       Risks / Benefits of Treatment:    Risks, benefits, and possible side effects of medications explained to patient and patient verbalizes understanding and agreement for treatment      Counseling / Coordination of Care:    Diagnosis, medication changes and treatment plan reviewed with patient  Grant Escalona OMAYRA 12/31/19    Code Status: Level 1 - Full Code      Portions of the record may have been created with voice recognition software  Occasional wrong word or "sound a like" substitutions may have occurred due to the inherent limitations of voice recognition software  Read the chart carefully and recognize, using context, where substitutions have occurred

## 2019-12-31 NOTE — UTILIZATION REVIEW
Initial Clinical Review    Admission: Date/Time/Statement: Inpatient Admission Orders (From admission, onward)     Ordered        12/31/19 0057  Inpatient Admission (expected length of stay for this patient Order details is greater than two midnights)  Once                   Orders Placed This Encounter   Procedures    Inpatient Admission (expected length of stay for this patient Order details is greater than two midnights)     Standing Status:   Standing     Number of Occurrences:   1     Order Specific Question:   Admitting Physician     Answer:   Ardell Sicard [[id number]]     Order Specific Question:   Level of Care     Answer:   Med Surg [[id number]]     Order Specific Question:   Estimated length of stay     Answer:   More than 2 Midnights     Order Specific Question:   Certification     Answer:   I certify that inpatient services are medically necessary for this patient for a duration of greater than two midnights  See H&P and MD Progress Notes for additional information about the patient's course of treatment  ED Arrival Information     Expected Arrival Acuity Means of Arrival Escorted By Service Admission Type    - 12/30/2019 22:23 Emergent [facility] ([facility]) General Medicine Emergency    Arrival Complaint    pain        Chief Complaint   Patient presents with    Weakness - Generalized     pt c/o generalized weakness, pt states she was here last week for a bladder infection  During triage pt reports SI at this time HI "sometimes"  "I just don't want to be here anymore"     Assessment/Plan: 62year old female to the ED from home via EMS with complaints of generalized weakness   Admitted to inpatient for JULIO on superimposed on CKD, high anion gap metabolic acidosis, right upper quadrant abdominal pain  Has had the pain for about a week, not tolerating pointake  JULIO likely prerenal due to dehydration  Difficulty obtaining IV access  IV fluids initiated in the ED  PICC team consult  Anion gap 17, bicarb 17, likely due to uremia  On admission she had elevated Alkaline phosphotase at 1016 and Total bilirubin of 1 4  CT scan of the abdomen/pelvis showed prominent but stable common bile duct measuring up to 9 mm in diameter  Check RUQ US  REpeat CMP  IV zofran, starte on clear liquid diet  Has had vagianl dc for 2 weeks and Seen in ER on 12/12/2019, treated with diflucan  Returned on 12/14  BV probe negative for candida, gardnerella, trichomonas 12/14/2019  GC/CT negative  Concern for urethrovaginal fistula on exam by NASIMA JANG in ER on 12/14/2019  Wound C/S from catheter site grew polymicrobial joycelyn with PCN susceptibility  Started on IV abx for recurrent UTI  Has indwelling suprapubic catherr due to neurogenic bladder  Expresses frustration with recent health issues and is feeling suicidal   1:1  Observation for safety  ED Triage Vitals   Temperature Pulse Respirations Blood Pressure SpO2   12/30/19 2228 12/30/19 2228 12/30/19 2228 12/30/19 2228 12/30/19 2228   (!) 96 °F (35 6 °C) 76 16 144/86 100 %      Temp Source Heart Rate Source Patient Position - Orthostatic VS BP Location FiO2 (%)   12/30/19 2228 12/30/19 2228 12/30/19 2228 12/30/19 2228 --   Tympanic Monitor Lying Left arm       Pain Score       12/31/19 0244       Worst Possible Pain        Wt Readings from Last 1 Encounters:   12/31/19 74 5 kg (164 lb 3 9 oz)     Additional Vital Signs:   Date/Time  Temp  Pulse  Resp  BP  SpO2  O2 Device  Patient Position - Orthostatic VS   12/31/19 0746  97 2 °F (36 2 °C)Abnormal   78  18  152/83  94 %  None (Room air)  Lying   12/31/19 0229  97 6 °F (36 4 °C)  80  16  144/75  96 %  None (Room air)  Lying   12/30/19 2228  96 °F (35 6 °C)Abnormal                  Pertinent Labs/Diagnostic Test Results:   RUQ US 12/31:  Gallbladder sludge without calculi   No wall thickening, pericholecystic fluid, or sonographic Michael sign    2   Dilated common bile duct at 11 mm without definite choledocholithiasis identified   Recommend correlation with liver function studies and, if clinically warranted, consider MRI/MRCP for further evaluation  CT A/P : Suprapubic catheter in place   Mild thickening of the left ureteral wall   Correlate with urinalysis for ascending urinary tract infection    EK/31:  Interpretation:     Interpretation: normal    Rate:     ECG rate:  78 bpm    ECG rate assessment: normal    Rhythm:     Rhythm: sinus rhythm    Ectopy:     Ectopy: none    QRS:     QRS axis:  Normal  Conduction:     Conduction: normal    ST segments:     ST segments:  Normal  T waves:     T waves: normal    Results from last 7 days   Lab Units 19  0635 19  2245   WBC Thousand/uL 5 80 6 80   HEMOGLOBIN g/dL 8 5* 10 0*   HEMATOCRIT % 26 3* 31 2*   PLATELETS Thousands/uL 286 364   NEUTROS ABS Thousands/µL 3 20 4 00         Results from last 7 days   Lab Units 19  0635 19  2245   SODIUM mmol/L 142 143   POTASSIUM mmol/L 3 5* 4 1   CHLORIDE mmol/L 114* 109*   CO2 mmol/L 15* 17*   ANION GAP mmol/L 13 17*   BUN mg/dL 33* 35*   CREATININE mg/dL 2 82* 2 89*   EGFR ml/min/1 73sq m 18* 17*   CALCIUM mg/dL 8 7 9 5     Results from last 7 days   Lab Units 19  0635 19  2245   AST U/L 45* 54*   ALT U/L 37 42   ALK PHOS U/L 755* 1,016*   TOTAL PROTEIN g/dL 6 5 7 7   ALBUMIN g/dL 3 0 3 6   TOTAL BILIRUBIN mg/dL 1 00 1 40*     Results from last 7 days   Lab Units 19  1113 19  0548   POC GLUCOSE mg/dl 143* 141*     Results from last 7 days   Lab Units 19  0635 19  2245   GLUCOSE RANDOM mg/dL 148* 191*             BETA-HYDROXYBUTYRATE   Date Value Ref Range Status   2019 4 2 (H) <0 6 mmol/L Final      Results from last 7 days   Lab Units 19  0635   LACTIC ACID mmol/L 0 6*     Results from last 7 days   Lab Units 19  2249   CLARITY UA  Cloudy*   COLOR UA  Qian*   SPEC GRAV UA  1 020   PH UA  5 0   GLUCOSE UA mg/dl 50 (1/25%)*   KETONES UA mg/dl 50 (2+)*   BLOOD UA  50 0*   PROTEIN UA mg/dl >=500*   NITRITE UA  Negative   BILIRUBIN UA  Negative   UROBILINOGEN UA mg/dL Negative   LEUKOCYTES UA  500 0*   WBC UA /hpf Innumerable*   RBC UA /hpf 0-1*   BACTERIA UA /hpf Innumerable*   EPITHELIAL CELLS WET PREP /hpf Moderate*   MUCUS THREADS  Moderate*         Results from last 7 days   Lab Units 12/30/19  2249   AMPH/METH  Negative   BARBITURATE UR  Negative   BENZODIAZEPINE UR  Negative   COCAINE UR  Negative   METHADONE URINE  Negative   OPIATE UR  Negative   PCP UR  Negative   THC UR  Negative       ED Treatment:   Medication Administration from 12/30/2019 2222 to 12/31/2019 0225       Date/Time Order Dose Route Action     12/31/2019 0207 piperacillin-tazobactam (ZOSYN) 2 25 g in sodium chloride 0 9 % 50 mL IVPB 2 25 g Intravenous New Bag     12/31/2019 0206 sodium chloride 0 9 % bolus 1,000 mL 1,000 mL Intravenous New Bag        Past Medical History:   Diagnosis Date    Ambulatory dysfunction     Anemia     macrocyctic    Chronic kidney disease 5/24/2018    Chronic pain disorder     right hip    Diabetes mellitus ([facility] [address] [address] )     Diabetic foot ulcer ([facility] [address] )     left with fat layer exposed    Dyslipidemia 5/24/2018    ESBL E  coli carrier     GERD (gastroesophageal reflux disease)     History of frequent urinary tract infections     HTN (hypertension) 5/24/2018    Hyperlipidemia     Irritable bowel syndrome     constipation    Neurogenic bladder     Neuropathy     Pedal edema     Vitamin D deficiency        Admitting Diagnosis: Liver failure (HCC) [K72 90]  Weakness [R53 1]  Acute kidney injury ([facility] ) [Q66 5]  Complicated UTI (urinary tract infection) [N39 0]  Age/Sex: 62 y o  female  Admission Orders:  IR  US transvaginal  CBC with diff  Gamma GT,  CMP  Clear liquid diet  Scheduled Medications:    Medications:  amLODIPine 5 mg Oral Daily   heparin (porcine) 5,000 Units Subcutaneous Q8H [address]   insulin lispro 1-5 Units Subcutaneous Q6H [address] mirtazapine 15 mg Oral HS   nicotine 1 patch Transdermal Daily   pantoprazole 40 mg Intravenous Q24H [address]     Continuous IV Infusions:    sodium chloride 125 mL/hr Intravenous Continuous     PRN Meds:    acetaminophen 650 mg Oral Q8H PRN   aluminum-magnesium hydroxide-simethicone 30 mL Oral Q4H PRN   ondansetron 4 mg Intravenous Q6H PRN       IP CONSULT TO PSYCHIATRY  IP CONSULT TO PICC TEAM    Network Utilization Review Department  [email or web address]  ATTENTION: Please call with any questions or concerns to [phone] and carefully listen to the prompts so that you are directed to the right person  All voicemails are confidential   Tom Mason all requests for admission clinical reviews, approved or denied determinations and any other requests to dedicated fax number below belonging to the campus where the patient is receiving treatment   List of dedicated fax numbers for the Facilities:  [address] DENIALS (Administrative/Medical Necessity) [phone]   [address] (Maternity/NICU/Pediatrics) [phone]   [facility] [phone]   Tommie Orozco [phone]   Patrica Busby [phone]   Clarissa Bailey [phone]   [address] [address] [facility] [phone]   [facility]  [phone]   [address] [facility] [address] [facility]  [facility] [address] [facility] [phone]

## 2019-12-31 NOTE — NURSING NOTE
Security brought items from safe to the family and took items home  Medications brought out of the pharmacy and also returned to the family and took them home as well  Items signed off from the personal items list in patient's physical file  Family sighed form  Only items kept were clothing

## 2020-01-01 ENCOUNTER — TELEPHONE (OUTPATIENT)
Dept: FAMILY MEDICINE CLINIC | Facility: CLINIC | Age: 55
End: 2020-01-01

## 2020-01-01 ENCOUNTER — APPOINTMENT (INPATIENT)
Dept: RADIOLOGY | Facility: HOSPITAL | Age: 55
DRG: 469 | End: 2020-01-01
Payer: COMMERCIAL

## 2020-01-01 ENCOUNTER — APPOINTMENT (OUTPATIENT)
Dept: LAB | Facility: HOSPITAL | Age: 55
DRG: 469 | End: 2020-01-01
Payer: COMMERCIAL

## 2020-01-01 ENCOUNTER — APPOINTMENT (EMERGENCY)
Dept: ULTRASOUND IMAGING | Facility: HOSPITAL | Age: 55
End: 2020-01-01
Payer: COMMERCIAL

## 2020-01-01 ENCOUNTER — HOSPITAL ENCOUNTER (EMERGENCY)
Facility: HOSPITAL | Age: 55
End: 2020-07-26
Attending: EMERGENCY MEDICINE
Payer: COMMERCIAL

## 2020-01-01 ENCOUNTER — TELEMEDICINE (OUTPATIENT)
Dept: FAMILY MEDICINE CLINIC | Facility: CLINIC | Age: 55
End: 2020-01-01

## 2020-01-01 ENCOUNTER — TELEPHONE (OUTPATIENT)
Dept: NEPHROLOGY | Facility: CLINIC | Age: 55
End: 2020-01-01

## 2020-01-01 ENCOUNTER — APPOINTMENT (INPATIENT)
Dept: CT IMAGING | Facility: HOSPITAL | Age: 55
DRG: 469 | End: 2020-01-01
Payer: COMMERCIAL

## 2020-01-01 ENCOUNTER — APPOINTMENT (OUTPATIENT)
Dept: LAB | Facility: HOSPITAL | Age: 55
End: 2020-01-01
Attending: INTERNAL MEDICINE
Payer: COMMERCIAL

## 2020-01-01 ENCOUNTER — APPOINTMENT (EMERGENCY)
Dept: CT IMAGING | Facility: HOSPITAL | Age: 55
DRG: 469 | End: 2020-01-01
Payer: COMMERCIAL

## 2020-01-01 ENCOUNTER — APPOINTMENT (EMERGENCY)
Dept: RADIOLOGY | Facility: HOSPITAL | Age: 55
End: 2020-01-01
Payer: COMMERCIAL

## 2020-01-01 ENCOUNTER — OFFICE VISIT (OUTPATIENT)
Dept: SURGERY | Facility: CLINIC | Age: 55
End: 2020-01-01
Payer: COMMERCIAL

## 2020-01-01 ENCOUNTER — TELEPHONE (OUTPATIENT)
Dept: OTHER | Facility: OTHER | Age: 55
End: 2020-01-01

## 2020-01-01 ENCOUNTER — HOSPITAL ENCOUNTER (EMERGENCY)
Facility: HOSPITAL | Age: 55
Discharge: HOME | End: 2020-06-30
Attending: EMERGENCY MEDICINE | Admitting: EMERGENCY MEDICINE
Payer: COMMERCIAL

## 2020-01-01 ENCOUNTER — TELEPHONE (OUTPATIENT)
Dept: PSYCHIATRY | Facility: CLINIC | Age: 55
End: 2020-01-01

## 2020-01-01 ENCOUNTER — HOSPITAL ENCOUNTER (EMERGENCY)
Facility: HOSPITAL | Age: 55
Discharge: HOME | End: 2020-02-24
Attending: EMERGENCY MEDICINE
Payer: COMMERCIAL

## 2020-01-01 ENCOUNTER — TELEPHONE (OUTPATIENT)
Dept: HEMATOLOGY ONCOLOGY | Facility: CLINIC | Age: 55
End: 2020-01-01

## 2020-01-01 ENCOUNTER — OFFICE VISIT (OUTPATIENT)
Dept: FAMILY MEDICINE CLINIC | Facility: CLINIC | Age: 55
End: 2020-01-01

## 2020-01-01 ENCOUNTER — HOSPITAL ENCOUNTER (INPATIENT)
Facility: HOSPITAL | Age: 55
LOS: 3 days | Discharge: HOME | DRG: 469 | End: 2020-03-05
Attending: EMERGENCY MEDICINE | Admitting: FAMILY MEDICINE
Payer: COMMERCIAL

## 2020-01-01 ENCOUNTER — TELEPHONE (OUTPATIENT)
Dept: OTHER | Facility: HOSPITAL | Age: 55
End: 2020-01-01

## 2020-01-01 ENCOUNTER — APPOINTMENT (OUTPATIENT)
Dept: LAB | Facility: HOSPITAL | Age: 55
DRG: 469 | End: 2020-01-01
Attending: INTERNAL MEDICINE
Payer: COMMERCIAL

## 2020-01-01 VITALS
RESPIRATION RATE: 20 BRPM | WEIGHT: 183.2 LBS | OXYGEN SATURATION: 99 % | SYSTOLIC BLOOD PRESSURE: 154 MMHG | BODY MASS INDEX: 31.28 KG/M2 | DIASTOLIC BLOOD PRESSURE: 82 MMHG | HEART RATE: 75 BPM | TEMPERATURE: 97.8 F | HEIGHT: 64 IN

## 2020-01-01 VITALS
HEIGHT: 64 IN | DIASTOLIC BLOOD PRESSURE: 80 MMHG | TEMPERATURE: 97.6 F | WEIGHT: 189.7 LBS | HEART RATE: 82 BPM | RESPIRATION RATE: 16 BRPM | BODY MASS INDEX: 32.39 KG/M2 | OXYGEN SATURATION: 97 % | SYSTOLIC BLOOD PRESSURE: 170 MMHG

## 2020-01-01 VITALS
SYSTOLIC BLOOD PRESSURE: 134 MMHG | TEMPERATURE: 96.3 F | HEIGHT: 63 IN | WEIGHT: 177 LBS | OXYGEN SATURATION: 99 % | RESPIRATION RATE: 18 BRPM | HEART RATE: 83 BPM | DIASTOLIC BLOOD PRESSURE: 74 MMHG | BODY MASS INDEX: 31.36 KG/M2

## 2020-01-01 VITALS
OXYGEN SATURATION: 97 % | DIASTOLIC BLOOD PRESSURE: 97 MMHG | BODY MASS INDEX: 31.25 KG/M2 | HEART RATE: 76 BPM | SYSTOLIC BLOOD PRESSURE: 172 MMHG | RESPIRATION RATE: 19 BRPM | TEMPERATURE: 97.1 F | WEIGHT: 176.4 LBS

## 2020-01-01 VITALS
WEIGHT: 187.83 LBS | TEMPERATURE: 97.8 F | BODY MASS INDEX: 32.65 KG/M2 | RESPIRATION RATE: 18 BRPM | DIASTOLIC BLOOD PRESSURE: 102 MMHG | OXYGEN SATURATION: 96 % | SYSTOLIC BLOOD PRESSURE: 175 MMHG | HEART RATE: 84 BPM

## 2020-01-01 VITALS
BODY MASS INDEX: 32.32 KG/M2 | WEIGHT: 182.4 LBS | SYSTOLIC BLOOD PRESSURE: 130 MMHG | HEART RATE: 90 BPM | HEIGHT: 63 IN | DIASTOLIC BLOOD PRESSURE: 76 MMHG

## 2020-01-01 VITALS
BODY MASS INDEX: 32.42 KG/M2 | OXYGEN SATURATION: 98 % | WEIGHT: 183 LBS | RESPIRATION RATE: 16 BRPM | TEMPERATURE: 98 F | HEART RATE: 87 BPM

## 2020-01-01 VITALS
TEMPERATURE: 96.6 F | OXYGEN SATURATION: 99 % | HEART RATE: 69 BPM | HEIGHT: 63 IN | BODY MASS INDEX: 32.23 KG/M2 | RESPIRATION RATE: 20 BRPM | SYSTOLIC BLOOD PRESSURE: 163 MMHG | DIASTOLIC BLOOD PRESSURE: 97 MMHG | WEIGHT: 181.88 LBS

## 2020-01-01 DIAGNOSIS — Z79.4 TYPE 2 DIABETES MELLITUS WITH STAGE 3 CHRONIC KIDNEY DISEASE, WITH LONG-TERM CURRENT USE OF INSULIN (HCC): ICD-10-CM

## 2020-01-01 DIAGNOSIS — E55.9 VITAMIN D DEFICIENCY: ICD-10-CM

## 2020-01-01 DIAGNOSIS — N18.9 ACUTE KIDNEY INJURY SUPERIMPOSED ON CKD (HCC): ICD-10-CM

## 2020-01-01 DIAGNOSIS — E11.22 TYPE 2 DIABETES MELLITUS WITH STAGE 3 CHRONIC KIDNEY DISEASE, WITH LONG-TERM CURRENT USE OF INSULIN (HCC): Primary | ICD-10-CM

## 2020-01-01 DIAGNOSIS — L84 CORN OF FOOT: ICD-10-CM

## 2020-01-01 DIAGNOSIS — E11.42 DIABETIC POLYNEUROPATHY ASSOCIATED WITH TYPE 2 DIABETES MELLITUS (HCC): ICD-10-CM

## 2020-01-01 DIAGNOSIS — N17.9 AKI (ACUTE KIDNEY INJURY) (HCC): Primary | ICD-10-CM

## 2020-01-01 DIAGNOSIS — N17.9 ACUTE RENAL FAILURE SUPERIMPOSED ON STAGE 5 CHRONIC KIDNEY DISEASE, NOT ON CHRONIC DIALYSIS, UNSPECIFIED ACUTE RENAL FAILURE TYPE (HCC): ICD-10-CM

## 2020-01-01 DIAGNOSIS — Z79.4 TYPE 2 DIABETES MELLITUS WITH STAGE 3 CHRONIC KIDNEY DISEASE, WITH LONG-TERM CURRENT USE OF INSULIN (HCC): Primary | ICD-10-CM

## 2020-01-01 DIAGNOSIS — K83.8 DILATED CBD, ACQUIRED: ICD-10-CM

## 2020-01-01 DIAGNOSIS — E11.22 TYPE 2 DIABETES MELLITUS WITH STAGE 3 CHRONIC KIDNEY DISEASE, WITH LONG-TERM CURRENT USE OF INSULIN (HCC): ICD-10-CM

## 2020-01-01 DIAGNOSIS — I10 ESSENTIAL HYPERTENSION: Primary | ICD-10-CM

## 2020-01-01 DIAGNOSIS — R10.13 EPIGASTRIC PAIN: ICD-10-CM

## 2020-01-01 DIAGNOSIS — E55.9 VITAMIN D DEFICIENCY: Primary | ICD-10-CM

## 2020-01-01 DIAGNOSIS — N17.9 ACUTE KIDNEY INJURY (HCC): ICD-10-CM

## 2020-01-01 DIAGNOSIS — I46.9 CARDIAC ARREST (HCC): Primary | ICD-10-CM

## 2020-01-01 DIAGNOSIS — N18.9 CHRONIC RENAL FAILURE: ICD-10-CM

## 2020-01-01 DIAGNOSIS — N17.9 ACUTE KIDNEY INJURY SUPERIMPOSED ON CKD (HCC): ICD-10-CM

## 2020-01-01 DIAGNOSIS — N18.4 STAGE 4 CHRONIC KIDNEY DISEASE (HCC): ICD-10-CM

## 2020-01-01 DIAGNOSIS — R10.11 RIGHT UPPER QUADRANT ABDOMINAL PAIN: Primary | ICD-10-CM

## 2020-01-01 DIAGNOSIS — I10 ESSENTIAL HYPERTENSION: ICD-10-CM

## 2020-01-01 DIAGNOSIS — M79.604 RIGHT LEG PAIN: ICD-10-CM

## 2020-01-01 DIAGNOSIS — E11.42 DIABETIC PERIPHERAL NEUROPATHY (HCC): ICD-10-CM

## 2020-01-01 DIAGNOSIS — N18.30 TYPE 2 DIABETES MELLITUS WITH STAGE 3 CHRONIC KIDNEY DISEASE, WITH LONG-TERM CURRENT USE OF INSULIN (HCC): ICD-10-CM

## 2020-01-01 DIAGNOSIS — N18.30 TYPE 2 DIABETES MELLITUS WITH STAGE 3 CHRONIC KIDNEY DISEASE, WITH LONG-TERM CURRENT USE OF INSULIN (HCC): Primary | ICD-10-CM

## 2020-01-01 DIAGNOSIS — M79.7 FIBROMYALGIA: ICD-10-CM

## 2020-01-01 DIAGNOSIS — N18.5 ACUTE RENAL FAILURE SUPERIMPOSED ON STAGE 5 CHRONIC KIDNEY DISEASE, NOT ON CHRONIC DIALYSIS, UNSPECIFIED ACUTE RENAL FAILURE TYPE (HCC): ICD-10-CM

## 2020-01-01 DIAGNOSIS — N17.9 AKI (ACUTE KIDNEY INJURY) (HCC): ICD-10-CM

## 2020-01-01 DIAGNOSIS — R26.2 AMBULATORY DYSFUNCTION: Primary | ICD-10-CM

## 2020-01-01 DIAGNOSIS — R10.9 FLANK PAIN: Primary | ICD-10-CM

## 2020-01-01 DIAGNOSIS — R07.81 RIB PAIN ON RIGHT SIDE: Primary | ICD-10-CM

## 2020-01-01 DIAGNOSIS — N18.5 CKD STAGE 5 DUE TO TYPE 2 DIABETES MELLITUS (HCC): ICD-10-CM

## 2020-01-01 DIAGNOSIS — E78.2 MIXED HYPERLIPIDEMIA: ICD-10-CM

## 2020-01-01 DIAGNOSIS — K59.00 CONSTIPATION, UNSPECIFIED CONSTIPATION TYPE: ICD-10-CM

## 2020-01-01 DIAGNOSIS — Z01.818 PRE-OP EXAMINATION: ICD-10-CM

## 2020-01-01 DIAGNOSIS — F32.89 OTHER DEPRESSION: ICD-10-CM

## 2020-01-01 DIAGNOSIS — Z12.11 COLON CANCER SCREENING: ICD-10-CM

## 2020-01-01 DIAGNOSIS — Z78.9 PROBLEM WITH VASCULAR ACCESS: ICD-10-CM

## 2020-01-01 DIAGNOSIS — F32.A DEPRESSION: ICD-10-CM

## 2020-01-01 DIAGNOSIS — R07.82 INTERCOSTAL PAIN: Primary | ICD-10-CM

## 2020-01-01 DIAGNOSIS — N18.4 STAGE 4 CHRONIC KIDNEY DISEASE (HCC): Primary | ICD-10-CM

## 2020-01-01 DIAGNOSIS — E11.9 TYPE 2 DIABETES MELLITUS (HCC): ICD-10-CM

## 2020-01-01 DIAGNOSIS — Z09 HOSPITAL DISCHARGE FOLLOW-UP: ICD-10-CM

## 2020-01-01 DIAGNOSIS — N39.0 UTI (URINARY TRACT INFECTION): ICD-10-CM

## 2020-01-01 DIAGNOSIS — E11.22 CKD STAGE 5 DUE TO TYPE 2 DIABETES MELLITUS (HCC): ICD-10-CM

## 2020-01-01 DIAGNOSIS — N18.5 STAGE 5 CHRONIC KIDNEY DISEASE NOT ON CHRONIC DIALYSIS (HCC): ICD-10-CM

## 2020-01-01 LAB
ABO GROUP BLD BPU: NORMAL
ABO GROUP BLD BPU: NORMAL
ABO GROUP BLD: NORMAL
ABO GROUP BLD: NORMAL
ACTIN IGG SERPL-ACNC: 13 UNITS (ref 0–19)
ALBUMIN SERPL BCP-MCNC: 2.3 G/DL (ref 3–5.2)
ALBUMIN SERPL BCP-MCNC: 2.4 G/DL (ref 3–5.2)
ALBUMIN SERPL BCP-MCNC: 2.4 G/DL (ref 3–5.2)
ALBUMIN SERPL BCP-MCNC: 2.5 G/DL (ref 3–5.2)
ALBUMIN SERPL BCP-MCNC: 2.6 G/DL (ref 3–5.2)
ALBUMIN SERPL BCP-MCNC: 2.6 G/DL (ref 3–5.2)
ALBUMIN SERPL BCP-MCNC: 2.7 G/DL (ref 3–5.2)
ALBUMIN SERPL BCP-MCNC: 2.9 G/DL (ref 3–5.2)
ALBUMIN SERPL BCP-MCNC: 2.9 G/DL (ref 3–5.2)
ALBUMIN SERPL ELPH-MCNC: 1.94 G/DL (ref 3.5–5)
ALBUMIN SERPL ELPH-MCNC: 39.6 % (ref 52–65)
ALBUMIN UR ELPH-MCNC: 63.2 %
ALP SERPL-CCNC: 160 U/L (ref 43–122)
ALP SERPL-CCNC: 164 U/L (ref 43–122)
ALP SERPL-CCNC: 185 U/L (ref 43–122)
ALP SERPL-CCNC: 213 U/L (ref 43–122)
ALP SERPL-CCNC: 492 U/L (ref 43–122)
ALP SERPL-CCNC: 500 U/L (ref 43–122)
ALP SERPL-CCNC: 515 U/L (ref 43–122)
ALP SERPL-CCNC: 546 U/L (ref 43–122)
ALP SERPL-CCNC: 554 U/L (ref 43–122)
ALP SERPL-CCNC: 556 U/L (ref 43–122)
ALP SERPL-CCNC: 556 U/L (ref 43–122)
ALP SERPL-CCNC: 560 U/L (ref 43–122)
ALPHA1 GLOB MFR UR ELPH: 4.7 %
ALPHA1 GLOB SERPL ELPH-MCNC: 0.39 G/DL (ref 0.1–0.4)
ALPHA1 GLOB SERPL ELPH-MCNC: 7.9 % (ref 2.5–5)
ALPHA2 GLOB MFR UR ELPH: 8.8 %
ALPHA2 GLOB SERPL ELPH-MCNC: 1.01 G/DL (ref 0.4–1.2)
ALPHA2 GLOB SERPL ELPH-MCNC: 20.7 % (ref 7–13)
ALT SERPL W P-5'-P-CCNC: 102 U/L (ref 9–52)
ALT SERPL W P-5'-P-CCNC: 121 U/L (ref 9–52)
ALT SERPL W P-5'-P-CCNC: 13 U/L (ref 9–52)
ALT SERPL W P-5'-P-CCNC: 16 U/L (ref 9–52)
ALT SERPL W P-5'-P-CCNC: 21 U/L (ref 9–52)
ALT SERPL W P-5'-P-CCNC: 22 U/L (ref 9–52)
ALT SERPL W P-5'-P-CCNC: 28 U/L (ref 9–52)
ALT SERPL W P-5'-P-CCNC: 33 U/L (ref 9–52)
ALT SERPL W P-5'-P-CCNC: 38 U/L (ref 9–52)
ALT SERPL W P-5'-P-CCNC: 51 U/L (ref 9–52)
ALT SERPL W P-5'-P-CCNC: 54 U/L (ref 9–52)
ALT SERPL W P-5'-P-CCNC: 71 U/L (ref 9–52)
AMMONIA PLAS-SCNC: <9 UMOL/L (ref 9–33)
AMPHETAMINES SERPL QL SCN: NEGATIVE
AMPHETAMINES UR QL SCN: NEGATIVE NG/ML
ANION GAP SERPL CALCULATED.3IONS-SCNC: 3 MMOL/L (ref 5–14)
ANION GAP SERPL CALCULATED.3IONS-SCNC: 4 MMOL/L (ref 5–14)
ANION GAP SERPL CALCULATED.3IONS-SCNC: 5 MMOL/L (ref 5–14)
ANION GAP SERPL CALCULATED.3IONS-SCNC: 6 MMOL/L (ref 5–14)
ANION GAP SERPL CALCULATED.3IONS-SCNC: 7 MMOL/L (ref 5–14)
ANION GAP SERPL CALCULATED.3IONS-SCNC: 9 MMOL/L (ref 5–14)
ANISOCYTOSIS BLD QL SMEAR: PRESENT
APTT PPP: 23 SECONDS (ref 25–32)
ARTERIAL PATENCY WRIST A: YES
AST SERPL W P-5'-P-CCNC: 145 U/L (ref 14–36)
AST SERPL W P-5'-P-CCNC: 17 U/L (ref 14–36)
AST SERPL W P-5'-P-CCNC: 19 U/L (ref 14–36)
AST SERPL W P-5'-P-CCNC: 23 U/L (ref 14–36)
AST SERPL W P-5'-P-CCNC: 245 U/L (ref 14–36)
AST SERPL W P-5'-P-CCNC: 26 U/L (ref 14–36)
AST SERPL W P-5'-P-CCNC: 32 U/L (ref 14–36)
AST SERPL W P-5'-P-CCNC: 32 U/L (ref 14–36)
AST SERPL W P-5'-P-CCNC: 36 U/L (ref 14–36)
AST SERPL W P-5'-P-CCNC: 39 U/L (ref 14–36)
AST SERPL W P-5'-P-CCNC: 45 U/L (ref 14–36)
AST SERPL W P-5'-P-CCNC: 46 U/L (ref 14–36)
ATRIAL RATE: 71 BPM
ATRIAL RATE: 75 BPM
ATRIAL RATE: 76 BPM
ATRIAL RATE: 76 BPM
ATRIAL RATE: 78 BPM
ATRIAL RATE: 78 BPM
ATRIAL RATE: 84 BPM
B-GLOBULIN MFR UR ELPH: 8.8 %
B2 MICROGLOB SERPL-MCNC: 7.2 MG/L (ref 0.6–2.4)
BACTERIA UR CULT: ABNORMAL
BACTERIA UR QL AUTO: ABNORMAL /HPF
BACTERIA UR QL AUTO: ABNORMAL /HPF
BARBITURATES UR QL SCN: NEGATIVE NG/ML
BARBITURATES UR QL: NEGATIVE
BASE EXCESS BLDA CALC-SCNC: -6.2 MMOL/L (ref -2.1–2.1)
BASOPHILS # BLD AUTO: 0 THOUSANDS/ΜL (ref 0–0.1)
BASOPHILS # BLD AUTO: 0.1 THOUSANDS/ΜL (ref 0–0.1)
BASOPHILS NFR BLD AUTO: 0 % (ref 0–1)
BASOPHILS NFR BLD AUTO: 1 % (ref 0–1)
BASOPHILS NFR BLD AUTO: 2 % (ref 0–1)
BENZODIAZ UR QL: NEGATIVE
BENZODIAZ UR QL: NEGATIVE NG/ML
BETA GLOB ABNORMAL SERPL ELPH-MCNC: 0.29 G/DL (ref 0.4–0.8)
BETA1 GLOB SERPL ELPH-MCNC: 5.9 % (ref 5–13)
BETA2 GLOB SERPL ELPH-MCNC: 10.6 % (ref 2–8)
BETA2+GAMMA GLOB SERPL ELPH-MCNC: 0.52 G/DL (ref 0.2–0.5)
BILIRUB DIRECT SERPL-MCNC: 1.9 MG/DL
BILIRUB SERPL-MCNC: 0.2 MG/DL
BILIRUB SERPL-MCNC: 0.3 MG/DL
BILIRUB SERPL-MCNC: 0.3 MG/DL
BILIRUB SERPL-MCNC: 0.5 MG/DL
BILIRUB SERPL-MCNC: 0.6 MG/DL
BILIRUB SERPL-MCNC: 0.7 MG/DL
BILIRUB SERPL-MCNC: 0.8 MG/DL
BILIRUB SERPL-MCNC: 1 MG/DL
BILIRUB SERPL-MCNC: 2.4 MG/DL
BILIRUB SERPL-MCNC: 2.6 MG/DL
BILIRUB UR QL STRIP: NEGATIVE
BILIRUB UR QL STRIP: NEGATIVE
BLD GP AB SCN SERPL QL: NEGATIVE
BLD SMEAR INTERP: NORMAL
BPU ID: NORMAL
BPU ID: NORMAL
BUN SERPL-MCNC: 19 MG/DL (ref 5–25)
BUN SERPL-MCNC: 20 MG/DL (ref 5–25)
BUN SERPL-MCNC: 22 MG/DL (ref 5–25)
BUN SERPL-MCNC: 25 MG/DL (ref 5–25)
BUN SERPL-MCNC: 31 MG/DL (ref 5–25)
BUN SERPL-MCNC: 34 MG/DL (ref 5–25)
BUN SERPL-MCNC: 39 MG/DL (ref 5–25)
BUN SERPL-MCNC: 43 MG/DL (ref 5–25)
BUN SERPL-MCNC: 44 MG/DL (ref 5–25)
BUN SERPL-MCNC: 47 MG/DL (ref 5–25)
BUN SERPL-MCNC: 51 MG/DL (ref 5–25)
BUN SERPL-MCNC: 58 MG/DL (ref 5–25)
BUN SERPL-MCNC: 60 MG/DL (ref 5–25)
BZE UR QL: NEGATIVE NG/ML
CALCIUM SERPL-MCNC: 7.3 MG/DL (ref 8.4–10.2)
CALCIUM SERPL-MCNC: 7.4 MG/DL (ref 8.4–10.2)
CALCIUM SERPL-MCNC: 7.4 MG/DL (ref 8.4–10.2)
CALCIUM SERPL-MCNC: 7.5 MG/DL (ref 8.4–10.2)
CALCIUM SERPL-MCNC: 7.7 MG/DL (ref 8.4–10.2)
CALCIUM SERPL-MCNC: 7.9 MG/DL (ref 8.4–10.2)
CALCIUM SERPL-MCNC: 8 MG/DL (ref 8.4–10.2)
CALCIUM SERPL-MCNC: 8.1 MG/DL (ref 8.4–10.2)
CALCIUM SERPL-MCNC: 8.1 MG/DL (ref 8.4–10.2)
CALCIUM SERPL-MCNC: 8.2 MG/DL (ref 8.4–10.2)
CALCIUM SERPL-MCNC: 8.3 MG/DL (ref 8.4–10.2)
CALCIUM SERPL-MCNC: 8.7 MG/DL (ref 8.4–10.2)
CALCIUM SERPL-MCNC: 8.7 MG/DL (ref 8.4–10.2)
CALCIUM SERPL-MCNC: 8.8 MG/DL (ref 8.4–10.2)
CALCIUM SERPL-MCNC: 8.9 MG/DL (ref 8.4–10.2)
CALCIUM SERPL-MCNC: 9.1 MG/DL (ref 8.4–10.2)
CANNABINOIDS UR QL SCN: NEGATIVE NG/ML
CHLORIDE SERPL-SCNC: 107 MMOL/L (ref 97–108)
CHLORIDE SERPL-SCNC: 108 MMOL/L (ref 97–108)
CHLORIDE SERPL-SCNC: 109 MMOL/L (ref 97–108)
CHLORIDE SERPL-SCNC: 110 MMOL/L (ref 97–108)
CHLORIDE SERPL-SCNC: 111 MMOL/L (ref 97–108)
CHLORIDE SERPL-SCNC: 112 MMOL/L (ref 97–108)
CHLORIDE SERPL-SCNC: 113 MMOL/L (ref 97–108)
CHLORIDE SERPL-SCNC: 115 MMOL/L (ref 97–108)
CHLORIDE SERPL-SCNC: 117 MMOL/L (ref 97–108)
CHLORIDE SERPL-SCNC: 121 MMOL/L (ref 97–108)
CLARITY UR: ABNORMAL
CLARITY UR: ABNORMAL
CO2 SERPL-SCNC: 19 MMOL/L (ref 22–30)
CO2 SERPL-SCNC: 20 MMOL/L (ref 22–30)
CO2 SERPL-SCNC: 21 MMOL/L (ref 22–30)
CO2 SERPL-SCNC: 22 MMOL/L (ref 22–30)
CO2 SERPL-SCNC: 23 MMOL/L (ref 22–30)
CO2 SERPL-SCNC: 23 MMOL/L (ref 22–30)
CO2 SERPL-SCNC: 24 MMOL/L (ref 22–30)
CO2 SERPL-SCNC: 26 MMOL/L (ref 22–30)
CO2 SERPL-SCNC: 26 MMOL/L (ref 22–30)
CO2 SERPL-SCNC: 27 MMOL/L (ref 22–30)
CO2 SERPL-SCNC: 28 MMOL/L (ref 22–30)
COCAINE UR QL: NEGATIVE
COLOR UR: ABNORMAL
COLOR UR: ABNORMAL
CREAT SERPL-MCNC: 1.92 MG/DL (ref 0.6–1.2)
CREAT SERPL-MCNC: 2.06 MG/DL (ref 0.6–1.2)
CREAT SERPL-MCNC: 2.08 MG/DL (ref 0.6–1.2)
CREAT SERPL-MCNC: 2.17 MG/DL (ref 0.6–1.2)
CREAT SERPL-MCNC: 2.17 MG/DL (ref 0.6–1.2)
CREAT SERPL-MCNC: 2.19 MG/DL (ref 0.6–1.2)
CREAT SERPL-MCNC: 2.2 MG/DL (ref 0.6–1.2)
CREAT SERPL-MCNC: 2.22 MG/DL (ref 0.6–1.2)
CREAT SERPL-MCNC: 2.28 MG/DL (ref 0.6–1.2)
CREAT SERPL-MCNC: 2.34 MG/DL (ref 0.6–1.2)
CREAT SERPL-MCNC: 3 MG/DL (ref 0.6–1.2)
CREAT SERPL-MCNC: 3.37 MG/DL (ref 0.6–1.2)
CREAT SERPL-MCNC: 3.67 MG/DL (ref 0.6–1.2)
CREAT SERPL-MCNC: 3.72 MG/DL (ref 0.6–1.2)
CREAT SERPL-MCNC: 3.76 MG/DL (ref 0.6–1.2)
CREAT SERPL-MCNC: 4.22 MG/DL (ref 0.6–1.2)
CREAT SERPL-MCNC: 4.27 MG/DL (ref 0.6–1.2)
CREAT SERPL-MCNC: 5.82 MG/DL (ref 0.6–1.2)
CREAT UR-MCNC: 53.2 MG/DL
CROSSMATCH: NORMAL
CROSSMATCH: NORMAL
CRP SERPL QL: 22.4 MG/L
DAT POLY-SP REAG RBC QL: NEGATIVE
EOSINOPHIL # BLD AUTO: 0.2 THOUSAND/ΜL (ref 0–0.4)
EOSINOPHIL # BLD AUTO: 0.3 THOUSAND/ΜL (ref 0–0.4)
EOSINOPHIL # BLD AUTO: 0.4 THOUSAND/ΜL (ref 0–0.4)
EOSINOPHIL # BLD AUTO: 0.4 THOUSAND/ΜL (ref 0–0.4)
EOSINOPHIL # BLD AUTO: 0.5 THOUSAND/ΜL (ref 0–0.4)
EOSINOPHIL NFR BLD AUTO: 3 % (ref 0–6)
EOSINOPHIL NFR BLD AUTO: 4 % (ref 0–6)
EOSINOPHIL NFR BLD AUTO: 5 % (ref 0–6)
EOSINOPHIL NFR BLD AUTO: 6 % (ref 0–6)
EOSINOPHIL NFR BLD AUTO: 7 % (ref 0–6)
EOSINOPHIL NFR BLD AUTO: 7 % (ref 0–6)
EOSINOPHIL NFR BLD AUTO: 8 % (ref 0–6)
EPO SERPL-ACNC: 6.1 MIU/ML (ref 2.6–18.5)
ERYTHROCYTE [DISTWIDTH] IN BLOOD BY AUTOMATED COUNT: 14.5 %
ERYTHROCYTE [DISTWIDTH] IN BLOOD BY AUTOMATED COUNT: 14.6 %
ERYTHROCYTE [DISTWIDTH] IN BLOOD BY AUTOMATED COUNT: 14.6 %
ERYTHROCYTE [DISTWIDTH] IN BLOOD BY AUTOMATED COUNT: 14.9 %
ERYTHROCYTE [DISTWIDTH] IN BLOOD BY AUTOMATED COUNT: 15.1 %
ERYTHROCYTE [DISTWIDTH] IN BLOOD BY AUTOMATED COUNT: 15.2 %
ERYTHROCYTE [DISTWIDTH] IN BLOOD BY AUTOMATED COUNT: 15.6 %
ERYTHROCYTE [DISTWIDTH] IN BLOOD BY AUTOMATED COUNT: 15.8 %
ERYTHROCYTE [DISTWIDTH] IN BLOOD BY AUTOMATED COUNT: 16 %
ERYTHROCYTE [DISTWIDTH] IN BLOOD BY AUTOMATED COUNT: 16 %
ERYTHROCYTE [DISTWIDTH] IN BLOOD BY AUTOMATED COUNT: 16.1 %
ERYTHROCYTE [DISTWIDTH] IN BLOOD BY AUTOMATED COUNT: 16.2 %
ERYTHROCYTE [DISTWIDTH] IN BLOOD BY AUTOMATED COUNT: 16.4 %
ERYTHROCYTE [SEDIMENTATION RATE] IN BLOOD: 128 MM/HOUR (ref 1–20)
EST. AVERAGE GLUCOSE BLD GHB EST-MCNC: 154 MG/DL
EST. AVERAGE GLUCOSE BLD GHB EST-MCNC: 229 MG/DL
FERRITIN SERPL-MCNC: 1540 NG/ML (ref 8–388)
FERRITIN SERPL-MCNC: 1565 NG/ML (ref 8–388)
FERRITIN SERPL-MCNC: 943 NG/ML (ref 8–388)
FOLATE SERPL-MCNC: 7.1 NG/ML (ref 3.1–17.5)
GAMMA GLOB ABNORMAL SERPL ELPH-MCNC: 0.75 G/DL (ref 0.5–1.6)
GAMMA GLOB MFR UR ELPH: 14.5 %
GAMMA GLOB SERPL ELPH-MCNC: 15.3 % (ref 12–22)
GFR SERPL CREATININE-BSD FRML MDRD: 11 ML/MIN/1.73SQ M
GFR SERPL CREATININE-BSD FRML MDRD: 11 ML/MIN/1.73SQ M
GFR SERPL CREATININE-BSD FRML MDRD: 13 ML/MIN/1.73SQ M
GFR SERPL CREATININE-BSD FRML MDRD: 15 ML/MIN/1.73SQ M
GFR SERPL CREATININE-BSD FRML MDRD: 17 ML/MIN/1.73SQ M
GFR SERPL CREATININE-BSD FRML MDRD: 23 ML/MIN/1.73SQ M
GFR SERPL CREATININE-BSD FRML MDRD: 23 ML/MIN/1.73SQ M
GFR SERPL CREATININE-BSD FRML MDRD: 24 ML/MIN/1.73SQ M
GFR SERPL CREATININE-BSD FRML MDRD: 25 ML/MIN/1.73SQ M
GFR SERPL CREATININE-BSD FRML MDRD: 25 ML/MIN/1.73SQ M
GFR SERPL CREATININE-BSD FRML MDRD: 26 ML/MIN/1.73SQ M
GFR SERPL CREATININE-BSD FRML MDRD: 26 ML/MIN/1.73SQ M
GFR SERPL CREATININE-BSD FRML MDRD: 29 ML/MIN/1.73SQ M
GFR SERPL CREATININE-BSD FRML MDRD: 7 ML/MIN/1.73SQ M
GGT SERPL-CCNC: 504 U/L (ref 5–85)
GLUCOSE P FAST SERPL-MCNC: 121 MG/DL (ref 70–99)
GLUCOSE P FAST SERPL-MCNC: 300 MG/DL (ref 70–99)
GLUCOSE SERPL-MCNC: 109 MG/DL (ref 65–140)
GLUCOSE SERPL-MCNC: 112 MG/DL (ref 70–99)
GLUCOSE SERPL-MCNC: 122 MG/DL (ref 65–140)
GLUCOSE SERPL-MCNC: 126 MG/DL (ref 70–99)
GLUCOSE SERPL-MCNC: 137 MG/DL (ref 70–99)
GLUCOSE SERPL-MCNC: 144 MG/DL (ref 65–140)
GLUCOSE SERPL-MCNC: 144 MG/DL (ref 65–140)
GLUCOSE SERPL-MCNC: 151 MG/DL (ref 65–140)
GLUCOSE SERPL-MCNC: 154 MG/DL (ref 65–140)
GLUCOSE SERPL-MCNC: 154 MG/DL (ref 70–99)
GLUCOSE SERPL-MCNC: 161 MG/DL (ref 70–99)
GLUCOSE SERPL-MCNC: 166 MG/DL (ref 65–140)
GLUCOSE SERPL-MCNC: 171 MG/DL (ref 65–140)
GLUCOSE SERPL-MCNC: 173 MG/DL (ref 70–99)
GLUCOSE SERPL-MCNC: 175 MG/DL (ref 65–140)
GLUCOSE SERPL-MCNC: 176 MG/DL (ref 65–140)
GLUCOSE SERPL-MCNC: 181 MG/DL (ref 70–99)
GLUCOSE SERPL-MCNC: 187 MG/DL (ref 65–140)
GLUCOSE SERPL-MCNC: 195 MG/DL (ref 65–140)
GLUCOSE SERPL-MCNC: 195 MG/DL (ref 65–140)
GLUCOSE SERPL-MCNC: 196 MG/DL (ref 70–99)
GLUCOSE SERPL-MCNC: 211 MG/DL (ref 65–140)
GLUCOSE SERPL-MCNC: 221 MG/DL (ref 65–140)
GLUCOSE SERPL-MCNC: 227 MG/DL (ref 65–140)
GLUCOSE SERPL-MCNC: 229 MG/DL (ref 65–140)
GLUCOSE SERPL-MCNC: 231 MG/DL (ref 65–140)
GLUCOSE SERPL-MCNC: 232 MG/DL (ref 70–99)
GLUCOSE SERPL-MCNC: 234 MG/DL (ref 65–140)
GLUCOSE SERPL-MCNC: 236 MG/DL (ref 65–140)
GLUCOSE SERPL-MCNC: 239 MG/DL (ref 65–140)
GLUCOSE SERPL-MCNC: 248 MG/DL (ref 65–140)
GLUCOSE SERPL-MCNC: 256 MG/DL (ref 65–140)
GLUCOSE SERPL-MCNC: 258 MG/DL (ref 65–140)
GLUCOSE SERPL-MCNC: 259 MG/DL (ref 65–140)
GLUCOSE SERPL-MCNC: 261 MG/DL (ref 65–140)
GLUCOSE SERPL-MCNC: 263 MG/DL (ref 65–140)
GLUCOSE SERPL-MCNC: 267 MG/DL (ref 65–140)
GLUCOSE SERPL-MCNC: 270 MG/DL (ref 65–140)
GLUCOSE SERPL-MCNC: 271 MG/DL (ref 65–140)
GLUCOSE SERPL-MCNC: 272 MG/DL (ref 65–140)
GLUCOSE SERPL-MCNC: 272 MG/DL (ref 65–140)
GLUCOSE SERPL-MCNC: 273 MG/DL (ref 65–140)
GLUCOSE SERPL-MCNC: 274 MG/DL (ref 70–99)
GLUCOSE SERPL-MCNC: 276 MG/DL (ref 65–140)
GLUCOSE SERPL-MCNC: 277 MG/DL (ref 70–99)
GLUCOSE SERPL-MCNC: 291 MG/DL (ref 70–99)
GLUCOSE SERPL-MCNC: 292 MG/DL (ref 65–140)
GLUCOSE SERPL-MCNC: 303 MG/DL (ref 70–99)
GLUCOSE SERPL-MCNC: 303 MG/DL (ref 70–99)
GLUCOSE SERPL-MCNC: 315 MG/DL (ref 65–140)
GLUCOSE SERPL-MCNC: 319 MG/DL (ref 65–140)
GLUCOSE SERPL-MCNC: 323 MG/DL (ref 65–140)
GLUCOSE SERPL-MCNC: 328 MG/DL (ref 65–140)
GLUCOSE SERPL-MCNC: 330 MG/DL (ref 65–140)
GLUCOSE SERPL-MCNC: 339 MG/DL (ref 65–140)
GLUCOSE SERPL-MCNC: 34 MG/DL (ref 65–140)
GLUCOSE SERPL-MCNC: 348 MG/DL (ref 65–140)
GLUCOSE SERPL-MCNC: 43 MG/DL (ref 65–140)
GLUCOSE SERPL-MCNC: 46 MG/DL (ref 65–140)
GLUCOSE SERPL-MCNC: 47 MG/DL (ref 65–140)
GLUCOSE SERPL-MCNC: 52 MG/DL (ref 70–99)
GLUCOSE SERPL-MCNC: 52 MG/DL (ref 70–99)
GLUCOSE SERPL-MCNC: 59 MG/DL (ref 65–140)
GLUCOSE SERPL-MCNC: 77 MG/DL (ref 65–140)
GLUCOSE SERPL-MCNC: 80 MG/DL (ref 65–140)
GLUCOSE SERPL-MCNC: 82 MG/DL (ref 65–140)
GLUCOSE SERPL-MCNC: 99 MG/DL (ref 65–140)
GLUCOSE SERPL-MCNC: 99 MG/DL (ref 65–140)
GLUCOSE UR STRIP-MCNC: ABNORMAL MG/DL
GLUCOSE UR STRIP-MCNC: NEGATIVE MG/DL
HAPTOGLOB SERPL-MCNC: 200 MG/DL (ref 33–346)
HAV IGM SER QL: NORMAL
HBA1C MFR BLD: 7 %
HBA1C MFR BLD: 9.6 % (ref 4.2–6.3)
HBV CORE IGM SER QL: NORMAL
HBV SURFACE AG SER QL: NORMAL
HCO3 BLDA-SCNC: 18.6 MMOL/L (ref 22–26)
HCT VFR BLD AUTO: 20.2 % (ref 36–46)
HCT VFR BLD AUTO: 21.2 % (ref 36–46)
HCT VFR BLD AUTO: 22.4 % (ref 36–46)
HCT VFR BLD AUTO: 23.2 % (ref 36–46)
HCT VFR BLD AUTO: 23.3 % (ref 36–46)
HCT VFR BLD AUTO: 23.5 % (ref 36–46)
HCT VFR BLD AUTO: 26.1 % (ref 36–46)
HCT VFR BLD AUTO: 26.9 % (ref 36–46)
HCT VFR BLD AUTO: 27.2 % (ref 36–46)
HCT VFR BLD AUTO: 29.1 % (ref 36–46)
HCT VFR BLD AUTO: 29.5 % (ref 36–46)
HCT VFR BLD AUTO: 29.5 % (ref 36–46)
HCT VFR BLD AUTO: 29.8 % (ref 36–46)
HCT VFR BLD AUTO: 29.8 % (ref 36–46)
HCT VFR BLD AUTO: 30.3 % (ref 36–46)
HCT VFR BLD AUTO: 30.7 % (ref 36–46)
HCV AB SER QL: NORMAL
HEMOCCULT STL QL: NEGATIVE
HGB BLD-MCNC: 10 G/DL (ref 12–16)
HGB BLD-MCNC: 10.2 G/DL (ref 12–16)
HGB BLD-MCNC: 6.6 G/DL (ref 12–16)
HGB BLD-MCNC: 7 G/DL (ref 12–16)
HGB BLD-MCNC: 7.4 G/DL (ref 12–16)
HGB BLD-MCNC: 7.4 G/DL (ref 12–16)
HGB BLD-MCNC: 7.7 G/DL (ref 12–16)
HGB BLD-MCNC: 7.7 G/DL (ref 12–16)
HGB BLD-MCNC: 8.7 G/DL (ref 12–16)
HGB BLD-MCNC: 9 G/DL (ref 12–16)
HGB BLD-MCNC: 9 G/DL (ref 12–16)
HGB BLD-MCNC: 9.6 G/DL (ref 12–16)
HGB BLD-MCNC: 9.6 G/DL (ref 12–16)
HGB BLD-MCNC: 9.7 G/DL (ref 12–16)
HGB BLD-MCNC: 9.8 G/DL (ref 12–16)
HGB BLD-MCNC: 9.8 G/DL (ref 12–16)
HGB UR QL STRIP.AUTO: 25
HGB UR QL STRIP.AUTO: 50
HIV 1+2 AB+HIV1 P24 AG SERPL QL IA: NORMAL
HPV HR 12 DNA CVX QL NAA+PROBE: NEGATIVE
HPV16 DNA CVX QL NAA+PROBE: NEGATIVE
HPV18 DNA CVX QL NAA+PROBE: NEGATIVE
HYPERCHROMIA BLD QL SMEAR: PRESENT
IGA SERPL-MCNC: 353 MG/DL (ref 70–400)
IGG SERPL-MCNC: 694 MG/DL (ref 700–1600)
IGG/ALB SER: 0.66 {RATIO} (ref 1.1–1.8)
IGM SERPL-MCNC: 100 MG/DL (ref 40–230)
INR PPP: 0.85 (ref 0.91–1.09)
INTERPRETATION UR IFE-IMP: NORMAL
IRON SATN MFR SERPL: 20 %
IRON SATN MFR SERPL: 59 %
IRON SERPL-MCNC: 26 UG/DL (ref 50–170)
IRON SERPL-MCNC: 59 UG/DL (ref 50–170)
KAPPA LC FREE SER-MCNC: 157.2 MG/L (ref 3.3–19.4)
KAPPA LC FREE/LAMBDA FREE SER: 2.12 {RATIO} (ref 0.26–1.65)
KETONES UR STRIP-MCNC: NEGATIVE MG/DL
KETONES UR STRIP-MCNC: NEGATIVE MG/DL
LAB AP GYN PRIMARY INTERPRETATION: ABNORMAL
LACTATE SERPL-SCNC: 0.8 MMOL/L (ref 0.7–2)
LAMBDA LC FREE SERPL-MCNC: 74.2 MG/L (ref 5.7–26.3)
LDH SERPL-CCNC: 626 U/L (ref 313–618)
LDH SERPL-CCNC: 851 U/L (ref 313–618)
LEUKOCYTE ESTERASE UR QL STRIP: 500
LEUKOCYTE ESTERASE UR QL STRIP: 500
LIPASE SERPL-CCNC: 61 U/L (ref 23–300)
LIPASE SERPL-CCNC: 81 U/L (ref 23–300)
LYMPHOCYTES # BLD AUTO: 1.2 THOUSANDS/ΜL (ref 0.5–4)
LYMPHOCYTES # BLD AUTO: 1.4 THOUSANDS/ΜL (ref 0.5–4)
LYMPHOCYTES # BLD AUTO: 1.4 THOUSANDS/ΜL (ref 0.5–4)
LYMPHOCYTES # BLD AUTO: 1.5 THOUSANDS/ΜL (ref 0.5–4)
LYMPHOCYTES # BLD AUTO: 1.6 THOUSANDS/ΜL (ref 0.5–4)
LYMPHOCYTES # BLD AUTO: 1.7 THOUSANDS/ΜL (ref 0.5–4)
LYMPHOCYTES # BLD AUTO: 1.8 THOUSANDS/ΜL (ref 0.5–4)
LYMPHOCYTES # BLD AUTO: 1.8 THOUSANDS/ΜL (ref 0.5–4)
LYMPHOCYTES # BLD AUTO: 2 THOUSANDS/ΜL (ref 0.5–4)
LYMPHOCYTES # BLD AUTO: 2.2 THOUSANDS/ΜL (ref 0.5–4)
LYMPHOCYTES # BLD AUTO: 2.3 THOUSANDS/ΜL (ref 0.5–4)
LYMPHOCYTES NFR BLD AUTO: 22 % (ref 25–45)
LYMPHOCYTES NFR BLD AUTO: 23 % (ref 25–45)
LYMPHOCYTES NFR BLD AUTO: 25 % (ref 25–45)
LYMPHOCYTES NFR BLD AUTO: 27 % (ref 25–45)
LYMPHOCYTES NFR BLD AUTO: 28 % (ref 25–45)
LYMPHOCYTES NFR BLD AUTO: 29 % (ref 25–45)
LYMPHOCYTES NFR BLD AUTO: 29 % (ref 25–45)
LYMPHOCYTES NFR BLD AUTO: 30 % (ref 25–45)
LYMPHOCYTES NFR BLD AUTO: 31 % (ref 25–45)
LYMPHOCYTES NFR BLD AUTO: 31 % (ref 25–45)
LYMPHOCYTES NFR BLD AUTO: 33 % (ref 25–45)
LYMPHOCYTES NFR BLD AUTO: 35 % (ref 25–45)
LYMPHOCYTES NFR BLD AUTO: 40 % (ref 25–45)
Lab: ABNORMAL
MACROCYTES BLD QL AUTO: PRESENT
MAGNESIUM SERPL-MCNC: 1.7 MG/DL (ref 1.6–2.3)
MAGNESIUM SERPL-MCNC: 1.9 MG/DL (ref 1.6–2.3)
MAGNESIUM SERPL-MCNC: 1.9 MG/DL (ref 1.6–2.3)
MAGNESIUM SERPL-MCNC: 2.1 MG/DL (ref 1.6–2.3)
MCH RBC QN AUTO: 31.5 PG (ref 26–34)
MCH RBC QN AUTO: 31.6 PG (ref 26–34)
MCH RBC QN AUTO: 31.7 PG (ref 26–34)
MCH RBC QN AUTO: 31.7 PG (ref 26–34)
MCH RBC QN AUTO: 31.8 PG (ref 26–34)
MCH RBC QN AUTO: 31.8 PG (ref 26–34)
MCH RBC QN AUTO: 32.1 PG (ref 26–34)
MCH RBC QN AUTO: 32.2 PG (ref 26–34)
MCH RBC QN AUTO: 32.5 PG (ref 26–34)
MCH RBC QN AUTO: 33.3 PG (ref 26–34)
MCH RBC QN AUTO: 33.4 PG (ref 26–34)
MCH RBC QN AUTO: 33.4 PG (ref 26–34)
MCH RBC QN AUTO: 33.7 PG (ref 26–34)
MCHC RBC AUTO-ENTMCNC: 31.8 G/DL (ref 31–36)
MCHC RBC AUTO-ENTMCNC: 32.7 G/DL (ref 31–36)
MCHC RBC AUTO-ENTMCNC: 32.7 G/DL (ref 31–36)
MCHC RBC AUTO-ENTMCNC: 32.8 G/DL (ref 31–36)
MCHC RBC AUTO-ENTMCNC: 32.9 G/DL (ref 31–36)
MCHC RBC AUTO-ENTMCNC: 32.9 G/DL (ref 31–36)
MCHC RBC AUTO-ENTMCNC: 33 G/DL (ref 31–36)
MCHC RBC AUTO-ENTMCNC: 33.1 G/DL (ref 31–36)
MCHC RBC AUTO-ENTMCNC: 33.1 G/DL (ref 31–36)
MCHC RBC AUTO-ENTMCNC: 33.3 G/DL (ref 31–36)
MCHC RBC AUTO-ENTMCNC: 33.3 G/DL (ref 31–36)
MCHC RBC AUTO-ENTMCNC: 33.4 G/DL (ref 31–36)
MCHC RBC AUTO-ENTMCNC: 33.5 G/DL (ref 31–36)
MCV RBC AUTO: 100 FL (ref 80–100)
MCV RBC AUTO: 100 FL (ref 80–100)
MCV RBC AUTO: 101 FL (ref 80–100)
MCV RBC AUTO: 102 FL (ref 80–100)
MCV RBC AUTO: 102 FL (ref 80–100)
MCV RBC AUTO: 95 FL (ref 80–100)
MCV RBC AUTO: 96 FL (ref 80–100)
MCV RBC AUTO: 97 FL (ref 80–100)
MCV RBC AUTO: 98 FL (ref 80–100)
MCV RBC AUTO: 98 FL (ref 80–100)
METHADONE UR QL SCN: NEGATIVE NG/ML
METHADONE UR QL: NEGATIVE
MISCELLANEOUS LAB TEST RESULT: NORMAL
MITOCHONDRIA M2 IGG SER-ACNC: <20 UNITS (ref 0–20)
MONOCYTES # BLD AUTO: 0.3 THOUSAND/ΜL (ref 0.2–0.9)
MONOCYTES # BLD AUTO: 0.4 THOUSAND/ΜL (ref 0.2–0.9)
MONOCYTES # BLD AUTO: 0.5 THOUSAND/ΜL (ref 0.2–0.9)
MONOCYTES # BLD AUTO: 0.6 THOUSAND/ΜL (ref 0.2–0.9)
MONOCYTES NFR BLD AUTO: 4 % (ref 1–10)
MONOCYTES NFR BLD AUTO: 5 % (ref 1–10)
MONOCYTES NFR BLD AUTO: 6 % (ref 1–10)
MONOCYTES NFR BLD AUTO: 7 % (ref 1–10)
MONOCYTES NFR BLD AUTO: 8 % (ref 1–10)
MONOCYTES NFR BLD AUTO: 8 % (ref 1–10)
MONOCYTES NFR BLD AUTO: 9 % (ref 1–10)
MONOCYTES NFR BLD AUTO: 9 % (ref 1–10)
NEUTROPHILS # BLD AUTO: 2.2 THOUSANDS/ΜL (ref 1.8–7.8)
NEUTROPHILS # BLD AUTO: 2.4 THOUSANDS/ΜL (ref 1.8–7.8)
NEUTROPHILS # BLD AUTO: 2.7 THOUSANDS/ΜL (ref 1.8–7.8)
NEUTROPHILS # BLD AUTO: 2.7 THOUSANDS/ΜL (ref 1.8–7.8)
NEUTROPHILS # BLD AUTO: 2.9 THOUSANDS/ΜL (ref 1.8–7.8)
NEUTROPHILS # BLD AUTO: 3 THOUSANDS/ΜL (ref 1.8–7.8)
NEUTROPHILS # BLD AUTO: 3.2 THOUSANDS/ΜL (ref 1.8–7.8)
NEUTROPHILS # BLD AUTO: 3.2 THOUSANDS/ΜL (ref 1.8–7.8)
NEUTROPHILS # BLD AUTO: 3.4 THOUSANDS/ΜL (ref 1.8–7.8)
NEUTROPHILS # BLD AUTO: 3.4 THOUSANDS/ΜL (ref 1.8–7.8)
NEUTROPHILS # BLD AUTO: 3.6 THOUSANDS/ΜL (ref 1.8–7.8)
NEUTROPHILS # BLD AUTO: 4.6 THOUSANDS/ΜL (ref 1.8–7.8)
NEUTROPHILS # BLD AUTO: 5.4 THOUSANDS/ΜL (ref 1.8–7.8)
NEUTS SEG NFR BLD AUTO: 46 % (ref 45–65)
NEUTS SEG NFR BLD AUTO: 51 % (ref 45–65)
NEUTS SEG NFR BLD AUTO: 52 % (ref 45–65)
NEUTS SEG NFR BLD AUTO: 53 % (ref 45–65)
NEUTS SEG NFR BLD AUTO: 54 % (ref 45–65)
NEUTS SEG NFR BLD AUTO: 55 % (ref 45–65)
NEUTS SEG NFR BLD AUTO: 55 % (ref 45–65)
NEUTS SEG NFR BLD AUTO: 56 % (ref 45–65)
NEUTS SEG NFR BLD AUTO: 56 % (ref 45–65)
NEUTS SEG NFR BLD AUTO: 58 % (ref 45–65)
NEUTS SEG NFR BLD AUTO: 58 % (ref 45–65)
NEUTS SEG NFR BLD AUTO: 60 % (ref 45–65)
NEUTS SEG NFR BLD AUTO: 65 % (ref 45–65)
NEUTS SEG NFR BLD AUTO: 65 % (ref 45–65)
NEUTS SEG NFR BLD AUTO: 69 % (ref 45–65)
NITRITE UR QL STRIP: NEGATIVE
NITRITE UR QL STRIP: POSITIVE
NON VENT ROOM AIR: 21 %
NON-SQ EPI CELLS URNS QL MICRO: ABNORMAL /HPF
NON-SQ EPI CELLS URNS QL MICRO: ABNORMAL /HPF
O2 CT BLDA-SCNC: 9.8 ML/DL (ref 16–23)
OPIATES UR QL SCN: NEGATIVE
OPIATES UR QL: NEGATIVE NG/ML
OXYHGB MFR BLDA: 91.1 % (ref 95–98)
P AXIS: 48 DEGREES
P AXIS: 52 DEGREES
P AXIS: 55 DEGREES
P AXIS: 60 DEGREES
P AXIS: 68 DEGREES
P AXIS: 68 DEGREES
P AXIS: 74 DEGREES
PATH INTERP SPEC-IMP: ABNORMAL
PCO2 BLDA: 33 MM HG (ref 35–45)
PCP UR QL: NEGATIVE
PCP UR QL: NEGATIVE NG/ML
PH BLDA: 7.36 [PH] (ref 7.35–7.45)
PH UR STRIP.AUTO: 6 [PH]
PH UR STRIP.AUTO: 7 [PH]
PHOSPHATE SERPL-MCNC: 5.6 MG/DL (ref 2.5–4.8)
PLATELET # BLD AUTO: 153 THOUSANDS/UL (ref 150–450)
PLATELET # BLD AUTO: 153 THOUSANDS/UL (ref 150–450)
PLATELET # BLD AUTO: 156 THOUSANDS/UL (ref 150–450)
PLATELET # BLD AUTO: 167 THOUSANDS/UL (ref 150–450)
PLATELET # BLD AUTO: 191 THOUSANDS/UL (ref 150–450)
PLATELET # BLD AUTO: 200 THOUSANDS/UL (ref 150–450)
PLATELET # BLD AUTO: 205 THOUSANDS/UL (ref 150–450)
PLATELET # BLD AUTO: 208 THOUSANDS/UL (ref 150–450)
PLATELET # BLD AUTO: 208 THOUSANDS/UL (ref 150–450)
PLATELET # BLD AUTO: 211 THOUSANDS/UL (ref 150–450)
PLATELET # BLD AUTO: 215 THOUSANDS/UL (ref 150–450)
PLATELET # BLD AUTO: 222 THOUSANDS/UL (ref 150–450)
PLATELET # BLD AUTO: 225 THOUSANDS/UL (ref 150–450)
PLATELET # BLD AUTO: 232 THOUSANDS/UL (ref 150–450)
PLATELET # BLD AUTO: 248 THOUSANDS/UL (ref 150–450)
PLATELET BLD QL SMEAR: ADEQUATE
PLATELET BLD QL SMEAR: ADEQUATE
PMV BLD AUTO: 10.2 FL (ref 8.9–12.7)
PMV BLD AUTO: 10.2 FL (ref 8.9–12.7)
PMV BLD AUTO: 10.4 FL (ref 8.9–12.7)
PMV BLD AUTO: 10.4 FL (ref 8.9–12.7)
PMV BLD AUTO: 10.5 FL (ref 8.9–12.7)
PMV BLD AUTO: 9.3 FL (ref 8.9–12.7)
PMV BLD AUTO: 9.4 FL (ref 8.9–12.7)
PMV BLD AUTO: 9.5 FL (ref 8.9–12.7)
PMV BLD AUTO: 9.6 FL (ref 8.9–12.7)
PMV BLD AUTO: 9.6 FL (ref 8.9–12.7)
PMV BLD AUTO: 9.8 FL (ref 8.9–12.7)
PMV BLD AUTO: 9.9 FL (ref 8.9–12.7)
PO2 BLDA: 59 MM HG (ref 80–105)
POTASSIUM SERPL-SCNC: 2.7 MMOL/L (ref 3.6–5)
POTASSIUM SERPL-SCNC: 2.9 MMOL/L (ref 3.6–5)
POTASSIUM SERPL-SCNC: 3.1 MMOL/L (ref 3.6–5)
POTASSIUM SERPL-SCNC: 3.1 MMOL/L (ref 3.6–5)
POTASSIUM SERPL-SCNC: 3.2 MMOL/L (ref 3.6–5)
POTASSIUM SERPL-SCNC: 3.5 MMOL/L (ref 3.6–5)
POTASSIUM SERPL-SCNC: 3.5 MMOL/L (ref 3.6–5)
POTASSIUM SERPL-SCNC: 3.6 MMOL/L (ref 3.6–5)
POTASSIUM SERPL-SCNC: 3.7 MMOL/L (ref 3.6–5)
POTASSIUM SERPL-SCNC: 3.9 MMOL/L (ref 3.6–5)
POTASSIUM SERPL-SCNC: 4 MMOL/L (ref 3.6–5)
POTASSIUM SERPL-SCNC: 4.1 MMOL/L (ref 3.6–5)
POTASSIUM SERPL-SCNC: 4.1 MMOL/L (ref 3.6–5)
POTASSIUM SERPL-SCNC: 4.2 MMOL/L (ref 3.6–5)
PR INTERVAL: 112 MS
PR INTERVAL: 120 MS
PR INTERVAL: 128 MS
PR INTERVAL: 146 MS
PR INTERVAL: 146 MS
PR INTERVAL: 148 MS
PR INTERVAL: 150 MS
PR INTERVAL: 150 MS
PR INTERVAL: 160 MS
PROPOXYPH UR QL SCN: NEGATIVE NG/ML
PROT PATTERN SERPL ELPH-IMP: ABNORMAL
PROT PATTERN UR ELPH-IMP: ABNORMAL
PROT SERPL-MCNC: 4.9 G/DL (ref 6.4–8.2)
PROT SERPL-MCNC: 5.1 G/DL (ref 5.9–8.4)
PROT SERPL-MCNC: 5.4 G/DL (ref 5.9–8.4)
PROT SERPL-MCNC: 5.5 G/DL (ref 5.9–8.4)
PROT SERPL-MCNC: 5.6 G/DL (ref 5.9–8.4)
PROT SERPL-MCNC: 5.6 G/DL (ref 5.9–8.4)
PROT SERPL-MCNC: 5.7 G/DL (ref 5.9–8.4)
PROT SERPL-MCNC: 5.8 G/DL (ref 5.9–8.4)
PROT SERPL-MCNC: 5.8 G/DL (ref 5.9–8.4)
PROT SERPL-MCNC: 6.4 G/DL (ref 5.9–8.4)
PROT SERPL-MCNC: 6.7 G/DL (ref 5.9–8.4)
PROT UR STRIP-MCNC: >=500 MG/DL
PROT UR STRIP-MCNC: >=500 MG/DL
PROT UR-MCNC: 586 MG/DL
PROT UR-MCNC: 603 MG/DL
PROT/CREAT UR: 11.02 MG/G{CREAT} (ref 0–0.1)
PROTHROMBIN TIME: 9.4 SECONDS (ref 9.8–12)
QRS AXIS: 12 DEGREES
QRS AXIS: 15 DEGREES
QRS AXIS: 16 DEGREES
QRS AXIS: 17 DEGREES
QRS AXIS: 19 DEGREES
QRS AXIS: 19 DEGREES
QRS AXIS: 38 DEGREES
QRSD INTERVAL: 74 MS
QRSD INTERVAL: 74 MS
QRSD INTERVAL: 76 MS
QRSD INTERVAL: 78 MS
QRSD INTERVAL: 78 MS
QRSD INTERVAL: 80 MS
QRSD INTERVAL: 82 MS
QRSD INTERVAL: 82 MS
QRSD INTERVAL: 84 MS
QT INTERVAL: 386 MS
QT INTERVAL: 394 MS
QT INTERVAL: 398 MS
QT INTERVAL: 418 MS
QT INTERVAL: 424 MS
QT INTERVAL: 428 MS
QT INTERVAL: 428 MS
QT INTERVAL: 430 MS
QT INTERVAL: 466 MS
QTC INTERVAL: 440 MS
QTC INTERVAL: 453 MS
QTC INTERVAL: 465 MS
QTC INTERVAL: 465 MS
QTC INTERVAL: 470 MS
QTC INTERVAL: 473 MS
QTC INTERVAL: 480 MS
QTC INTERVAL: 481 MS
QTC INTERVAL: 520 MS
RBC # BLD AUTO: 2.08 MILLION/UL (ref 4–5.2)
RBC # BLD AUTO: 2.21 MILLION/UL (ref 4–5.2)
RBC # BLD AUTO: 2.31 MILLION/UL (ref 4–5.2)
RBC # BLD AUTO: 2.32 MILLION/UL (ref 4–5.2)
RBC # BLD AUTO: 2.32 MILLION/UL (ref 4–5.2)
RBC # BLD AUTO: 2.74 MILLION/UL (ref 4–5.2)
RBC # BLD AUTO: 2.79 MILLION/UL (ref 4–5.2)
RBC # BLD AUTO: 2.8 MILLION/UL (ref 4–5.2)
RBC # BLD AUTO: 3 MILLION/UL (ref 4–5.2)
RBC # BLD AUTO: 3.03 MILLION/UL (ref 4–5.2)
RBC # BLD AUTO: 3.05 MILLION/UL (ref 4–5.2)
RBC # BLD AUTO: 3.06 MILLION/UL (ref 4–5.2)
RBC # BLD AUTO: 3.07 MILLION/UL (ref 4–5.2)
RBC # BLD AUTO: 3.16 MILLION/UL (ref 4–5.2)
RBC # BLD AUTO: 3.18 MILLION/UL (ref 4–5.2)
RBC #/AREA URNS AUTO: ABNORMAL /HPF
RBC #/AREA URNS AUTO: ABNORMAL /HPF
RBC MORPH BLD: NORMAL
RBC MORPH BLD: NORMAL
RETICS # CALC: 2.36 % (ref 0.87–2.63)
RETICS # CALC: 3.27 % (ref 0.87–2.63)
RH BLD: POSITIVE
RH BLD: POSITIVE
RYE IGE QN: NEGATIVE
SL AMB POCT HEMOGLOBIN AIC: 5.7 (ref ?–6.5)
SODIUM SERPL-SCNC: 137 MMOL/L (ref 137–147)
SODIUM SERPL-SCNC: 138 MMOL/L (ref 137–147)
SODIUM SERPL-SCNC: 138 MMOL/L (ref 137–147)
SODIUM SERPL-SCNC: 139 MMOL/L (ref 137–147)
SODIUM SERPL-SCNC: 140 MMOL/L (ref 137–147)
SODIUM SERPL-SCNC: 141 MMOL/L (ref 137–147)
SODIUM SERPL-SCNC: 142 MMOL/L (ref 137–147)
SODIUM SERPL-SCNC: 142 MMOL/L (ref 137–147)
SODIUM SERPL-SCNC: 144 MMOL/L (ref 137–147)
SODIUM SERPL-SCNC: 145 MMOL/L (ref 137–147)
SP GR UR STRIP.AUTO: 1.01 (ref 1–1.04)
SP GR UR STRIP.AUTO: 1.01 (ref 1–1.04)
SPECIMEN EXPIRATION DATE: NORMAL
SPECIMEN SOURCE: ABNORMAL
T WAVE AXIS: 177 DEGREES
T WAVE AXIS: 47 DEGREES
T WAVE AXIS: 50 DEGREES
T WAVE AXIS: 53 DEGREES
T WAVE AXIS: 64 DEGREES
T WAVE AXIS: 65 DEGREES
T WAVE AXIS: 68 DEGREES
T WAVE AXIS: 68 DEGREES
T WAVE AXIS: 86 DEGREES
THC UR QL: NEGATIVE
TIBC SERPL-MCNC: 100 UG/DL (ref 250–450)
TIBC SERPL-MCNC: 128 UG/DL (ref 250–450)
TROPONIN I SERPL-MCNC: <0.01 NG/ML (ref 0–0.03)
UNIT DISPENSE STATUS: NORMAL
UNIT DISPENSE STATUS: NORMAL
UNIT PRODUCT CODE: NORMAL
UNIT PRODUCT CODE: NORMAL
UNIT RH: NORMAL
UNIT RH: NORMAL
URATE SERPL-MCNC: 5.2 MG/DL (ref 2.7–7.5)
UROBILINOGEN UA: NEGATIVE MG/DL
UROBILINOGEN UA: NEGATIVE MG/DL
VENTRICULAR RATE: 71 BPM
VENTRICULAR RATE: 75 BPM
VENTRICULAR RATE: 76 BPM
VENTRICULAR RATE: 76 BPM
VENTRICULAR RATE: 78 BPM
VENTRICULAR RATE: 78 BPM
VENTRICULAR RATE: 84 BPM
VIT B12 SERPL-MCNC: 1310 PG/ML (ref 100–900)
WBC # BLD AUTO: 4.2 THOUSAND/UL (ref 4.5–11)
WBC # BLD AUTO: 4.6 THOUSAND/UL (ref 4.5–11)
WBC # BLD AUTO: 4.8 THOUSAND/UL (ref 4.5–11)
WBC # BLD AUTO: 5 THOUSAND/UL (ref 4.5–11)
WBC # BLD AUTO: 5.1 THOUSAND/UL (ref 4.5–11)
WBC # BLD AUTO: 5.2 THOUSAND/UL (ref 4.5–11)
WBC # BLD AUTO: 5.3 THOUSAND/UL (ref 4.5–11)
WBC # BLD AUTO: 5.5 THOUSAND/UL (ref 4.5–11)
WBC # BLD AUTO: 5.8 THOUSAND/UL (ref 4.5–11)
WBC # BLD AUTO: 5.9 THOUSAND/UL (ref 4.5–11)
WBC # BLD AUTO: 6.1 THOUSAND/UL (ref 4.5–11)
WBC # BLD AUTO: 6.1 THOUSAND/UL (ref 4.5–11)
WBC # BLD AUTO: 6.2 THOUSAND/UL (ref 4.5–11)
WBC # BLD AUTO: 7.1 THOUSAND/UL (ref 4.5–11)
WBC # BLD AUTO: 7.9 THOUSAND/UL (ref 4.5–11)
WBC #/AREA URNS AUTO: ABNORMAL /HPF
WBC #/AREA URNS AUTO: ABNORMAL /HPF

## 2020-01-01 PROCEDURE — 82784 ASSAY IGA/IGD/IGG/IGM EACH: CPT | Performed by: NURSE PRACTITIONER

## 2020-01-01 PROCEDURE — 93005 ELECTROCARDIOGRAM TRACING: CPT

## 2020-01-01 PROCEDURE — 80053 COMPREHEN METABOLIC PANEL: CPT | Performed by: FAMILY MEDICINE

## 2020-01-01 PROCEDURE — 36415 COLL VENOUS BLD VENIPUNCTURE: CPT

## 2020-01-01 PROCEDURE — 99232 SBSQ HOSP IP/OBS MODERATE 35: CPT | Performed by: INTERNAL MEDICINE

## 2020-01-01 PROCEDURE — 94762 N-INVAS EAR/PLS OXIMTRY CONT: CPT

## 2020-01-01 PROCEDURE — 82728 ASSAY OF FERRITIN: CPT | Performed by: FAMILY MEDICINE

## 2020-01-01 PROCEDURE — 99232 SBSQ HOSP IP/OBS MODERATE 35: CPT | Performed by: NURSE PRACTITIONER

## 2020-01-01 PROCEDURE — 86900 BLOOD TYPING SEROLOGIC ABO: CPT | Performed by: FAMILY MEDICINE

## 2020-01-01 PROCEDURE — 86140 C-REACTIVE PROTEIN: CPT | Performed by: NURSE PRACTITIONER

## 2020-01-01 PROCEDURE — 93010 ELECTROCARDIOGRAM REPORT: CPT | Performed by: INTERNAL MEDICINE

## 2020-01-01 PROCEDURE — 82948 REAGENT STRIP/BLOOD GLUCOSE: CPT

## 2020-01-01 PROCEDURE — 82668 ASSAY OF ERYTHROPOIETIN: CPT | Performed by: NURSE PRACTITIONER

## 2020-01-01 PROCEDURE — 85045 AUTOMATED RETICULOCYTE COUNT: CPT | Performed by: FAMILY MEDICINE

## 2020-01-01 PROCEDURE — 80048 BASIC METABOLIC PNL TOTAL CA: CPT | Performed by: FAMILY MEDICINE

## 2020-01-01 PROCEDURE — 84550 ASSAY OF BLOOD/URIC ACID: CPT | Performed by: NURSE PRACTITIONER

## 2020-01-01 PROCEDURE — 86880 COOMBS TEST DIRECT: CPT | Performed by: NURSE PRACTITIONER

## 2020-01-01 PROCEDURE — 82248 BILIRUBIN DIRECT: CPT | Performed by: FAMILY MEDICINE

## 2020-01-01 PROCEDURE — 83615 LACTATE (LD) (LDH) ENZYME: CPT | Performed by: FAMILY MEDICINE

## 2020-01-01 PROCEDURE — 82607 VITAMIN B-12: CPT | Performed by: FAMILY MEDICINE

## 2020-01-01 PROCEDURE — 3066F NEPHROPATHY DOC TX: CPT | Performed by: FAMILY MEDICINE

## 2020-01-01 PROCEDURE — 99213 OFFICE O/P EST LOW 20 MIN: CPT | Performed by: FAMILY MEDICINE

## 2020-01-01 PROCEDURE — 83540 ASSAY OF IRON: CPT | Performed by: FAMILY MEDICINE

## 2020-01-01 PROCEDURE — 86850 RBC ANTIBODY SCREEN: CPT | Performed by: FAMILY MEDICINE

## 2020-01-01 PROCEDURE — 82570 ASSAY OF URINE CREATININE: CPT | Performed by: INTERNAL MEDICINE

## 2020-01-01 PROCEDURE — 99239 HOSP IP/OBS DSCHRG MGMT >30: CPT | Performed by: FAMILY MEDICINE

## 2020-01-01 PROCEDURE — 99223 1ST HOSP IP/OBS HIGH 75: CPT | Performed by: FAMILY MEDICINE

## 2020-01-01 PROCEDURE — 99232 SBSQ HOSP IP/OBS MODERATE 35: CPT | Performed by: FAMILY MEDICINE

## 2020-01-01 PROCEDURE — 97167 OT EVAL HIGH COMPLEX 60 MIN: CPT

## 2020-01-01 PROCEDURE — 3008F BODY MASS INDEX DOCD: CPT | Performed by: FAMILY MEDICINE

## 2020-01-01 PROCEDURE — 82977 ASSAY OF GGT: CPT | Performed by: FAMILY MEDICINE

## 2020-01-01 PROCEDURE — 3078F DIAST BP <80 MM HG: CPT | Performed by: FAMILY MEDICINE

## 2020-01-01 PROCEDURE — C9113 INJ PANTOPRAZOLE SODIUM, VIA: HCPCS | Performed by: FAMILY MEDICINE

## 2020-01-01 PROCEDURE — 85025 COMPLETE CBC W/AUTO DIFF WBC: CPT | Performed by: INTERNAL MEDICINE

## 2020-01-01 PROCEDURE — P9016 RBC LEUKOCYTES REDUCED: HCPCS

## 2020-01-01 PROCEDURE — 4004F PT TOBACCO SCREEN RCVD TLK: CPT | Performed by: FAMILY MEDICINE

## 2020-01-01 PROCEDURE — 87389 HIV-1 AG W/HIV-1&-2 AB AG IA: CPT | Performed by: FAMILY MEDICINE

## 2020-01-01 PROCEDURE — 84166 PROTEIN E-PHORESIS/URINE/CSF: CPT | Performed by: INTERNAL MEDICINE

## 2020-01-01 PROCEDURE — 83883 ASSAY NEPHELOMETRY NOT SPEC: CPT | Performed by: NURSE PRACTITIONER

## 2020-01-01 PROCEDURE — 87186 SC STD MICRODIL/AGAR DIL: CPT | Performed by: EMERGENCY MEDICINE

## 2020-01-01 PROCEDURE — 87077 CULTURE AEROBIC IDENTIFY: CPT | Performed by: EMERGENCY MEDICINE

## 2020-01-01 PROCEDURE — 80048 BASIC METABOLIC PNL TOTAL CA: CPT

## 2020-01-01 PROCEDURE — 84165 PROTEIN E-PHORESIS SERUM: CPT | Performed by: INTERNAL MEDICINE

## 2020-01-01 PROCEDURE — 1111F DSCHRG MED/CURRENT MED MERGE: CPT | Performed by: FAMILY MEDICINE

## 2020-01-01 PROCEDURE — 3044F HG A1C LEVEL LT 7.0%: CPT | Performed by: FAMILY MEDICINE

## 2020-01-01 PROCEDURE — 97163 PT EVAL HIGH COMPLEX 45 MIN: CPT

## 2020-01-01 PROCEDURE — 3046F HEMOGLOBIN A1C LEVEL >9.0%: CPT | Performed by: FAMILY MEDICINE

## 2020-01-01 PROCEDURE — 83605 ASSAY OF LACTIC ACID: CPT | Performed by: FAMILY MEDICINE

## 2020-01-01 PROCEDURE — 85025 COMPLETE CBC W/AUTO DIFF WBC: CPT | Performed by: PHYSICIAN ASSISTANT

## 2020-01-01 PROCEDURE — 99238 HOSP IP/OBS DSCHRG MGMT 30/<: CPT | Performed by: FAMILY MEDICINE

## 2020-01-01 PROCEDURE — 30233N1 TRANSFUSION OF NONAUTOLOGOUS RED BLOOD CELLS INTO PERIPHERAL VEIN, PERCUTANEOUS APPROACH: ICD-10-PCS | Performed by: FAMILY MEDICINE

## 2020-01-01 PROCEDURE — 85025 COMPLETE CBC W/AUTO DIFF WBC: CPT | Performed by: FAMILY MEDICINE

## 2020-01-01 PROCEDURE — 85045 AUTOMATED RETICULOCYTE COUNT: CPT | Performed by: NURSE PRACTITIONER

## 2020-01-01 PROCEDURE — 94760 N-INVAS EAR/PLS OXIMETRY 1: CPT

## 2020-01-01 PROCEDURE — 82746 ASSAY OF FOLIC ACID SERUM: CPT | Performed by: FAMILY MEDICINE

## 2020-01-01 PROCEDURE — 85652 RBC SED RATE AUTOMATED: CPT | Performed by: NURSE PRACTITIONER

## 2020-01-01 PROCEDURE — 81001 URINALYSIS AUTO W/SCOPE: CPT | Performed by: EMERGENCY MEDICINE

## 2020-01-01 PROCEDURE — 82805 BLOOD GASES W/O2 SATURATION: CPT | Performed by: FAMILY MEDICINE

## 2020-01-01 PROCEDURE — 83036 HEMOGLOBIN GLYCOSYLATED A1C: CPT | Performed by: FAMILY MEDICINE

## 2020-01-01 PROCEDURE — 74176 CT ABD & PELVIS W/O CONTRAST: CPT

## 2020-01-01 PROCEDURE — 99233 SBSQ HOSP IP/OBS HIGH 50: CPT | Performed by: INTERNAL MEDICINE

## 2020-01-01 PROCEDURE — 80048 BASIC METABOLIC PNL TOTAL CA: CPT | Performed by: INTERNAL MEDICINE

## 2020-01-01 PROCEDURE — 87086 URINE CULTURE/COLONY COUNT: CPT | Performed by: EMERGENCY MEDICINE

## 2020-01-01 PROCEDURE — 99285 EMERGENCY DEPT VISIT HI MDM: CPT | Performed by: EMERGENCY MEDICINE

## 2020-01-01 PROCEDURE — 80053 COMPREHEN METABOLIC PANEL: CPT | Performed by: PHYSICIAN ASSISTANT

## 2020-01-01 PROCEDURE — 83010 ASSAY OF HAPTOGLOBIN QUANT: CPT | Performed by: NURSE PRACTITIONER

## 2020-01-01 PROCEDURE — 87181 SC STD AGAR DILUTION PER AGT: CPT | Performed by: FAMILY MEDICINE

## 2020-01-01 PROCEDURE — 81001 URINALYSIS AUTO W/SCOPE: CPT | Performed by: FAMILY MEDICINE

## 2020-01-01 PROCEDURE — 85610 PROTHROMBIN TIME: CPT | Performed by: NURSE PRACTITIONER

## 2020-01-01 PROCEDURE — 84156 ASSAY OF PROTEIN URINE: CPT | Performed by: INTERNAL MEDICINE

## 2020-01-01 PROCEDURE — 83550 IRON BINDING TEST: CPT | Performed by: NURSE PRACTITIONER

## 2020-01-01 PROCEDURE — 80053 COMPREHEN METABOLIC PANEL: CPT | Performed by: INTERNAL MEDICINE

## 2020-01-01 PROCEDURE — 83735 ASSAY OF MAGNESIUM: CPT | Performed by: FAMILY MEDICINE

## 2020-01-01 PROCEDURE — 83550 IRON BINDING TEST: CPT | Performed by: FAMILY MEDICINE

## 2020-01-01 PROCEDURE — 81003 URINALYSIS AUTO W/O SCOPE: CPT | Performed by: FAMILY MEDICINE

## 2020-01-01 PROCEDURE — 83735 ASSAY OF MAGNESIUM: CPT | Performed by: INTERNAL MEDICINE

## 2020-01-01 PROCEDURE — 85014 HEMATOCRIT: CPT | Performed by: FAMILY MEDICINE

## 2020-01-01 PROCEDURE — 84165 PROTEIN E-PHORESIS SERUM: CPT | Performed by: PATHOLOGY

## 2020-01-01 PROCEDURE — 71046 X-RAY EXAM CHEST 2 VIEWS: CPT

## 2020-01-01 PROCEDURE — 80053 COMPREHEN METABOLIC PANEL: CPT

## 2020-01-01 PROCEDURE — 99284 EMERGENCY DEPT VISIT MOD MDM: CPT

## 2020-01-01 PROCEDURE — 82728 ASSAY OF FERRITIN: CPT | Performed by: NURSE PRACTITIONER

## 2020-01-01 PROCEDURE — NC001 PR NO CHARGE: Performed by: FAMILY MEDICINE

## 2020-01-01 PROCEDURE — 82272 OCCULT BLD FECES 1-3 TESTS: CPT | Performed by: FAMILY MEDICINE

## 2020-01-01 PROCEDURE — 80074 ACUTE HEPATITIS PANEL: CPT | Performed by: NURSE PRACTITIONER

## 2020-01-01 PROCEDURE — 99221 1ST HOSP IP/OBS SF/LOW 40: CPT | Performed by: PSYCHIATRY & NEUROLOGY

## 2020-01-01 PROCEDURE — 86901 BLOOD TYPING SEROLOGIC RH(D): CPT | Performed by: FAMILY MEDICINE

## 2020-01-01 PROCEDURE — 86038 ANTINUCLEAR ANTIBODIES: CPT | Performed by: NURSE PRACTITIONER

## 2020-01-01 PROCEDURE — 71101 X-RAY EXAM UNILAT RIBS/CHEST: CPT

## 2020-01-01 PROCEDURE — 83516 IMMUNOASSAY NONANTIBODY: CPT | Performed by: INTERNAL MEDICINE

## 2020-01-01 PROCEDURE — 99285 EMERGENCY DEPT VISIT HI MDM: CPT

## 2020-01-01 PROCEDURE — 4010F ACE/ARB THERAPY RXD/TAKEN: CPT | Performed by: FAMILY MEDICINE

## 2020-01-01 PROCEDURE — 85025 COMPLETE CBC W/AUTO DIFF WBC: CPT

## 2020-01-01 PROCEDURE — 87077 CULTURE AEROBIC IDENTIFY: CPT | Performed by: FAMILY MEDICINE

## 2020-01-01 PROCEDURE — 99254 IP/OBS CNSLTJ NEW/EST MOD 60: CPT | Performed by: INTERNAL MEDICINE

## 2020-01-01 PROCEDURE — 83615 LACTATE (LD) (LDH) ENZYME: CPT | Performed by: NURSE PRACTITIONER

## 2020-01-01 PROCEDURE — 87186 SC STD MICRODIL/AGAR DIL: CPT | Performed by: FAMILY MEDICINE

## 2020-01-01 PROCEDURE — 99284 EMERGENCY DEPT VISIT MOD MDM: CPT | Performed by: PHYSICIAN ASSISTANT

## 2020-01-01 PROCEDURE — 99291 CRITICAL CARE FIRST HOUR: CPT

## 2020-01-01 PROCEDURE — 83690 ASSAY OF LIPASE: CPT | Performed by: EMERGENCY MEDICINE

## 2020-01-01 PROCEDURE — 87147 CULTURE TYPE IMMUNOLOGIC: CPT | Performed by: EMERGENCY MEDICINE

## 2020-01-01 PROCEDURE — 96374 THER/PROPH/DIAG INJ IV PUSH: CPT

## 2020-01-01 PROCEDURE — 81003 URINALYSIS AUTO W/O SCOPE: CPT | Performed by: EMERGENCY MEDICINE

## 2020-01-01 PROCEDURE — 84484 ASSAY OF TROPONIN QUANT: CPT | Performed by: FAMILY MEDICINE

## 2020-01-01 PROCEDURE — G2012 BRIEF CHECK IN BY MD/QHP: HCPCS | Performed by: FAMILY MEDICINE

## 2020-01-01 PROCEDURE — 36415 COLL VENOUS BLD VENIPUNCTURE: CPT | Performed by: EMERGENCY MEDICINE

## 2020-01-01 PROCEDURE — 86235 NUCLEAR ANTIGEN ANTIBODY: CPT | Performed by: NURSE PRACTITIONER

## 2020-01-01 PROCEDURE — 83036 HEMOGLOBIN GLYCOSYLATED A1C: CPT

## 2020-01-01 PROCEDURE — 86335 IMMUNFIX E-PHORSIS/URINE/CSF: CPT | Performed by: INTERNAL MEDICINE

## 2020-01-01 PROCEDURE — 82232 ASSAY OF BETA-2 PROTEIN: CPT | Performed by: NURSE PRACTITIONER

## 2020-01-01 PROCEDURE — 99283 EMERGENCY DEPT VISIT LOW MDM: CPT

## 2020-01-01 PROCEDURE — 86920 COMPATIBILITY TEST SPIN: CPT

## 2020-01-01 PROCEDURE — 83540 ASSAY OF IRON: CPT | Performed by: NURSE PRACTITIONER

## 2020-01-01 PROCEDURE — 85018 HEMOGLOBIN: CPT | Performed by: FAMILY MEDICINE

## 2020-01-01 PROCEDURE — 84166 PROTEIN E-PHORESIS/URINE/CSF: CPT | Performed by: PATHOLOGY

## 2020-01-01 PROCEDURE — 80307 DRUG TEST PRSMV CHEM ANLYZR: CPT | Performed by: FAMILY MEDICINE

## 2020-01-01 PROCEDURE — 92950 HEART/LUNG RESUSCITATION CPR: CPT | Performed by: EMERGENCY MEDICINE

## 2020-01-01 PROCEDURE — 99231 SBSQ HOSP IP/OBS SF/LOW 25: CPT | Performed by: FAMILY MEDICINE

## 2020-01-01 PROCEDURE — 99203 OFFICE O/P NEW LOW 30 MIN: CPT | Performed by: SURGERY

## 2020-01-01 PROCEDURE — 76705 ECHO EXAM OF ABDOMEN: CPT

## 2020-01-01 PROCEDURE — 99291 CRITICAL CARE FIRST HOUR: CPT | Performed by: EMERGENCY MEDICINE

## 2020-01-01 PROCEDURE — 84100 ASSAY OF PHOSPHORUS: CPT

## 2020-01-01 PROCEDURE — 83690 ASSAY OF LIPASE: CPT | Performed by: FAMILY MEDICINE

## 2020-01-01 PROCEDURE — 87086 URINE CULTURE/COLONY COUNT: CPT | Performed by: FAMILY MEDICINE

## 2020-01-01 PROCEDURE — 86335 IMMUNFIX E-PHORSIS/URINE/CSF: CPT | Performed by: PATHOLOGY

## 2020-01-01 PROCEDURE — 3075F SYST BP GE 130 - 139MM HG: CPT | Performed by: FAMILY MEDICINE

## 2020-01-01 PROCEDURE — C9113 INJ PANTOPRAZOLE SODIUM, VIA: HCPCS | Performed by: INTERNAL MEDICINE

## 2020-01-01 PROCEDURE — 82140 ASSAY OF AMMONIA: CPT | Performed by: FAMILY MEDICINE

## 2020-01-01 PROCEDURE — 85025 COMPLETE CBC W/AUTO DIFF WBC: CPT | Performed by: EMERGENCY MEDICINE

## 2020-01-01 PROCEDURE — 85730 THROMBOPLASTIN TIME PARTIAL: CPT | Performed by: NURSE PRACTITIONER

## 2020-01-01 PROCEDURE — 80053 COMPREHEN METABOLIC PANEL: CPT | Performed by: EMERGENCY MEDICINE

## 2020-01-01 PROCEDURE — 86256 FLUORESCENT ANTIBODY TITER: CPT | Performed by: NURSE PRACTITIONER

## 2020-01-01 RX ORDER — SYRINGE-NEEDLE,INSULIN,0.5 ML 31 GX5/16"
SYRINGE, EMPTY DISPOSABLE MISCELLANEOUS
Qty: 100 EACH | Refills: 2 | Status: SHIPPED | OUTPATIENT
Start: 2020-01-01

## 2020-01-01 RX ORDER — INSULIN GLARGINE 100 [IU]/ML
10 INJECTION, SOLUTION SUBCUTANEOUS
Status: DISCONTINUED | OUTPATIENT
Start: 2020-01-01 | End: 2020-01-01

## 2020-01-01 RX ORDER — ATORVASTATIN CALCIUM 40 MG/1
40 TABLET, FILM COATED ORAL DAILY
Qty: 30 TABLET | Refills: 4 | Status: SHIPPED | OUTPATIENT
Start: 2020-01-01

## 2020-01-01 RX ORDER — DOCUSATE SODIUM 100 MG/1
100 CAPSULE, LIQUID FILLED ORAL 2 TIMES DAILY PRN
Qty: 10 CAPSULE | Refills: 0 | Status: SHIPPED | OUTPATIENT
Start: 2020-01-01

## 2020-01-01 RX ORDER — LIDOCAINE 50 MG/G
1 PATCH TOPICAL DAILY
Status: DISCONTINUED | OUTPATIENT
Start: 2020-01-01 | End: 2020-01-01

## 2020-01-01 RX ORDER — ERGOCALCIFEROL 1.25 MG/1
50000 CAPSULE ORAL WEEKLY
Qty: 6 CAPSULE | Refills: 0 | Status: SHIPPED | OUTPATIENT
Start: 2020-01-01 | End: 2020-01-01 | Stop reason: ALTCHOICE

## 2020-01-01 RX ORDER — LIDOCAINE 50 MG/G
1 PATCH TOPICAL ONCE
Status: DISCONTINUED | OUTPATIENT
Start: 2020-01-01 | End: 2020-01-01 | Stop reason: HOSPADM

## 2020-01-01 RX ORDER — MUSCLE RUB CREAM 100; 150 MG/G; MG/G
1 CREAM TOPICAL 4 TIMES DAILY PRN
Status: DISCONTINUED | OUTPATIENT
Start: 2020-01-01 | End: 2020-01-01 | Stop reason: HOSPADM

## 2020-01-01 RX ORDER — SODIUM CHLORIDE, SODIUM GLUCONATE, SODIUM ACETATE, POTASSIUM CHLORIDE, MAGNESIUM CHLORIDE, SODIUM PHOSPHATE, DIBASIC, AND POTASSIUM PHOSPHATE .53; .5; .37; .037; .03; .012; .00082 G/100ML; G/100ML; G/100ML; G/100ML; G/100ML; G/100ML; G/100ML
125 INJECTION, SOLUTION INTRAVENOUS CONTINUOUS
Status: DISCONTINUED | OUTPATIENT
Start: 2020-01-01 | End: 2020-01-01

## 2020-01-01 RX ORDER — PREGABALIN 25 MG/1
25 CAPSULE ORAL 2 TIMES DAILY
Status: DISCONTINUED | OUTPATIENT
Start: 2020-01-01 | End: 2020-01-01 | Stop reason: HOSPADM

## 2020-01-01 RX ORDER — POTASSIUM CHLORIDE 20 MEQ/1
40 TABLET, EXTENDED RELEASE ORAL ONCE
Status: DISCONTINUED | OUTPATIENT
Start: 2020-01-01 | End: 2020-01-01

## 2020-01-01 RX ORDER — SODIUM CHLORIDE 9 MG/ML
125 INJECTION, SOLUTION INTRAVENOUS CONTINUOUS
Status: DISCONTINUED | OUTPATIENT
Start: 2020-01-01 | End: 2020-01-01

## 2020-01-01 RX ORDER — INSULIN GLARGINE 100 [IU]/ML
10 INJECTION, SOLUTION SUBCUTANEOUS
Status: DISCONTINUED | OUTPATIENT
Start: 2020-01-01 | End: 2020-01-01 | Stop reason: HOSPADM

## 2020-01-01 RX ORDER — PREGABALIN 25 MG/1
25 CAPSULE ORAL 2 TIMES DAILY
Qty: 60 CAPSULE | Refills: 1 | Status: SHIPPED | OUTPATIENT
Start: 2020-01-01 | End: 2020-01-01 | Stop reason: SDUPTHER

## 2020-01-01 RX ORDER — AMLODIPINE BESYLATE 5 MG/1
5 TABLET ORAL DAILY
Status: DISCONTINUED | OUTPATIENT
Start: 2020-01-01 | End: 2020-01-01

## 2020-01-01 RX ORDER — MELATONIN
1000 DAILY
Qty: 30 TABLET | Refills: 3 | Status: SHIPPED | OUTPATIENT
Start: 2020-01-01

## 2020-01-01 RX ORDER — INSULIN GLARGINE 100 [IU]/ML
15 INJECTION, SOLUTION SUBCUTANEOUS ONCE
Status: COMPLETED | OUTPATIENT
Start: 2020-01-01 | End: 2020-01-01

## 2020-01-01 RX ORDER — INSULIN GLARGINE 100 [IU]/ML
10 INJECTION, SOLUTION SUBCUTANEOUS ONCE
Status: COMPLETED | OUTPATIENT
Start: 2020-01-01 | End: 2020-01-01

## 2020-01-01 RX ORDER — TRAMADOL HYDROCHLORIDE 50 MG/1
50 TABLET ORAL 2 TIMES DAILY PRN
Qty: 45 TABLET | Refills: 0 | Status: SHIPPED | OUTPATIENT
Start: 2020-01-01 | End: 2020-01-01 | Stop reason: ALTCHOICE

## 2020-01-01 RX ORDER — UBIQUINOL 100 MG
1 CAPSULE ORAL AS NEEDED
Qty: 100 EACH | Refills: 2 | Status: SHIPPED | OUTPATIENT
Start: 2020-01-01

## 2020-01-01 RX ORDER — LANCETS 23 GAUGE
EACH MISCELLANEOUS 3 TIMES DAILY
Qty: 100 EACH | Refills: 9 | Status: SHIPPED | OUTPATIENT
Start: 2020-01-01 | End: 2020-01-01 | Stop reason: SDUPTHER

## 2020-01-01 RX ORDER — POTASSIUM CHLORIDE 20 MEQ/1
40 TABLET, EXTENDED RELEASE ORAL ONCE
Status: COMPLETED | OUTPATIENT
Start: 2020-01-01 | End: 2020-01-01

## 2020-01-01 RX ORDER — PREGABALIN 25 MG/1
25 CAPSULE ORAL 2 TIMES DAILY
Qty: 60 CAPSULE | Refills: 2 | Status: SHIPPED | OUTPATIENT
Start: 2020-01-01

## 2020-01-01 RX ORDER — ASPIRIN 81 MG
TABLET, DELAYED RELEASE (ENTERIC COATED) ORAL
COMMUNITY
Start: 2020-01-01 | End: 2020-01-01 | Stop reason: SDUPTHER

## 2020-01-01 RX ORDER — AMLODIPINE BESYLATE 10 MG/1
10 TABLET ORAL DAILY
Status: DISCONTINUED | OUTPATIENT
Start: 2020-01-01 | End: 2020-01-01 | Stop reason: HOSPADM

## 2020-01-01 RX ORDER — PREGABALIN 50 MG/1
50 CAPSULE ORAL 2 TIMES DAILY
Status: DISCONTINUED | OUTPATIENT
Start: 2020-01-01 | End: 2020-01-01

## 2020-01-01 RX ORDER — DOCUSATE SODIUM 100 MG/1
100 CAPSULE, LIQUID FILLED ORAL DAILY PRN
Status: DISCONTINUED | OUTPATIENT
Start: 2020-01-01 | End: 2020-01-01 | Stop reason: HOSPADM

## 2020-01-01 RX ORDER — MAGNESIUM HYDROXIDE/ALUMINUM HYDROXICE/SIMETHICONE 120; 1200; 1200 MG/30ML; MG/30ML; MG/30ML
30 SUSPENSION ORAL EVERY 4 HOURS PRN
Qty: 355 ML | Refills: 0 | Status: SHIPPED | OUTPATIENT
Start: 2020-01-01

## 2020-01-01 RX ORDER — AMLODIPINE BESYLATE 5 MG/1
5 TABLET ORAL DAILY
Qty: 30 TABLET | Refills: 3 | Status: SHIPPED | OUTPATIENT
Start: 2020-01-01

## 2020-01-01 RX ORDER — LIDOCAINE 50 MG/G
1 PATCH TOPICAL DAILY
Qty: 6 PATCH | Refills: 0 | Status: SHIPPED | OUTPATIENT
Start: 2020-01-01 | End: 2020-01-01 | Stop reason: SDUPTHER

## 2020-01-01 RX ORDER — ATORVASTATIN CALCIUM 40 MG/1
40 TABLET, FILM COATED ORAL DAILY
Qty: 30 TABLET | Refills: 4 | Status: SHIPPED | OUTPATIENT
Start: 2020-01-01 | End: 2020-01-01 | Stop reason: SDUPTHER

## 2020-01-01 RX ORDER — ERGOCALCIFEROL 1.25 MG/1
50000 CAPSULE ORAL WEEKLY
Qty: 4 CAPSULE | Refills: 0 | OUTPATIENT
Start: 2020-01-01

## 2020-01-01 RX ORDER — SODIUM CHLORIDE 450 MG/100ML
125 INJECTION, SOLUTION INTRAVENOUS CONTINUOUS
Status: DISCONTINUED | OUTPATIENT
Start: 2020-01-01 | End: 2020-01-01

## 2020-01-01 RX ORDER — ASPIRIN 81 MG
81 TABLET, DELAYED RELEASE (ENTERIC COATED) ORAL DAILY
Qty: 30 TABLET | Refills: 4 | Status: SHIPPED | OUTPATIENT
Start: 2020-01-01

## 2020-01-01 RX ORDER — PREGABALIN 25 MG/1
25 CAPSULE ORAL 2 TIMES DAILY
Qty: 60 CAPSULE | Refills: 1 | Status: SHIPPED | OUTPATIENT
Start: 2020-01-01 | End: 2020-01-01

## 2020-01-01 RX ORDER — OMEPRAZOLE 40 MG/1
40 CAPSULE, DELAYED RELEASE ORAL DAILY
Qty: 60 CAPSULE | Refills: 2 | Status: SHIPPED | OUTPATIENT
Start: 2020-01-01 | End: 2020-01-01 | Stop reason: SDUPTHER

## 2020-01-01 RX ORDER — PANTOPRAZOLE SODIUM 40 MG/1
40 TABLET, DELAYED RELEASE ORAL
Status: DISCONTINUED | OUTPATIENT
Start: 2020-01-01 | End: 2020-01-01

## 2020-01-01 RX ORDER — MELATONIN
1000 DAILY
Qty: 30 TABLET | Refills: 3 | Status: SHIPPED | OUTPATIENT
Start: 2020-01-01 | End: 2020-01-01 | Stop reason: SDUPTHER

## 2020-01-01 RX ORDER — INSULIN GLARGINE 100 [IU]/ML
15 INJECTION, SOLUTION SUBCUTANEOUS
Status: DISCONTINUED | OUTPATIENT
Start: 2020-01-01 | End: 2020-01-01

## 2020-01-01 RX ORDER — ERGOCALCIFEROL 1.25 MG/1
CAPSULE ORAL
COMMUNITY
Start: 2020-01-01 | End: 2020-01-01 | Stop reason: SDUPTHER

## 2020-01-01 RX ORDER — PANTOPRAZOLE SODIUM 40 MG/1
40 INJECTION, POWDER, FOR SOLUTION INTRAVENOUS
Status: DISCONTINUED | OUTPATIENT
Start: 2020-01-01 | End: 2020-01-01

## 2020-01-01 RX ORDER — PREGABALIN 25 MG/1
25 CAPSULE ORAL 2 TIMES DAILY
Qty: 60 CAPSULE | Refills: 2 | Status: CANCELLED | OUTPATIENT
Start: 2020-01-01

## 2020-01-01 RX ORDER — OMEPRAZOLE 40 MG/1
40 CAPSULE, DELAYED RELEASE ORAL DAILY
Qty: 60 CAPSULE | Refills: 2 | Status: SHIPPED | OUTPATIENT
Start: 2020-01-01

## 2020-01-01 RX ORDER — SODIUM CHLORIDE, SODIUM GLUCONATE, SODIUM ACETATE, POTASSIUM CHLORIDE, MAGNESIUM CHLORIDE, SODIUM PHOSPHATE, DIBASIC, AND POTASSIUM PHOSPHATE .53; .5; .37; .037; .03; .012; .00082 G/100ML; G/100ML; G/100ML; G/100ML; G/100ML; G/100ML; G/100ML
50 INJECTION, SOLUTION INTRAVENOUS CONTINUOUS
Status: DISCONTINUED | OUTPATIENT
Start: 2020-01-01 | End: 2020-01-01

## 2020-01-01 RX ORDER — LIDOCAINE HYDROCHLORIDE 10 MG/ML
5 INJECTION, SOLUTION EPIDURAL; INFILTRATION; INTRACAUDAL; PERINEURAL ONCE
Status: COMPLETED | OUTPATIENT
Start: 2020-01-01 | End: 2020-01-01

## 2020-01-01 RX ORDER — DEXTROSE MONOHYDRATE 25 G/50ML
25 INJECTION, SOLUTION INTRAVENOUS ONCE
Status: COMPLETED | OUTPATIENT
Start: 2020-01-01 | End: 2020-01-01

## 2020-01-01 RX ORDER — LIDOCAINE HYDROCHLORIDE 10 MG/ML
INJECTION, SOLUTION EPIDURAL; INFILTRATION; INTRACAUDAL; PERINEURAL
Status: COMPLETED
Start: 2020-01-01 | End: 2020-01-01

## 2020-01-01 RX ORDER — PREGABALIN 25 MG/1
CAPSULE ORAL
Qty: 60 CAPSULE | Refills: 2 | Status: SHIPPED | OUTPATIENT
Start: 2020-01-01 | End: 2020-01-01 | Stop reason: SDUPTHER

## 2020-01-01 RX ORDER — ATORVASTATIN CALCIUM 40 MG/1
40 TABLET, FILM COATED ORAL DAILY
Status: DISCONTINUED | OUTPATIENT
Start: 2020-01-01 | End: 2020-01-01 | Stop reason: HOSPADM

## 2020-01-01 RX ORDER — DIAZEPAM 5 MG/1
5 TABLET ORAL ONCE
Status: COMPLETED | OUTPATIENT
Start: 2020-01-01 | End: 2020-01-01

## 2020-01-01 RX ORDER — ACETAMINOPHEN 325 MG/1
650 TABLET ORAL EVERY 6 HOURS PRN
Qty: 30 TABLET | Refills: 0 | Status: SHIPPED | OUTPATIENT
Start: 2020-01-01

## 2020-01-01 RX ORDER — SERTRALINE HYDROCHLORIDE 25 MG/1
25 TABLET, FILM COATED ORAL DAILY
Status: DISCONTINUED | OUTPATIENT
Start: 2020-01-01 | End: 2020-01-01

## 2020-01-01 RX ORDER — TRAMADOL HYDROCHLORIDE 50 MG/1
50 TABLET ORAL EVERY 6 HOURS PRN
Status: DISCONTINUED | OUTPATIENT
Start: 2020-01-01 | End: 2020-01-01 | Stop reason: HOSPADM

## 2020-01-01 RX ORDER — ACETAMINOPHEN 325 MG/1
650 TABLET ORAL EVERY 8 HOURS PRN
Status: DISCONTINUED | OUTPATIENT
Start: 2020-01-01 | End: 2020-01-01 | Stop reason: HOSPADM

## 2020-01-01 RX ORDER — ACETAMINOPHEN 325 MG/1
975 TABLET ORAL EVERY 8 HOURS SCHEDULED
Status: DISCONTINUED | OUTPATIENT
Start: 2020-01-01 | End: 2020-01-01 | Stop reason: HOSPADM

## 2020-01-01 RX ORDER — EPINEPHRINE 0.1 MG/ML
SYRINGE (ML) INJECTION CODE/TRAUMA/SEDATION MEDICATION
Status: COMPLETED | OUTPATIENT
Start: 2020-01-01 | End: 2020-01-01

## 2020-01-01 RX ORDER — HYDROMORPHONE HCL/PF 1 MG/ML
0.2 SYRINGE (ML) INJECTION EVERY 6 HOURS PRN
Status: DISCONTINUED | OUTPATIENT
Start: 2020-01-01 | End: 2020-01-01

## 2020-01-01 RX ORDER — DEXTROSE MONOHYDRATE 25 G/50ML
INJECTION, SOLUTION INTRAVENOUS
Status: COMPLETED
Start: 2020-01-01 | End: 2020-01-01

## 2020-01-01 RX ORDER — SODIUM CHLORIDE, SODIUM GLUCONATE, SODIUM ACETATE, POTASSIUM CHLORIDE, MAGNESIUM CHLORIDE, SODIUM PHOSPHATE, DIBASIC, AND POTASSIUM PHOSPHATE .53; .5; .37; .037; .03; .012; .00082 G/100ML; G/100ML; G/100ML; G/100ML; G/100ML; G/100ML; G/100ML
80 INJECTION, SOLUTION INTRAVENOUS CONTINUOUS
Status: DISCONTINUED | OUTPATIENT
Start: 2020-01-01 | End: 2020-01-01

## 2020-01-01 RX ORDER — PANTOPRAZOLE SODIUM 40 MG/1
40 TABLET, DELAYED RELEASE ORAL
Status: DISCONTINUED | OUTPATIENT
Start: 2020-01-01 | End: 2020-01-01 | Stop reason: HOSPADM

## 2020-01-01 RX ORDER — HYDROMORPHONE HCL/PF 1 MG/ML
0.5 SYRINGE (ML) INJECTION ONCE
Status: COMPLETED | OUTPATIENT
Start: 2020-01-01 | End: 2020-01-01

## 2020-01-01 RX ORDER — AMLODIPINE BESYLATE 5 MG/1
5 TABLET ORAL DAILY
Status: DISCONTINUED | OUTPATIENT
Start: 2020-01-01 | End: 2020-01-01 | Stop reason: HOSPADM

## 2020-01-01 RX ORDER — MAGNESIUM HYDROXIDE/ALUMINUM HYDROXICE/SIMETHICONE 120; 1200; 1200 MG/30ML; MG/30ML; MG/30ML
30 SUSPENSION ORAL EVERY 4 HOURS PRN
Qty: 355 ML | Refills: 0 | Status: SHIPPED | OUTPATIENT
Start: 2020-01-01 | End: 2020-01-01 | Stop reason: SDUPTHER

## 2020-01-01 RX ORDER — FENTANYL CITRATE 50 UG/ML
50 INJECTION, SOLUTION INTRAMUSCULAR; INTRAVENOUS ONCE
Status: COMPLETED | OUTPATIENT
Start: 2020-01-01 | End: 2020-01-01

## 2020-01-01 RX ORDER — MAGNESIUM SULFATE 1 G/100ML
1 INJECTION INTRAVENOUS ONCE
Status: COMPLETED | OUTPATIENT
Start: 2020-01-01 | End: 2020-01-01

## 2020-01-01 RX ORDER — LANCETS 23 GAUGE
EACH MISCELLANEOUS 3 TIMES DAILY
Qty: 100 EACH | Refills: 9 | Status: SHIPPED | OUTPATIENT
Start: 2020-01-01

## 2020-01-01 RX ORDER — HYDROMORPHONE HCL/PF 1 MG/ML
0.2 SYRINGE (ML) INJECTION EVERY 4 HOURS PRN
Status: DISCONTINUED | OUTPATIENT
Start: 2020-01-01 | End: 2020-01-01

## 2020-01-01 RX ORDER — INSULIN GLARGINE 100 [IU]/ML
5 INJECTION, SOLUTION SUBCUTANEOUS
Status: DISCONTINUED | OUTPATIENT
Start: 2020-01-01 | End: 2020-01-01

## 2020-01-01 RX ADMIN — POTASSIUM CHLORIDE 40 MEQ: 20 TABLET, EXTENDED RELEASE ORAL at 08:35

## 2020-01-01 RX ADMIN — HYDROMORPHONE HYDROCHLORIDE 0.2 MG: 1 INJECTION, SOLUTION INTRAMUSCULAR; INTRAVENOUS; SUBCUTANEOUS at 17:04

## 2020-01-01 RX ADMIN — HEPARIN SODIUM 5000 UNITS: 5000 INJECTION INTRAVENOUS; SUBCUTANEOUS at 21:04

## 2020-01-01 RX ADMIN — PANTOPRAZOLE SODIUM 40 MG: 40 TABLET, DELAYED RELEASE ORAL at 06:01

## 2020-01-01 RX ADMIN — SODIUM CHLORIDE, SODIUM GLUCONATE, SODIUM ACETATE, POTASSIUM CHLORIDE AND MAGNESIUM CHLORIDE 80 ML/HR: 526; 502; 368; 37; 30 INJECTION, SOLUTION INTRAVENOUS at 11:52

## 2020-01-01 RX ADMIN — TRAMADOL HYDROCHLORIDE 50 MG: 50 TABLET, FILM COATED ORAL at 20:55

## 2020-01-01 RX ADMIN — INSULIN GLARGINE 15 UNITS: 100 INJECTION, SOLUTION SUBCUTANEOUS at 08:09

## 2020-01-01 RX ADMIN — SERTRALINE HYDROCHLORIDE 100 MG: 50 TABLET ORAL at 21:11

## 2020-01-01 RX ADMIN — SODIUM CHLORIDE, SODIUM GLUCONATE, SODIUM ACETATE, POTASSIUM CHLORIDE AND MAGNESIUM CHLORIDE 125 ML/HR: 526; 502; 368; 37; 30 INJECTION, SOLUTION INTRAVENOUS at 11:53

## 2020-01-01 RX ADMIN — HYDROMORPHONE HYDROCHLORIDE 0.2 MG: 1 INJECTION, SOLUTION INTRAMUSCULAR; INTRAVENOUS; SUBCUTANEOUS at 14:13

## 2020-01-01 RX ADMIN — HYDROMORPHONE HYDROCHLORIDE 0.2 MG: 1 INJECTION, SOLUTION INTRAMUSCULAR; INTRAVENOUS; SUBCUTANEOUS at 18:13

## 2020-01-01 RX ADMIN — Medication 400 MG: at 18:09

## 2020-01-01 RX ADMIN — HYDROMORPHONE HYDROCHLORIDE 0.2 MG: 1 INJECTION, SOLUTION INTRAMUSCULAR; INTRAVENOUS; SUBCUTANEOUS at 02:45

## 2020-01-01 RX ADMIN — POTASSIUM CHLORIDE 40 MEQ: 20 TABLET, EXTENDED RELEASE ORAL at 19:20

## 2020-01-01 RX ADMIN — POTASSIUM CHLORIDE 40 MEQ: 20 TABLET, EXTENDED RELEASE ORAL at 11:22

## 2020-01-01 RX ADMIN — PANTOPRAZOLE SODIUM 40 MG: 40 INJECTION, POWDER, FOR SOLUTION INTRAVENOUS at 19:09

## 2020-01-01 RX ADMIN — PANTOPRAZOLE SODIUM 40 MG: 40 TABLET, DELAYED RELEASE ORAL at 06:37

## 2020-01-01 RX ADMIN — Medication 400 MG: at 08:35

## 2020-01-01 RX ADMIN — AMLODIPINE BESYLATE 5 MG: 5 TABLET ORAL at 08:35

## 2020-01-01 RX ADMIN — MENTHOL, METHYL SALICYLATE 1 APPLICATION: 10; 15 CREAM TOPICAL at 23:25

## 2020-01-01 RX ADMIN — PANTOPRAZOLE SODIUM 40 MG: 40 TABLET, DELAYED RELEASE ORAL at 06:17

## 2020-01-01 RX ADMIN — POTASSIUM CHLORIDE AND SODIUM CHLORIDE 100 ML/HR: 900; 150 INJECTION, SOLUTION INTRAVENOUS at 15:04

## 2020-01-01 RX ADMIN — HEPARIN SODIUM 5000 UNITS: 5000 INJECTION INTRAVENOUS; SUBCUTANEOUS at 06:13

## 2020-01-01 RX ADMIN — POTASSIUM CHLORIDE AND SODIUM CHLORIDE 125 ML/HR: 900; 150 INJECTION, SOLUTION INTRAVENOUS at 11:15

## 2020-01-01 RX ADMIN — ATORVASTATIN CALCIUM 40 MG: 40 TABLET, FILM COATED ORAL at 08:35

## 2020-01-01 RX ADMIN — HYDROMORPHONE HYDROCHLORIDE 0.2 MG: 1 INJECTION, SOLUTION INTRAMUSCULAR; INTRAVENOUS; SUBCUTANEOUS at 06:13

## 2020-01-01 RX ADMIN — HYDROMORPHONE HYDROCHLORIDE 0.2 MG: 1 INJECTION, SOLUTION INTRAMUSCULAR; INTRAVENOUS; SUBCUTANEOUS at 01:59

## 2020-01-01 RX ADMIN — ACETAMINOPHEN 650 MG: 325 TABLET ORAL at 06:06

## 2020-01-01 RX ADMIN — INSULIN GLARGINE 10 UNITS: 100 INJECTION, SOLUTION SUBCUTANEOUS at 21:02

## 2020-01-01 RX ADMIN — AMLODIPINE BESYLATE 5 MG: 5 TABLET ORAL at 08:18

## 2020-01-01 RX ADMIN — HYDROMORPHONE HYDROCHLORIDE 0.2 MG: 1 INJECTION, SOLUTION INTRAMUSCULAR; INTRAVENOUS; SUBCUTANEOUS at 10:22

## 2020-01-01 RX ADMIN — PANTOPRAZOLE SODIUM 40 MG: 40 TABLET, DELAYED RELEASE ORAL at 16:54

## 2020-01-01 RX ADMIN — LIDOCAINE 1 PATCH: 50 PATCH TOPICAL at 20:58

## 2020-01-01 RX ADMIN — LIDOCAINE HYDROCHLORIDE 5 ML: 10 INJECTION, SOLUTION EPIDURAL; INFILTRATION; INTRACAUDAL; PERINEURAL at 21:16

## 2020-01-01 RX ADMIN — INSULIN GLARGINE 10 UNITS: 100 INJECTION, SOLUTION SUBCUTANEOUS at 21:10

## 2020-01-01 RX ADMIN — HYDROMORPHONE HYDROCHLORIDE 0.2 MG: 1 INJECTION, SOLUTION INTRAMUSCULAR; INTRAVENOUS; SUBCUTANEOUS at 04:58

## 2020-01-01 RX ADMIN — PREGABALIN 25 MG: 25 CAPSULE ORAL at 08:38

## 2020-01-01 RX ADMIN — SODIUM CHLORIDE, SODIUM GLUCONATE, SODIUM ACETATE, POTASSIUM CHLORIDE AND MAGNESIUM CHLORIDE 125 ML/HR: 526; 502; 368; 37; 30 INJECTION, SOLUTION INTRAVENOUS at 11:35

## 2020-01-01 RX ADMIN — HYDROMORPHONE HYDROCHLORIDE 0.2 MG: 1 INJECTION, SOLUTION INTRAMUSCULAR; INTRAVENOUS; SUBCUTANEOUS at 22:33

## 2020-01-01 RX ADMIN — HEPARIN SODIUM 5000 UNITS: 5000 INJECTION INTRAVENOUS; SUBCUTANEOUS at 06:01

## 2020-01-01 RX ADMIN — LIDOCAINE 1 PATCH: 50 PATCH TOPICAL at 16:48

## 2020-01-01 RX ADMIN — HYDROMORPHONE HYDROCHLORIDE 0.2 MG: 1 INJECTION, SOLUTION INTRAMUSCULAR; INTRAVENOUS; SUBCUTANEOUS at 21:14

## 2020-01-01 RX ADMIN — HYDROMORPHONE HYDROCHLORIDE 0.2 MG: 1 INJECTION, SOLUTION INTRAMUSCULAR; INTRAVENOUS; SUBCUTANEOUS at 06:02

## 2020-01-01 RX ADMIN — SODIUM CHLORIDE, SODIUM GLUCONATE, SODIUM ACETATE, POTASSIUM CHLORIDE AND MAGNESIUM CHLORIDE 125 ML/HR: 526; 502; 368; 37; 30 INJECTION, SOLUTION INTRAVENOUS at 01:02

## 2020-01-01 RX ADMIN — ATORVASTATIN CALCIUM 40 MG: 40 TABLET, FILM COATED ORAL at 08:38

## 2020-01-01 RX ADMIN — INSULIN LISPRO 1 UNITS: 100 INJECTION, SOLUTION INTRAVENOUS; SUBCUTANEOUS at 11:37

## 2020-01-01 RX ADMIN — INSULIN GLARGINE 15 UNITS: 100 INJECTION, SOLUTION SUBCUTANEOUS at 22:42

## 2020-01-01 RX ADMIN — HYDROMORPHONE HYDROCHLORIDE 0.2 MG: 1 INJECTION, SOLUTION INTRAMUSCULAR; INTRAVENOUS; SUBCUTANEOUS at 13:14

## 2020-01-01 RX ADMIN — PREGABALIN 50 MG: 50 CAPSULE ORAL at 20:57

## 2020-01-01 RX ADMIN — TRAMADOL HYDROCHLORIDE 50 MG: 50 TABLET, FILM COATED ORAL at 18:10

## 2020-01-01 RX ADMIN — HYDROMORPHONE HYDROCHLORIDE 0.2 MG: 1 INJECTION, SOLUTION INTRAMUSCULAR; INTRAVENOUS; SUBCUTANEOUS at 17:07

## 2020-01-01 RX ADMIN — ACETAMINOPHEN 650 MG: 325 TABLET ORAL at 08:43

## 2020-01-01 RX ADMIN — EPINEPHRINE 1 MG: 0.1 INJECTION, SOLUTION ENDOTRACHEAL; INTRACARDIAC; INTRAVENOUS at 12:13

## 2020-01-01 RX ADMIN — HYDROMORPHONE HYDROCHLORIDE 0.2 MG: 1 INJECTION, SOLUTION INTRAMUSCULAR; INTRAVENOUS; SUBCUTANEOUS at 09:12

## 2020-01-01 RX ADMIN — PANTOPRAZOLE SODIUM 40 MG: 40 TABLET, DELAYED RELEASE ORAL at 16:39

## 2020-01-01 RX ADMIN — INSULIN LISPRO 3 UNITS: 100 INJECTION, SOLUTION INTRAVENOUS; SUBCUTANEOUS at 11:49

## 2020-01-01 RX ADMIN — NICOTINE 1 PATCH: 14 PATCH, EXTENDED RELEASE TRANSDERMAL at 08:44

## 2020-01-01 RX ADMIN — SERTRALINE HYDROCHLORIDE 50 MG: 50 TABLET ORAL at 08:07

## 2020-01-01 RX ADMIN — DIAZEPAM 5 MG: 5 TABLET ORAL at 16:48

## 2020-01-01 RX ADMIN — PANTOPRAZOLE SODIUM 40 MG: 40 TABLET, DELAYED RELEASE ORAL at 17:04

## 2020-01-01 RX ADMIN — SODIUM CHLORIDE 1000 ML: 0.9 INJECTION, SOLUTION INTRAVENOUS at 20:20

## 2020-01-01 RX ADMIN — SODIUM CHLORIDE 125 ML/HR: 0.9 INJECTION, SOLUTION INTRAVENOUS at 05:25

## 2020-01-01 RX ADMIN — TRAMADOL HYDROCHLORIDE 50 MG: 50 TABLET, FILM COATED ORAL at 13:54

## 2020-01-01 RX ADMIN — FENTANYL CITRATE 50 MCG: 50 INJECTION INTRAMUSCULAR; INTRAVENOUS at 14:33

## 2020-01-01 RX ADMIN — INSULIN LISPRO 1 UNITS: 100 INJECTION, SOLUTION INTRAVENOUS; SUBCUTANEOUS at 17:20

## 2020-01-01 RX ADMIN — TRAMADOL HYDROCHLORIDE 50 MG: 50 TABLET, FILM COATED ORAL at 03:57

## 2020-01-01 RX ADMIN — POTASSIUM CHLORIDE AND SODIUM CHLORIDE 100 ML/HR: 900; 150 INJECTION, SOLUTION INTRAVENOUS at 17:41

## 2020-01-01 RX ADMIN — ACETAMINOPHEN 975 MG: 325 TABLET ORAL at 21:22

## 2020-01-01 RX ADMIN — INSULIN GLARGINE 10 UNITS: 100 INJECTION, SOLUTION SUBCUTANEOUS at 08:41

## 2020-01-01 RX ADMIN — PANTOPRAZOLE SODIUM 40 MG: 40 TABLET, DELAYED RELEASE ORAL at 06:12

## 2020-01-01 RX ADMIN — LIDOCAINE 1 PATCH: 50 PATCH TOPICAL at 21:42

## 2020-01-01 RX ADMIN — SODIUM CHLORIDE 125 ML/HR: 0.9 INJECTION, SOLUTION INTRAVENOUS at 20:18

## 2020-01-01 RX ADMIN — Medication 400 MG: at 19:20

## 2020-01-01 RX ADMIN — HYDROMORPHONE HYDROCHLORIDE 0.2 MG: 1 INJECTION, SOLUTION INTRAMUSCULAR; INTRAVENOUS; SUBCUTANEOUS at 21:05

## 2020-01-01 RX ADMIN — POTASSIUM CHLORIDE 20 MEQ: 200 INJECTION, SOLUTION INTRAVENOUS at 00:58

## 2020-01-01 RX ADMIN — TRAMADOL HYDROCHLORIDE 50 MG: 50 TABLET, FILM COATED ORAL at 08:46

## 2020-01-01 RX ADMIN — SERTRALINE HYDROCHLORIDE 25 MG: 25 TABLET ORAL at 08:00

## 2020-01-01 RX ADMIN — SERTRALINE HYDROCHLORIDE 25 MG: 25 TABLET ORAL at 09:12

## 2020-01-01 RX ADMIN — SERTRALINE HYDROCHLORIDE 50 MG: 50 TABLET ORAL at 21:42

## 2020-01-01 RX ADMIN — HYDROMORPHONE HYDROCHLORIDE 0.2 MG: 1 INJECTION, SOLUTION INTRAMUSCULAR; INTRAVENOUS; SUBCUTANEOUS at 01:07

## 2020-01-01 RX ADMIN — HEPARIN SODIUM 5000 UNITS: 5000 INJECTION INTRAVENOUS; SUBCUTANEOUS at 14:38

## 2020-01-01 RX ADMIN — HEPARIN SODIUM 5000 UNITS: 5000 INJECTION INTRAVENOUS; SUBCUTANEOUS at 21:17

## 2020-01-01 RX ADMIN — PANTOPRAZOLE SODIUM 40 MG: 40 TABLET, DELAYED RELEASE ORAL at 06:04

## 2020-01-01 RX ADMIN — SERTRALINE HYDROCHLORIDE 50 MG: 50 TABLET ORAL at 08:45

## 2020-01-01 RX ADMIN — AMLODIPINE BESYLATE 5 MG: 5 TABLET ORAL at 08:30

## 2020-01-01 RX ADMIN — HYDROMORPHONE HYDROCHLORIDE 0.2 MG: 1 INJECTION, SOLUTION INTRAMUSCULAR; INTRAVENOUS; SUBCUTANEOUS at 14:38

## 2020-01-01 RX ADMIN — POTASSIUM CHLORIDE 40 MEQ: 20 TABLET, EXTENDED RELEASE ORAL at 11:47

## 2020-01-01 RX ADMIN — ALTEPLASE 2 MG: 2.2 INJECTION, POWDER, LYOPHILIZED, FOR SOLUTION INTRAVENOUS at 12:55

## 2020-01-01 RX ADMIN — AMLODIPINE BESYLATE 10 MG: 10 TABLET ORAL at 08:45

## 2020-01-01 RX ADMIN — SODIUM CHLORIDE, SODIUM GLUCONATE, SODIUM ACETATE, POTASSIUM CHLORIDE AND MAGNESIUM CHLORIDE 80 ML/HR: 526; 502; 368; 37; 30 INJECTION, SOLUTION INTRAVENOUS at 13:06

## 2020-01-01 RX ADMIN — HYDROMORPHONE HYDROCHLORIDE 0.2 MG: 1 INJECTION, SOLUTION INTRAMUSCULAR; INTRAVENOUS; SUBCUTANEOUS at 13:06

## 2020-01-01 RX ADMIN — SODIUM CHLORIDE, SODIUM GLUCONATE, SODIUM ACETATE, POTASSIUM CHLORIDE AND MAGNESIUM CHLORIDE 80 ML/HR: 526; 502; 368; 37; 30 INJECTION, SOLUTION INTRAVENOUS at 20:06

## 2020-01-01 RX ADMIN — AMLODIPINE BESYLATE 10 MG: 10 TABLET ORAL at 08:07

## 2020-01-01 RX ADMIN — SODIUM CHLORIDE, SODIUM GLUCONATE, SODIUM ACETATE, POTASSIUM CHLORIDE AND MAGNESIUM CHLORIDE 80 ML/HR: 526; 502; 368; 37; 30 INJECTION, SOLUTION INTRAVENOUS at 07:44

## 2020-01-01 RX ADMIN — HYDROMORPHONE HYDROCHLORIDE 0.2 MG: 1 INJECTION, SOLUTION INTRAMUSCULAR; INTRAVENOUS; SUBCUTANEOUS at 12:09

## 2020-01-01 RX ADMIN — TRAMADOL HYDROCHLORIDE 50 MG: 50 TABLET, FILM COATED ORAL at 01:10

## 2020-01-01 RX ADMIN — ATORVASTATIN CALCIUM 40 MG: 40 TABLET, FILM COATED ORAL at 08:31

## 2020-01-01 RX ADMIN — AMLODIPINE BESYLATE 5 MG: 5 TABLET ORAL at 08:38

## 2020-01-01 RX ADMIN — PREGABALIN 25 MG: 25 CAPSULE ORAL at 08:35

## 2020-01-01 RX ADMIN — HYDROMORPHONE HYDROCHLORIDE 0.2 MG: 1 INJECTION, SOLUTION INTRAMUSCULAR; INTRAVENOUS; SUBCUTANEOUS at 23:23

## 2020-01-01 RX ADMIN — HYDROMORPHONE HYDROCHLORIDE 0.5 MG: 1 INJECTION, SOLUTION INTRAMUSCULAR; INTRAVENOUS; SUBCUTANEOUS at 06:31

## 2020-01-01 RX ADMIN — HYDROMORPHONE HYDROCHLORIDE 0.2 MG: 1 INJECTION, SOLUTION INTRAMUSCULAR; INTRAVENOUS; SUBCUTANEOUS at 19:14

## 2020-01-01 RX ADMIN — INSULIN GLARGINE 10 UNITS: 100 INJECTION, SOLUTION SUBCUTANEOUS at 21:41

## 2020-01-01 RX ADMIN — HEPARIN SODIUM 5000 UNITS: 5000 INJECTION INTRAVENOUS; SUBCUTANEOUS at 21:14

## 2020-01-01 RX ADMIN — HEPARIN SODIUM 5000 UNITS: 5000 INJECTION INTRAVENOUS; SUBCUTANEOUS at 05:20

## 2020-01-01 RX ADMIN — INSULIN LISPRO 1 UNITS: 100 INJECTION, SOLUTION INTRAVENOUS; SUBCUTANEOUS at 06:10

## 2020-01-01 RX ADMIN — INSULIN GLARGINE 5 UNITS: 100 INJECTION, SOLUTION SUBCUTANEOUS at 22:42

## 2020-01-01 RX ADMIN — SERTRALINE HYDROCHLORIDE 50 MG: 50 TABLET ORAL at 09:02

## 2020-01-01 RX ADMIN — SODIUM CHLORIDE, SODIUM GLUCONATE, SODIUM ACETATE, POTASSIUM CHLORIDE, MAGNESIUM CHLORIDE, SODIUM PHOSPHATE, DIBASIC, AND POTASSIUM PHOSPHATE 50 ML/HR: .53; .5; .37; .037; .03; .012; .00082 INJECTION, SOLUTION INTRAVENOUS at 08:46

## 2020-01-01 RX ADMIN — INSULIN GLARGINE 5 UNITS: 100 INJECTION, SOLUTION SUBCUTANEOUS at 23:00

## 2020-01-01 RX ADMIN — HEPARIN SODIUM 5000 UNITS: 5000 INJECTION INTRAVENOUS; SUBCUTANEOUS at 22:46

## 2020-01-01 RX ADMIN — AMLODIPINE BESYLATE 10 MG: 10 TABLET ORAL at 09:02

## 2020-01-01 RX ADMIN — HEPARIN SODIUM 5000 UNITS: 5000 INJECTION INTRAVENOUS; SUBCUTANEOUS at 13:49

## 2020-01-01 RX ADMIN — HEPARIN SODIUM 5000 UNITS: 5000 INJECTION INTRAVENOUS; SUBCUTANEOUS at 06:17

## 2020-01-01 RX ADMIN — ALTEPLASE 2 MG: 2.2 INJECTION, POWDER, LYOPHILIZED, FOR SOLUTION INTRAVENOUS at 08:27

## 2020-01-01 RX ADMIN — Medication 400 MG: at 08:39

## 2020-01-01 RX ADMIN — INSULIN GLARGINE 10 UNITS: 100 INJECTION, SOLUTION SUBCUTANEOUS at 21:04

## 2020-01-01 RX ADMIN — AMLODIPINE BESYLATE 5 MG: 5 TABLET ORAL at 08:41

## 2020-01-01 RX ADMIN — PANTOPRAZOLE SODIUM 40 MG: 40 TABLET, DELAYED RELEASE ORAL at 07:12

## 2020-01-01 RX ADMIN — HYDROMORPHONE HYDROCHLORIDE 0.2 MG: 1 INJECTION, SOLUTION INTRAMUSCULAR; INTRAVENOUS; SUBCUTANEOUS at 15:05

## 2020-01-01 RX ADMIN — HEPARIN SODIUM 5000 UNITS: 5000 INJECTION INTRAVENOUS; SUBCUTANEOUS at 14:25

## 2020-01-01 RX ADMIN — SODIUM CHLORIDE 125 ML/HR: 0.45 INJECTION, SOLUTION INTRAVENOUS at 06:18

## 2020-01-01 RX ADMIN — INSULIN GLARGINE 15 UNITS: 100 INJECTION, SOLUTION SUBCUTANEOUS at 21:14

## 2020-01-01 RX ADMIN — MAGNESIUM SULFATE IN DEXTROSE 1 G: 10 INJECTION, SOLUTION INTRAVENOUS at 11:22

## 2020-01-01 RX ADMIN — AMLODIPINE BESYLATE 5 MG: 5 TABLET ORAL at 08:07

## 2020-01-01 RX ADMIN — INSULIN GLARGINE 10 UNITS: 100 INJECTION, SOLUTION SUBCUTANEOUS at 23:04

## 2020-01-01 RX ADMIN — SODIUM CHLORIDE, SODIUM GLUCONATE, SODIUM ACETATE, POTASSIUM CHLORIDE AND MAGNESIUM CHLORIDE 80 ML/HR: 526; 502; 368; 37; 30 INJECTION, SOLUTION INTRAVENOUS at 04:16

## 2020-01-01 RX ADMIN — PREGABALIN 25 MG: 25 CAPSULE ORAL at 08:30

## 2020-01-01 RX ADMIN — PREGABALIN 25 MG: 25 CAPSULE ORAL at 18:09

## 2020-01-01 RX ADMIN — HYDROMORPHONE HYDROCHLORIDE 0.2 MG: 1 INJECTION, SOLUTION INTRAMUSCULAR; INTRAVENOUS; SUBCUTANEOUS at 01:20

## 2020-01-01 RX ADMIN — AMLODIPINE BESYLATE 5 MG: 5 TABLET ORAL at 09:12

## 2020-01-01 RX ADMIN — HYDROMORPHONE HYDROCHLORIDE 0.2 MG: 1 INJECTION, SOLUTION INTRAMUSCULAR; INTRAVENOUS; SUBCUTANEOUS at 10:56

## 2020-01-01 RX ADMIN — SODIUM CHLORIDE, SODIUM GLUCONATE, SODIUM ACETATE, POTASSIUM CHLORIDE AND MAGNESIUM CHLORIDE 80 ML/HR: 526; 502; 368; 37; 30 INJECTION, SOLUTION INTRAVENOUS at 20:33

## 2020-01-01 RX ADMIN — AMLODIPINE BESYLATE 10 MG: 10 TABLET ORAL at 09:08

## 2020-01-01 RX ADMIN — AMLODIPINE BESYLATE 5 MG: 5 TABLET ORAL at 08:00

## 2020-01-01 RX ADMIN — SERTRALINE HYDROCHLORIDE 50 MG: 50 TABLET ORAL at 09:08

## 2020-01-01 RX ADMIN — HYDROMORPHONE HYDROCHLORIDE 0.2 MG: 1 INJECTION, SOLUTION INTRAMUSCULAR; INTRAVENOUS; SUBCUTANEOUS at 09:13

## 2020-01-01 RX ADMIN — PANTOPRAZOLE SODIUM 40 MG: 40 INJECTION, POWDER, FOR SOLUTION INTRAVENOUS at 08:41

## 2020-01-01 RX ADMIN — PANTOPRAZOLE SODIUM 40 MG: 40 TABLET, DELAYED RELEASE ORAL at 16:49

## 2020-01-01 RX ADMIN — DEXTROSE MONOHYDRATE 25 ML: 25 INJECTION, SOLUTION INTRAVENOUS at 00:42

## 2020-01-01 RX ADMIN — DEXTROSE 50 % IN WATER (D50W) INTRAVENOUS SYRINGE 25 ML: at 00:42

## 2020-01-01 RX ADMIN — PANTOPRAZOLE SODIUM 40 MG: 40 TABLET, DELAYED RELEASE ORAL at 16:16

## 2020-01-01 RX ADMIN — POTASSIUM CHLORIDE AND SODIUM CHLORIDE 125 ML/HR: 900; 150 INJECTION, SOLUTION INTRAVENOUS at 22:18

## 2020-01-01 RX ADMIN — SERTRALINE HYDROCHLORIDE 25 MG: 25 TABLET ORAL at 08:07

## 2020-01-01 RX ADMIN — LIDOCAINE 1 PATCH: 50 PATCH TOPICAL at 21:13

## 2020-01-01 RX ADMIN — PREGABALIN 25 MG: 25 CAPSULE ORAL at 17:22

## 2020-01-01 RX ADMIN — TRAMADOL HYDROCHLORIDE 50 MG: 50 TABLET, FILM COATED ORAL at 11:48

## 2020-01-01 RX ADMIN — HYDROMORPHONE HYDROCHLORIDE 0.2 MG: 1 INJECTION, SOLUTION INTRAMUSCULAR; INTRAVENOUS; SUBCUTANEOUS at 05:01

## 2020-01-01 RX ADMIN — SERTRALINE HYDROCHLORIDE 50 MG: 50 TABLET ORAL at 21:22

## 2020-01-01 RX ADMIN — HYDROMORPHONE HYDROCHLORIDE 0.2 MG: 1 INJECTION, SOLUTION INTRAMUSCULAR; INTRAVENOUS; SUBCUTANEOUS at 19:29

## 2020-01-01 NOTE — RAPID RESPONSE
Progress Note - Rapid Response   La Hinojosa 62 y o  female MRN: [id number]    Time Called ( Time): 6107  Date Called: 1/1/2020  Level of Care: MS  Room#: 536 -02  Arrival Time ( Time): 7993  Stroke Alert Called: 1415  Event End Time ( Time): 1851  Primary reason for call: Acute change in mental status  Interventions:  Airway/Breathing:  O2 Mask/Nasal  Circulation: EKG stat ordered  Other Treatments: Non treatment       Assessment:   1  Altered mental status: Patient suddenly awoke and was alert and orientated x 3 roughly 5 minutes after 0 4 mg IV Narcan was administered, can not R/O toxic encephalopathy 2/2 opioid overdose at this time  2  TIA: Patient had a questionable left sided facial droop which resolved    Plan:   Stroke alert was called  · Discontinued Remeron, as this was recently started prior to episode  · UDS / toxicology screen, to assess for toxic encephalopathy   · CT head wo contrast negative for acute intracranial abnormalities  · CXR, to evaluate any acute Cardio/ Pulmonary disease   · Telemetry, to monitor for any arrthymias   · Continuous pulse oximetry  · Neuro checks q1h X 3 times and reevaluate patient if further neuro checks are required   · EKG   · F/U Labs  · Neurology consulted      HPI/Chief Complaint (Background/Situation):   La Hinojosa is a 62y o  year old female with PMH significant for T2DM, CKD 3, HTN, neurogenic bladder with indwelling suprapubic catheter originally admitted on 12/30/19 and presented with RUQ pain, vaginal discharge and suicidal ideation  Patient also reported roughly a 30 lb unintentional weight loss over the past 2-3 months with poor PO intake  In ED patient was found to have JULIO and admitted for management  Glucose on admission was low however started to trend upwards and was restarted on lantus and titrated up to 15 U qhs  Today patient received 10 U of lantus this AM  This AM patient was stable and had no complaints   She was alert and orientated and had good PO intake  Yesterday psychiatry comenced Remeron 15 mg and patient denied any continued SI and discharged from their service  Patient received one time dose of Ultram yesterday AM for RUQ pain and tylenol 650 prn since then  Abnormal labs included a drop in Hb from 10 to 7 7 since admission with no signs of acute bleeding  Rapid response was called by nurse and SLIM team responded first  Patient was unresponsive to sternal rub  Had pinpoint pupils but reactive  Glucose was 339  Vitals were all WNL  Patient was connected to monitor and showed normal sinus rhythm  Response team unsure if there was left sided facial droop and stroke alert was called  STAT labs ordered were CBC, BMP, Troponin x 3, ammonia, ABG, UDS, fecal occult blood test and lactic acid  Narcan was administered after 10 minutes (vitals stable throughout)  Roughly five minutes after Narcan was administered patient spontaneously woke up and was alert and orientated x 3  She stated "woah I was sleeping what's going on?" Then stated she was in the hospital and remembers everything, and that she was just sleeping  She could hear what was going on but could not respond  She then requested to use the bathroom because she needs to have a BM immediately  Tranfser to ICU was cancelled and patient was brought to CT  While at radiology spoke with Dr Antoinette Lugo (neurology) and discussed the case  At this time there is no further indication for a CTA  While in radiology also got a CXR to rule out other etiologies and the wet read was negative, final read pending  Patient was brought back to room and had no questions or concerns  Asked if patient had any friends or family she would like us to contact and she declined  Spoke with radiologist from the reading room over the phone and the CT head with no contrast was negative for any acute intracranial abnormalities       Historical Information   Past Medical History:   Diagnosis Date  Ambulatory dysfunction     Anemia     macrocyctic    Chronic kidney disease 5/24/2018    Chronic pain disorder     right hip    Diabetes mellitus ([address] )     Diabetic foot ulcer ([address] )     left with fat layer exposed    Dyslipidemia 5/24/2018    ESBL E  coli carrier     GERD (gastroesophageal reflux disease)     History of frequent urinary tract infections     HTN (hypertension) 5/24/2018    Hyperlipidemia     Irritable bowel syndrome     constipation    Neurogenic bladder     Neuropathy     Pedal edema     Vitamin D deficiency      Past Surgical History:   Procedure Laterality Date    CATARACT EXTRACTION      AZ XCAPSL CTRC RMVL INSJ IO LENS PROSTH W/O ECP Right 12/6/2018    Procedure: EXTRACAPSULAR CATARACT REMOVAL/INSERTION OF INTRAOCULAR LENS;  Surgeon: Christiano Young MD;  Location: [facility] MAIN OR;  Service: Ophthalmology    AZ XCAPSL CTRC RMVL INSJ IO LENS PROSTH W/O ECP Left 10/17/2019    Procedure: EXTRACAPSULAR CATARACT REMOVAL/INSERTION OF INTRAOCULAR LENS;  Surgeon: Christiano Young MD;  Location: [facility] MAIN OR;  Service: Ophthalmology    SUPRAPUBIC CATHETER INSERTION       Social History   Social History     Substance and Sexual Activity   Alcohol Use Not Currently    Alcohol/week: 0 0 standard drinks    Comment: no alcohol for 16 yrs     Social History     Substance and Sexual Activity   Drug Use Not Currently     Social History     Tobacco Use   Smoking Status Current Every Day Smoker    Packs/day: 0 50    Years: 40 00    Pack years: 20 00    Types: Cigarettes   Smokeless Tobacco Never Used     Family History: non-contributory    Meds/Allergies     Current Facility-Administered Medications:  acetaminophen 650 mg Oral Q8H PRN Thelma Narvaez MD    aluminum-magnesium hydroxide-simethicone 30 mL Oral Q4H PRN Thelma Narvaez MD    amLODIPine 5 mg Oral Daily Thelma Narvaez MD    heparin (porcine) 5,000 Units Subcutaneous Q8H [address] Thelma Narvaez MD    insulin glargine 15 Units Subcutaneous HS Dasiy England MD    insulin lispro 1-5 Units Subcutaneous 4x Daily (AC & HS) Daisy England MD    nicotine 1 patch Transdermal Daily Efraín Dee MD    ondansetron 4 mg Intravenous Q6H PRN Efraín Dee MD    [START ON 1/2/2020] pantoprazole 40 mg Oral Early Morning Nicolle Hernandez MD    sodium chloride 0 9 % with KCl 20 mEq/L 100 mL/hr Intravenous Continuous Daisy England MD Last Rate: 100 mL/hr (01/01/20 1504)         sodium chloride 0 9 % with KCl 20 mEq/L 100 mL/hr Last Rate: 100 mL/hr (01/01/20 1504)       Allergies   Allergen Reactions    Gabapentin      Other reaction(s): slurring of speech, falls  ROS: Could not be completed because patient was unresponsive  However upon spontaneously waking patient had no complaints  Vitals: /82, HR 75 with NSR on monitor, RR 89% on RA when rapid response called, however above 95% on 2L NC    Physical Exam:  Gen:Unresponsive   HEENT:Pin point pupils but reactive  Neck:negative  Chest: negative   Cor: negative  Abd: negative, indwelling suprapubic catheter insitu  Ext: negative  Neuro: Unresponsive, possible facial droop noted by rapid response team  Skin: negative      Intake/Output Summary (Last 24 hours) at 1/1/2020 1741  Last data filed at 1/1/2020 1734  Gross per 24 hour   Intake 3490 ml   Output 1975 ml   Net 1515 ml       Respiratory    Lab Data (Last 4 hours)      01/01 1520            pH, Arterial       7 360           pCO2, Arterial       33 0           pO2, Arterial       59 0           HCO3, Arterial       18 6           Base Excess, Arterial       -6 2                O2/Vent Data     None              Invasive Devices     Peripheral Intravenous Line            Long-Dwell Peripheral IV (Midline) 12/31/19 1 day          Drain            Suprapubic Catheter 587 days              DIAGNOSTIC DATA:    Lab: I have personally reviewed pertinent lab results     CBC:   Results from last 7 days   Lab Units 01/01/20  1528   WBC Thousand/uL 4 20*   HEMOGLOBIN g/dL 7 4*   HEMATOCRIT % 23 2*   PLATELETS Thousands/uL 208     CMP:   Results from last 7 days   Lab Units 01/01/20  1528 01/01/20  0521 12/31/19  2048 12/31/19  0635   POTASSIUM mmol/L 3 7 4 0 2 9* 3 5*   CHLORIDE mmol/L 115* 115* 114* 114*   CO2 mmol/L 21* 20* 20* 15*   BUN mg/dL 22 25 25 33*   CREATININE mg/dL 2 34* 2 08* 2 41* 2 82*   CALCIUM mg/dL 7 9* 7 4* 7 4* 8 7   ALK PHOS U/L  --  515* 563* 755*   ALT U/L  --  28 33 37   AST U/L  --  36 31 45*     PT/INR:   No results found for: PT, INR,   Magnesium: No components found for: MAG,   Phosphorous: No results found for: PHOS    Microbiology:  Lab Results   Component Value Date    BLOODCX Proprionibacterium acnes (A) 12/03/2018    BLOODCX Staphylococcus coagulase negative (A) 12/03/2018    BLOODCX Proprionibacterium acnes (A) 12/03/2018    URINECX >100,000 cfu/ml Escherichia coli (A) 12/30/2019    URINECX >100,000 cfu/ml  12/14/2019    URINECX 40,000-49,000 cfu/ml Escherichia coli ESBL (A) 05/29/2019    URINECX 10,000-19,000 cfu/ml Escherichia coli (A) 05/29/2019    URINECX (A) 05/29/2019     20,000-29,000 cfu/ml Alpha Hemolytic Streptococcus NOT Enterococcus    URINECX 20,000-29,000 cfu/ml Lactobacillus species (A) 05/29/2019    WOUNDCULT 3+ Growth of Beta Hemolytic Streptococcus Group B (A) 12/14/2019    WOUNDCULT 3+ Growth of  12/14/2019       OUTCOME:   Stayed in room  and Other: started on telemetry with hourly neuro checks  Family notified of transfer: no  Family member contacted: None, patient was fully responsive and alert and orientated x 3, declined wanting any family contacted to inform them of recent events  Code Status: Level 1 - Full Code  Critical Care Time: Total Critical Care time spent 60 minutes excluding procedures, teaching and family updates

## 2020-01-01 NOTE — QUICK NOTE
Unclear what time stroke alert was called  Not paged through operators  73-year-old female was found unresponsive, however did respond to Narcan  At this moment appears to be at or close to baseline without any clear focal deficits   - NIHSS 0  CT head was unremarkable  No IV tPA given due to low NIH stroke scale score, resolution of symptoms, suspicion for nonvascular etiology  At this point, the not appear to be any clear indication to continue stroke pathway  Recommend supportive care    Please called additional questions/concerns

## 2020-01-01 NOTE — ASSESSMENT & PLAN NOTE
Cr 2 08 (2 89 on admission)   MIVF 100cc/hr normal saline  Reports good urine output     - Continue MIVF   - BMP in AM

## 2020-01-01 NOTE — PROGRESS NOTES
Progress Note    Marleni Melendrez 62 y o  female MRN: [id number]  Unit/Bed#: 7T [facility] 702-02 Encounter: [id number]  Admitting Physician: Madelin Garcia MD  PCP: Jenny Ramirez MD  Date of Admission:  12/30/2019 10:23 PM    Assessment and Plan    * Acute kidney injury superimposed on CKD ([address] )  Assessment & Plan  Cr 2 08 (2 89 on admission)   MIVF 100cc/hr normal saline (midline)  Reports good urine output     - Continue MIVF   - CMP in AM  -Avoid nephrotoxic agents    Type 2 diabetes mellitus with kidney complication, with long-term current use of insulin [facility])  Assessment & Plan  Lab Results   Component Value Date    HGBA1C 9 1 (A) 07/08/2019       Recent Labs     12/31/19  1113 12/31/19  1545 12/31/19  2047 01/01/20  0553   POCGLU 143* 281* 250* 339*       Blood Sugar Average: Last 72 hrs:  (P) 230 8     Home lantus 40 U qhs, hasnt taken in two weeks because of poor po intake  Lantus 8 U last night and 10 U this AM after 339 glucose this AM    - Increase Lantus tonight to 15 U  - Diabetic diet with ISS and accuchek achs  - HbA1C pending    Weight loss, unintentional  Assessment & Plan  Pap smear results pending  Recommend outpatient mammogram and colonoscopy for malignancy screening      Anemia  Assessment & Plan  Macrocytic anemia- likely from Vitamin b12 deficiency  Patient was receiving 1000mcg Vitamin B12 at home  Baseline Hb 10    - Hb 7 7 today, patient asymptomatic   - FOBT   - Folate, Vit B 12 and Iron panel pending   - CBC in AM  -Will resume Vitamin B12 once renal function improves      HTN (hypertension)  Assessment & Plan  /97, not at goal (possible 2/2 to pain and fluids as is well controlled in outpatient setting)  Goal < 140/90   Amlodipine 5 mg daily    - Continue amlodipine   - Monitor    Suicidal ideation  Assessment & Plan  Resolved   Reports feeling much better   Psychiatry started patient on Remeron   Cleared for discharge by psychiatry and does not require inpatient management once medically stable  1:1 observation was discontinued    Right upper quadrant abdominal pain  Assessment & Plan  Stable, reports intermittent RUQ pain/discomfort that responds to tylenol   Alk phos trending down --> 515  Diet advanced  GGT pending  RUQ US: Gallbladder sludge without calculi  Dilated common bile duct at 11 mm without definite choledocholithiasis     - May consider MRI with MRCP per radiologist suggestion if clinically indicated  - CMP in AM   - Pain managment    Vaginal discharge  Assessment & Plan  speculum exam performed yesterday  ultrasound of the pelvis showed endometrium 4 mm, no suspicion for adnexal mass or loculated collection  Minimal free fluid in pelvis, no fistula noted  Patient did not report any vaginal symptoms this AM    High anion gap metabolic acidosis  Assessment & Plan  Resolved    Abnormal urinalysis  Assessment & Plan  Asymptomatic and has suprapubic catheter  Received one dose of Zosyn     - Abx discontinued       Tobacco dependence  Assessment & Plan  Smokes 1/2 PPD of cigarettes  -On nicotine patch  - Cessation counseling provided    Neurogenic bladder  Assessment & Plan  H/o chronic neurogenic bladder with a suprapubic catheter in place, changed every 6 weeks  - Catheter site was examined, no signs of infection was noted    Gastroesophageal reflux disease  Assessment & Plan  Chronic, Stable  Home meds: mylanta and prilosec 20 mg daily  - Protonix 40 mg IV during admission    - Mylanta 30 mL daily prn  - Switch to PO prilosec      VTE Pharmacologic Prophylaxis:   Pharmacologic: Heparin  Mechanical VTE Prophylaxis in Place: Yes    Patient Centered Rounds: I have performed bedside rounds with nursing staff today  Discussions with Specialists or Other Care Team Provider: None    Education and Discussions with Family / Patient: Patient    Time Spent for Care: 30 minutes    More than 50% of total time spent on counseling and coordination of care as described above     Current Length of Stay: 1 day(s)    Current Patient Status: Inpatient   Certification Statement: The patient will continue to require additional inpatient hospital stay due to JULIO    Discharge Plan: Home once medically stable    Code Status: Level 1 - Full Code      Subjective:   Patient was seen and examined at bedside  She was laying upright comfortably in bed  Patient reports that she feels much better  All imaging results were discussed with patient  Discussed that outpatient mammogram and colonoscopy are indicated  Patient ate all of he breakfast  Currently patient denies any fever, chills, chest pain, palpitations, light headedness, headaches, vision change, N/V/D, urinary symptoms  Patient does report mild, intermittent RUQ pain  Patient had no further questions at this time  Objective:     Vitals:   Temp (24hrs), Av 6 °F (36 4 °C), Min:97 1 °F (36 2 °C), Max:98 °F (36 7 °C)    Temp:  [97 1 °F (36 2 °C)-98 °F (36 7 °C)] 97 7 °F (36 5 °C)  HR:  [75-82] 75  Resp:  [18-20] 18  BP: (135-153)/(86-97) 153/97  SpO2:  [97 %-98 %] 98 %  Body mass index is 29 09 kg/m²  Input and Output Summary (last 24 hours): Intake/Output Summary (Last 24 hours) at 2020 1013  Last data filed at 2020 0530  Gross per 24 hour   Intake 2231 67 ml   Output 1750 ml   Net 481 67 ml       Physical Exam:     Physical Exam   Constitutional: She is oriented to person, place, and time  She appears well-developed and well-nourished  No distress  HENT:   Head: Normocephalic and atraumatic  Eyes: EOM are normal    Neck: Normal range of motion  Neck supple  Cardiovascular: Normal rate, regular rhythm, normal heart sounds and intact distal pulses  No murmur heard  Pulmonary/Chest: Effort normal and breath sounds normal  No respiratory distress  She has no wheezes  She exhibits no tenderness  Abdominal: Soft  Bowel sounds are normal  She exhibits no distension  There is no tenderness     Musculoskeletal: Normal range of motion  She exhibits no edema or tenderness  Neurological: She is alert and oriented to person, place, and time  Skin: Skin is warm and dry  Psychiatric: She has a normal mood and affect  Her behavior is normal    Nursing note and vitals reviewed  Additional Data:     Labs:    Results from last 7 days   Lab Units 01/01/20  0521   WBC Thousand/uL 4 60   HEMOGLOBIN g/dL 7 7*   HEMATOCRIT % 23 3*   PLATELETS Thousands/uL 211   NEUTROS PCT % 53   LYMPHS PCT % 33   MONOS PCT % 7   EOS PCT % 6     Results from last 7 days   Lab Units 01/01/20  0521   POTASSIUM mmol/L 4 0   CHLORIDE mmol/L 115*   CO2 mmol/L 20*   BUN mg/dL 25   CREATININE mg/dL 2 08*   CALCIUM mg/dL 7 4*   ALK PHOS U/L 515*   ALT U/L 28   AST U/L 36         Results from last 7 days   Lab Units 01/01/20  0553 12/31/19  2047 12/31/19  1545 12/31/19  1113 12/31/19  0548   POC GLUCOSE mg/dl 339* 250* 281* 143* 141*             * I Have Reviewed All Lab Data Listed Above  * Additional Pertinent Lab Tests Reviewed: [facility]    Imaging:    Imaging Reports Reviewed Today Include: RUQ US and Transvaginal US  Imaging Personally Reviewed by Myself Includes:  None    Recent Cultures (last 7 days):     Results from last 7 days   Lab Units 12/30/19  2249   URINE CULTURE  Culture results to follow         Last 24 Hours Medication List:     Current Facility-Administered Medications:  acetaminophen 650 mg Oral Q8H PRN Guru Maya MD    aluminum-magnesium hydroxide-simethicone 30 mL Oral Q4H PRN Guru Maya MD    amLODIPine 5 mg Oral Daily Guru Maya MD    heparin (porcine) 5,000 Units Subcutaneous [facility] Guru Maya MD    insulin glargine 15 Units Subcutaneous HS Guru Maya MD    insulin lispro 1-5 Units Subcutaneous 4x Daily (AC & HS) Guru Maya MD    mirtazapine 15 mg Oral HS OMAYRA Murphy    nicotine 1 patch Transdermal Daily Jimbo Sorto MD ondansetron 4 mg Intravenous Q6H PRN Inga Lambert MD    [START ON 1/2/2020] pantoprazole 40 mg Oral Early Morning Nicolle Hernandez MD    sodium chloride 0 9 % with KCl 20 mEq/L 100 mL/hr Intravenous Continuous Aletha Spurling, MD Last Rate: 100 mL/hr (12/31/19 9101)        ** Please Note: Dictation voice to text software may have been used in the creation of this document   Brody Hernandez MD  01/01/20  10:13 AM

## 2020-01-01 NOTE — PLAN OF CARE
Problem: Potential for Falls  Goal: Patient will remain free of falls  Description  INTERVENTIONS:  - Assess patient frequently for physical needs  -  Identify cognitive and physical deficits and behaviors that affect risk of falls    -  [place] fall precautions as indicated by assessment   - Educate patient/family on patient safety including physical limitations  - Instruct patient to call for assistance with activity based on assessment  - Modify environment to reduce risk of injury  - Consider OT/PT consult to assist with strengthening/mobility  Outcome: Progressing     Problem: PAIN - ADULT  Goal: Verbalizes/displays adequate comfort level or baseline comfort level  Description  Interventions:  - Encourage patient to monitor pain and request assistance  - Assess pain using appropriate pain scale  - Administer analgesics based on type and severity of pain and evaluate response  - Implement non-pharmacological measures as appropriate and evaluate response  - Consider cultural and social influences on pain and pain management  - Notify physician/advanced practitioner if interventions unsuccessful or patient reports new pain  Outcome: Progressing     Problem: INFECTION - ADULT  Goal: Absence or prevention of progression during hospitalization  Description  INTERVENTIONS:  - Assess and monitor for signs and symptoms of infection  - Monitor lab/diagnostic results  - Monitor all insertion sites, i e  indwelling lines, tubes, and drains  - Monitor endotracheal if appropriate and nasal secretions for changes in amount and color  - [place] appropriate cooling/warming therapies per order  - Administer medications as ordered  - Instruct and encourage patient and family to use good hand hygiene technique  - Identify and instruct in appropriate isolation precautions for identified infection/condition  Outcome: Progressing  Goal: Absence of fever/infection during neutropenic period  Description  INTERVENTIONS:  - Monitor WBC    Outcome: Progressing     Problem: SAFETY ADULT  Goal: Patient will remain free of falls  Description  INTERVENTIONS:  - Assess patient frequently for physical needs  -  Identify cognitive and physical deficits and behaviors that affect risk of falls    -  [place] fall precautions as indicated by assessment   - Educate patient/family on patient safety including physical limitations  - Instruct patient to call for assistance with activity based on assessment  - Modify environment to reduce risk of injury  - Consider OT/PT consult to assist with strengthening/mobility  Outcome: Progressing  Goal: Maintain or return to baseline ADL function  Description  INTERVENTIONS:  -  Assess patient's ability to carry out ADLs; assess patient's baseline for ADL function and identify physical deficits which impact ability to perform ADLs (bathing, care of mouth/teeth, toileting, grooming, dressing, etc )  - Assess/evaluate cause of self-care deficits   - Assess range of motion  - Assess patient's mobility; develop plan if impaired  - Assess patient's need for assistive devices and provide as appropriate  - Encourage maximum independence but intervene and supervise when necessary  - Involve family in performance of ADLs  - Assess for home care needs following discharge   - Consider OT consult to assist with ADL evaluation and planning for discharge  - Provide patient education as appropriate  Outcome: Progressing  Goal: Maintain or return mobility status to optimal level  Description  INTERVENTIONS:  - Assess patient's baseline mobility status (ambulation, transfers, stairs, etc )    - Identify cognitive and physical deficits and behaviors that affect mobility  - Identify mobility aids required to assist with transfers and/or ambulation (gait belt, sit-to-stand, lift, walker, cane, etc )  - [place] fall precautions as indicated by assessment  - Record patient progress and toleration of activity level on Mobility SBAR; progress patient to next Phase/Stage  - Instruct patient to call for assistance with activity based on assessment  - Consider rehabilitation consult to assist with strengthening/weightbearing, etc   Outcome: Progressing     Problem: DISCHARGE PLANNING  Goal: Discharge to home or other facility with appropriate resources  Description  INTERVENTIONS:  - Identify barriers to discharge w/patient and caregiver  - Arrange for needed discharge resources and transportation as appropriate  - Identify discharge learning needs (meds, wound care, etc )  - Arrange for interpretive services to assist at discharge as needed  - Refer to Case Management Department for coordinating discharge planning if the patient needs post-hospital services based on physician/advanced practitioner order or complex needs related to functional status, cognitive ability, or social support system  Outcome: Progressing     Problem: Knowledge Deficit  Goal: Patient/family/caregiver demonstrates understanding of disease process, treatment plan, medications, and discharge instructions  Description  Complete learning assessment and assess knowledge base    Interventions:  - Provide teaching at level of understanding  - Provide teaching via preferred learning methods  Outcome: Progressing     Problem: Prexisting or High Potential for Compromised Skin Integrity  Goal: Skin integrity is maintained or improved  Description  INTERVENTIONS:  - Identify patients at risk for skin breakdown  - Assess and monitor skin integrity  - Assess and monitor nutrition and hydration status  - Monitor labs   - Assess for incontinence   - Turn and reposition patient  - Assist with mobility/ambulation  - Relieve pressure over bony prominences  - Avoid friction and shearing  - Provide appropriate hygiene as needed including keeping skin clean and dry  - Evaluate need for skin moisturizer/barrier cream  - Collaborate with interdisciplinary team   - Patient/family teaching  - Consider wound care consult   Outcome: Progressing

## 2020-01-01 NOTE — ASSESSMENT & PLAN NOTE
Chronic, Stable  Home meds: mylanta and prilosec 20 mg daily    - Mylanta 30 mL daily prn  - Protonix 40 mg IV   - Consider switching to PO

## 2020-01-01 NOTE — PLAN OF CARE
Problem: Potential for Falls  Goal: Patient will remain free of falls  Description  INTERVENTIONS:  - Assess patient frequently for physical needs  -  Identify cognitive and physical deficits and behaviors that affect risk of falls    -  [place] fall precautions as indicated by assessment   - Educate patient/family on patient safety including physical limitations  - Instruct patient to call for assistance with activity based on assessment  - Modify environment to reduce risk of injury  - Consider OT/PT consult to assist with strengthening/mobility  Outcome: Progressing     Problem: PAIN - ADULT  Goal: Verbalizes/displays adequate comfort level or baseline comfort level  Description  Interventions:  - Encourage patient to monitor pain and request assistance  - Assess pain using appropriate pain scale  - Administer analgesics based on type and severity of pain and evaluate response  - Implement non-pharmacological measures as appropriate and evaluate response  - Consider cultural and social influences on pain and pain management  - Notify physician/advanced practitioner if interventions unsuccessful or patient reports new pain  Outcome: Progressing     Problem: INFECTION - ADULT  Goal: Absence or prevention of progression during hospitalization  Description  INTERVENTIONS:  - Assess and monitor for signs and symptoms of infection  - Monitor lab/diagnostic results  - Monitor all insertion sites, i e  indwelling lines, tubes, and drains  - Monitor endotracheal if appropriate and nasal secretions for changes in amount and color  - [place] appropriate cooling/warming therapies per order  - Administer medications as ordered  - Instruct and encourage patient and family to use good hand hygiene technique  - Identify and instruct in appropriate isolation precautions for identified infection/condition  Outcome: Progressing  Goal: Absence of fever/infection during neutropenic period  Description  INTERVENTIONS:  - Monitor WBC    Outcome: Progressing     Problem: SAFETY ADULT  Goal: Patient will remain free of falls  Description  INTERVENTIONS:  - Assess patient frequently for physical needs  -  Identify cognitive and physical deficits and behaviors that affect risk of falls    -  [place] fall precautions as indicated by assessment   - Educate patient/family on patient safety including physical limitations  - Instruct patient to call for assistance with activity based on assessment  - Modify environment to reduce risk of injury  - Consider OT/PT consult to assist with strengthening/mobility  Outcome: Progressing  Goal: Maintain or return to baseline ADL function  Description  INTERVENTIONS:  -  Assess patient's ability to carry out ADLs; assess patient's baseline for ADL function and identify physical deficits which impact ability to perform ADLs (bathing, care of mouth/teeth, toileting, grooming, dressing, etc )  - Assess/evaluate cause of self-care deficits   - Assess range of motion  - Assess patient's mobility; develop plan if impaired  - Assess patient's need for assistive devices and provide as appropriate  - Encourage maximum independence but intervene and supervise when necessary  - Involve family in performance of ADLs  - Assess for home care needs following discharge   - Consider OT consult to assist with ADL evaluation and planning for discharge  - Provide patient education as appropriate  Outcome: Progressing  Goal: Maintain or return mobility status to optimal level  Description  INTERVENTIONS:  - Assess patient's baseline mobility status (ambulation, transfers, stairs, etc )    - Identify cognitive and physical deficits and behaviors that affect mobility  - Identify mobility aids required to assist with transfers and/or ambulation (gait belt, sit-to-stand, lift, walker, cane, etc )  - [place] fall precautions as indicated by assessment  - Record patient progress and toleration of activity level on Mobility SBAR; progress patient to next Phase/Stage  - Instruct patient to call for assistance with activity based on assessment  - Consider rehabilitation consult to assist with strengthening/weightbearing, etc   Outcome: Progressing     Problem: DISCHARGE PLANNING  Goal: Discharge to home or other facility with appropriate resources  Description  INTERVENTIONS:  - Identify barriers to discharge w/patient and caregiver  - Arrange for needed discharge resources and transportation as appropriate  - Identify discharge learning needs (meds, wound care, etc )  - Arrange for interpretive services to assist at discharge as needed  - Refer to Case Management Department for coordinating discharge planning if the patient needs post-hospital services based on physician/advanced practitioner order or complex needs related to functional status, cognitive ability, or social support system  Outcome: Progressing     Problem: Knowledge Deficit  Goal: Patient/family/caregiver demonstrates understanding of disease process, treatment plan, medications, and discharge instructions  Description  Complete learning assessment and assess knowledge base    Interventions:  - Provide teaching at level of understanding  - Provide teaching via preferred learning methods  Outcome: Progressing

## 2020-01-02 NOTE — NURSING NOTE
On initial rounds patient in no apparent distress  Skin warm and dry to touch  Pleasant and cooperative  All safety maintained  Will continue to monitor

## 2020-01-02 NOTE — ASSESSMENT & PLAN NOTE
Lab Results   Component Value Date    HGBA1C 9 1 (A) 07/08/2019       Recent Labs     12/31/19  1113 12/31/19  1545 12/31/19 2047 01/01/20  0553   POCGLU 143* 281* 250* 339*       Blood Sugar Average: Last 72 hrs:  (P) 230 8     Better controlled BG  HbA1c trending up from 9 1-->9 6   Home lantus 40 U qhs, hasn't taken in two weeks because of poor po intake     Received dose of Lantus 5 U last night, continue same regimen at bedtime   - Diabetic diet  - ISS and accuchek achs

## 2020-01-02 NOTE — ASSESSMENT & PLAN NOTE
Pap smear showed ASCUS     -HPV high risk pending results   -Outpatient mammogram and colonoscopy for malignancy screening discussed with patient  Agreeable to plan

## 2020-01-02 NOTE — CONSULTS
Washington Bowman Patient MRN: [id number]  Date of Service: 1/2/2020  Referring Physician: Dr Marshall Romero  Provider Creating Note: OMAYRA Momin  PCP: Timbo Yeh  Reason for Consult: RUQ pain  HPI  Washington Bowman is a 62 y o  female who was admitted with Acute kidney injury superimposed on CKD ([facility] [address] )  Pt was admitted on 12/30 for fatigue and RUQ abd pain with nausea and also suicidal ideations  Noted to have elevated , AST 45, normal TBili, US showed dilated CBD without evidence of stones, sludge noted, stable 9 mm gallstone  Pt will need MRCP  At present pt reports no further abd pain or nausea, denies vomiting  Pt refused physical exam stating she does not want to be touch since she is pain free  Per chart 1/1 had an unresponsive episode after being given new medication Mirtazapine  Rapid drug screen normal   A, A, O x 3 at present  Denies history of liver disease  GGT elevated 504  Pt gave limited history during interview         Past Medical History:   Diagnosis Date    Ambulatory dysfunction     Anemia     macrocyctic    Chronic kidney disease 5/24/2018    Chronic pain disorder     right hip    Diabetes mellitus ([facility] [address] )     Diabetic foot ulcer ([facility] [address] )     left with fat layer exposed    Dyslipidemia 5/24/2018    ESBL E  coli carrier     GERD (gastroesophageal reflux disease)     History of frequent urinary tract infections     HTN (hypertension) 5/24/2018    Hyperlipidemia     Irritable bowel syndrome     constipation    Neurogenic bladder     Neuropathy     Pedal edema     Vitamin D deficiency      Past Surgical History:   Procedure Laterality Date    CATARACT EXTRACTION      FL XCAPSL CTRC RMVL INSJ IO LENS PROSTH W/O ECP Right 12/6/2018    Procedure: EXTRACAPSULAR CATARACT REMOVAL/INSERTION OF INTRAOCULAR LENS;  Surgeon: Veronica Neves MD;  Location: [facility] MAIN OR;  Service: Ophthalmology    FL XCAPSL CTRC RMVL INSJ IO LENS PROSTH W/O ECP Left 10/17/2019    Procedure: EXTRACAPSULAR CATARACT REMOVAL/INSERTION OF INTRAOCULAR LENS;  Surgeon: Silvano Payne MD;  Location: [address] [place] [address];  Service: Ophthalmology    SUPRAPUBIC CATHETER INSERTION         Medications  Home Medications:   Prior to Admission medications    Medication Sig Start Date End Date Taking? Authorizing Provider   acetaminophen (TYLENOL) 500 mg tablet Take 1 tablet (500 mg total) by mouth every 6 (six) hours as needed for moderate pain 12/14/19  Yes Bonnie Olson PA-C   ADMELOG SOLOSTAR 100 units/mL injection pen INJECT 10 UNITS SUBCUTANEOUSLY 3 TIMES DAILY WITH MEALS 11/20/19  Yes Gavino Spence MD   amLODIPine (NORVASC) 5 mg tablet TAKE 1 TABLET BY MOUTH ONCE DAILY  8/15/19  Yes Mari Gordon MD   ASPIRIN LOW DOSE 81 MG EC tablet TAKE 1 TABLET BY MOUTH ONCE DAILY   9/30/19  Yes Mari Gordon MD   atorvastatin (LIPITOR) 40 mg tablet Take 1 tablet (40 mg total) by mouth daily 10/4/19  Yes Mari Gordon MD   BASAGLAR KWIKPEN 100 units/mL injection pen INJECT 40 UNIST SUBCUTANEOUSLY DAILY AT BEDTIME 11/20/19  Yes Gavino Spence MD   ergocalciferol (VITAMIN D2) 50,000 units Take 1 capsule (50,000 Units total) by mouth once a week 9/30/19  Yes Mari Gordon MD   nortriptyline (PAMELOR) 50 mg capsule Take 1 capsule (50 mg total) by mouth daily 3/11/19  Yes Leonardo Moore MD   nystatin (MYCOSTATIN) powder Apply topically 2 (two) times a day Apply to lower abdomen twice a day 12/12/19  Yes Ashia Valdez MD   omeprazole (PriLOSEC) 20 mg delayed release capsule Take 1 capsule (20 mg total) by mouth daily before breakfast 5/31/19  Yes Viola Tom MD   pregabalin (LYRICA) 50 mg capsule Take 1 capsule (50 mg total) by mouth 2 (two) times a day 12/9/19  Yes Ronak Salazar MD   senna (SENOKOT) 8 6 mg Take 1 tablet (8 6 mg total) by mouth daily at bedtime 8/19/19  Yes Mari Gordon MD   Alcohol Swabs (ALCOHOL PREP) 70 % PADS Apply 1 applicator topically as needed 6/26/18   Historical Provider, MD   aluminum-magnesium hydroxide-simethicone (MYLANTA) 200-200-20 mg/5 mL suspension Take 30 mL by mouth every 4 (four) hours as needed for indigestion or heartburn 5/30/19   Josh Lott MD   B-D INS SYRINGE 0 5CC/31GX5/16 31G X 5/16" 0 5 ML MISC  10/3/18   Historical Provider, MD   Catheters MISC Please change patients suprapubic catheter as soon as possible and then every three months      Dx: N31 9 Neurogenic Bladder 1/3/19   Merari Galindo MD   cyanocobalamin (VITAMIN B-12) 1,000 mcg tablet Take 1 tablet (1,000 mcg total) by mouth daily for 90 days 4/25/19 7/24/19  Viola Tom MD   DOCQLACE 100 MG capsule Take 1 capsule (100 mg total) by mouth 2 (two) times a day 3/11/19   Adelaida Gonsalves MD   Insulin Pen Needle (UNIFINE PENTIPS) 32G X 4 MM MISC Inject under the skin 4 (four) times a day for 90 days 4/25/19 7/24/19  Viola Tom MD   Lancets 28G MISC Test blood sugars three times daily 6/12/18   Historical Provider, MD   ONE TOUCH ULTRA TEST test strip 100 strips by Device route 3 (three) times a day 6/12/18   Historical Provider, MD   predniSONE 20 mg tablet Take 2 tablets (40 mg total) by mouth daily 7/8/19   Kenia Chaudhary MD       Inhouse Medications    Current Facility-Administered Medications:     acetaminophen (TYLENOL) tablet 650 mg, 650 mg, Oral, Q8H PRN, Dominic Osei MD, 650 mg at 01/01/20 0843    aluminum-magnesium hydroxide-simethicone (MYLANTA) 200-200-20 mg/5 mL oral suspension 30 mL, 30 mL, Oral, Q4H PRN, Dominic Osei MD    amLODIPine (NORVASC) tablet 5 mg, 5 mg, Oral, Daily, Dominic Osei MD, 5 mg at 01/02/20 0818    heparin (porcine) subcutaneous injection 5,000 Units, 5,000 Units, Subcutaneous, Q8H [address], Dominic Osei MD, 5,000 Units at 01/02/20 1438    HYDROmorphone (DILAUDID) injection 0 2 mg, 0 2 mg, Intravenous, Q4H PRN, Kartik Honeycutt MD, 0 2 mg at 01/02/20 1438    insulin glargine (LANTUS) subcutaneous injection 15 Units 0 15 mL, 15 Units, Subcutaneous, Brent Kristyn Douglas MD, 15 Units at 01/01/20 2242    insulin lispro (HumaLOG) 100 units/mL subcutaneous injection 1-5 Units, 1-5 Units, Subcutaneous, 4x Daily (AC & HS), 4 Units at 01/02/20 1124 **AND** Fingerstick Glucose (POCT), , , 4x Daily AC and at bedtime, Aletha Spurling, MD    nicotine (NICODERM CQ) 14 mg/24hr TD 24 hr patch 1 patch, 1 patch, Transdermal, Daily, Inga Lambert MD, 1 patch at 01/01/20 0844    ondansetron (ZOFRAN) injection 4 mg, 4 mg, Intravenous, Q6H PRN, Inga Lambert MD, 4 mg at 12/31/19 0607    pantoprazole (PROTONIX) EC tablet 40 mg, 40 mg, Oral, Early Morning, Nicolle Hernandez MD, 40 mg at 01/02/20 0601    [START ON 1/3/2020] sertraline (ZOLOFT) tablet 25 mg, 25 mg, Oral, Daily, OMAYRA Murphy    sodium chloride 0 9 % with KCl 20 mEq/L infusion (premix), 125 mL/hr, Intravenous, Continuous, Nicolle Hernandez MD, Last Rate: 125 mL/hr at 01/02/20 1115, 125 mL/hr at 01/02/20 1115    Allergies  Allergies   Allergen Reactions    Gabapentin      Other reaction(s): slurring of speech, falls  Social History   reports that she has been smoking cigarettes  She has a 20 00 pack-year smoking history  She has never used smokeless tobacco  She reports that she drank alcohol  She reports that she has current or past drug history  Family History  Family History   Problem Relation Age of Onset   Maritza Ash Breast cancer Mother     Hypertension Mother     Diabetes Mother         Mellitus    Parkinsonism Mother     Cancer Maternal Aunt         Unknown       ROS  ROS: Denies CP, SOB, fever, weight loss, adenopathy, rash  All others negative except as noted in HPI  Objective   Vitals  /66 (BP Location: Left arm)   Pulse 90   Temp 99 9 °F (37 7 °C) (Temporal)   Resp 18   Ht 5' 3" (1 6 m)   Wt 78 9 kg (173 lb 15 1 oz)   SpO2 96%   BMI 30 81 kg/m²   General: Alert, no apparent distress  Eyes: No scleral icterus, EOM's intact    ENT: MMM  Card: pt refused exam  Lungs: pt refused exam   Abdomen: pt refused exam  Reports she is pain free at the moment  Skin: No jaundice  Ext: No edema   Psych:  Flat affect  Neuro: Awake, alert and oriented x3      Laboratory Studies  Lab Results   Component Value Date    WBC 4 80 01/02/2020    HGB 7 4 (L) 01/02/2020    HCT 22 4 (L) 01/02/2020     01/02/2020     (H) 01/02/2020     Lab Results   Component Value Date    CREATININE 2 19 (H) 01/02/2020    BUN 20 01/02/2020    SODIUM 141 01/02/2020    K 3 7 01/02/2020     (H) 01/02/2020    CO2 21 (L) 01/02/2020    GLUCOSE 912 (AA) 05/24/2018    CALCIUM 8 1 (L) 01/02/2020    PROT 6 9 05/24/2018    ALKPHOS 554 (H) 01/02/2020    BILITOT 0 4 05/24/2018    AST 45 (H) 01/02/2020    ALT 33 01/02/2020     Lab Results   Component Value Date    PROTIME 9 6 12/03/2018    INR 0 90 12/03/2018       Imaging and Other Studies      Assessment and Plan:    1  RUQ pain with dilatated CBD noted on US, sludge but no stone identified  Recommend MRCP will likely need it with anesthesia  Pain free at present  2  Elevated ALP and AST, normal T Bili and ALT  Will check liver serologies, iron, acute hepatitis panel, AMA, JOSE, SMA  Continue to trend LFTs  3  JULIO on CKD  4  Macrocytic anemia  Will check iron studies  Elevated B12, normal folate     5  DM      Principal Problem:    Acute kidney injury superimposed on CKD ([facility] [address] )  Active Problems:    HTN (hypertension)    Gastroesophageal reflux disease    Type 2 diabetes mellitus with kidney complication, with long-term current use of insulin (HCC)    Neurogenic bladder    Anemia    Tobacco dependence    Abnormal urinalysis    High anion gap metabolic acidosis    Vaginal discharge    Right upper quadrant abdominal pain    Suicidal ideation    Weight loss, unintentional      OMAYRA Momin

## 2020-01-02 NOTE — ASSESSMENT & PLAN NOTE
Baseline Hb 10  Currently Hg stable at 9 8    - FOBT negative   - CBC in am  - Will resume Vitamin B12 once renal function improves  - GI consult with plasma cell dyscrasia workup, labs pending   - Hematology on board

## 2020-01-02 NOTE — ASSESSMENT & PLAN NOTE
Speculum exam performed 12/31/2019  Ultrasound of the pelvis showed endometrium 4 mm, no suspicion for adnexal mass or loculated collection  Minimal free fluid in pelvis, no fistula noted       - Pap smear result with ASCUS, HPV high risk pending

## 2020-01-02 NOTE — PLAN OF CARE
Problem: Potential for Falls  Goal: Patient will remain free of falls  Description  INTERVENTIONS:  - Assess patient frequently for physical needs  -  Identify cognitive and physical deficits and behaviors that affect risk of falls    -  [place] fall precautions as indicated by assessment   - Educate patient/family on patient safety including physical limitations  - Instruct patient to call for assistance with activity based on assessment  - Modify environment to reduce risk of injury  - Consider OT/PT consult to assist with strengthening/mobility  Outcome: Progressing     Problem: PAIN - ADULT  Goal: Verbalizes/displays adequate comfort level or baseline comfort level  Description  Interventions:  - Encourage patient to monitor pain and request assistance  - Assess pain using appropriate pain scale  - Administer analgesics based on type and severity of pain and evaluate response  - Implement non-pharmacological measures as appropriate and evaluate response  - Consider cultural and social influences on pain and pain management  - Notify physician/advanced practitioner if interventions unsuccessful or patient reports new pain  Outcome: Progressing     Problem: INFECTION - ADULT  Goal: Absence or prevention of progression during hospitalization  Description  INTERVENTIONS:  - Assess and monitor for signs and symptoms of infection  - Monitor lab/diagnostic results  - Monitor all insertion sites, i e  indwelling lines, tubes, and drains  - Monitor endotracheal if appropriate and nasal secretions for changes in amount and color  - [place] appropriate cooling/warming therapies per order  - Administer medications as ordered  - Instruct and encourage patient and family to use good hand hygiene technique  - Identify and instruct in appropriate isolation precautions for identified infection/condition  Outcome: Progressing  Goal: Absence of fever/infection during neutropenic period  Description  INTERVENTIONS:  - Monitor WBC    Outcome: Progressing     Problem: SAFETY ADULT  Goal: Patient will remain free of falls  Description  INTERVENTIONS:  - Assess patient frequently for physical needs  -  Identify cognitive and physical deficits and behaviors that affect risk of falls    -  [place] fall precautions as indicated by assessment   - Educate patient/family on patient safety including physical limitations  - Instruct patient to call for assistance with activity based on assessment  - Modify environment to reduce risk of injury  - Consider OT/PT consult to assist with strengthening/mobility  Outcome: Progressing  Goal: Maintain or return to baseline ADL function  Description  INTERVENTIONS:  -  Assess patient's ability to carry out ADLs; assess patient's baseline for ADL function and identify physical deficits which impact ability to perform ADLs (bathing, care of mouth/teeth, toileting, grooming, dressing, etc )  - Assess/evaluate cause of self-care deficits   - Assess range of motion  - Assess patient's mobility; develop plan if impaired  - Assess patient's need for assistive devices and provide as appropriate  - Encourage maximum independence but intervene and supervise when necessary  - Involve family in performance of ADLs  - Assess for home care needs following discharge   - Consider OT consult to assist with ADL evaluation and planning for discharge  - Provide patient education as appropriate  Outcome: Progressing  Goal: Maintain or return mobility status to optimal level  Description  INTERVENTIONS:  - Assess patient's baseline mobility status (ambulation, transfers, stairs, etc )    - Identify cognitive and physical deficits and behaviors that affect mobility  - Identify mobility aids required to assist with transfers and/or ambulation (gait belt, sit-to-stand, lift, walker, cane, etc )  - [place] fall precautions as indicated by assessment  - Record patient progress and toleration of activity level on Mobility SBAR; progress patient to next Phase/Stage  - Instruct patient to call for assistance with activity based on assessment  - Consider rehabilitation consult to assist with strengthening/weightbearing, etc   Outcome: Progressing     Problem: DISCHARGE PLANNING  Goal: Discharge to home or other facility with appropriate resources  Description  INTERVENTIONS:  - Identify barriers to discharge w/patient and caregiver  - Arrange for needed discharge resources and transportation as appropriate  - Identify discharge learning needs (meds, wound care, etc )  - Arrange for interpretive services to assist at discharge as needed  - Refer to Case Management Department for coordinating discharge planning if the patient needs post-hospital services based on physician/advanced practitioner order or complex needs related to functional status, cognitive ability, or social support system  Outcome: Progressing     Problem: Knowledge Deficit  Goal: Patient/family/caregiver demonstrates understanding of disease process, treatment plan, medications, and discharge instructions  Description  Complete learning assessment and assess knowledge base    Interventions:  - Provide teaching at level of understanding  - Provide teaching via preferred learning methods  Outcome: Progressing     Problem: Prexisting or High Potential for Compromised Skin Integrity  Goal: Skin integrity is maintained or improved  Description  INTERVENTIONS:  - Identify patients at risk for skin breakdown  - Assess and monitor skin integrity  - Assess and monitor nutrition and hydration status  - Monitor labs   - Assess for incontinence   - Turn and reposition patient  - Assist with mobility/ambulation  - Relieve pressure over bony prominences  - Avoid friction and shearing  - Provide appropriate hygiene as needed including keeping skin clean and dry  - Evaluate need for skin moisturizer/barrier cream  - Collaborate with interdisciplinary team   - Patient/family teaching  - Consider wound care consult   Outcome: Progressing

## 2020-01-02 NOTE — TELEPHONE ENCOUNTER
SIGNATURES NEEDED FOR HOME HEALTH HOSPICE FORM RECEIVED VIA FAX  WILL BE PLACED IN YOUR BIN AT ASSIGNED DELIVERY TIMES

## 2020-01-02 NOTE — PROGRESS NOTES
Progress Note - [address] [place] 62 [place] o  female MRN: [id number]  Unit/Bed#: 7T [facility] 702-02 Encounter: [id number]    The patient was seen for continuing care and reviewed with treatment team     Mental Status Evaluation:  Appearance:  Adequate hygiene and grooming   Behavior:  calm, cooperative and friendly   Mood:  depressed   Affect: appropriate   Speech: Normal rate and Normal volume   Thought Process:  Goal directed and coherent   Thought Content:  Does not verbalize delusional material   Perceptual Disturbances: Denies hallucinations and does not appear to be responding to internal stimuli   Risk Potential: No suicidal or homicidal ideation   Orientation:   AOx3     Progress Toward Goals: Rapid response called 1/1/20 for patient non-responsive to voice command or sternal rub  No specific cause found explaining why patient became unresponsive  Mirtazapine 15mg PO HS, started by psychiatry 12/31/19,  discontinued at time of rapid response, as it was a new medication that can cause sedation  Patient described incident as "I could hear them and feel them rubbing my chest but I couldn't respond"  Pt continues to report depressed mood and attributes sleeping difficulty to frequent daytime napping  Denies poor appetite at this time  Following verbal consultation with treating physician team, will start Zoloft 25mg PO Daily on 1/3/20 to allow additional day of observation prior to adding a new medication  Will continue follow and assess how patient is tolerating zoloft  Recommended Treatment: Continue with pharmacotherapy, group therapy, milieu therapy and occupational therapy    The patient will be maintained on the following medications:    Current Facility-Administered Medications:  acetaminophen 650 mg Oral Q8H PRN Kristine Souza MD    alteplase 2 mg Intracatheter Once Iver Shone, MD    aluminum-magnesium hydroxide-simethicone 30 mL Oral Q4H PRN Kristine Souza MD    amLODIPine 5 mg Oral Daily Rashard Cheney MD    heparin (porcine) 5,000 Units Subcutaneous [facility] Rashard Cheney MD    insulin glargine 15 Units Subcutaneous HS Rashard Cheney MD    insulin lispro 1-5 Units Subcutaneous 4x Daily (AC & HS) Rashard Cheney MD    nicotine 1 patch Transdermal Daily Silvia Flores MD    ondansetron 4 mg Intravenous Q6H PRN Silvia Flores MD    pantoprazole 40 mg Oral Early Morning Nicolle Hernandez MD    sodium chloride 0 9 % with KCl 20 mEq/L 125 mL/hr Intravenous Continuous Nicolle Hernandez MD Last Rate: 125 mL/hr (01/02/20 1115)       Acute kidney injury superimposed on CKD ([facility] )

## 2020-01-02 NOTE — PHYSICAL THERAPY NOTE
Physical Therapy Evaluation        Patient's Name: Washington Bowman    Admitting Diagnosis  Liver failure ([place] [address] ) [K72 90]  Weakness [R53 1]  Acute kidney injury ([facility] ) [D00 8]  Complicated UTI (urinary tract infection) [N39 0]    Problem List  Patient Active Problem List   Diagnosis    Acute kidney injury superimposed on CKD ([place] [address] )    Chronic kidney disease    HTN (hypertension)    Dyslipidemia    Diabetic peripheral neuropathy ([place] [address] )    Gastroesophageal reflux disease    Type 2 diabetes mellitus with kidney complication, with long-term current use of insulin ([place] [address] )    Vitamin D deficiency    Pain of right lower extremity    Other constipation    Pedal edema    Neurogenic bladder    Anemia    Preop examination    Encounter for administration of vaccine    Non-compliance    Diabetic ulcer of toe of left foot associated with type 2 diabetes mellitus, with fat layer exposed ([place] [address] )    ESBL Escherichia coli carrier    Acute right hip pain    Epigastric pain    Ambulatory dysfunction    Tobacco dependence    Abnormal urinalysis    High anion gap metabolic acidosis    Vaginal discharge    Right upper quadrant abdominal pain    Suicidal ideation    Weight loss, unintentional       Past Medical History  Past Medical History:   Diagnosis Date    Ambulatory dysfunction     Anemia     macrocyctic    Chronic kidney disease 5/24/2018    Chronic pain disorder     right hip    Diabetes mellitus ([facility] )     Diabetic foot ulcer ([place] [address] )     left with fat layer exposed    Dyslipidemia 5/24/2018    ESBL E  coli carrier     GERD (gastroesophageal reflux disease)     History of frequent urinary tract infections     HTN (hypertension) 5/24/2018    Hyperlipidemia     Irritable bowel syndrome     constipation    Neurogenic bladder     Neuropathy     Pedal edema     Vitamin D deficiency        Past Surgical History  Past Surgical History:   Procedure Laterality Date    CATARACT EXTRACTION      KALLIE Mccall CTRC RMVL INSJ IO LENS PROSTH W/O ECP Right 12/6/2018    Procedure: EXTRACAPSULAR CATARACT REMOVAL/INSERTION OF INTRAOCULAR LENS;  Surgeon: Vikash Joe MD;  Location: [address] [place] [address];  Service: Ophthalmology    [place] XCAPSL CTRC RMVL INSJ IO LENS PROSTH W/O ECP Left 10/17/2019    Procedure: EXTRACAPSULAR CATARACT REMOVAL/INSERTION OF INTRAOCULAR LENS;  Surgeon: Vikash Joe MD;  Location: [address] [place] [address];  Service: Ophthalmology    [address] 01/02/20 1057   Note Type   Note type Eval only   Pain Assessment   Pain Assessment No/denies pain   Pain Score 9   Pain Type Acute pain   Pain Location   (RUQ)   Home Living   Type of Home House  (2  with 3 DAIJA no rail)   Home Layout Able to live on main level with bedroom/bathroom  (bath on 1st flr; requesting shower chair)   Home Equipment Cane;Walker  (RW)   Prior Function   Level of [place] Independent with ADLs and functional mobility  (used RW at times)   Lives With Daughter;Son  (4 dtr and one son)   Receives Help From [facility] in the last 6 months 0   Restrictions/Precautions   Weight Bearing Precautions Per Order No   Other Precautions Contact/isolation;Pain   General   Family/Caregiver Present No   Cognition   Overall Cognitive Status WFL   Arousal/Participation   (irritable)   Following Commands Follows all commands and directions without difficulty   RUE Assessment   RUE Assessment   (WFL elbow to hands, Shld NT due to irriatibity)   LUE Assessment   LUE Assessment   (WFL from elbow to hands, shld NT due to irritability)   RLE Assessment   RLE Assessment WFL   LLE Assessment   LLE Assessment WFL   Light Touch   RLE Light Touch Absent   RLE Light Touch Comments   (mid tibia to foot)   LLE Light Touch Absent   LLE Light Touch Comments   (mid tibia to foot)   Bed Mobility   Additional Comments declines   Transfers   Sit to Stand 7  Independent   Stand to Sit 7  Independent   Stand pivot 7  Independent   Additional items   (no AD) Ambulation/Elevation   Gait pattern WNL   Gait Assistance 7  Independent   Assistive Device None   Distance 20 feet   Balance   Static Sitting Good   Dynamic Sitting Good   Static Standing Good   Ambulatory Good  (level surfaces short distance)   Activity Tolerance   Activity Tolerance   (pt irritable during session, needed max encouragement)   Nurse Made Aware   (RN cleared for eval)   Assessment   Assessment Pt is 62 y o  female seen for PT evaluation s/p admit to [facility] on 12/30/2019 w/ Acute kidney injury superimposed on CKD ([address] )  PT consulted to assess pt's functional mobility and d/c needs  Order placed for PT eval and tx, w/ up w/ A order  Comorbidities affecting pt's physical performance at time of assessment include: DMII, CKD, neurogenic bladder, suicidal ideation, depression,  HTN, pain RLE, peripheral neuropathy  In ED patient was found to have JULIO and admitted for management  According to patient she started having multiple episodes of recurrent nausea and non-biliious non bloody emesis about a week ago  Pt reported suicide ideation and was initially ;1, seen by psychiatry with plan for treatment established  Pt on 1/1/20 was found unresponsive, however did respond to Narcan  rapid response called  CT head (-)  Hbg 7 4 today  On evaluation patient seated OOB in chair with SCD's in place  Agreeable initially for evaluation however midway through evaluation states she will not do anything because she is tired of everybody coming and going  She expresses being irritated  With encouragement patient performs motor task of getting out of chair and walking short distance to the door and back to chair  Pt balance good during transition of movements, gait stable with normal pattern  Pt is noted to have absent sensation to BLE which appears due to diabetic neuropathy  She reports c/o RUQ pain  No skilled PT needed at this time  Pt appears at baseline with functional  Mobility   Encourage staff to ambulate patient during stay to maintain current function  In summary the patient's history, examination of body system(s), activity limitations, participation restrictions, and collaboration of care are the guiding factors that were used to determine the clinical decision  The clinical presentation is unstable and therefore the assigned level of complexity is:High      Goals   Patient Goals "To get out of here "   Recommendation   Recommendation D/C PT;Home with family support   PT - OK to Discharge Yes   Additional Comments   (when medically stable)       Oliva Ayala, PT

## 2020-01-02 NOTE — ASSESSMENT & PLAN NOTE
Cr 2 89 on admission   Today Cr trending up from 2 06-->2 17, GFR 25  Baseline Cr 1 5  Reports good urine output     - IVF stopped, patient endorses good PO fluid intake   - Discussed with nephrology, await for kidney function to stabilize, GFR >35 in order for MRCP to be completed   If creatinine stable tomorrow, patient can be discharged and follow up outpatient with nephology   - Avoid nephrotoxic agents  - CMP in am  - Nephrology following

## 2020-01-02 NOTE — ASSESSMENT & PLAN NOTE
Resolved  Psychiatry started patient on Remeron, however it was discontinued yesterday due sleep cycle disturbance     -Continue Zoloft 25 mg daily

## 2020-01-02 NOTE — ASSESSMENT & PLAN NOTE
Chronic, Stable  Home meds: mylanta and prilosec 20 mg daily  - Protonix 40 mg PO BID     - Mylanta 30 mL daily prn

## 2020-01-02 NOTE — NURSING NOTE
went into pt room at 1503 to scan bracelet to give medication, pt had been sleeping but is responding, when going back in five minutes later pt is unresposive to voice and sternal rub, RRT was called  Pt remained unresponsive during the RRT, when pt to go down to CT scan she sits straight up in bed, alert, "whats going on here"  Pt a+o X3, responsive  Answers questions appropiate  No deficits noted   Will closely monitor

## 2020-01-02 NOTE — ASSESSMENT & PLAN NOTE
Stable, reports intermittent RUQ pain up to 9/10, controlled   Alk phos trending up 560->556    Liver enzymes normalized  RUQ US: Gallbladder sludge without calculi  Dilated common bile duct at 11 mm without definite choledocholithiasis     GI recommending outpatient monitoring of LFTs  Once kidney function stable, MRCP with contrast for further biliary anatomy evaluation  Per nephrology, it would be ideal to wait for patient's creatinine to be at baseline and GFR >35 for 48 hours before any contrast procedure      - Patient will follow up outpatient with GI and Nephrology    - Pain control with Tramadol 50 mg Q 6hrs PRN, Dilaudid stopped   - Multiple serologies for hepatic disease ordered: Hepatitis panel normal, HIV normal, others currently pending

## 2020-01-02 NOTE — NURSING NOTE
Pt unchanged, seems back to baseline, no deficits, no distress  States she could hear the staff during ht rapid but she could not move  Also states that happened to her before   Resident made aware, will monitor

## 2020-01-02 NOTE — OCCUPATIONAL THERAPY NOTE
Occupational Therapy Evaluation     Patient Name: Victor Hugo Kelley  QHJLY'U Date: 1/2/2020  Problem List  Principal Problem:    Acute kidney injury superimposed on CKD ([facility] )  Active Problems:    HTN (hypertension)    Gastroesophageal reflux disease    Type 2 diabetes mellitus with kidney complication, with long-term current use of insulin (HCC)    Neurogenic bladder    Anemia    Tobacco dependence    Abnormal urinalysis    High anion gap metabolic acidosis    Vaginal discharge    Right upper quadrant abdominal pain    Suicidal ideation    Weight loss, unintentional    Past Medical History  Past Medical History:   Diagnosis Date    Ambulatory dysfunction     Anemia     macrocyctic    Chronic kidney disease 5/24/2018    Chronic pain disorder     right hip    Diabetes mellitus ([facility] )     Diabetic foot ulcer ([facility] )     left with fat layer exposed    Dyslipidemia 5/24/2018    ESBL E  coli carrier     GERD (gastroesophageal reflux disease)     History of frequent urinary tract infections     HTN (hypertension) 5/24/2018    Hyperlipidemia     Irritable bowel syndrome     constipation    Neurogenic bladder     Neuropathy     Pedal edema     Vitamin D deficiency      Past Surgical History  Past Surgical History:   Procedure Laterality Date    CATARACT EXTRACTION      [place] XCAPSL CTRC RMVL INSJ IO LENS PROSTH W/O ECP Right 12/6/2018    Procedure: EXTRACAPSULAR CATARACT REMOVAL/INSERTION OF INTRAOCULAR LENS;  Surgeon: Layne Rodney MD;  Location: [address] [place] [address] OR;  Service: Ophthalmology    [place] XCAPSL CTRC RMVL INSJ IO LENS PROSTH W/O ECP Left 10/17/2019    Procedure: EXTRACAPSULAR CATARACT REMOVAL/INSERTION OF INTRAOCULAR LENS;  Surgeon: Layne Rodney MD;  Location: [address] [place] [address];  Service: Ophthalmology    [address] [place] [address]             01/02/20 1051  10:50-11:10am   Note Type   Note type Eval only   Restrictions/Precautions   Weight Bearing Precautions Per Order No   Other Precautions Contact/isolation;Pain   Pain Assessment   Pain Assessment 0-10   Pain Score 9   Pain Type Acute pain   Pain Location Abdomen   Pain Orientation Right   Pain Descriptors Discomfort   Home Living   Type of [facility] Two level; Able to live on main level with bedroom/bathroom;Stairs to enter without rails  (3 DAIJA)   Bathroom Shower/Tub Tub/shower unit   Bathroom Toilet Standard   Bathroom Accessibility Accessible   Home Equipment Walker;Cane   Prior Function   Level of [place] Independent with ADLs and functional mobility   Lives With Family;Daughter; Son   [place] Help From Family   ADL Assistance Independent   IADLs Independent   Falls in the last 6 months 0   Psychosocial   Psychosocial (WDL) WDL   Subjective   Subjective "I just want to rest there have been too many things"   ADL   Eating Assistance 7  Independent   Grooming Assistance 7  Independent   UB Bathing Assistance 7  Independent   LB Bathing Assistance 6  [address] 6  Modified independent   [address]  6  Modified independent   Bed Mobility   Additional Comments Pt received OOB in chair      Transfers   Sit to Stand 7  Independent   Stand to Sit 7  Independent   Stand pivot 7  Independent   Functional Mobility   Functional Mobility 7  Independent   Additional Comments household distances, hunched over posture    Balance   Static Sitting Good   Dynamic Sitting Good   Static Standing Good   Dynamic Standing Good   Ambulatory Good   Activity Tolerance   Activity Tolerance Treatment limited secondary to agitation   Nurse Made Aware RN cleared for therapy    RUE Assessment   RUE Assessment WFL   LUE Assessment   LUE Assessment WFL   Hand Function   Gross Motor Coordination Functional   Fine Motor Coordination Functional   Sensation   Light Touch No apparent deficits   Sharp/Dull No apparent deficits   Proprioception   Proprioception No apparent deficits Vision-Basic Assessment   Visual History Cataracts  (reports needing new glassess, does not currently have)   Cognition   Overall Cognitive Status [facility]   Arousal/Participation Alert; Responsive   Attention Within functional limits   Orientation Level Oriented X4   Memory Within functional limits   Following Commands Follows all commands and directions without difficulty   Comments Pt irritable at times, requires max encouragement for participation  Assessment   Assessment   Pt is a 62 y o  female seen for OT evaluation s/p admit to [address] [place] [address] on 12/30/2019 w/ Acute kidney injury superimposed on CKD ([address] )  Comorbidities affecting Pt's functional performance at time of assessment include: DM, SI, anemia, HTN, right upper quadrant pain    Personal factors affecting pt at time of IE include:steps to enter environment  Upon evaluation: Pt able to complete functional ambulation independently without AD  Pt reports using a walker for community mobility only  Pt able to complete LB dressing without assist   At this time, Pt is primarily limited by decreased activity tolerance and motivation for functional tasks  Pt on room air throughout with O2 saturations reading > 95%  Pt educated re: safety and precautions  At this time, Pt appears to be at or near her functional baseline, no further acute OT needs at this time  Recommend discharge home with family support once medically stable      Plan   OT Frequency Eval only   Recommendation   OT Discharge Recommendation Home with family support   B[place]hel Index   Feeding 10   Bathing 5   Grooming Score 5   Dressing Score 10   Bladder Score 10   Bowels Score 10   Toilet Use Score 10   Transfers (Bed/Chair) Score 10   Mobility (Level Surface) Score 10   Stairs Score 5   B[place]hel Index Score 85

## 2020-01-02 NOTE — ASSESSMENT & PLAN NOTE
Controlled  within last 24 hrs 147/81  Goal < 140/90      - Amlodipine 5mg has been changed to 10 mg by nephrology   - Monitor VS

## 2020-01-02 NOTE — PROGRESS NOTES
Progress Note    Sumi Staley 62 y o  female MRN: [id number]  Unit/Bed#: 7T [facility] 702-02 Encounter: [id number]  Admitting Physician: Barbar Gowers, MD  PCP: Hillary Fisher MD  Date of Admission:  12/30/2019 10:23 PM    Assessment and Plan    * Acute kidney injury superimposed on CKD ([facility] [place] [address] )  Assessment & Plan  Cr 2 08-->2-->2 19 (2 89 on admission)   Baseline Cr 1 5  Reports good urine output     - Continue MIVF 125 cc/hr  - CMP in AM  -Avoid nephrotoxic agents    Weight loss, unintentional  Assessment & Plan  Pap smear results pending    -Outpatient mammogram and colonoscopy for malignancy screening discussed with patient  Agreeable to plan       Type 2 diabetes mellitus with kidney complication, with long-term current use of insulin [facility])  Assessment & Plan  Lab Results   Component Value Date    HGBA1C 9 1 (A) 07/08/2019       Recent Labs     12/31/19  1113 12/31/19  1545 12/31/19  2047 01/01/20  0553   POCGLU 143* 281* 250* 339*       Blood Sugar Average: Last 72 hrs:  (P) 230 8     Uncontrolled BG  HbA1c trending up from 9 1-->9 6   Home lantus 40 U qhs, hasn't taken in two weeks because of poor po intake    - One time Lantus 15 units given this morning for BG in the 315 range   - Diabetic diet with ISS and accuchek achs    Anemia  Assessment & Plan  Macrocytic anemia- likely from Vitamin b12 deficiency  Patient was receiving 1000mcg Vitamin B12 at home  Baseline Hb 10  Folate 7 1, Vitamin B12 1,310  Fe panel: Fe 59, TIBC 100, Fe saturation 59, ferritin 943    - Hb trending down from 7 7-->7 4 today, patient asymptomatic   - FOBT pending   - Will resume Vitamin B12 once renal function improves  - CBC in AM  - GI consult       Right upper quadrant abdominal pain  Assessment & Plan  Stable, reports intermittent RUQ pain/discomfort that responds to tylenol   Alk phos trending up: 515-->554    Diet advanced  RUQ US: Gallbladder sludge without calculi   Dilated common bile duct at 11 mm without definite choledocholithiasis     - May consider MRI with MRCP per radiologist suggestion if clinically indicated  - CMP in AM   - Pain control     HTN (hypertension)  Assessment & Plan  /62, not at goal (possible 2/2 to pain and fluids as is well controlled in outpatient setting)  Goal < 140/90     - Continue Amlodipine 5mg daily   - Monitor VS    High anion gap metabolic acidosis  Assessment & Plan  Resolved    Vaginal discharge  Assessment & Plan  Speculum exam performed 12/31/2019  Ultrasound of the pelvis showed endometrium 4 mm, no suspicion for adnexal mass or loculated collection  Minimal free fluid in pelvis, no fistula noted  -Follow up Pap smear result     Neurogenic bladder  Assessment & Plan  H/o chronic neurogenic bladder with a suprapubic catheter in place, changed every 6 weeks  - Catheter site was examined, no signs of infection was noted    Abnormal urinalysis  Assessment & Plan  Asymptomatic and has suprapubic catheter  Received one dose of Zosyn     - Abx discontinued       Suicidal ideation  Assessment & Plan  Resolved  Psychiatry started patient on Remeron, however it was discontinued yesterday due sleep cycle disturbance   Cleared for discharge by psychiatry and does not require inpatient management once medically stable  1:1 observation was discontinued    -Will follow up with psychiatry about further recommendations and possible new medication on board     Gastroesophageal reflux disease  Assessment & Plan  Chronic, Stable  Home meds: mylanta and prilosec 20 mg daily  - Protonix 40 mg IV during admission    - Mylanta 30 mL daily prn  - Switch to PO prilosec    Tobacco dependence  Assessment & Plan  Smokes 1/2 PPD of cigarettes  -On nicotine patch  - Cessation counseling provided      VTE Pharmacologic Prophylaxis:   Pharmacologic: Heparin  Mechanical VTE Prophylaxis in Place: Yes    Patient Centered Rounds: I have performed bedside rounds with nursing staff today      Discussions with Specialists or Other Care Team Provider: None     Education and Discussions with Family / Patient: Patient     Time Spent for Care: 20 minutes  More than 50% of total time spent on counseling and coordination of care as described above  Current Length of Stay: 2 day(s)    Current Patient Status: Inpatient   Certification Statement: The patient will continue to require additional inpatient hospital stay due to JULIO    Discharge Plan: Home once clinically stable       Code Status: Level 1 - Full Code      Subjective:     Patient seen and examined at bedside today  Patient states she feels overall well  Patient reports she continues to have RUQ abdominal pain, which has been stable since admission  Patient denies any fevers, chills, chest pain, shortness of breath, nausea, vomiting, urinary symptoms  Patient does no have any other concerns at this time  Objective:     Vitals:   Temp (24hrs), Av 8 °F (36 6 °C), Min:97 3 °F (36 3 °C), Max:98 2 °F (36 8 °C)    Temp:  [97 3 °F (36 3 °C)-98 2 °F (36 8 °C)] 97 8 °F (36 6 °C)  HR:  [73-81] 81  Resp:  [18] 18  BP: (138-160)/() 160/103  SpO2:  [92 %-98 %] 98 %  Body mass index is 30 81 kg/m²  Input and Output Summary (last 24 hours): Intake/Output Summary (Last 24 hours) at 2020 1215  Last data filed at 2020 1115  Gross per 24 hour   Intake 4481 67 ml   Output 1900 ml   Net 2581 67 ml       Physical Exam:     Physical Exam   Constitutional: She is oriented to person, place, and time  She appears well-developed and well-nourished  HENT:   Head: Normocephalic and atraumatic  Nose: Nose normal    Mouth/Throat: Oropharynx is clear and moist    Eyes: Pupils are equal, round, and reactive to light  Conjunctivae and EOM are normal    Neck: Normal range of motion  Neck supple  Cardiovascular: Normal rate, regular rhythm and normal heart sounds  Pulmonary/Chest: Effort normal and breath sounds normal    Abdominal: Soft   Bowel sounds are normal  She exhibits no distension  There is tenderness  There is no guarding  RUQ mild tenderness to palpation   Musculoskeletal: Normal range of motion  She exhibits no edema  Neurological: She is alert and oriented to person, place, and time  She exhibits normal muscle tone  Skin: Skin is warm  No rash noted  Psychiatric: She has a normal mood and affect  Her behavior is normal  Judgment and thought content normal        Additional Data:     Labs:    Results from last 7 days   Lab Units 01/02/20  0457   WBC Thousand/uL 4 80   HEMOGLOBIN g/dL 7 4*   HEMATOCRIT % 22 4*   PLATELETS Thousands/uL 208   NEUTROS PCT % 56   LYMPHS PCT % 31   MONOS PCT % 6   EOS PCT % 6     Results from last 7 days   Lab Units 01/02/20  0457   POTASSIUM mmol/L 3 7   CHLORIDE mmol/L 117*   CO2 mmol/L 21*   BUN mg/dL 20   CREATININE mg/dL 2 19*   CALCIUM mg/dL 8 1*   ALK PHOS U/L 554*   ALT U/L 33   AST U/L 45*         Results from last 7 days   Lab Units 01/02/20  1106 01/02/20  0549 01/01/20  2029 01/01/20  1702 01/01/20  1513 01/01/20  1117 01/01/20  0553 12/31/19  2047 12/31/19  1545 12/31/19  1113 12/31/19  0548   POC GLUCOSE mg/dl 330* 315* 273* 323* 319* 258* 339* 250* 281* 143* 141*     Results from last 7 days   Lab Units 01/01/20  0521   HEMOGLOBIN A1C % 9 6*         * I Have Reviewed All Lab Data Listed Above  * Additional Pertinent Lab Tests Reviewed:  All Labs Within Last 24 Hours Reviewed    Imaging:    Imaging Reports Reviewed Today Include: CXR and CT head   Imaging Personally Reviewed by Myself Includes:  None     Recent Cultures (last 7 days):     Results from last 7 days   Lab Units 12/30/19  2249   URINE CULTURE  >100,000 cfu/ml Escherichia coli*       Last 24 Hours Medication List:     Current Facility-Administered Medications:  acetaminophen 650 mg Oral Q8H PRN Mague Flank, MD    alteplase 2 mg Intracatheter Once Lila Kathy, MD    aluminum-magnesium hydroxide-simethicone 30 mL Oral Q4H PRN Mague Flank, MD    amLODIPine 5 mg Oral Daily Rhett Kyle MD    heparin (porcine) 5,000 Units Subcutaneous [facility] Rhett Kyle MD    insulin glargine 15 Units Subcutaneous HS Rhett Kyle MD    insulin lispro 1-5 Units Subcutaneous 4x Daily (AC & HS) Rhett Kyle MD    nicotine 1 patch Transdermal Daily Toyin Paul MD    ondansetron 4 mg Intravenous Q6H PRN Toyin Paul MD    pantoprazole 40 mg Oral Early Morning Emily Canada MD    [START ON 1/3/2020] sertraline 25 mg Oral Daily OMAYRA Murphy    sodium chloride 0 9 % with KCl 20 mEq/L 125 mL/hr Intravenous Continuous Emily Canada MD Last Rate: 125 mL/hr (01/02/20 1115)        ** Please Note: Dictation voice to text software may have been used in the creation of this document   Jennifer Ferrara MD  01/02/20  12:15 PM

## 2020-01-03 NOTE — SOCIAL WORK
Per [facility] OP office, license has not yet been obtained for medicaid  Patient will have to be established with [facility] for now  Office staff Enriqueta De La Rosa requested Joel Schuler to contact  to schedule appt

## 2020-01-03 NOTE — SOCIAL WORK
LOS: 3 GMLOS: N/A    Pt is not a bundle or 30 day readmission  SW met with pt to complete assessment  Pt identifies her two dtrs Bri La ([phone]) and Manuel Gallegos ([phone]) as emergency contacts  Pt lives with both dtrs in a 3  with 3 DAIJA and 1st floor set-up  Pt is disabled and receives SSI/SSD for income  Her two dtrs both work during the day  Pt reports to be independent with ambulation with a RW and self-care ADLS prior to admission  Dtrs assist with transportation needs  Pt states that she was supposed to get a shower bench and BSC delivered to her home after last admission in May  Pt is open with [facility] for SN services of suprapubic cath management  Pt states that she prefers to continue using same agency  No hx of SNF admissions  PCP adore Cisneros  Preferred pharmacy is [facility]  Pt reports to smoking 1/2ppd of cigarettes  Pt denies prior MH hx but does state that she is interested in following recommendation by psychiatry to get established with an OP provider  Family to assist with transport on discharge  [facility] referral sent to [facility] to update on discharge needs  SW reached out to [employer] to research issue with DME  SW to f/u with scheduling new patient appt  With Elva Vera at [facility]

## 2020-01-03 NOTE — PROGRESS NOTES
Patient Name: Antony Ro  Patient MRN: [id number]  Date: 01/03/20  Service: [facility]    Subjective   Antony Ro is a 62 y o  female who was admitted with abdominal pain   She continues with right upper quadrant abdominal pain  Patient admits:  Abdominal pain  Denies:  Dizziness, nausea, chest pain, dyspnea  All others negative except as noted in HPI  Objective     Vitals  /61 (BP Location: Left arm)   Pulse 86   Temp 97 8 °F (36 6 °C) (Temporal)   Resp 18   Ht 5' 3" (1 6 m)   Wt 80 5 kg (177 lb 7 5 oz)   SpO2 99%   BMI 31 44 kg/m²   General: Alert, no apparent distress  Eyes:  No scleral icterus  ENT:  Mucous membranes moist  Card:  Regular rhythm  Lungs: Clear to ascultation b/l  No wheezes, rales, rhonchi  Abdomen:  Soft  Obese  Bowel sounds normoactive    Tender in right upper quadrant without rebound or guarding  Skin:  No jaundice  Extremities:  No edema  Neuro: Alert and oriented x3    Laboratory Studies  Lab Results   Component Value Date    CREATININE 2 17 (H) 01/03/2020    BUN 20 01/03/2020    SODIUM 145 01/03/2020    K 4 1 01/03/2020     (H) 01/03/2020    CO2 19 (L) 01/03/2020    GLUCOSE 912 (AA) 05/24/2018    CALCIUM 8 3 (L) 01/03/2020    ALKPHOS 556 (H) 01/03/2020    ALB 2 3 (L) 01/03/2020    TBILI 2 60 (H) 01/03/2020     (H) 01/03/2020     (H) 01/03/2020     Lab Results   Component Value Date    ALB 2 3 (L) 01/03/2020    ALB 2 4 (L) 01/02/2020    ALB 2 3 (L) 01/01/2020    TBILI 2 60 (H) 01/03/2020    TBILI 0 50 01/02/2020    TBILI 0 70 01/01/2020     (H) 01/03/2020    AST 45 (H) 01/02/2020    AST 36 01/01/2020     (H) 01/03/2020    ALT 33 01/02/2020    ALT 28 01/01/2020    ALKPHOS 556 (H) 01/03/2020    ALKPHOS 554 (H) 01/02/2020    ALKPHOS 515 (H) 01/01/2020       Lab Results   Component Value Date    WBC 7 90 01/03/2020    HGB 7 7 (L) 01/03/2020    HCT 23 5 (L) 01/03/2020     01/03/2020     (H) 01/03/2020 Lab Results   Component Value Date    HGB 7 7 (L) 01/03/2020    HGB 7 4 (L) 01/02/2020    HGB 7 4 (L) 01/01/2020       Lab Results   Component Value Date    PROTIME 9 6 12/03/2018    INR 0 90 12/03/2018       Inhouse Medications       Current Facility-Administered Medications:     acetaminophen (TYLENOL) tablet 650 mg, 650 mg, Oral, Q8H PRN, 650 mg at 01/01/20 0843    aluminum-magnesium hydroxide-simethicone (MYLANTA) 200-200-20 mg/5 mL oral suspension 30 mL, 30 mL, Oral, Q4H PRN    [START ON 1/4/2020] amLODIPine (NORVASC) tablet 5 mg, 5 mg, Oral, Daily    heparin (porcine) subcutaneous injection 5,000 Units, 5,000 Units, Subcutaneous, Q8H [address], 5,000 Units at 01/03/20 0617    HYDROmorphone (DILAUDID) injection 0 2 mg, 0 2 mg, Intravenous, Q4H PRN, 0 2 mg at 01/03/20 1314    insulin glargine (LANTUS) subcutaneous injection 15 Units 0 15 mL, 15 Units, Subcutaneous, HS, 15 Units at 01/02/20 2114    insulin lispro (HumaLOG) 100 units/mL subcutaneous injection 1-5 Units, 1-5 Units, Subcutaneous, 4x Daily (AC & HS), 1 Units at 01/02/20 2114 **AND** Fingerstick Glucose (POCT), , , 4x Daily AC and at bedtime    multi-electrolyte (PLASMALYTE-A/ISOLYTE-S PH 7 4) IV solution, 80 mL/hr, Intravenous, Continuous, 80 mL/hr at 01/03/20 1152    nicotine (NICODERM CQ) 14 mg/24hr TD 24 hr patch 1 patch, 1 patch, Transdermal, Daily, 1 patch at 01/01/20 0844    ondansetron (ZOFRAN) injection 4 mg, 4 mg, Intravenous, Q6H PRN, 4 mg at 12/31/19 0607    pantoprazole (PROTONIX) EC tablet 40 mg, 40 mg, Oral, BID AC, 40 mg at 01/03/20 0617    sertraline (ZOLOFT) tablet 25 mg, 25 mg, Oral, Daily, 25 mg at 01/03/20 0912      Assessment/Plan:  1  Right upper quadrant abdominal pain with elevated LFTs  Ultrasound shows sludge but no definite stones  Common bile duct 11 mm, slightly dilated    Awaiting stability of GFR prior to proceeding with MRCP    Principal Problem:    Acute kidney injury superimposed on CKD ([facility] [address] )  Active Problems:    HTN (hypertension)    Gastroesophageal reflux disease    Type 2 diabetes mellitus with kidney complication, with long-term current use of insulin (HCC)    Neurogenic bladder    Anemia    Tobacco dependence    Abnormal urinalysis    High anion gap metabolic acidosis    Vaginal discharge    Right upper quadrant abdominal pain    Suicidal ideation    Weight loss, unintentional    Samantha Tom PA-C

## 2020-01-03 NOTE — PLAN OF CARE
Problem: Potential for Falls  Goal: Patient will remain free of falls  Description  INTERVENTIONS:  - Assess patient frequently for physical needs  -  Identify cognitive and physical deficits and behaviors that affect risk of falls    -  [place] fall precautions as indicated by assessment   - Educate patient/family on patient safety including physical limitations  - Instruct patient to call for assistance with activity based on assessment  - Modify environment to reduce risk of injury  - Consider OT/PT consult to assist with strengthening/mobility  Outcome: Progressing     Problem: PAIN - ADULT  Goal: Verbalizes/displays adequate comfort level or baseline comfort level  Description  Interventions:  - Encourage patient to monitor pain and request assistance  - Assess pain using appropriate pain scale  - Administer analgesics based on type and severity of pain and evaluate response  - Implement non-pharmacological measures as appropriate and evaluate response  - Consider cultural and social influences on pain and pain management  - Notify physician/advanced practitioner if interventions unsuccessful or patient reports new pain  Outcome: Progressing     Problem: INFECTION - ADULT  Goal: Absence or prevention of progression during hospitalization  Description  INTERVENTIONS:  - Assess and monitor for signs and symptoms of infection  - Monitor lab/diagnostic results  - Monitor all insertion sites, i e  indwelling lines, tubes, and drains  - Monitor endotracheal if appropriate and nasal secretions for changes in amount and color  - [place] appropriate cooling/warming therapies per order  - Administer medications as ordered  - Instruct and encourage patient and family to use good hand hygiene technique  - Identify and instruct in appropriate isolation precautions for identified infection/condition  Outcome: Progressing  Goal: Absence of fever/infection during neutropenic period  Description  INTERVENTIONS:  - Monitor WBC    Outcome: Progressing     Problem: SAFETY ADULT  Goal: Patient will remain free of falls  Description  INTERVENTIONS:  - Assess patient frequently for physical needs  -  Identify cognitive and physical deficits and behaviors that affect risk of falls    -  [place] fall precautions as indicated by assessment   - Educate patient/family on patient safety including physical limitations  - Instruct patient to call for assistance with activity based on assessment  - Modify environment to reduce risk of injury  - Consider OT/PT consult to assist with strengthening/mobility  Outcome: Progressing  Goal: Maintain or return to baseline ADL function  Description  INTERVENTIONS:  -  Assess patient's ability to carry out ADLs; assess patient's baseline for ADL function and identify physical deficits which impact ability to perform ADLs (bathing, care of mouth/teeth, toileting, grooming, dressing, etc )  - Assess/evaluate cause of self-care deficits   - Assess range of motion  - Assess patient's mobility; develop plan if impaired  - Assess patient's need for assistive devices and provide as appropriate  - Encourage maximum independence but intervene and supervise when necessary  - Involve family in performance of ADLs  - Assess for home care needs following discharge   - Consider OT consult to assist with ADL evaluation and planning for discharge  - Provide patient education as appropriate  Outcome: Progressing  Goal: Maintain or return mobility status to optimal level  Description  INTERVENTIONS:  - Assess patient's baseline mobility status (ambulation, transfers, stairs, etc )    - Identify cognitive and physical deficits and behaviors that affect mobility  - Identify mobility aids required to assist with transfers and/or ambulation (gait belt, sit-to-stand, lift, walker, cane, etc )  - [place] fall precautions as indicated by assessment  - Record patient progress and toleration of activity level on Mobility SBAR; progress patient to next Phase/Stage  - Instruct patient to call for assistance with activity based on assessment  - Consider rehabilitation consult to assist with strengthening/weightbearing, etc   Outcome: Progressing     Problem: DISCHARGE PLANNING  Goal: Discharge to home or other facility with appropriate resources  Description  INTERVENTIONS:  - Identify barriers to discharge w/patient and caregiver  - Arrange for needed discharge resources and transportation as appropriate  - Identify discharge learning needs (meds, wound care, etc )  - Arrange for interpretive services to assist at discharge as needed  - Refer to Case Management Department for coordinating discharge planning if the patient needs post-hospital services based on physician/advanced practitioner order or complex needs related to functional status, cognitive ability, or social support system  Outcome: Progressing     Problem: Knowledge Deficit  Goal: Patient/family/caregiver demonstrates understanding of disease process, treatment plan, medications, and discharge instructions  Description  Complete learning assessment and assess knowledge base    Interventions:  - Provide teaching at level of understanding  - Provide teaching via preferred learning methods  Outcome: Progressing     Problem: Prexisting or High Potential for Compromised Skin Integrity  Goal: Skin integrity is maintained or improved  Description  INTERVENTIONS:  - Identify patients at risk for skin breakdown  - Assess and monitor skin integrity  - Assess and monitor nutrition and hydration status  - Monitor labs   - Assess for incontinence   - Turn and reposition patient  - Assist with mobility/ambulation  - Relieve pressure over bony prominences  - Avoid friction and shearing  - Provide appropriate hygiene as needed including keeping skin clean and dry  - Evaluate need for skin moisturizer/barrier cream  - Collaborate with interdisciplinary team   - Patient/family teaching  - Consider wound care consult   Outcome: Progressing     Problem: Nutrition/Hydration-ADULT  Goal: Nutrient/Hydration intake appropriate for improving, restoring or maintaining nutritional needs  Description  Monitor and assess patient's nutrition/hydration status for malnutrition  Collaborate with interdisciplinary team and initiate plan and interventions as ordered  Monitor patient's weight and dietary intake as ordered or per policy  Utilize nutrition screening tool and intervene as necessary  Determine patient's food preferences and provide high-protein, high-caloric foods as appropriate       INTERVENTIONS:  - Monitor oral intake, urinary output, labs, and treatment plans  - Assess nutrition and hydration status and recommend course of action  - Evaluate amount of meals eaten  - Assist patient with eating if necessary   - Allow adequate time for meals  - Recommend/ encourage appropriate diets, oral nutritional supplements, and vitamin/mineral supplements  - Order, calculate, and assess calorie counts as needed  - Recommend, monitor, and adjust tube feedings and TPN/PPN based on assessed needs  - Assess need for intravenous fluids  - Provide specific nutrition/hydration education as appropriate  - Include patient/family/caregiver in decisions related to nutrition  Outcome: Progressing

## 2020-01-03 NOTE — CONSULTS
Consultation - Nephrology   Kaden Yung 62 y o  female MRN: [id number]  Unit/Bed#: 7T [facility] 702-02 Encounter: [id number]      ASSESSMENT & PLAN    25-year-old female history of type 2 diabetes mellitus neurogenic bladder who presents with weakness nausea, vomiting for 1 week complicated by acute kidney injury    1  Acute kidney injury likely secondary to element of pre renal azotemia that has transitioned to ATN  -present on admission:  Creatinine was 2 89 now slowly improved down to 2 1 with a base on 1 5-1 8  -urinalysis:  Urinalysis from December 30th shows 2+ ketones specific gravity of 1 020 positive leukocytes positive protein, hyaline cast  -imaging:  CT scan without contrast done on December 31st shows mild scarring of the left kidney mild thickening of the left ureteral wall and no significant hydronephrosis, adrenal glands unremarkable  -etiology:  Likely etiology is a pre renal azotemia, liver injury? That is likely transition to ATN, would also rule out proteinuric kidney disease associated JULIO or progressive CKD  -home medications reviewed not on diuretics or RAAS blockade  -neurogenic bladder with catheter drained  -plan:  Would continue fluids at this time with changes to isolate will workup proteinuria which could be related to longstanding diabetes mellitus  2  Electrolytes:  -hyperchloremia-has been receiving chloride rich solutions now with non anion gap acidosis-changed to isolate as above  -hypernatremia-increased free water intake orally    3  Acid/Base  -non gap, and anion gap metabolic acidosis-initially presented with an anion gap of 17 and a bicarb of 17 in the setting of acute kidney injury id this improved with intravenous fluids and creatinine is now down to 2 1, non gap acidosis due to chloride rich solutions will change to isolte at 80 cc/hour    4   Hemodynamics  Hypertension-target a systolic blood pressure around 130 will change hold parameters on the amlodipine to avoid hypotension and decreased renal perfusion in the setting illness  Heart rate stable    5  Volume Status  -appears euvolemic now but with poor p o  Intake and abdominal pain will continue intravenous fluids    6  Anemia   -has a macrocytic anemia, primary team ordered peripheral smear an iron panel retake count continue ongoing workup    5  CKD Stage  -baseline creatinine:  Baseline creatinine 1 5-1 8 with an estimated GFR 35-45 mL per min  -etiology:  With a history of longstanding type 2 diabetes mellitus and hypertension likely etiology    6  Cardiovascular  -hyperlipidemia-was on a statin as an outpatient now discontinue  -blood pressure is above    7  Proteinuria  -check urine protein to creatinine ratio, SPEP UPEP with anemia for now further workup if needed    8 abdominal pain  -elevated LFTs elevated alk-phos  -MRCP pending  -she is at risk for nephrogenic systemic fibrosis, would ideally wait for creatinine to be at baseline an estimated GFR to be greater than 35 before any gadolinium administration  -if this is an emergent procedure then benefits and risks will need to be discussed with patient interventional radiology, GI and primary team to make this determination  -otherwise if non emergent if creatinine is stable with GFR greater than 35 for 48 hours okay to proceed with MRCP    HISTORY OF PRESENT ILLNESS:  Requesting Physician: Irene Granados MD  Reason for Consult:  Acute kidney injury    Kaden Yung is a 62y o  year old female who was admitted to [facility] after presenting with nausea  A renal consultation is requested today for assistance in the management of acute kidney injury      Patient has a history of recurrent urinary tract infection presented with abdominal pain generalized weakness and nausea on the 30th of December, she has remained with this pain but it is improving, she was given intravenous fluids and her creatinine has improved from 2 8 now down to 2 1, her urine output has been stable, but she still continues to have abdominal pain, consultation was for potential need for MRCP with gadolinium, her current blood pressures have been stable she is on a low-dose of amlodipine, she has proteinuric kidney disease and a history of type 2 diabetes mellitus she is also severely anemic now with a hemoglobin of 7 7 she denies any foamy urine or bloody urine she has and neurogenic bladder and a Holliday back that is draining yellow urine she has had no fevers no chills but her p  o  intake has been poor  Today her nausea is better    PAST MEDICAL HISTORY:  Past Medical History:   Diagnosis Date    Ambulatory dysfunction     Anemia     macrocyctic    Chronic kidney disease 5/24/2018    Chronic pain disorder     right hip    Diabetes mellitus ([address] )     Diabetic foot ulcer [address] [address]     left with fat layer exposed    Dyslipidemia 5/24/2018    ESBL E  coli carrier     GERD (gastroesophageal reflux disease)     History of frequent urinary tract infections     HTN (hypertension) 5/24/2018    Hyperlipidemia     Irritable bowel syndrome     constipation    Neurogenic bladder     Neuropathy     Pedal edema     Vitamin D deficiency        PAST SURGICAL HISTORY:  Past Surgical History:   Procedure Laterality Date    CATARACT EXTRACTION      AR XCAPSL CTRC RMVL INSJ IO LENS PROSTH W/O ECP Right 12/6/2018    Procedure: EXTRACAPSULAR CATARACT REMOVAL/INSERTION OF INTRAOCULAR LENS;  Surgeon: Severino Howard MD;  Location: [facility] MAIN OR;  Service: Ophthalmology    AR XCAPSL CTRC RMVL INSJ IO LENS PROSTH W/O ECP Left 10/17/2019    Procedure: EXTRACAPSULAR CATARACT REMOVAL/INSERTION OF INTRAOCULAR LENS;  Surgeon: Severino Howard MD;  Location:  MAIN OR;  Service: Ophthalmology    SUPRAPUBIC CATHETER INSERTION         ALLERGIES:  Allergies   Allergen Reactions    Gabapentin      Other reaction(s): slurring of speech, falls         SOCIAL HISTORY:  Social History     Substance and Sexual Activity   Alcohol Use Not Currently    Alcohol/week: 0 0 standard drinks    Comment: no alcohol for 16 yrs     Social History     Substance and Sexual Activity   Drug Use Not Currently     Social History     Tobacco Use   Smoking Status Current Every Day Smoker    Packs/day: 0 50    Years: 40 00    Pack years: 20 00    Types: Cigarettes   Smokeless Tobacco Never Used       FAMILY HISTORY:  Family History   Problem Relation Age of Onset   [facility] Breast cancer Mother     Hypertension Mother     Diabetes Mother         Mellitus    Parkinsonism Mother     Cancer Maternal Aunt         Unknown       MEDICATIONS:    Current Facility-Administered Medications:     acetaminophen (TYLENOL) tablet 650 mg, 650 mg, Oral, Q8H PRN, Ward Burgos MD, 650 mg at 01/01/20 0843    aluminum-magnesium hydroxide-simethicone (MYLANTA) 200-200-20 mg/5 mL oral suspension 30 mL, 30 mL, Oral, Q4H PRN, Ward Burgos MD    amLODIPine (NORVASC) tablet 5 mg, 5 mg, Oral, Daily, Ward Burgos MD, 5 mg at 01/03/20 0912    heparin (porcine) subcutaneous injection 5,000 Units, 5,000 Units, Subcutaneous, Q8H [facility], Ward Burgos MD, 5,000 Units at 01/03/20 0617    HYDROmorphone (DILAUDID) injection 0 2 mg, 0 2 mg, Intravenous, Q4H PRN, Mauricio Kincaid MD, 0 2 mg at 01/03/20 0913    insulin glargine (LANTUS) subcutaneous injection 15 Units 0 15 mL, 15 Units, Subcutaneous, HS, Ward Burgos MD, 15 Units at 01/02/20 2114    insulin lispro (HumaLOG) 100 units/mL subcutaneous injection 1-5 Units, 1-5 Units, Subcutaneous, 4x Daily (AC & HS), 1 Units at 01/02/20 2114 **AND** Fingerstick Glucose (POCT), , , 4x Daily AC and at bedtime, Ward Burgos MD    nicotine (NICODERM CQ) 14 mg/24hr TD 24 hr patch 1 patch, 1 patch, Transdermal, Daily, Jemma Oakley MD, 1 patch at 01/01/20 0844    ondansetron (ZOFRAN) injection 4 mg, 4 mg, Intravenous, Q6H PRN, Jemma Oakley MD, 4 mg at 12/31/19 0607    pantoprazole (PROTONIX) EC tablet 40 mg, 40 mg, Oral, BID AC, Robert Stoner IV, MD, 40 mg at 01/03/20 0617    sertraline (ZOLOFT) tablet 25 mg, 25 mg, Oral, Daily, OMAYRA Murphy, 25 mg at 01/03/20 0912    sodium chloride infusion 0 45 %, 125 mL/hr, Intravenous, Continuous, Natan Infante MD, Last Rate: 125 mL/hr at 01/03/20 0618, 125 mL/hr at 01/03/20 6995    REVIEW OF SYSTEMS:  All the systems were reviewed and were negative except as documented on the HPI        PHYSICAL EXAM:  Current Weight: Weight - Scale: 80 5 kg (177 lb 7 5 oz)  First Weight: Weight - Scale: 77 2 kg (170 lb 3 1 oz)  Vitals:    01/02/20 1909 01/02/20 2319 01/03/20 0532 01/03/20 0703   BP:  119/76  125/61   BP Location:  Left arm  Left arm   Pulse:  85  86   Resp:  18  18   Temp:  98 2 °F (36 8 °C)  97 8 °F (36 6 °C)   TempSrc:  Temporal  Temporal   SpO2: 96% 96%  99%   Weight:   80 5 kg (177 lb 7 5 oz)    Height:           Intake/Output Summary (Last 24 hours) at 1/3/2020 0935  Last data filed at 1/3/2020 2521  Gross per 24 hour   Intake 4151 67 ml   Output 1400 ml   Net 2751 67 ml     General: conscious, cooperative, in no acute distress  Eyes: conjunctivae pink, anicteric sclerae  ENT: lips and mucous membranes moist  Neck: supple, no JVD  Chest: clear breath sounds bilateral, no crackles, ronchus or wheezings  CVS: normal rate, regular rhythm  Abdomen:  Tenderness worse on the right upper quadrant  Extremities: no edema of both legs  Skin: no rash  Neuro: awake, alert, oriented  Psych:  Pleasant affect  Neurogenic bladder with suprapubic catheter draining clear urine       Lab Results:   Results from last 7 days   Lab Units 01/03/20  0454 01/02/20  0457 01/01/20  1528 01/01/20  0521 12/31/19  2048 12/31/19  0635 12/30/19  2249   WBC Thousand/uL 7 90 4 80 4 20* 4 60  --  5 80  --    HEMOGLOBIN g/dL 7 7* 7 4* 7 4* 7 7*  --  8 5*  --    HEMATOCRIT % 23 5* 22 4* 23 2* 23 3*  --  26 3*  --    PLATELETS Thousands/uL 222 208 208 211  --  286  --    POTASSIUM mmol/L 4 1 3 7 3 7 4 0 2 9* 3 5*  --    CHLORIDE mmol/L 121* 117* 115* 115* 114* 114*  --    CO2 mmol/L 19* 21* 21* 20* 20* 15*  --    BUN mg/dL 20 20 22 25 25 33*  --    CREATININE mg/dL 2 17* 2 19* 2 34* 2 08* 2 41* 2 82*  --    CALCIUM mg/dL 8 3* 8 1* 7 9* 7 4* 7 4* 8 7  --    ALK PHOS U/L 556* 554*  --  515* 563* 755*  --    ALT U/L 121* 33  --  28 33 37  --    AST U/L 245* 45*  --  36 31 45*  --    NITRITE UA   --   --   --   --   --   --  Negative   BLOOD UA   --   --   --   --   --   --  50 0*   LEUKOCYTES UA   --   --   --   --   --   --  500 0*       Other Studies:  Please see a assessment and plan

## 2020-01-03 NOTE — PROGRESS NOTES
Progress Note    Nanette Elias 62 y o  female MRN: [id number]  Unit/Bed#: 7T [facility] 702-02 Encounter: [id number]  Admitting Physician: Joelle Esparza MD  PCP: Flako Jackson MD  Date of Admission:  12/30/2019 10:23 PM    Assessment and Plan    * Acute kidney injury superimposed on CKD ([address] )  Assessment & Plan  Cr 2 08-->2-->2 19-->2 17 (2 89 on admission)   Baseline Cr 1 5  Reports good urine output   CT scan without contrast (12/31/2019)shows mild scarring of the left kidney mild thickening of the left ureteral wall and no significant hydronephrosis, adrenal glands unremarkable    - Per nephrology, start plasma electrolyte solution 80 cc/hr  - CMP in AM  -Avoid nephrotoxic agents    Weight loss, unintentional  Assessment & Plan  Pap smear results pending    -Outpatient mammogram and colonoscopy for malignancy screening discussed with patient  Agreeable to plan       Type 2 diabetes mellitus with kidney complication, with long-term current use of insulin [facility])  Assessment & Plan  Lab Results   Component Value Date    HGBA1C 9 1 (A) 07/08/2019       Recent Labs     12/31/19  1113 12/31/19  1545 12/31/19  2047 01/01/20  0553   POCGLU 143* 281* 250* 339*       Blood Sugar Average: Last 72 hrs:  (P) 230 8     Uncontrolled BG  HbA1c trending up from 9 1-->9 6   Home lantus 40 U qhs, hasn't taken in two weeks because of poor po intake    - Patient received 14 units Lantus last night  One episode of asymptomatic hypoglycemia this morning BG 43-59   Morning dose was skipped, will continue with 15 units at night    - Diabetic diet with ISS and accuchek achs    Anemia  Assessment & Plan  Macrocytic anemia- likely from Vitamin b12 deficiency  Patient was receiving 1000mcg Vitamin B12 at home  Baseline Hb 10  Folate 7 1, Vitamin B12 1,310  Fe panel: Fe 59, TIBC 100, Fe saturation 59, ferritin 943    - Hb trending down from 7 7-->7 4-->7 7 patient asymptomatic   - FOBT pending   - Will resume Vitamin B12 once renal function improves  - CBC in AM  - GI consult       Right upper quadrant abdominal pain  Assessment & Plan  Stable, reports intermittent RUQ pain/discomfort that responds to tylenol   Alk phos trending up: 515-->554-->515-->556    Lipase normal   RUQ US: Gallbladder sludge without calculi  Dilated common bile duct at 11 mm without definite choledocholithiasis   Liver enzymes trending up:  and   Bilirubin 1 9    -Per nephrology, it would be ideal to wait for patient's creatinine to be at baseline and GFR >35 before any contrast procedure  If MRI with MRCP becomes urgent then will need to discuss benefits and risks with the patient and IR  Appreciate nephrology recommendations   - Await GI further recommendations regarding MRI with MRCP, greatly appreciate the input   - CMP in AM   - Pain control     HTN (hypertension)  Assessment & Plan  Well-controlled, /61  Goal < 140/90     - Continue Amlodipine 5mg daily   - Monitor VS    High anion gap metabolic acidosis  Assessment & Plan  Resolved    Vaginal discharge  Assessment & Plan  Speculum exam performed 12/31/2019  Ultrasound of the pelvis showed endometrium 4 mm, no suspicion for adnexal mass or loculated collection  Minimal free fluid in pelvis, no fistula noted  -Follow up Pap smear result     Neurogenic bladder  Assessment & Plan  H/o chronic neurogenic bladder with a suprapubic catheter in place, changed every 6 weeks  - Catheter site was examined, no signs of infection was noted    Abnormal urinalysis  Assessment & Plan  Asymptomatic and has suprapubic catheter  Received one dose of Zosyn     - Abx discontinued       Suicidal ideation  Assessment & Plan  Resolved  Psychiatry started patient on Remeron, however it was discontinued yesterday due sleep cycle disturbance   Cleared for discharge by psychiatry and does not require inpatient management once medically stable     1:1 observation was discontinued    -Will follow up with psychiatry about further recommendations and possible new medication on board     Gastroesophageal reflux disease  Assessment & Plan  Chronic, Stable  Home meds: mylanta and prilosec 20 mg daily  - Protonix 40 mg IV during admission    - Mylanta 30 mL daily prn  - Switch to PO prilosec    Tobacco dependence  Assessment & Plan  Smokes 1/2 PPD of cigarettes  -On nicotine patch  - Cessation counseling provided      VTE Pharmacologic Prophylaxis:   Pharmacologic: Heparin  Mechanical VTE Prophylaxis in Place: Yes    Patient Centered Rounds: I have performed bedside rounds with nursing staff today  Discussions with Specialists or Other Care Team Provider: Nephrology    Education and Discussions with Family / Patient: Patient     Time Spent for Care: 20 minutes  More than 50% of total time spent on counseling and coordination of care as described above  Current Length of Stay: 3 day(s)    Current Patient Status: Inpatient   Certification Statement: The patient will continue to require additional inpatient hospital stay due to JULIO and RUQ pain    Discharge Plan: Per GI     Code Status: Level 1 - Full Code      Subjective:     Patient seen and examined at bedside  Patient states her RUQ pain is less than yesterday  States the medication is helping her symptoms and she received every 4 hours  Patient denies any fevers, dizziness, chest pain, shortness of breath, nausea, vomiting, and diarrhea  Objective:     Vitals:   Temp (24hrs), Av 6 °F (37 °C), Min:97 8 °F (36 6 °C), Max:99 9 °F (37 7 °C)    Temp:  [97 8 °F (36 6 °C)-99 9 °F (37 7 °C)] 97 8 °F (36 6 °C)  HR:  [85-90] 86  Resp:  [18] 18  BP: (119-136)/(61-76) 125/61  SpO2:  [96 %-99 %] 99 %  Body mass index is 31 44 kg/m²  Input and Output Summary (last 24 hours):        Intake/Output Summary (Last 24 hours) at 1/3/2020 1306  Last data filed at 1/3/2020 0900  Gross per 24 hour   Intake 1480 ml   Output 1125 ml   Net 355 ml       Physical Exam:     Physical Exam Constitutional: She is oriented to person, place, and time  She appears well-developed and well-nourished  She appears distressed  HENT:   Head: Normocephalic and atraumatic  Nose: Nose normal    Mouth/Throat: Oropharynx is clear and moist    Eyes: Pupils are equal, round, and reactive to light  Conjunctivae and EOM are normal    Neck: Normal range of motion  Neck supple  Cardiovascular: Normal rate, regular rhythm and normal heart sounds  Pulmonary/Chest: Effort normal and breath sounds normal    Abdominal: Soft  Bowel sounds are normal  She exhibits no distension  There is tenderness  There is no guarding  RUQ abdominal pain tenderness   Inconclusive Michael sign    Musculoskeletal: Normal range of motion  She exhibits no edema  Neurological: She is alert and oriented to person, place, and time  Skin: Skin is warm  No rash noted  She is not diaphoretic  Psychiatric: She has a normal mood and affect  Her behavior is normal  Judgment and thought content normal    Nursing note and vitals reviewed  Additional Data:     Labs:    Results from last 7 days   Lab Units 01/03/20  0454   WBC Thousand/uL 7 90   HEMOGLOBIN g/dL 7 7*   HEMATOCRIT % 23 5*   PLATELETS Thousands/uL 222   NEUTROS PCT % 69*   LYMPHS PCT % 22*   MONOS PCT % 6   EOS PCT % 3     Results from last 7 days   Lab Units 01/03/20  0454   POTASSIUM mmol/L 4 1   CHLORIDE mmol/L 121*   CO2 mmol/L 19*   BUN mg/dL 20   CREATININE mg/dL 2 17*   CALCIUM mg/dL 8 3*   ALK PHOS U/L 556*   ALT U/L 121*   AST U/L 245*         Results from last 7 days   Lab Units 01/03/20  1123 01/03/20  0629 01/03/20  0553 01/03/20  0552 01/02/20  2007 01/02/20  1622 01/02/20  1106 01/02/20  0549 01/01/20  2029 01/01/20  1702 01/01/20  1513 01/01/20  1117   POC GLUCOSE mg/dl 82 59* 47* 43* 176* 221* 330* 315* 273* 323* 319* 258*     Results from last 7 days   Lab Units 01/01/20  0521   HEMOGLOBIN A1C % 9 6*         * I Have Reviewed All Lab Data Listed Above    * Additional Pertinent Lab Tests Reviewed: All Labs Within Last 24 Hours Reviewed    Imaging:    Imaging Reports Reviewed Today Include: None   Imaging Personally Reviewed by Myself Includes: None     Recent Cultures (last 7 days):     Results from last 7 days   Lab Units 12/30/19  2249   URINE CULTURE  >100,000 cfu/ml Lactobacillus species*  >100,000 cfu/ml Escherichia coli*  >100,000 cfu/ml Escherichia coli ESBL*       Last 24 Hours Medication List:     Current Facility-Administered Medications:  acetaminophen 650 mg Oral Q8H PRN Michael Childs MD    aluminum-magnesium hydroxide-simethicone 30 mL Oral Q4H PRN Michael Childs MD    [START ON 1/4/2020] amLODIPine 5 mg Oral Daily Davy Coleman DO    heparin (porcine) 5,000 Units Subcutaneous Q8H Cyril Joshua MD    HYDROmorphone 0 2 mg Intravenous Q4H PRN Dada Reyna MD    insulin glargine 15 Units Subcutaneous HS Michael Childs MD    insulin lispro 1-5 Units Subcutaneous 4x Daily (AC & HS) Michael Childs MD    multi-electrolyte 80 mL/hr Intravenous Continuous Claudene Spates, DO Last Rate: 80 mL/hr (01/03/20 1152)   nicotine 1 patch Transdermal Daily Paramjit Feng MD    ondansetron 4 mg Intravenous Q6H PRN Mike Plaza MD    pantoprazole 40 mg Oral BID AC Rubina Turner IV, MD    sertraline 25 mg Oral Daily OMAYRA Ricci         ** Please Note: Dictation voice to text software may have been used in the creation of this document   Selene Yates MD  01/03/20  1:06 PM

## 2020-01-04 PROBLEM — R80.9 PROTEINURIA: Status: ACTIVE | Noted: 2020-01-01

## 2020-01-04 PROBLEM — N18.30 STAGE 3 CHRONIC KIDNEY DISEASE (HCC): Status: ACTIVE | Noted: 2018-05-24

## 2020-01-04 NOTE — PROGRESS NOTES
Progress Note    Oj Charles 62 y o  female MRN: [id number]  Unit/Bed#: 7T [facility] 702-02 Encounter: [id number]  Admitting Physician: Dr Marilyn Orozco MD  PCP: Ge Baez MD  Date of Admission:  12/30/2019 10:23 PM    Assessment and Plan    * Acute kidney injury superimposed on CKD [facility])  Assessment & Plan  Cr 2 08-->2-->2 19-->2 17-->2 22 (2 89 on admission)   Baseline Cr 1 5  Reports good urine output   CT scan without contrast (12/31/2019)shows mild scarring of the left kidney mild thickening of the left ureteral wall and no significant hydronephrosis, adrenal glands unremarkable    - Per nephrology, start plasma electrolyte solution 80 cc/hr  - CMP in AM  - Avoid nephrotoxic agents  - Nephrology following - Appreciate all recommendations    Right upper quadrant abdominal pain  Assessment & Plan  Stable, reports intermittent RUQ pain/discomfort that responds to tylenol   Alk phos trending up: 515-->554-->515-->556-->500    Lipase normal   RUQ US: Gallbladder sludge without calculi  Dilated common bile duct at 11 mm without definite choledocholithiasis   Liver enzymes trending up:  and   Bilirubin 1 9  GI recommending MRCP  Per nephrology, it would be ideal to wait for patient's creatinine to be at baseline and GFR >35 for 48 hours before any contrast procedure  If  MRCP becomes urgent then will need to discuss benefits and risks with the patient and IR   Appreciate nephrology recommendations   - Await GI further recommendations regarding MRI with MRCP, greatly appreciate the input    - Pain control  - Multiple serologies for hepatic disease currently pending    Anemia  Assessment & Plan  Macrocytic anemia- likely from Vitamin b12 deficiency  Patient was receiving 1000mcg Vitamin B12 at home  Baseline Hb 10  Folate 7 1, Vitamin B12 1,310  Fe panel: Fe 59, TIBC 100, Fe saturation 59, ferritin 943  Hb trending down from 7 7-->7 4-->7 7-->7 0 patient asymptomatic   - FOBT pending   - Will resume Vitamin B12 once renal function improves  - H/H at 3:00 PM, with possible 1 unite of PRBC   - CBC in AM  - GI consult - appreciate all recommendations      Proteinuria  Assessment & Plan  Possible diabetic nephrology  Nephrology following - appreciate all recommendations  Acute Hepatitis panel negative  - UPEP/SPEP pending  - HIV and JOSE pending  - Start ACEi/ARB once JULIO resolves  Type 2 diabetes mellitus with kidney complication, with long-term current use of insulin [facility])  Assessment & Plan  Lab Results   Component Value Date    HGBA1C 9 1 (A) 07/08/2019       Recent Labs     12/31/19  1113 12/31/19  1545 12/31/19  2047 01/01/20  0553   POCGLU 143* 281* 250* 339*       Blood Sugar Average: Last 72 hrs:  (P) 230 8     Uncontrolled BG  HbA1c trending up from 9 1-->9 6   Home lantus 40 U qhs, hasn't taken in two weeks because of poor po intake    - Patient had hypoglycemic episode overnight with BG of 34  Lantus has been stopped  Will continue to monitor BG with accuchecks and adjust accordingly    - Diabetic diet with ISS and accuchek achs    Vaginal discharge  Assessment & Plan  Speculum exam performed 12/31/2019  Ultrasound of the pelvis showed endometrium 4 mm, no suspicion for adnexal mass or loculated collection  Minimal free fluid in pelvis, no fistula noted  - Pap smear result pending    Suicidal ideation  Assessment & Plan  Resolved  Psychiatry started patient on Remeron, however it was discontinued yesterday due sleep cycle disturbance   Cleared for discharge by psychiatry and does not require inpatient management once medically stable  1:1 observation was discontinued    - Zoloft 25 mg daily    Weight loss, unintentional  Assessment & Plan  Pap smear results pending    -Outpatient mammogram and colonoscopy for malignancy screening discussed with patient   Agreeable to plan       High anion gap metabolic acidosis  Assessment & Plan  Resolved    Abnormal urinalysis  Assessment & Plan  Asymptomatic and has suprapubic catheter  Received one dose of Zosyn     - Abx discontinued       Tobacco dependence  Assessment & Plan  Smokes 1/2 PPD of cigarettes  -On nicotine patch  - Cessation counseling provided    Neurogenic bladder  Assessment & Plan  H/o chronic neurogenic bladder with a suprapubic catheter in place, changed every 6 weeks  - Catheter site was examined, no signs of infection was noted    Gastroesophageal reflux disease  Assessment & Plan  Chronic, Stable  Home meds: mylanta and prilosec 20 mg daily  - Protonix 40 mg PO BID     - Mylanta 30 mL daily prn    HTN (hypertension)  Assessment & Plan  Well-controlled, /75  Goal < 140/90     - Continue Amlodipine 5mg daily   - Monitor VS    Stage 3 chronic kidney disease (HCC)  Assessment & Plan  Baseline Cr 1 5 - 1 8, etiology possibly due to diabetes and hypertension   - Currently has JULIO  - Nephrology consulted  - Will need outpatient nephrology follow-up after discharge  VTE Pharmacologic Prophylaxis:   Pharmacologic: Heparin  Mechanical VTE Prophylaxis in Place: Yes    Patient Centered Rounds: I have performed bedside rounds with nursing staff today  Discussions with Specialists or Other Care Team Provider: None    Education and Discussions with Family / Patient: Patient    Time Spent for Care: 45 minutes  More than 50% of total time spent on counseling and coordination of care as described above  Current Length of Stay: 4 day(s)    Current Patient Status: Inpatient   Certification Statement: The patient will continue to require additional inpatient hospital stay due to Acute kidney injury, anemia, cholelithiasis    Discharge Plan: We will need to resolve JULIO and then patient would require MRCP  Also hemoglobin/hematocrit would need to be monitored for drop below 7 and PRBC given accordingly  Code Status: Level 1 - Full Code      Subjective:   Patient seen and examined at bedside    She states that she is feeling better but continues to have right upper quadrant abdominal pain  She currently denies nausea, vomiting or diarrhea  She denies blood in stool  She is informed that we are currently working on improving her kidney function prior to having any tests regarding the abdominal pain  She is currently agreeable with plan  Denies any other acute symptoms or overnight events  Objective:     Vitals:   Temp (24hrs), Av 5 °F (36 4 °C), Min:97 3 °F (36 3 °C), Max:97 8 °F (36 6 °C)    Temp:  [97 3 °F (36 3 °C)-97 8 °F (36 6 °C)] 97 3 °F (36 3 °C)  HR:  [67-78] 69  Resp:  [18] 18  BP: (130-138)/(75-79) 130/75  SpO2:  [97 %-100 %] 97 %  Body mass index is 32 3 kg/m²  Input and Output Summary (last 24 hours): Intake/Output Summary (Last 24 hours) at 2020 1301  Last data filed at 2020 1210  Gross per 24 hour   Intake 3340 ml   Output 1300 ml   Net 2040 ml       Physical Exam:     Physical Exam   Constitutional: She is oriented to person, place, and time  She appears well-developed and well-nourished  No distress  HENT:   Head: Normocephalic and atraumatic  Eyes: EOM are normal  Right eye exhibits no discharge  Left eye exhibits no discharge  No scleral icterus  Neck: Normal range of motion  Cardiovascular: Normal rate, regular rhythm and normal heart sounds  No murmur heard  Pulmonary/Chest: Effort normal and breath sounds normal  No respiratory distress  She has no wheezes  Abdominal: Soft  Bowel sounds are normal  She exhibits no distension  There is tenderness (mild tenderness to palpation of RUQ and Epigastric area  )  Musculoskeletal: She exhibits edema (trace edema in lower extremities bilaterally to mid-shin)  Neurological: She is alert and oriented to person, place, and time  Skin: Skin is warm  No rash noted  She is not diaphoretic  No erythema  Vitals reviewed        Additional Data:     Labs:    Results from last 7 days   Lab Units 20  0435   WBC Thousand/uL 5 20 HEMOGLOBIN g/dL 7 0*   HEMATOCRIT % 21 2*   PLATELETS Thousands/uL 167   NEUTROS PCT % 65   LYMPHS PCT % 23*   MONOS PCT % 6   EOS PCT % 5     Results from last 7 days   Lab Units 01/04/20  0435   POTASSIUM mmol/L 4 1   CHLORIDE mmol/L 113*   CO2 mmol/L 22   BUN mg/dL 19   CREATININE mg/dL 2 22*   CALCIUM mg/dL 8 0*   ALK PHOS U/L 500*   ALT U/L 102*   AST U/L 145*         Results from last 7 days   Lab Units 01/04/20  1058 01/04/20  0522 01/04/20  0110 01/04/20  0038 01/03/20  2022 01/03/20  1631 01/03/20  1123 01/03/20  0629 01/03/20  0553 01/03/20  0552 01/02/20 2007 01/02/20  1622   POC GLUCOSE mg/dl 99 154* 99 34* 109 80 82 59* 47* 43* 176* 221*     Results from last 7 days   Lab Units 01/01/20  0521   HEMOGLOBIN A1C % 9 6*         * I Have Reviewed All Lab Data Listed Above  * Additional Pertinent Lab Tests Reviewed:  Mele 64 Admission Reviewed    Imaging:    Imaging Reports Reviewed Today Include: None  Imaging Personally Reviewed by Myself Includes:  None    Recent Cultures (last 7 days):     Results from last 7 days   Lab Units 12/30/19  2249   URINE CULTURE  >100,000 cfu/ml Lactobacillus species*  >100,000 cfu/ml Escherichia coli*  >100,000 cfu/ml Escherichia coli ESBL*       Last 24 Hours Medication List:     Current Facility-Administered Medications:  acetaminophen 650 mg Oral Q8H PRN Mague Schwartz MD    aluminum-magnesium hydroxide-simethicone 30 mL Oral Q4H PRN Mague Schwartz MD    amLODIPine 5 mg Oral Daily Davy Coleman DO    heparin (porcine) 5,000 Units Subcutaneous Q8H Ricarda Adames MD    HYDROmorphone 0 2 mg Intravenous Q4H PRN Lila Chavez MD    insulin lispro 1-5 Units Subcutaneous 4x Daily (AC & HS) Mague Schwartz MD    multi-electrolyte 80 mL/hr Intravenous Continuous Davin Farfan DO Last Rate: 80 mL/hr (01/04/20 0416)   nicotine 1 patch Transdermal Daily Catarino Costa MD    ondansetron 4 mg Intravenous Q6H PRN Catarino Costa MD pantoprazole 40 mg Oral BID AC Lurene Hodgkin IV, MD    sertraline 25 mg Oral Daily OMAYRA Shanks MD  01/04/20  1:01 PM

## 2020-01-04 NOTE — ASSESSMENT & PLAN NOTE
Baseline Cr 1 5 - 1 8, etiology possibly due to diabetes and hypertension   - Currently has JULIO  - Nephrology consulted  - Will need outpatient nephrology follow-up after discharge

## 2020-01-04 NOTE — PLAN OF CARE
Problem: Potential for Falls  Goal: Patient will remain free of falls  Description  INTERVENTIONS:  - Assess patient frequently for physical needs  -  Identify cognitive and physical deficits and behaviors that affect risk of falls    -  [place] fall precautions as indicated by assessment   - Educate patient/family on patient safety including physical limitations  - Instruct patient to call for assistance with activity based on assessment  - Modify environment to reduce risk of injury  - Consider OT/PT consult to assist with strengthening/mobility  Outcome: Progressing     Problem: PAIN - ADULT  Goal: Verbalizes/displays adequate comfort level or baseline comfort level  Description  Interventions:  - Encourage patient to monitor pain and request assistance  - Assess pain using appropriate pain scale  - Administer analgesics based on type and severity of pain and evaluate response  - Implement non-pharmacological measures as appropriate and evaluate response  - Consider cultural and social influences on pain and pain management  - Notify physician/advanced practitioner if interventions unsuccessful or patient reports new pain  Outcome: Progressing     Problem: INFECTION - ADULT  Goal: Absence or prevention of progression during hospitalization  Description  INTERVENTIONS:  - Assess and monitor for signs and symptoms of infection  - Monitor lab/diagnostic results  - Monitor all insertion sites, i e  indwelling lines, tubes, and drains  - Monitor endotracheal if appropriate and nasal secretions for changes in amount and color  - [place] appropriate cooling/warming therapies per order  - Administer medications as ordered  - Instruct and encourage patient and family to use good hand hygiene technique  - Identify and instruct in appropriate isolation precautions for identified infection/condition  Outcome: Progressing  Goal: Absence of fever/infection during neutropenic period  Description  INTERVENTIONS:  - Monitor WBC    Outcome: Progressing     Problem: SAFETY ADULT  Goal: Patient will remain free of falls  Description  INTERVENTIONS:  - Assess patient frequently for physical needs  -  Identify cognitive and physical deficits and behaviors that affect risk of falls    -  [place] fall precautions as indicated by assessment   - Educate patient/family on patient safety including physical limitations  - Instruct patient to call for assistance with activity based on assessment  - Modify environment to reduce risk of injury  - Consider OT/PT consult to assist with strengthening/mobility  Outcome: Progressing  Goal: Maintain or return to baseline ADL function  Description  INTERVENTIONS:  -  Assess patient's ability to carry out ADLs; assess patient's baseline for ADL function and identify physical deficits which impact ability to perform ADLs (bathing, care of mouth/teeth, toileting, grooming, dressing, etc )  - Assess/evaluate cause of self-care deficits   - Assess range of motion  - Assess patient's mobility; develop plan if impaired  - Assess patient's need for assistive devices and provide as appropriate  - Encourage maximum independence but intervene and supervise when necessary  - Involve family in performance of ADLs  - Assess for home care needs following discharge   - Consider OT consult to assist with ADL evaluation and planning for discharge  - Provide patient education as appropriate  Outcome: Progressing  Goal: Maintain or return mobility status to optimal level  Description  INTERVENTIONS:  - Assess patient's baseline mobility status (ambulation, transfers, stairs, etc )    - Identify cognitive and physical deficits and behaviors that affect mobility  - Identify mobility aids required to assist with transfers and/or ambulation (gait belt, sit-to-stand, lift, walker, cane, etc )  - [place] fall precautions as indicated by assessment  - Record patient progress and toleration of activity level on Mobility SBAR; progress patient to next Phase/Stage  - Instruct patient to call for assistance with activity based on assessment  - Consider rehabilitation consult to assist with strengthening/weightbearing, etc   Outcome: Progressing     Problem: DISCHARGE PLANNING  Goal: Discharge to home or other facility with appropriate resources  Description  INTERVENTIONS:  - Identify barriers to discharge w/patient and caregiver  - Arrange for needed discharge resources and transportation as appropriate  - Identify discharge learning needs (meds, wound care, etc )  - Arrange for interpretive services to assist at discharge as needed  - Refer to Case Management Department for coordinating discharge planning if the patient needs post-hospital services based on physician/advanced practitioner order or complex needs related to functional status, cognitive ability, or social support system  Outcome: Progressing     Problem: Knowledge Deficit  Goal: Patient/family/caregiver demonstrates understanding of disease process, treatment plan, medications, and discharge instructions  Description  Complete learning assessment and assess knowledge base    Interventions:  - Provide teaching at level of understanding  - Provide teaching via preferred learning methods  Outcome: Progressing     Problem: Prexisting or High Potential for Compromised Skin Integrity  Goal: Skin integrity is maintained or improved  Description  INTERVENTIONS:  - Identify patients at risk for skin breakdown  - Assess and monitor skin integrity  - Assess and monitor nutrition and hydration status  - Monitor labs   - Assess for incontinence   - Turn and reposition patient  - Assist with mobility/ambulation  - Relieve pressure over bony prominences  - Avoid friction and shearing  - Provide appropriate hygiene as needed including keeping skin clean and dry  - Evaluate need for skin moisturizer/barrier cream  - Collaborate with interdisciplinary team   - Patient/family teaching  - Consider wound care consult   Outcome: Progressing     Problem: Nutrition/Hydration-ADULT  Goal: Nutrient/Hydration intake appropriate for improving, restoring or maintaining nutritional needs  Description  Monitor and assess patient's nutrition/hydration status for malnutrition  Collaborate with interdisciplinary team and initiate plan and interventions as ordered  Monitor patient's weight and dietary intake as ordered or per policy  Utilize nutrition screening tool and intervene as necessary  Determine patient's food preferences and provide high-protein, high-caloric foods as appropriate       INTERVENTIONS:  - Monitor oral intake, urinary output, labs, and treatment plans  - Assess nutrition and hydration status and recommend course of action  - Evaluate amount of meals eaten  - Assist patient with eating if necessary   - Allow adequate time for meals  - Recommend/ encourage appropriate diets, oral nutritional supplements, and vitamin/mineral supplements  - Order, calculate, and assess calorie counts as needed  - Recommend, monitor, and adjust tube feedings and TPN/PPN based on assessed needs  - Assess need for intravenous fluids  - Provide specific nutrition/hydration education as appropriate  - Include patient/family/caregiver in decisions related to nutrition  Outcome: Progressing

## 2020-01-04 NOTE — ASSESSMENT & PLAN NOTE
Possible diabetic nephrology  Nephrology following - appreciate all recommendations  Acute Hepatitis panel negative  HIV normal   JOSE normal   Protein/Cr ratio: 11 2  Urine protein 603    - Protein electrophoresis pending   - Anti-smooth ms, IgG and antimitochondrial Ab pending   - Start ACEi/ARB once JULIO resolves

## 2020-01-04 NOTE — NURSING NOTE
Patient is awake, alert, and oriented to person, place, and time  Denies any pain or shortness of breath  Vital signs are stable  Telemetry discontinued per protocol  No change from initial assessment  Friend is in visiting  Call bell in reach  Will continue to monitor

## 2020-01-04 NOTE — PROGRESS NOTES
Progress Note - Nephrology   Marshall Reese 62 y o  female MRN: [id number]  Unit/Bed#: 7T [facility] 702- Encounter: [id number]      Assessment / Plan:  1  Acute kidney injury likely secondary to element of prerenal azotemia that has transitioned to ATN  -LFTs improving  -baseline serum creatinine 1 5-1 8, peak serum creatinine 2 89, down to 2 2 and stable  -monitor on isolyte at 80ml/hr  -suspect patient most likely has underlying CKD due to diabetes  -f/u am BMP  -at risk for NSF with MRI with cindi - would hold off for GFR > 35 for 48hrs prior to cindi administration if not emergent  Patient appears comfortable today     2  Hypernatremia resolved    3  Hyperchloremia - improving on isolyte    4  Non elevated anion gap metabolic acidosis-improving on isolate, monitor bicarb    5  Hypertension-BP acceptable on amlodipine 5 mg p o  Daily with hold parameters    6  Anemia-iron and TIBC low, iron sat 20%, ferritin high  Hgb 7, would transfuse prn for symptomatic anemia and Hgb < 7    7  CKD stage 3-baseline serum creatinine 1 5-1 8, likely due to diabetes and hypertension, needs outpatient Nephrology follow-up upon discharge    8  Proteinuria most likely d/t diabetic nephropathy -UpCr 11 02g  ? If this is d/t DM vs other cause  Acute hepatitis panel negative  JOSE, HIV pending  UPEP/SPEP pending  Will defer further serologic work up as patient has uncontrolled DM2  Would start ACEi/ARB once patient's renal function stable and JULIO resolves  Subjective:   She denies any chest pain or shortness of breath, nausea, vomiting or urinary difficulty  No complaints  Objective:     Vitals: Blood pressure 130/75, pulse 69, temperature (!) 97 3 °F (36 3 °C), temperature source Temporal, resp  rate 18, height 5' 3" (1 6 m), weight 82 7 kg (182 lb 5 1 oz), SpO2 97 %, not currently breastfeeding  ,Body mass index is 32 3 kg/m²  Temp (24hrs), Av 5 °F (36 4 °C), Min:97 3 °F (36 3 °C), Max:97 8 °F (36 6 °C)      Weight (last 2 days)     Date/Time   Weight    01/04/20 0540   82 7 (182 32)    01/03/20 0532   80 5 (177 47)    01/02/20 0538   78 9 (173 94)    01/02/20 0537   78 9 (173 94)                Intake/Output Summary (Last 24 hours) at 1/4/2020 1020  Last data filed at 1/4/2020 0900  Gross per 24 hour   Intake 3340 ml   Output 1300 ml   Net 2040 ml     I/O last 24 hours: In: 46 [P O :1680; I V :1900]  Out: 1525 [Urine:1525]        Physical Exam:   Physical Exam   Constitutional: She appears well-developed and well-nourished  No distress  obese   HENT:   Head: Normocephalic and atraumatic  Mouth/Throat: No oropharyngeal exudate  Eyes: Right eye exhibits no discharge  Left eye exhibits no discharge  No scleral icterus  Neck: Normal range of motion  Neck supple  No thyromegaly present  Cardiovascular: Normal rate and regular rhythm  No murmur heard  Pulmonary/Chest: Effort normal and breath sounds normal  No respiratory distress  She has no wheezes  Abdominal: Soft  Bowel sounds are normal  She exhibits no distension  Genitourinary:   Genitourinary Comments: +spurapubic catheter   Musculoskeletal: She exhibits edema (b/l LE)  Neurological: She is alert  She exhibits normal muscle tone  awake   Skin: Skin is warm and dry  No rash noted  She is not diaphoretic  Psychiatric: She has a normal mood and affect  Her behavior is normal    Nursing note and vitals reviewed        Invasive Devices     Peripheral Intravenous Line            Long-Dwell Peripheral IV (Midline) 12/31/19 3 days          Drain            Suprapubic Catheter 590 days                Medications:    Scheduled Meds:  Current Facility-Administered Medications:  acetaminophen 650 mg Oral Q8H PRN Viji Hernandez MD    aluminum-magnesium hydroxide-simethicone 30 mL Oral Q4H PRN Viji Hernandez MD    amLODIPine 5 mg Oral Daily Davy Coleman DO    heparin (porcine) 5,000 Units Subcutaneous [facility] Viji Hernandez MD    HYDROmorphone 0 2 mg Intravenous Q4H PRN Keshia Ramirez MD    insulin lispro 1-5 Units Subcutaneous 4x Daily (AC & HS) Yesica Quiles MD    multi-electrolyte 80 mL/hr Intravenous Continuous Thermon DO Sukh Last Rate: 80 mL/hr (01/04/20 0416)   nicotine 1 patch Transdermal Daily Paramjit Feng MD    ondansetron 4 mg Intravenous Q6H PRN Lidia Omer MD    pantoprazole 40 mg Oral BID ADRIANE Kaur IV, MD    sertraline 25 mg Oral Daily OMAYRA Ngo        PRN Meds:   acetaminophen    aluminum-magnesium hydroxide-simethicone    HYDROmorphone    ondansetron    Continuous Infusions:  multi-electrolyte 80 mL/hr Last Rate: 80 mL/hr (01/04/20 0416)           LAB RESULTS:      Results from last 7 days   Lab Units 01/04/20  0435 01/03/20  0454 01/02/20  0457 01/01/20  1528 01/01/20  0521 12/31/19  2048 12/31/19  0635 12/30/19  2245   WBC Thousand/uL 5 20 7 90 4 80 4 20* 4 60  --  5 80 6 80   HEMOGLOBIN g/dL 7 0* 7 7* 7 4* 7 4* 7 7*  --  8 5* 10 0*   HEMATOCRIT % 21 2* 23 5* 22 4* 23 2* 23 3*  --  26 3* 31 2*   PLATELETS Thousands/uL 167 222 208 208 211  --  286 364   NEUTROS PCT % 65 69* 56 52 53  --  55 59   LYMPHS PCT % 23* 22* 31 33 33  --  33 29   MONOS PCT % 6 6 6 8 7  --  6 7   EOS PCT % 5 3 6 6 6  --  5 5   POTASSIUM mmol/L 4 1 4 1 3 7 3 7 4 0 2 9* 3 5* 4 1   CHLORIDE mmol/L 113* 121* 117* 115* 115* 114* 114* 109*   CO2 mmol/L 22 19* 21* 21* 20* 20* 15* 17*   BUN mg/dL 19 20 20 22 25 25 33* 35*   CREATININE mg/dL 2 22* 2 17* 2 19* 2 34* 2 08* 2 41* 2 82* 2 89*   CALCIUM mg/dL 8 0* 8 3* 8 1* 7 9* 7 4* 7 4* 8 7 9 5   ALK PHOS U/L 500* 556* 554*  --  515* 563* 755* 1,016*   ALT U/L 102* 121* 33  --  28 33 37 42   AST U/L 145* 245* 45*  --  36 31 45* 54*       CUTURES:  Lab Results   Component Value Date    BLOODCX Proprionibacterium acnes (A) 12/03/2018    BLOODCX Staphylococcus coagulase negative (A) 12/03/2018    BLOODCX Proprionibacterium acnes (A) 12/03/2018    URINECX >100,000 cfu/ml Lactobacillus species (A) 12/30/2019    URINECX >100,000 cfu/ml Escherichia coli (A) 12/30/2019    URINECX >100,000 cfu/ml Escherichia coli ESBL (A) 12/30/2019    URINECX >100,000 cfu/ml  12/14/2019    URINECX 40,000-49,000 cfu/ml Escherichia coli ESBL (A) 05/29/2019    URINECX 10,000-19,000 cfu/ml Escherichia coli (A) 05/29/2019    URINECX (A) 05/29/2019     20,000-29,000 cfu/ml Alpha Hemolytic Streptococcus NOT Enterococcus    URINECX 20,000-29,000 cfu/ml Lactobacillus species (A) 05/29/2019                 Portions of the record may have been created with voice recognition software  Occasional wrong word or "sound a like" substitutions may have occurred due to the inherent limitations of voice recognition software  Read the chart carefully and recognize, using context, where substitutions have occurred  If you have any questions, please contact the dictating provider

## 2020-01-05 NOTE — PROGRESS NOTES
Progress Note    Jessica Prieto 62 y o  female MRN: [id number]  Unit/Bed#: 7T [facility] 702-02 Encounter: [id number]  Admitting Physician: Lluvia Tyler MD  PCP: Lluvia Tyler MD  Date of Admission:  12/30/2019 10:23 PM    Assessment and Plan    Anemia  Assessment & Plan  Macrocytic anemia- likely from Vitamin b12 deficiency  Patient was receiving 1000mcg Vitamin B12 at home  Baseline Hb 10  Folate 7 1, Vitamin B12 1,310  Fe panel: Fe 59, TIBC 100, Fe saturation 59, ferritin 943  Hb trending down from 7 7-->7 4-->7 7-->7 0 ->6 6, received 2 U RBC yesterday and Hg improved to 9 7  - FOBT pending   - Will resume Vitamin B12 once renal function improves  - CBC in AM  -discontinue heparin injections   - GI consult  -hematology consult placed      * Acute kidney injury superimposed on CKD (HCC)  Assessment & Plan  Cr 2 08-->2-->2 19-->2 17-->2 22->2 20 (2 89 on admission)   Baseline Cr 1 5  Reports good urine output   CT scan without contrast (12/31/2019)shows mild scarring of the left kidney mild thickening of the left ureteral wall and no significant hydronephrosis, adrenal glands unremarkable    - Per nephrology, continue plasma electrolyte solution 80 cc/hr for now  - CMP in AM  - Avoid nephrotoxic agents  - Nephrology following     Weight loss, unintentional  Assessment & Plan  Pap smear results pending    -Outpatient mammogram and colonoscopy for malignancy screening discussed with patient  Agreeable to plan         Type 2 diabetes mellitus with kidney complication, with long-term current use of insulin [facility])  Assessment & Plan  Lab Results   Component Value Date    HGBA1C 9 1 (A) 07/08/2019       Recent Labs     12/31/19  1113 12/31/19  1545 12/31/19  2047 01/01/20  0553   POCGLU 143* 281* 250* 339*       Blood Sugar Average: Last 72 hrs:  (P) 230 8     Uncontrolled BG  HbA1c trending up from 9 1-->9 6   Home lantus 40 U qhs, hasn't taken in two weeks because of poor po intake     Lantus has been stopped yesterday due to hypoglycemia, but her glucose trended up again to 230-260 on ISS, will start on low dose of Lantus 5 U tonight   - Diabetic diet with ISS and accuchek achs    HTN (hypertension)  Assessment & Plan  Elevated within last 24 hrs SBP of 150-160  Goal < 140/90     - Amlodipine 5mg has been changed to 10 mg by nephrology   - Monitor VS    Stage 3 chronic kidney disease ([facility] [address] )  Assessment & Plan  Baseline Cr 1 5 - 1 8, etiology possibly due to diabetes and hypertension   - Currently has JULIO  - Nephrology consulted  - Will need outpatient nephrology follow-up after discharge  Abnormal urinalysis  Assessment & Plan  Asymptomatic and has suprapubic catheter  Received one dose of Zosyn     - Abx discontinued       Tobacco dependence  Assessment & Plan  Smokes 1/2 PPD of cigarettes  -On nicotine patch  - Cessation counseling provided    Neurogenic bladder  Assessment & Plan  H/o chronic neurogenic bladder with a suprapubic catheter in place, changed every 6 weeks  - Catheter site was examined, no signs of infection was noted    Gastroesophageal reflux disease  Assessment & Plan  Chronic, Stable  Home meds: mylanta and prilosec 20 mg daily  - Protonix 40 mg PO BID     - Mylanta 30 mL daily prn    Proteinuria  Assessment & Plan  Possible diabetic nephrology  Nephrology following - appreciate all recommendations  Acute Hepatitis panel negative  - UPEP/SPEP pending  - HIV and JOSE pending  - Start ACEi/ARB once JULIO resolves  Suicidal ideation  Assessment & Plan  Resolved  Psychiatry started patient on Remeron, however it was discontinued yesterday due sleep cycle disturbance   Cleared for discharge by psychiatry and does not require inpatient management once medically stable     1:1 observation was discontinued    - Zoloft 25 mg daily    Right upper quadrant abdominal pain  Assessment & Plan  Stable, reports intermittent RUQ pain up to 9/10, receives Dilaudid PRN  Alk phos trending up: 515-->554-->515-->556-->500  GGT 504  Lipase normal   RUQ US: Gallbladder sludge without calculi  Dilated common bile duct at 11 mm without definite choledocholithiasis   Liver enzymes trending up:  and   Bilirubin 1 9  GI recommending MRCP  Per nephrology, it would be ideal to wait for patient's creatinine to be at baseline and GFR >35 for 48 hours before any contrast procedure  If  MRCP becomes urgent then will need to discuss benefits and risks with the patient and IR  Appreciate nephrology recommendations   - Await GI further recommendations regarding MRI with MRCP, greatly appreciate the input    - Pain control  - Multiple serologies for hepatic disease currently pending    Vaginal discharge  Assessment & Plan  Speculum exam performed 12/31/2019  Ultrasound of the pelvis showed endometrium 4 mm, no suspicion for adnexal mass or loculated collection  Minimal free fluid in pelvis, no fistula noted  - Pap smear result pending    High anion gap metabolic acidosis  Assessment & Plan  Resolved      VTE Pharmacologic Prophylaxis:   Pharmacologic: discontinued due to acute anemia  Mechanical VTE Prophylaxis in Place: Yes    Patient Centered Rounds: I have performed bedside rounds with nursing staff today  Discussions with Specialists or Other Care Team Provider: nursing, nephrologist    Education and Discussions with Family / Patient: discussed the plan of management, patient agreed    Time Spent for Care: 20 minutes  More than 50% of total time spent on counseling and coordination of care as described above  Current Length of Stay: 5 day(s)    Current Patient Status: Inpatient   Certification Statement: The patient will continue to require additional inpatient hospital stay due to JULIO, abdominal pain, anemia    Discharge Plan: pending    Code Status: Level 1 - Full Code      Subjective:   Patient seen and examined at bedside  She was not in distress and having breakfast comfortably, but admitted to having abdominal pain 9/10  No new complains  No events overnight  Objective:     Vitals:   Temp (24hrs), Av 2 °F (36 8 °C), Min:97 5 °F (36 4 °C), Max:99 °F (37 2 °C)    Temp:  [97 5 °F (36 4 °C)-99 °F (37 2 °C)] 97 5 °F (36 4 °C)  HR:  [69-75] 72  Resp:  [16-18] 18  BP: (125-177)/(76-97) 158/97  SpO2:  [96 %-99 %] 99 %  Body mass index is 32 02 kg/m²  Input and Output Summary (last 24 hours): Intake/Output Summary (Last 24 hours) at 2020 1300  Last data filed at 2020 1156  Gross per 24 hour   Intake 1098 75 ml   Output 1450 ml   Net -351 25 ml       Physical Exam:     Physical Exam   Constitutional: She is oriented to person, place, and time  She appears well-developed and well-nourished  No distress  HENT:   Head: Normocephalic and atraumatic  Nose: Nose normal    Mouth/Throat: Oropharynx is clear and moist    Neck: Normal range of motion  Neck supple  Cardiovascular: Normal rate, regular rhythm and normal heart sounds  Pulmonary/Chest: Effort normal and breath sounds normal    Abdominal: Soft  Bowel sounds are normal  She exhibits no distension  There is tenderness  There is no guarding  RUQ abdominal pain tenderness  Suprapubic catheter in place, no signs of infection  Musculoskeletal: Normal range of motion  She exhibits no edema  Neurological: She is alert and oriented to person, place, and time  Skin: Skin is warm  No rash noted  She is not diaphoretic  Psychiatric: She has a normal mood and affect  Her behavior is normal  Judgment and thought content normal    Nursing note and vitals reviewed        Additional Data:     Labs:    Results from last 7 days   Lab Units 20  0616   WBC Thousand/uL 5 10   HEMOGLOBIN g/dL 9 7*   HEMATOCRIT % 29 8*   PLATELETS Thousands/uL 205   NEUTROS PCT % 58   LYMPHS PCT % 28   MONOS PCT % 5   EOS PCT % 6     Results from last 7 days   Lab Units 20  0616 20  0435   POTASSIUM mmol/L 4 0 4 1   CHLORIDE mmol/L 113* 113*   CO2 mmol/L 23 22   BUN mg/dL 20 19   CREATININE mg/dL 2 20* 2 22*   CALCIUM mg/dL 8 7 8 0*   ALK PHOS U/L  --  500*   ALT U/L  --  102*   AST U/L  --  145*         Results from last 7 days   Lab Units 01/05/20  1118 01/05/20  0554 01/04/20 2059 01/04/20  1626 01/04/20  1058 01/04/20  0522 01/04/20  0110 01/04/20  0038 01/03/20 2022 01/03/20  1631 01/03/20  1123 01/03/20  0629   POC GLUCOSE mg/dl 267* 259* 236* 229* 99 154* 99 34* 109 80 82 59*     Results from last 7 days   Lab Units 01/01/20  0521   HEMOGLOBIN A1C % 9 6*         * I Have Reviewed All Lab Data Listed Above  * Additional Pertinent Lab Tests Reviewed: All Labs Within Last 24 Hours Reviewed    Imaging:    Imaging Reports Reviewed Today Include: n/a  Imaging Personally Reviewed by Myself Includes:  n/a    Recent Cultures (last 7 days):     Results from last 7 days   Lab Units 12/30/19  2249   URINE CULTURE  >100,000 cfu/ml Lactobacillus species*  >100,000 cfu/ml Escherichia coli*  >100,000 cfu/ml Escherichia coli ESBL*       Last 24 Hours Medication List:     Current Facility-Administered Medications:  acetaminophen 650 mg Oral Q8H PRN Leigh Barrera MD    aluminum-magnesium hydroxide-simethicone 30 mL Oral Q4H PRN Leigh Barrera MD    [START ON 1/6/2020] amLODIPine 10 mg Oral Daily Maura Johnson DO    HYDROmorphone 0 2 mg Intravenous Q4H PRN Kaitlynn Chaudhry MD    insulin glargine 5 Units Subcutaneous HS Mari Gordon MD    insulin lispro 1-5 Units Subcutaneous 4x Daily (AC & HS) Leigh Barrera MD    multi-electrolyte 80 mL/hr Intravenous Continuous Fish Jacobson DO Last Rate: 80 mL/hr (01/05/20 0744)   nicotine 1 patch Transdermal Daily Paramjit Feng MD    ondansetron 4 mg Intravenous Q6H PRN Beck Álvarez MD    pantoprazole 40 mg Oral BID AC Krish Hoang IV, MD    [START ON 1/6/2020] sertraline 50 mg Oral Daily OMAYRA Andersen         ** Please Note: Dictation voice to text software may have been used in the creation of this document  Daniela Vazquez MD  01/05/20  1:00 PM

## 2020-01-05 NOTE — PROGRESS NOTES
Progress Note - Nephrology   Adrian Holley 62 y o  female MRN: [id number]  Unit/Bed#: 7T [facility] 702-02 Encounter: [id number]      Assessment / Plan:  1  Acute kidney injury likely secondary to element of prerenal azotemia that has transitioned to ATN  -LFTs improving  -baseline serum creatinine 1 5-1 8, peak serum creatinine 2 89, down to 2 2 and stable  -monitor on isolyte at 80ml/hr  -suspect patient most likely has underlying CKD due to diabetes  -f/u am BMP  -at risk for NSF with MRI with cindi - would hold off for GFR > 35 for 48hrs prior to cindi administration if not emergent  Patient appears comfortable today      2  Hypernatremia resolved     3  Hyperchloremia - improved but stable on isolyte     4  Non elevated anion gap metabolic acidosis-improved on isolyte, monitor bicarb     5  Hypertension-BP a bit high on amlodipine 5 mg p o  Daily with hold parameters  Will increase to 10mg daily       6  Anemia-iron and TIBC low, iron sat 20%, ferritin high  Hgb 6 6-->9 7 s/p transfusoin would transfuse prn for symptomatic anemia and Hgb < 7     7  CKD stage 3-baseline serum creatinine 1 5-1 8, likely due to diabetes and hypertension, needs outpatient Nephrology follow-up upon discharge     8  Proteinuria most likely d/t diabetic nephropathy -UpCr 11 02g  ? If this is d/t DM vs other cause  Acute hepatitis panel negative  JOSE, HIV pending  UPEP/SPEP pending  Will defer further serologic work up as patient has uncontrolled DM2  Would start ACEi/ARB once patient's renal function stable and JULIO resolves  Subjective:   She still complains of abdominal pain but denies nausea, vomiting or diarrhea  Denies chest pain or shortness of breath  No other complaints  Urinating well  Objective:     Vitals: Blood pressure 158/97, pulse 72, temperature 97 5 °F (36 4 °C), temperature source Temporal, resp  rate 18, height 5' 3" (1 6 m), weight 82 kg (180 lb 12 4 oz), SpO2 99 %, not currently breastfeeding  ,Body mass index is 32 02 kg/m²  Temp (24hrs), Av 2 °F (36 8 °C), Min:97 5 °F (36 4 °C), Max:99 °F (37 2 °C)      Weight (last 2 days)     Date/Time   Weight    20 0600   82 (180 78)    20 0540   82 7 (182 32)    20 0532   80 5 (177 47)                Intake/Output Summary (Last 24 hours) at 2020 0836  Last data filed at 2020 0817  Gross per 24 hour   Intake 1338 75 ml   Output 950 ml   Net 388 75 ml     I/O last 24 hours: In: 1338 8 [P O :720; Blood:618 8]  Out: 950 [Urine:950]        Physical Exam:   Physical Exam   Constitutional: She appears well-developed and well-nourished  No distress  obese   HENT:   Head: Normocephalic and atraumatic  Mouth/Throat: No oropharyngeal exudate  Eyes: Right eye exhibits no discharge  Left eye exhibits no discharge  No scleral icterus  Neck: Normal range of motion  Neck supple  No thyromegaly present  Cardiovascular: Normal rate and regular rhythm  No murmur heard  Pulmonary/Chest: Effort normal and breath sounds normal  No respiratory distress  She has no wheezes  Abdominal: Soft  Bowel sounds are normal  She exhibits no distension  There is tenderness (RUQ)  Genitourinary:   Genitourinary Comments: +suprapubic catheter   Musculoskeletal: She exhibits edema (trace b/l LE)  Neurological: She is alert  She exhibits normal muscle tone  awake   Skin: Skin is warm and dry  No rash noted  She is not diaphoretic  Psychiatric: She has a normal mood and affect  Her behavior is normal    Nursing note and vitals reviewed        Invasive Devices     Peripheral Intravenous Line            Long-Dwell Peripheral IV (Midline) 19 4 days          Drain            Suprapubic Catheter 591 days                Medications:    Scheduled Meds:  Current Facility-Administered Medications:  acetaminophen 650 mg Oral Q8H PRN Rhett Kyle MD    aluminum-magnesium hydroxide-simethicone 30 mL Oral Q4H PRN Rhett Kyle MD    amLODIPine 5 mg Oral Daily Reginold DO Amy    heparin (porcine) 5,000 Units Subcutaneous [facility] Monica Arana MD    HYDROmorphone 0 2 mg Intravenous Q4H PRN Rayne Schultz MD    insulin lispro 1-5 Units Subcutaneous 4x Daily (AC & HS) Monica Arana MD    multi-electrolyte 80 mL/hr Intravenous Continuous Reginolsuyapa Reyes DO Last Rate: 80 mL/hr (01/05/20 0744)   nicotine 1 patch Transdermal Daily Harvey Singh MD    ondansetron 4 mg Intravenous Q6H PRN Harvey Singh MD    pantoprazole 40 mg Oral BID AC Shannon Francis IV, MD    sertraline 25 mg Oral Daily OMAYRA Shay        PRN Meds:   acetaminophen    aluminum-magnesium hydroxide-simethicone    HYDROmorphone    ondansetron    Continuous Infusions:  multi-electrolyte 80 mL/hr Last Rate: 80 mL/hr (01/05/20 0744)           LAB RESULTS:      Results from last 7 days   Lab Units 01/05/20  0616 01/04/20  1617 01/04/20  0435 01/03/20  0454 01/02/20  0457 01/01/20  1528 01/01/20  0521 12/31/19  2048 12/31/19  0635 12/30/19  2245   WBC Thousand/uL 5 10  --  5 20 7 90 4 80 4 20* 4 60  --  5 80 6 80   HEMOGLOBIN g/dL 9 7* 6 6* 7 0* 7 7* 7 4* 7 4* 7 7*  --  8 5* 10 0*   HEMATOCRIT % 29 8* 20 2* 21 2* 23 5* 22 4* 23 2* 23 3*  --  26 3* 31 2*   PLATELETS Thousands/uL 205  --  167 222 208 208 211  --  286 364   NEUTROS PCT % 58  --  65 69* 56 52 53  --  55 59   LYMPHS PCT % 28  --  23* 22* 31 33 33  --  33 29   MONOS PCT % 5  --  6 6 6 8 7  --  6 7   EOS PCT % 6  --  5 3 6 6 6  --  5 5   POTASSIUM mmol/L 4 0  --  4 1 4 1 3 7 3 7 4 0 2 9* 3 5* 4 1   CHLORIDE mmol/L 113*  --  113* 121* 117* 115* 115* 114* 114* 109*   CO2 mmol/L 23  --  22 19* 21* 21* 20* 20* 15* 17*   BUN mg/dL 20  --  19 20 20 22 25 25 33* 35*   CREATININE mg/dL 2 20*  --  2 22* 2 17* 2 19* 2 34* 2 08* 2 41* 2 82* 2 89*   CALCIUM mg/dL 8 7  --  8 0* 8 3* 8 1* 7 9* 7 4* 7 4* 8 7 9 5   ALK PHOS U/L  --   --  500* 556* 554*  --  515* 563* 755* 1,016*   ALT U/L  --   --  102* 121* 33  --  28 33 37 42   AST U/L --   --  145* 245* 45*  --  36 31 45* 47*       CUTURES:  Lab Results   Component Value Date    BLOODCX Proprionibacterium acnes (A) 12/03/2018    BLOODCX Staphylococcus coagulase negative (A) 12/03/2018    BLOODCX Proprionibacterium acnes (A) 12/03/2018    URINECX >100,000 cfu/ml Lactobacillus species (A) 12/30/2019    URINECX >100,000 cfu/ml Escherichia coli (A) 12/30/2019    URINECX >100,000 cfu/ml Escherichia coli ESBL (A) 12/30/2019    URINECX >100,000 cfu/ml  12/14/2019    URINECX 40,000-49,000 cfu/ml Escherichia coli ESBL (A) 05/29/2019    URINECX 10,000-19,000 cfu/ml Escherichia coli (A) 05/29/2019    URINECX (A) 05/29/2019     20,000-29,000 cfu/ml Alpha Hemolytic Streptococcus NOT Enterococcus    URINECX 20,000-29,000 cfu/ml Lactobacillus species (A) 05/29/2019                 Portions of the record may have been created with voice recognition software  Occasional wrong word or "sound a like" substitutions may have occurred due to the inherent limitations of voice recognition software  Read the chart carefully and recognize, using context, where substitutions have occurred  If you have any questions, please contact the dictating provider

## 2020-01-06 NOTE — PROGRESS NOTES
Progress Note - [address] STAN Cervantes 62 y o  female MRN: [id number]  Unit/Bed#: 7T [facility] 702-02 Encounter: [id number]    The patient was seen for continuing care and reviewed with treatment team     Mental Status Evaluation:  Appearance:  older than stated age   Behavior:  calm, cooperative and friendly   Mood:  depressed   Affect: mood-congruent   Speech: Normal rate and Normal volume   Thought Process:  Goal directed and coherent   Thought Content:  Does not verbalize delusional material   Perceptual Disturbances: Denies hallucinations and does not appear to be responding to internal stimuli   Risk Potential: No suicidal or homicidal ideation   Orientation:   Aox3     Progress Toward Goals: Consult follow-up  Zoloft increased to 50mg PO Daily yesterday  Patient denies side effects from Zoloft including nausea diarrhea or vomiting  Slow improvement in mood  Patient to follow-up with PCP for management of zoloft on discharge  Patient refused psych referral for psychiatrist or therapist  May reconsult psych if psychiatric condition worsens or side effects develop  Recommended Treatment: Continue with pharmacotherapy, group therapy, milieu therapy and occupational therapy    The patient will be maintained on the following medications:    Current Facility-Administered Medications:  acetaminophen 650 mg Oral Q8H PRN Alverto Asher MD   aluminum-magnesium hydroxide-simethicone 30 mL Oral Q4H PRN Alverto Asher MD   amLODIPine 10 mg Oral Daily Maura Johnson DO   HYDROmorphone 0 2 mg Intravenous Q4H PRN Ade Adler MD   insulin glargine 5 Units Subcutaneous HS Fadi Nichole MD   insulin lispro 1-5 Units Subcutaneous 4x Daily (AC & HS) Alverto Asher MD   nicotine 1 patch Transdermal Daily Morgan Talbot MD   ondansetron 4 mg Intravenous Q6H PRN Morgan Talbot MD   pantoprazole 40 mg Oral BID AC Nay Rivero IV, MD   sertraline 50 mg Oral Daily OMAYRA Alvarez [facility] ([facility]

## 2020-01-06 NOTE — PROGRESS NOTES
Progress Note- Suleiman Bone 62 y o  female MRN: [id number]    Unit/Bed#: 7T [facility] 702-02 Encounter: [id number]      Assessment and Plan:    1  Elevated transaminases  Patient does have elevated transaminases and bilirubin  Mild sharp pain localized to the right upper quadrant  For which she is receiving Dilaudid  Continue to trend LFTs  She is unable to get any cross-sectional imaging due to her elevated creatinine  She will likely benefit from ERCP  If no improvement in the next 1-2 days, consider transfer to [facility] for ERCP    ______________________________________________________________________    Subjective:     No acute events overnight  Patient continues to have right upper quadrant pain      Medication Administration - last 24 hours from 01/04/2020 2349 to 01/05/2020 2349       Date/Time Order Dose Route Action Action by     01/05/2020 0807 nicotine (NICODERM CQ) 14 mg/24hr TD 24 hr patch 1 patch 1 patch Transdermal Not Given Karen Harrison, RN     01/05/2020 7747 heparin (porcine) subcutaneous injection 5,000 Units 5,000 Units Subcutaneous Given [facility], RN     01/05/2020 2242 insulin lispro (HumaLOG) 100 units/mL subcutaneous injection 1-5 Units 3 Units Subcutaneous Given [facility], RN     01/05/2020 1654 insulin lispro (HumaLOG) 100 units/mL subcutaneous injection 1-5 Units 2 Units Subcutaneous Given Karen Harrison, AUREA     01/05/2020 1204 insulin lispro (HumaLOG) 100 units/mL subcutaneous injection 1-5 Units 2 Units Subcutaneous Given Karen Harrison, RN     01/05/2020 0602 insulin lispro (HumaLOG) 100 units/mL subcutaneous injection 1-5 Units 2 Units Subcutaneous Given [facility], RN     01/05/2020 2545 sertraline (ZOLOFT) tablet 25 mg 25 mg Oral Given Karen Harrison, RN     01/05/2020 2238 HYDROmorphone (DILAUDID) injection 0 2 mg 0 mg Intravenous Not Given [facility], RN     01/05/2020 8940 HYDROmorphone (DILAUDID) injection 0 2 mg 0 2 mg Intravenous Given [address], [address] [place] [address]     01/05/2020 1813 HYDROmorphone (DILAUDID) injection 0 2 mg 0 2 mg Intravenous Given Sha Gaspar RN     01/05/2020 1413 HYDROmorphone (DILAUDID) injection 0 2 mg 0 2 mg Intravenous Given Sha Gaspar RN     01/05/2020 1022 HYDROmorphone (DILAUDID) injection 0 2 mg 0 2 mg Intravenous Given Sha Gaspar RN     01/05/2020 1203 HYDROmorphone (DILAUDID) injection 0 2 mg 0 2 mg Intravenous Given AUREA Barry     01/05/2020 0159 HYDROmorphone (DILAUDID) injection 0 2 mg 0 2 mg Intravenous Given AUREA Barry     01/05/2020 1654 pantoprazole (PROTONIX) EC tablet 40 mg 40 mg Oral Given Sha Gaspar RN     01/05/2020 0712 pantoprazole (PROTONIX) EC tablet 40 mg 40 mg Oral Given AUREA Barry     01/05/2020 0155 amLODIPine (NORVASC) tablet 5 mg 5 mg Oral Given Sha Gaspar RN     01/05/2020 2006 multi-electrolyte (PLASMALYTE-A/ISOLYTE-S PH 7 4) IV solution 80 mL/hr Intravenous [address]  [facility], [address] [place] [address]     01/05/2020 8774 multi-electrolyte (PLASMALYTE-A/ISOLYTE-S PH 7 4) IV solution 80 mL/hr [facility]ous [facility] Sha Gaspar RN     01/05/2020 0615 multi-electrolyte (PLASMALYTE-A/ISOLYTE-S PH 7 4) IV solution 80 mL/hr Intravenous Restarted AUREA Barry     01/05/2020 2242 insulin glargine (LANTUS) subcutaneous injection 5 Units 0 05 mL 5 Units Subcutaneous Given AUREA Barry          Objective:     Vitals: Blood pressure 149/73, pulse 67, temperature (!) 96 7 °F (35 9 °C), temperature source Temporal, resp  rate 18, height 5' 3" (1 6 m), weight 82 kg (180 lb 12 4 oz), SpO2 97 %, not currently breastfeeding  ,Body mass index is 32 02 kg/m²        Intake/Output Summary (Last 24 hours) at 1/5/2020 2349  Last data filed at 1/5/2020 2309  Gross per 24 hour   Intake 2418 75 ml   Output 1500 ml   Net 918 75 ml       Physical Exam:   General Appearance: Awake and alert, in no acute distress  Abdomen: Soft, mildly-tender in right upper quadrant, non-distended; bowel sounds normal; no masses or no organomegaly    Invasive Devices     Peripheral Intravenous Line            Long-Dwell Peripheral IV (Midline) 12/31/19 5 days          Drain            Suprapubic Catheter 592 days                Lab Results:  No results displayed because visit has over 200 results  Imaging Studies: I have personally reviewed pertinent imaging studies

## 2020-01-06 NOTE — PROGRESS NOTES
NEPHROLOGY PROGRESS NOTE   Francheska Luong 62 y o  female MRN: [id number]  Unit/Bed#: 7T [facility] 702-02 Encounter: [id number]      ASSESSMENT & PLAN:  1  Acute kidney injury suspected secondary to prerenal component likely transition to ATN  Serum creatinine peak at 2 89 and is currently improving down to 1 9 today  Patient states that currently she have better oral intake, denies any nausea, no vomiting, no diarrhea  Okay to stop intravenously fluid for now and watch renal function  At risk for NSF if MRI with gadolinium is done, awaiting further improvement in her kidney function and see if her GFR remains over 35 for 48 hours  2  Chronic kidney disease stage 3 with baseline creatinine around 1 5-1 8 with nephrotic range proteinuria suspected secondary to long-term history of uncontrolled diabetes  Serum and urine electrophoresis in process since 01/03  3  Hypokalemia, I have ordered 40 mEq of K-Dur p o  Today    4  Abnormal LFTs with right upper quadrant abdominal pain, GI on board  Plan for MRCP with gadolinium once kidney function improved  Patient continue with abdominal pain but clinically improving  5  Hypertension, blood pressure slightly elevated, amlodipine was increased to 10 mg daily yesterday  Will follow for now  Monitor blood pressure now that she is off intravenously fluids  6  Hyperchloremia, follow now that she is off intravenously fluid  7  Anemia, monitor H&H and transfusion as needed  My plan and recommendations were discussed with family medicine team       SUBJECTIVE:  Patient seen and examined, feeling better today, denies any nausea, no vomiting, no diarrhea  Continue with abdominal pain but in general improving    Denies any chest pain or shortness of breath    OBJECTIVE:  Current Weight: Weight - Scale: 82 9 kg (182 lb 12 2 oz)  Vitals:    01/06/20 0733   BP: 152/80   Pulse: 67   Resp: 18   Temp: (!) 97 3 °F (36 3 °C)   SpO2: 100%       Intake/Output Summary (Last 24 hours) at 1/6/2020 0901  Last data filed at 1/6/2020 0539  Gross per 24 hour   Intake 1800 ml   Output 2700 ml   Net -900 ml     General: conscious, cooperative, in not acute distress  Eyes: conjunctivae pale, anicteric sclerae  ENT: lips and mucous membranes moist  Neck: supple, no JVD  Chest: clear breath sounds bilateral, no crackles, ronchus or wheezings  CVS: distinct S1 & S2, normal rate, regular rhythm  Abdomen: soft, non-tender, non-distended, normoactive bowel sounds  Back: no CVA tenderness  Extremities: no significant edema of both legs  Skin: no rash  Neuro: awake, alert, oriented        Medications:    Current Facility-Administered Medications:     acetaminophen (TYLENOL) tablet 650 mg, 650 mg, Oral, Q8H PRN, Guru Maya MD, 650 mg at 01/06/20 0606    aluminum-magnesium hydroxide-simethicone (MYLANTA) 200-200-20 mg/5 mL oral suspension 30 mL, 30 mL, Oral, Q4H PRN, Guru Maya MD    amLODIPine (NORVASC) tablet 10 mg, 10 mg, Oral, Daily, Maura Johnson, DO, 10 mg at 01/06/20 0807    HYDROmorphone (DILAUDID) injection 0 2 mg, 0 2 mg, Intravenous, Q4H PRN, Leena Jalloh MD, 0 2 mg at 01/06/20 0245    insulin glargine (LANTUS) subcutaneous injection 5 Units 0 05 mL, 5 Units, Subcutaneous, HS, Madeline Moreland MD, 5 Units at 01/05/20 2242    insulin lispro (HumaLOG) 100 units/mL subcutaneous injection 1-5 Units, 1-5 Units, Subcutaneous, 4x Daily (AC & HS), 1 Units at 01/06/20 0605 **AND** Fingerstick Glucose (POCT), , , 4x Daily AC and at bedtime, Guru Maya MD    nicotine (NICODERM CQ) 14 mg/24hr TD 24 hr patch 1 patch, 1 patch, Transdermal, Daily, Jimbo Sorto MD, 1 patch at 01/01/20 0844    ondansetron (ZOFRAN) injection 4 mg, 4 mg, Intravenous, Q6H PRN, Jimbo Sorto MD, 4 mg at 12/31/19 0607    pantoprazole (PROTONIX) EC tablet 40 mg, 40 mg, Oral, BID AC, Víctor Wright IV, MD, 40 mg at 01/06/20 0604    potassium chloride (K-DUR,KLOR-CON) CR tablet 40 mEq, 40 mEq, Oral, Once, Tatiana Rivera MD    sertraline (ZOLOFT) tablet 50 mg, 50 mg, Oral, Daily, OMAYRA Michelle, 50 mg at 01/06/20 0807    Invasive Devices:        Lab Results:   Results from last 7 days   Lab Units 01/06/20  0520 01/05/20  0616 01/04/20  1617 01/04/20  0435 01/03/20  0454   WBC Thousand/uL 5 00 5  10  --  5 20 7 90   HEMOGLOBIN g/dL 9 6* 9 7* 6 6* 7 0* 7 7*   HEMATOCRIT % 29 1* 29 8* 20 2* 21 2* 23 5*   PLATELETS Thousands/uL 225 205  --  167 222   SODIUM mmol/L 142 140  --  140 145   POTASSIUM mmol/L 3 5* 4 0  --  4 1 4 1   CHLORIDE mmol/L 111* 113*  --  113* 121*   CO2 mmol/L 26 23  --  22 19*   BUN mg/dL 20 20  --  19 20   CREATININE mg/dL 1 92* 2 20*  --  2 22* 2 17*   CALCIUM mg/dL 8 8 8 7  --  8 0* 8 3*   ALK PHOS U/L 546*  --   --  500* 556*   ALT U/L 71*  --   --  102* 121*   AST U/L 46*  --   --  145* 245*       Previous work up:  See previous notes      Portions of the record may have been created with voice recognition software  Occasional wrong word or "sound a like" substitutions may have occurred due to the inherent limitations of voice recognition software  Read the chart carefully and recognize, using context, where substitutions have occurred  If you have any questions, please contact the dictating provider

## 2020-01-06 NOTE — PROGRESS NOTES
Progress Note    Alberto Wick 62 y o  female MRN: [id number]  Unit/Bed#: 7T [facility] 702-02 Encounter: [id number]  Admitting Physician: Tiana Husain MD  PCP: Cheryle Idler, MD  Date of Admission:  12/30/2019 10:23 PM    Assessment and Plan    * Acute kidney injury superimposed on CKD [facility])  Assessment & Plan  Cr 2 08-->2-->2 19-->2 17-->2 22->2 20-->1 92 (2 89 on admission)   Baseline Cr 1 5  Reports good urine output   CT scan without contrast (12/31/2019)shows mild scarring of the left kidney mild thickening of the left ureteral wall and no significant hydronephrosis, adrenal glands unremarkable    - Per nephrology, continue plasma electrolyte solution 80 cc/hr for now  - CMP in AM  - Avoid nephrotoxic agents  - Nephrology following     Weight loss, unintentional  Assessment & Plan  Pap smear results pending    -Outpatient mammogram and colonoscopy for malignancy screening discussed with patient  Agreeable to plan   Type 2 diabetes mellitus with kidney complication, with long-term current use of insulin [facility])  Assessment & Plan  Lab Results   Component Value Date    HGBA1C 9 1 (A) 07/08/2019       Recent Labs     12/31/19  1113 12/31/19  1545 12/31/19  2047 01/01/20  0553   POCGLU 143* 281* 250* 339*       Blood Sugar Average: Last 72 hrs:  (P) 230 8     Better controlled BG  HbA1c trending up from 9 1-->9 6   Home lantus 40 U qhs, hasn't taken in two weeks because of poor po intake     Received dose of Lantus 5 U last night, continue same regimen at bedtime   - Diabetic diet  - ISS and accuchek achs    Anemia  Assessment & Plan  Macrocytic anemia- likely from Vitamin b12 deficiency  Patient was receiving 1000mcg Vitamin B12 at home  Baseline Hb 10  Folate 7 1, Vitamin B12 1,310  Fe panel: Fe 59, TIBC 100, Fe saturation 59, ferritin 943  Hb trending down from 7 7-->7 4-->7 7-->7 0 ->6 6, received 2 U RBC on 01/04/2019 and Hg improved to 9 7   Currently Hg 9 6    - FOBT pending   - Will resume Vitamin B12 once renal function improves  - CBC in AM  -D/C heparin injections, continue    - GI consult  -Hematology consult placed      Right upper quadrant abdominal pain  Assessment & Plan  Stable, reports intermittent RUQ pain up to 9/10, receives Dilaudid PRN  Alk phos trending up: 515-->554-->515-->556-->500-->546    Lipase normal   RUQ US: Gallbladder sludge without calculi  Dilated common bile duct at 11 mm without definite choledocholithiasis   Liver enzymes trending down: AST 46 and ALT 71  Bilirubin 1 0  GI recommending MRCP  Per nephrology, it would be ideal to wait for patient's creatinine to be at baseline and GFR >35 for 48 hours before any contrast procedure  If  MRCP becomes urgent then will need to discuss benefits and risks with the patient and IR  Appreciate nephrology recommendations   - Await GI further recommendations regarding MRI with MRCP, greatly appreciate the input    - Pain control with Dilaudid   - Multiple serologies for hepatic disease ordered: Hepatitis panel normal, others currently pending    HTN (hypertension)  Assessment & Plan  Slightly levated within last 24 hrs SBP of 140-150  Goal < 140/90      - Amlodipine 5mg has been changed to 10 mg by nephrology   - Monitor VS    High anion gap metabolic acidosis  Assessment & Plan  Resolved    Vaginal discharge  Assessment & Plan  Speculum exam performed 12/31/2019  Ultrasound of the pelvis showed endometrium 4 mm, no suspicion for adnexal mass or loculated collection  Minimal free fluid in pelvis, no fistula noted  - Pap smear result pending    Neurogenic bladder  Assessment & Plan  H/o chronic neurogenic bladder with a suprapubic catheter in place, changed every 6 weeks       - Catheter site was examined, no signs of infection was noted    Abnormal urinalysis  Assessment & Plan  Asymptomatic and has suprapubic catheter  Received one dose of Zosyn     - Abx discontinued       Suicidal ideation  Assessment & Plan  Resolved  Psychiatry started patient on Remeron, however it was discontinued yesterday due sleep cycle disturbance   Cleared for discharge by psychiatry and does not require inpatient management once medically stable  1:1 observation was discontinued    - Zoloft 25 mg daily    Gastroesophageal reflux disease  Assessment & Plan  Chronic, Stable  Home meds: mylanta and prilosec 20 mg daily  - Protonix 40 mg PO BID     - Mylanta 30 mL daily prn    Tobacco dependence  Assessment & Plan  Smokes 1/2 PPD of cigarettes  -On nicotine patch  - Cessation counseling provided    Proteinuria  Assessment & Plan  Possible diabetic nephrology  Nephrology following - appreciate all recommendations  Acute Hepatitis panel negative  HIV normal   Protein/Cr ratio: 11 2    - UPEP/SPEP pending  - JOSE pending  - Start ACEi/ARB once JULIO resolves  Stage 3 chronic kidney disease (HCC)  Assessment & Plan  Baseline Cr 1 5 - 1 8, etiology possibly due to diabetes and hypertension   - Currently has JULIO  - Nephrology consulted  - Will need outpatient nephrology follow-up after discharge  VTE Pharmacologic Prophylaxis:   Pharmacologic: Discontinued due to acute anemia   Mechanical VTE Prophylaxis in Place: Yes    Patient Centered Rounds: I have performed bedside rounds with nursing staff today  Discussions with Specialists or Other Care Team Provider: None     Education and Discussions with Family / Patient: Patient     Time Spent for Care: 20 minutes  More than 50% of total time spent on counseling and coordination of care as described above  Current Length of Stay: 6 day(s)    Current Patient Status: Inpatient   Certification Statement: The patient will continue to require additional inpatient hospital stay due to JULIO, RUQ abdominal pain and anemia     Discharge Plan: Awaiting resolution of inpatient medical conditions     Code Status: Level 1 - Full Code    Subjective:     Patient seen and examined at bedside   Patient reports she has slept overnight  Endorsing slight RUQ abdominal tenderness, controlled with Dilaudid  Patient states last BM yesterday  Advised patient to notify nurse when she has BM as fecal occult blood stool needs to be collected  No other events overnight and no concerns  Objective:     Vitals:   Temp (24hrs), Av 8 °F (36 °C), Min:96 5 °F (35 8 °C), Max:97 3 °F (36 3 °C)    Temp:  [96 5 °F (35 8 °C)-97 3 °F (36 3 °C)] 97 3 °F (36 3 °C)  HR:  [67-69] 67  Resp:  [18] 18  BP: (144-152)/(73-84) 152/80  SpO2:  [97 %-100 %] 100 %  Body mass index is 32 37 kg/m²  Input and Output Summary (last 24 hours): Intake/Output Summary (Last 24 hours) at 2020 0832  Last data filed at 2020 0539  Gross per 24 hour   Intake 1800 ml   Output 2700 ml   Net -900 ml       Physical Exam:     Physical Exam   Constitutional: She is oriented to person, place, and time  She appears well-developed and well-nourished  No distress  HENT:   Head: Normocephalic and atraumatic  Nose: Nose normal    Eyes: Pupils are equal, round, and reactive to light  Conjunctivae and EOM are normal    Neck: Normal range of motion  Neck supple  Cardiovascular: Normal rate, regular rhythm and normal heart sounds  Pulmonary/Chest: Effort normal and breath sounds normal  No respiratory distress  Abdominal: Soft  Bowel sounds are normal  She exhibits distension  There is no tenderness  There is no guarding  RUQ tenderness on palpation   Suprapubic catheter in place    Musculoskeletal: She exhibits no edema or tenderness  Neurological: She is alert and oriented to person, place, and time  Skin: Skin is warm  No rash noted  She is not diaphoretic  Psychiatric: She has a normal mood and affect  Her behavior is normal  Judgment and thought content normal    Nursing note and vitals reviewed      Additional Data:     Labs:    Results from last 7 days   Lab Units 20  0520   WBC Thousand/uL 5 00   HEMOGLOBIN g/dL 9 6*   HEMATOCRIT % 29 1* PLATELETS Thousands/uL 225   NEUTROS PCT % 58   LYMPHS PCT % 29   MONOS PCT % 6   EOS PCT % 6     Results from last 7 days   Lab Units 01/06/20  0520   POTASSIUM mmol/L 3 5*   CHLORIDE mmol/L 111*   CO2 mmol/L 26   BUN mg/dL 20   CREATININE mg/dL 1 92*   CALCIUM mg/dL 8 8   ALK PHOS U/L 546*   ALT U/L 71*   AST U/L 46*         Results from last 7 days   Lab Units 01/06/20  0526 01/05/20  2108 01/05/20  1606 01/05/20  1118 01/05/20  0554 01/04/20  2059 01/04/20  1626 01/04/20  1058 01/04/20  0522 01/04/20  0110 01/04/20  0038 01/03/20 2022   POC GLUCOSE mg/dl 171* 276* 234* 267* 259* 236* 229* 99 154* 99 34* 109     Results from last 7 days   Lab Units 01/01/20  0521   HEMOGLOBIN A1C % 9 6*         * I Have Reviewed All Lab Data Listed Above  * Additional Pertinent Lab Tests Reviewed:  All Labs Within Last 24 Hours Reviewed    Imaging:    Imaging Reports Reviewed Today Include: None  Imaging Personally Reviewed by Myself Includes:  None     Recent Cultures (last 7 days):     Results from last 7 days   Lab Units 12/30/19  2249   URINE CULTURE  >100,000 cfu/ml Lactobacillus species*  >100,000 cfu/ml Escherichia coli*  >100,000 cfu/ml Escherichia coli ESBL*       Last 24 Hours Medication List:     Current Facility-Administered Medications:  acetaminophen 650 mg Oral Q8H PRN Mague Schwartz MD    aluminum-magnesium hydroxide-simethicone 30 mL Oral Q4H PRN Mague Schwartz MD    amLODIPine 10 mg Oral Daily Maura Johnson DO    HYDROmorphone 0 2 mg Intravenous Q4H PRN Lila Chavez MD    insulin glargine 5 Units Subcutaneous HS Caren Hernandez MD    insulin lispro 1-5 Units Subcutaneous 4x Daily (AC & HS) Mague Schwartz MD    multi-electrolyte 80 mL/hr Intravenous Continuous Davin Farfan DO Last Rate: 80 mL/hr (01/05/20 2006)   nicotine 1 patch Transdermal Daily Catarino Costa MD    ondansetron 4 mg Intravenous Q6H PRN Catarino Costa MD    pantoprazole 40 mg Oral BID ADRIANE Polo MD sertraline 50 mg Oral Daily OMAYRA Catherine         ** Please Note: Dictation voice to text software may have been used in the creation of this document   Ame Dunham MD  01/06/20  8:32 AM

## 2020-01-06 NOTE — TELEPHONE ENCOUNTER
Behavorial Health Outpatient Intake Questions    Referred by: Inpatient [facility]    Please advised interviewee that they need to answer all questions truthfully to allow for best care and any misrepresentations of information may affect their ability to be seen at this clinic   => Was this discussed? Yes     Behavorial Health Outpatient Intake History -     Presenting Problem (in patient's words): Feeling very depressed and anxious, suicidal - but no actual plan  Been dealing with health issues (kidneys) and thinks she'd be better off without the pain and hassle  Has the patient ever seen or is currently seeing a psychiatrist? No   If yes who/when? "Well- seeing a psychiatrist inpatient [facility] but never before that"    If seen as outpatient, have they been seen here (and by whom)? If not seen here, which provider(s) did the patient see and for how long? Has the patient ever seen or currently see a therapist? No If yes who/when? Has a member of the patient's family been in therapy here? No  If yes, with whom? Has the patient been hospitalized for mental health? No   If yes, how long ago was last hospitalization and where was it? 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 Does the patient have ICM or CTT? No    Is the patient taking injectable psychiatric medications? No    => If yes, patient cannot be seen here  Communications  Are there any developmental disabilities? ??  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 No       History-    Has the patient served in the [place] [address]? No    If yes, have you had combat services? No    Was the patient activated into federal active duty as a member of the [employer] or reserve?  No    Legal History-     Does the patient have any history of arrests, [facility] time, or DUIs? Yes  If Yes-  1) What types of charges? Drug abuse  2) When were they last incarcerated? 2006  3) Are they currently on parole or probation? Minor Child-    Who has custody of the child? Is there a custody agreement? If there is a custody agreement remind parent that they must bring a copy to the first appt or they will not be seen  Intake Team, please check with provider before scheduling if flags come up such as:  - complex case  - legal history (other than DUI)  - communication barrier concerns are present  - if, in your judgment, this needs further review    ACCEPTED as a patient Yes  => Appointment Date: 2/25 @ 1:30    Referred Elsewhere? Yes    Name of Insurance Co: Sheryl OCONNELL  Insurance ID# [id number]   [id number] Phone #  If ins is primary or secondary  If patient is a minor, parents information such as Name, D  O B of guarantor

## 2020-01-06 NOTE — CONSULTS
Medical Oncology/Hematology Consult Note  Ryan Stoddard, female, 62 y o , 1965,  7T /7T -02, [id number]     Assessment and Plan  1  Anemia:  Acute on chronic  Patient does not appear to have any evidence of GI bleeding; stools for occult blood have yet to be submitted awaiting results  She also does not appear to have any significant nutritional deficiencies  Patient's anemia is most likely multifactorial including anemia of chronic renal disease, anemia of chronic disease possibly exacerbated by acute events  She was found to have proteinuria- SPEP and UPEP were ordered and are pending; we will order additional plasma cell dyscrasia workup  We will also order the remaining anemia workup to rule out other etiologies of her anemia including hemolysis/bone marrow disorder/etc      Continue to trend CBC with diff daily and transfuse for hemoglobin less than or equal to 7 or sooner should she become symptomatic  We will continue to monitor patient's laboratory studies remotely and follow-up with her in the hospital if needed  She will also be offered outpatient follow-up/monitoring after discharge  2  Elevated LFTs:  Improving in comparison to admission  Patient is being monitored closely by GI  She was also found to have gallbladder sludge continues to have right upper quadrant pain  There is consideration for MRI/MRCP of the abdomen but test is on hold due to renal dysfunction  3  Acute on chronic renal dysfunction:  Has improved since admission almost back to baseline  Plasma cell dyscrasia workup ordered/pending  Nephrology is following      Reason for consultation:  Anemia    History of present illness:   Patient is a pleasant 57-year-old female with multiple comorbid conditions including diabetes, chronic kidney disease, hypertension, hyperlipidemia, anemia, neurogenic bladder with chronic suprapubic catheter, history of recurrent UTIs and tobacco abuse half a pack per day for 40 years  The patient apparently and presented to the emergency department on 12/30/2019 after she was found to have a 1 week history of abdominal pain, nausea, vomiting with poor p o intake  Upon admission she was found to have worsening renal dysfunction and elevated LFTs  She had a CT scan of the abdomen pelvis on 12/31/2019 which showed suprapubic catheter and mild thickening of the left urothelial wall  An ultrasound of the abdomen was also done on the same day which showed gallbladder sludge without calculi dilated common bile duct at 11 mm without definite cholelithiasis  She is being followed by nephrology and GI closely  Upon admission on 12/30/2019 patient was found to have normal white cells and platelets with macrocytic anemia H&H 10 0/31 2, , creatinine was 2 8, GFR 17, significant elevation of her alk-phos 1016, total bili 1 4 and elevated AST 54  One day later on 12/31/2019 she was found to have a drop in her hemoglobin H&H 8 5/26 3 and  which may be due to dilutional effect after being hydrated  Her hemoglobin continued to trend downward until 01/04/2019 when she was found to have a hemoglobin of 6 6  The patient was transfused with 2 units of packed red blood cells which improved her hemoglobin significantly  Today her CBC revealed normal white cells and platelets with normocytic anemia H&H 9 6/29 1, MCV 96  Her LDH was found to be elevated on 01/03/2019 at 851  Her total bilirubin has been fluctuating and peaked on 01/03/2020 at 2 6  She had some additional anemia workup done her J05 was 7541, folic acid 7 1, iron saturation 20%, serum iron 26, TIBC decreased 128 with elevated ferritin 1565  She was found to have proteinuria and serum and urine electrophoresis were ready ordered by Nephrology and are pending at this point  Stools for occult blood were also ordered which are pending    Her LFTs and renal function seem to be improving at this point in comparison to admission  The patient states that she has had anemia on and off for several years  She states that she has had blood transfusions in the past this was greater than 3 years ago  She denies bleeding from any site and reports good bowel movements  She denies any nausea or vomiting at present and her appetite has improved  Patient continues to report abdominal pain to the right upper quadrant  The patient states that she was never symptomatic with her recent low/drop in hemoglobin  Review of Systems:   Review of Systems   Constitutional: Positive for activity change, appetite change and fatigue  Negative for chills and fever  HENT: Negative for mouth sores, nosebleeds, sore throat and trouble swallowing  Eyes: Negative  Respiratory: Negative for cough, chest tightness and shortness of breath  Cardiovascular: Negative for chest pain, palpitations and leg swelling  Gastrointestinal: Positive for abdominal pain  Negative for blood in stool, constipation, diarrhea, nausea and vomiting  Genitourinary: Positive for difficulty urinating  Negative for hematuria  Suprapubic catheter   Skin: Positive for rash  Negative for color change and pallor  Neurological: Positive for numbness (And tingling fingers due to diabetic neuropathy)  Negative for dizziness, light-headedness and headaches  Hematological: Negative for adenopathy  Does not bruise/bleed easily  Psychiatric/Behavioral: Positive for dysphoric mood  Negative for sleep disturbance  The patient is not nervous/anxious          Past Medical History:   Diagnosis Date    Ambulatory dysfunction     Anemia     macrocyctic    Chronic kidney disease 5/24/2018    Chronic pain disorder     right hip    Diabetes mellitus ([facility] [address] )     Diabetic foot ulcer ([facility] [address] )     left with fat layer exposed    Dyslipidemia 5/24/2018    ESBL E  coli carrier     GERD (gastroesophageal reflux disease)     History of frequent urinary tract infections     HTN (hypertension) 5/24/2018    Hyperlipidemia     Irritable bowel syndrome     constipation    Neurogenic bladder     Neuropathy     Pedal edema     Vitamin D deficiency        Past Surgical History:   Procedure Laterality Date    CATARACT EXTRACTION      NH XCAPSL CTRC RMVL INSJ IO LENS PROSTH W/O ECP Right 12/6/2018    Procedure: EXTRACAPSULAR CATARACT REMOVAL/INSERTION OF INTRAOCULAR LENS;  Surgeon: Kathrin Eli MD;  Location: [address] [place] [address];  Service: Ophthalmology    NH XCAPSL CTRC RMVL INSJ IO LENS PROSTH W/O ECP Left 10/17/2019    Procedure: EXTRACAPSULAR CATARACT REMOVAL/INSERTION OF INTRAOCULAR LENS;  Surgeon: Kathrin Eli MD;  Location: [address] [place] [address];  Service: Ophthalmology    [facility]         Family History   Problem Relation Age of Onset   [facility] Breast cancer Mother     Hypertension Mother     Diabetes Mother         Mellitus    Parkinsonism Mother     Cancer Maternal Aunt         Unknown       Social History     Socioeconomic History    Marital status: Single     Spouse name: None    Number of children: None    Years of education: None    Highest education level: None   Occupational History    None   Social Needs    Financial resource strain: Somewhat hard    Food insecurity:     Worry: None     Inability: None    Transportation needs:     Medical: No     Non-medical: No   Tobacco Use    Smoking status: Current Every Day Smoker     Packs/day: 0 50     Years: 40 00     Pack years: 20 00     Types: Cigarettes    Smokeless tobacco: Never Used   Substance and Sexual Activity    Alcohol use: Not Currently     Alcohol/week: 0 0 standard drinks     Comment: no alcohol for 16 yrs    Drug use: Not Currently    Sexual activity: None   Lifestyle    Physical activity:     Days per week: None     Minutes per session: None    Stress:  To some extent   Relationships    Social connections:     Talks on phone: None     Gets together: More than three times a week     Attends Jain service: None     Active member of club or organization: None     Attends meetings of clubs or organizations: None     Relationship status: None    Intimate partner violence:     Fear of current or ex partner: None     Emotionally abused: None     Physically abused: None     Forced sexual activity: None   Other Topics Concern    None   Social History Narrative    Hospitalization: 4/28/18    Flu shot:yes         Current Facility-Administered Medications:     acetaminophen (TYLENOL) tablet 650 mg, 650 mg, Oral, Q8H PRN, Aletha Spurling, MD, 650 mg at 01/06/20 0606    aluminum-magnesium hydroxide-simethicone (MYLANTA) 200-200-20 mg/5 mL oral suspension 30 mL, 30 mL, Oral, Q4H PRN, Aletha Spurling, MD    amLODIPine (NORVASC) tablet 10 mg, 10 mg, Oral, Daily, Maura Johnson, , 10 mg at 01/06/20 0807    HYDROmorphone (DILAUDID) injection 0 2 mg, 0 2 mg, Intravenous, Q4H PRN, Sarah White MD, 0 2 mg at 01/06/20 1056    insulin glargine (LANTUS) subcutaneous injection 5 Units 0 05 mL, 5 Units, Subcutaneous, HS, Marshall Ortez MD, 5 Units at 01/05/20 2242    insulin lispro (HumaLOG) 100 units/mL subcutaneous injection 1-5 Units, 1-5 Units, Subcutaneous, 4x Daily (AC & HS), 3 Units at 01/06/20 1146 **AND** Fingerstick Glucose (POCT), , , 4x Daily AC and at bedtime, Aletha Spurling, MD    nicotine (NICODERM CQ) 14 mg/24hr TD 24 hr patch 1 patch, 1 patch, Transdermal, Daily, Inga aLmbert MD, 1 patch at 01/01/20 0844    ondansetron (ZOFRAN) injection 4 mg, 4 mg, Intravenous, Q6H PRN, Inga Lambert MD, 4 mg at 12/31/19 0607    pantoprazole (PROTONIX) EC tablet 40 mg, 40 mg, Oral, BID AC, Herman Spears IV, MD, 40 mg at 01/06/20 0604    sertraline (ZOLOFT) tablet 50 mg, 50 mg, Oral, Daily, OMAYRA Murphy, 50 mg at 01/06/20 0807    Medications Prior to Admission   Medication    acetaminophen (TYLENOL) 500 mg tablet    ADMELOG SOLOSTAR 100 units/mL injection pen    amLODIPine (NORVASC) 5 mg tablet    ASPIRIN LOW DOSE 81 MG EC tablet    atorvastatin (LIPITOR) 40 mg tablet    BASAGLAR KWIKPEN 100 units/mL injection pen    ergocalciferol (VITAMIN D2) 50,000 units    nortriptyline (PAMELOR) 50 mg capsule    nystatin (MYCOSTATIN) powder    omeprazole (PriLOSEC) 20 mg delayed release capsule    pregabalin (LYRICA) 50 mg capsule    senna (SENOKOT) 8 6 mg    Alcohol Swabs (ALCOHOL PREP) 70 % PADS    aluminum-magnesium hydroxide-simethicone (MYLANTA) 200-200-20 mg/5 mL suspension    B-D INS SYRINGE 0 5CC/31GX5/16 31G X 5/16" 0 5 ML MISC    Catheters MISC    cyanocobalamin (VITAMIN B-12) 1,000 mcg tablet    DOCQLACE 100 MG capsule    Insulin Pen Needle (UNIFINE PENTIPS) 32G X 4 MM MISC    Lancets 28G MISC    ONE TOUCH ULTRA TEST test strip    predniSONE 20 mg tablet       Allergies   Allergen Reactions    Gabapentin      Other reaction(s): slurring of speech, falls  Physical Exam:    /80 (BP Location: Left arm)   Pulse 67   Temp (!) 97 3 °F (36 3 °C) (Temporal)   Resp 18   Ht 5' 3" (1 6 m)   Wt 82 9 kg (182 lb 12 2 oz)   SpO2 100%   BMI 32 37 kg/m²     Physical Exam   Constitutional: She is oriented to person, place, and time  She appears well-developed and well-nourished  No distress  Sitting up at the side of the bed eating her lunch   HENT:   Head: Normocephalic and atraumatic  Mouth/Throat: Oropharynx is clear and moist  No oropharyngeal exudate  Eyes: Pupils are equal, round, and reactive to light  Conjunctivae are normal  No scleral icterus  Neck: Normal range of motion  Neck supple  No thyromegaly present  Cardiovascular: Normal rate, regular rhythm, normal heart sounds and intact distal pulses  No murmur heard  Pulmonary/Chest: Effort normal and breath sounds normal  No respiratory distress  Abdominal: Soft  Bowel sounds are normal  She exhibits no distension  There is no hepatosplenomegaly   There is tenderness in the right upper quadrant  Musculoskeletal: Normal range of motion  She exhibits no edema  Lymphadenopathy:     She has no cervical adenopathy  She has no axillary adenopathy  Neurological: She is alert and oriented to person, place, and time  Skin: Skin is warm and dry  No rash noted  She is not diaphoretic  No erythema  No pallor  Extremely dry generalized skin, linchenified dry scaly patch noted to left upper arm   Psychiatric: Her behavior is normal  Judgment and thought content normal    Flat affect   Vitals reviewed  Recent Results (from the past 48 hour(s))   Hemoglobin and hematocrit, blood    Collection Time: 01/04/20  4:17 PM   Result Value Ref Range    Hemoglobin 6 6 (LL) 12 0 - 16 0 g/dL    Hematocrit 20 2 (L) 36 0 - 46 0 %   Fingerstick Glucose (POCT)    Collection Time: 01/04/20  4:26 PM   Result Value Ref Range    POC Glucose 229 (H) 65 - 140 mg/dl   Type and screen    Collection Time: 01/04/20  8:14 PM   Result Value Ref Range    ABO Grouping A     Rh Factor Positive     Antibody Screen Negative     Specimen Expiration Date 20200107    ABO/Rh    Collection Time: 01/04/20  8:14 PM   Result Value Ref Range    ABO Grouping A     Rh Factor Positive    Fingerstick Glucose (POCT)    Collection Time: 01/04/20  8:59 PM   Result Value Ref Range    POC Glucose 236 (H) 65 - 140 mg/dl   Fingerstick Glucose (POCT)    Collection Time: 01/05/20  5:54 AM   Result Value Ref Range    POC Glucose 259 (H) 65 - 140 mg/dl   Prepare Leukoreduced RBC:Has consent been obtained?  Yes, 2 Units    Collection Time: 01/05/20  6:15 AM   Result Value Ref Range    Unit Product Code F7979D11     Unit Number [id number]     Unit ABO A     Unit [facility] POS     Crossmatch Compatible     Unit Dispense Status Presumed Trans     Unit Product Code P6558V06     Unit Number [id number]     Unit ABO A     Unit [facility] POS     Crossmatch Compatible     Unit Dispense Status Presumed Trans    Basic metabolic panel    Collection Time: 01/05/20 6:16 AM   Result Value Ref Range    Sodium 140 137 - 147 mmol/L    Potassium 4 0 3 6 - 5 0 mmol/L    Chloride 113 (H) 97 - 108 mmol/L    CO2 23 22 - 30 mmol/L    ANION GAP 4 (L) 5 - 14 mmol/L    BUN 20 5 - 25 mg/dL    Creatinine 2 20 (H) 0 60 - 1 20 mg/dL    Glucose 277 (H) 70 - 99 mg/dL    Calcium 8 7 8 4 - 10 2 mg/dL    eGFR 24 (L) >60 ml/min/1 73sq m   CBC and differential    Collection Time: 01/05/20  6:16 AM   Result Value Ref Range    WBC 5 10 4 50 - 11 00 Thousand/uL    RBC 3 07 (L) 4 00 - 5 20 Million/uL    Hemoglobin 9 7 (L) 12 0 - 16 0 g/dL    Hematocrit 29 8 (L) 36 0 - 46 0 %    MCV 97 80 - 100 fL    MCH 31 7 26 0 - 34 0 pg    MCHC 32 7 31 0 - 36 0 g/dL    RDW 16 2 (H) <15 3 %    MPV 10 5 8 9 - 12 7 fL    Platelets 806 468 - 166 Thousands/uL    Neutrophils Relative 58 45 - 65 %    Lymphocytes Relative 28 25 - 45 %    Monocytes Relative 5 1 - 10 %    Eosinophils Relative 6 0 - 6 %    Basophils Relative 2 (H) 0 - 1 %    Neutrophils Absolute 3 00 1 80 - 7 80 Thousands/µL    Lymphocytes Absolute 1 50 0 50 - 4 00 Thousands/µL    Monocytes Absolute 0 30 0 20 - 0 90 Thousand/µL    Eosinophils Absolute 0 30 0 00 - 0 40 Thousand/µL    Basophils Absolute 0 10 0 00 - 0 10 Thousands/µL   Fingerstick Glucose (POCT)    Collection Time: 01/05/20 11:18 AM   Result Value Ref Range    POC Glucose 267 (H) 65 - 140 mg/dl   Fingerstick Glucose (POCT)    Collection Time: 01/05/20  4:06 PM   Result Value Ref Range    POC Glucose 234 (H) 65 - 140 mg/dl   Fingerstick Glucose (POCT)    Collection Time: 01/05/20  9:08 PM   Result Value Ref Range    POC Glucose 276 (H) 65 - 140 mg/dl   CBC and differential    Collection Time: 01/06/20  5:20 AM   Result Value Ref Range    WBC 5 00 4 50 - 11 00 Thousand/uL    RBC 3 03 (L) 4 00 - 5 20 Million/uL    Hemoglobin 9 6 (L) 12 0 - 16 0 g/dL    Hematocrit 29 1 (L) 36 0 - 46 0 %    MCV 96 80 - 100 fL    MCH 31 6 26 0 - 34 0 pg    MCHC 32 9 31 0 - 36 0 g/dL    RDW 16 0 (H) <15 3 %    MPV 10 2 8 9 - 12 7 fL    Platelets 552 469 - 921 Thousands/uL    Neutrophils Relative 58 45 - 65 %    Lymphocytes Relative 29 25 - 45 %    Monocytes Relative 6 1 - 10 %    Eosinophils Relative 6 0 - 6 %    Basophils Relative 2 (H) 0 - 1 %    Neutrophils Absolute 2 90 1 80 - 7 80 Thousands/µL    Lymphocytes Absolute 1 40 0 50 - 4 00 Thousands/µL    Monocytes Absolute 0 30 0 20 - 0 90 Thousand/µL    Eosinophils Absolute 0 30 0 00 - 0 40 Thousand/µL    Basophils Absolute 0 10 0 00 - 0 10 Thousands/µL   Comprehensive metabolic panel    Collection Time: 01/06/20  5:20 AM   Result Value Ref Range    Sodium 142 137 - 147 mmol/L    Potassium 3 5 (L) 3 6 - 5 0 mmol/L    Chloride 111 (H) 97 - 108 mmol/L    CO2 26 22 - 30 mmol/L    ANION GAP 5 5 - 14 mmol/L    BUN 20 5 - 25 mg/dL    Creatinine 1 92 (H) 0 60 - 1 20 mg/dL    Glucose 161 (H) 70 - 99 mg/dL    Calcium 8 8 8 4 - 10 2 mg/dL    AST 46 (H) 14 - 36 U/L    ALT 71 (H) 9 - 52 U/L    Alkaline Phosphatase 546 (H) 43 - 122 U/L    Total Protein 5 7 (L) 5 9 - 8 4 g/dL    Albumin 2 5 (L) 3 0 - 5 2 g/dL    Total Bilirubin 1 00 <1 30 mg/dL    eGFR 29 (L) >60 ml/min/1 73sq m   Fingerstick Glucose (POCT)    Collection Time: 01/06/20  5:26 AM   Result Value Ref Range    POC Glucose 171 (H) 65 - 140 mg/dl   Fingerstick Glucose (POCT)    Collection Time: 01/06/20 11:07 AM   Result Value Ref Range    POC Glucose 272 (H) 65 - 140 mg/dl       Ct Abdomen Pelvis Wo Contrast    Result Date: 12/31/2019  Narrative: CT ABDOMEN AND PELVIS WITHOUT IV CONTRAST INDICATION:   Abdominal pain, acute, nonlocalized  COMPARISON:  CT of abdomen pelvis on May 28, 2019, March 31, 2017  TECHNIQUE:  CT examination of the abdomen and pelvis was performed without intravenous contrast   Axial, sagittal, and coronal 2D reformatted images were created from the source data and submitted for interpretation  Radiation dose length product (DLP) for this visit:  711 mGy-cm     This examination, like all CT scans performed in the [facility], was performed utilizing techniques to minimize radiation dose exposure, including the use of iterative reconstruction and automated exposure control  Enteric contrast was administered  FINDINGS: ABDOMEN LOWER CHEST:  3 mm nodule in the right lower lobe is stable since March 31, 2017, likely benign  LIVER/BILIARY TREE:  Prominent common bile duct measuring up to 9 mm in diameter, stable  GALLBLADDER:  No calcified gallstones  No pericholecystic inflammatory change  SPLEEN:  Unremarkable  PANCREAS:  Unremarkable  ADRENAL GLANDS:  Unremarkable  KIDNEYS/URETERS:  Mild scarring of the left kidney  Mild thickening of the left ureteral wall  No significant hydronephrosis  STOMACH AND BOWEL:  Unremarkable  APPENDIX:  No findings to suggest appendicitis  ABDOMINOPELVIC CAVITY:  No ascites or free intraperitoneal air  No lymphadenopathy  VESSELS:  Mild atherosclerotic calcifications  PELVIS REPRODUCTIVE ORGANS:  Unremarkable for patient's age  URINARY BLADDER:  Bladder is collapsed around a suprapubic catheter balloon  ABDOMINAL WALL/INGUINAL REGIONS:  Unremarkable  OSSEOUS STRUCTURES:  No acute fracture or destructive osseous lesion  Impression: Suprapubic catheter in place  Mild thickening of the left ureteral wall  Correlate with urinalysis for ascending urinary tract infection  Workstation performed: [id number]     Xr Chest Pa & Lateral    Result Date: 1/2/2020  Narrative: CHEST INDICATION:   stroke alert  COMPARISON:  December 3, 2018 EXAM PERFORMED/VIEWS:  XR CHEST PA & LATERAL Images: 3 FINDINGS:  Lungs are adequately aerated  No consolidation or effusion  Cardiac size normal   No vascular congestion or peribronchial thickening  No pneumothorax or free air  Right upper extremity venous catheter tip projects over the right axilla  Impression: No acute cardiopulmonary disease   Workstation performed: [id number]     Ct Stroke Alert Brain    Result Date: 1/1/2020  Narrative: CT BRAIN - STROKE ALERT PROTOCOL INDICATION:   Altered mental status  COMPARISON:  5/24/2018 TECHNIQUE:  CT examination of the brain was performed  In addition to axial images, coronal reformatted images were created and submitted for interpretation  Radiation dose length product (DLP) for this visit:  706 mGy-cm   This examination, like all CT scans performed in the [facility], was performed utilizing techniques to minimize radiation dose exposure, including the use of iterative reconstruction and automated exposure control  IMAGE QUALITY:  Diagnostic  FINDINGS:  PARENCHYMA:  No intracranial mass, mass effect or midline shift  No acute intracranial hemorrhage  No CT signs of acute infarction  Normal intracranial vasculature  VENTRICLES AND EXTRA-AXIAL SPACES:  Normal for patient's age  VISUALIZED ORBITS AND PARANASAL SINUSES:  Unremarkable  CALVARIUM AND EXTRACRANIAL SOFT TISSUES:   Normal      Impression: No acute findings  Findings were directly discussed with Sp Lott on 1/1/2020 3:59 PM  Workstation performed: [id number]     Us Abdomen Limited    Result Date: 12/31/2019  Narrative: RIGHT UPPER QUADRANT ULTRASOUND INDICATION:    RUQ PAIN, NO FEVER, NO ELEVATED WBC CBD 9mm on CT  Check CBD diameter  COMPARISON:  None  TECHNIQUE:   Real-time ultrasound of the right upper quadrant was performed with a curvilinear transducer with both volumetric sweeps and still imaging techniques  FINDINGS: PANCREAS:  Visualized portions of the pancreas are within normal limits  AORTA AND IVC:  Visualized portions are normal for patient age  LIVER: Size:  Mildly enlarged  The liver measures 19 1 cm in the midclavicular line  Contour:  Surface contour is smooth  Parenchyma:  Echogenicity and echotexture are within normal limits  No evidence of suspicious mass  Limited imaging of the main portal vein shows it to be patent and hepatopetal   BILIARY: The gallbladder is normal in caliber   No wall thickening or pericholecystic fluid  Gallbladder sludge is present without calculi identified  No sonographic Michael's sign  No intrahepatic biliary dilatation  CBD measures 11 mm  No choledocholithiasis  KIDNEY: Right kidney measures 10 0 x 4 3 cm  Within normal limits  ASCITES:   None  Impression: 1  Gallbladder sludge without calculi  No wall thickening, pericholecystic fluid, or sonographic Michael sign  2   Dilated common bile duct at 11 mm without definite choledocholithiasis identified  Recommend correlation with liver function studies and, if clinically warranted, consider MRI/MRCP for further evaluation  3   Additional findings as noted  Workstation performed: [id number]     Us Transvaginal Non Ob Complete (does Not Inc Pelvis)    Result Date: 12/31/2019  Narrative: PELVIC ULTRASOUND, TRANSVAGINAL INDICATION:  62years old  vaginal pain, r/o urethrovaginal fistula, vaginal discharge  COMPARISON: CT dated December 31, 2019  TECHNIQUE: Transvaginal pelvic ultrasound was performed in sagittal and transverse planes  Transvaginal imaging was performed to better evaluate the endometrium and ovaries  Transabdominal imaging was not performed  Imaging included volumetric sweeps as well as traditional still imaging technique  FINDINGS: UTERUS: The uterus is retroverted in position, measuring 5 4 x 3 3 x 5 4 cm  Contour and echotexture appear normal  The cervix shows no suspicious abnormality  ENDOMETRIUM:  The endometrium measures 4 mm  Homogeneous in appearance  Color evaluation demonstrates no abnormal hypervascular flow within the endometrium  OVARIES/ADNEXA: Right ovary:  2 7 x 1 8 x 1 6 cm  No suspicious right ovarian abnormality  Doppler flow within normal limits  Left ovary:  2 x 1 6 x 1 5 cm  No suspicious left ovarian abnormality  Doppler flow within normal limits  No suspicious adnexal mass or loculated collections  There is minimal free fluid in the pelvis  Impression:  The endometrium measures 4 mm  There is minimal free fluid in the pelvis  Workstation performed: [id number]       Labs and pertinent reports reviewed  This note has been generated by voice recognition software system  Therefore, there may be spelling, grammar, and or syntax errors  Please contact if questions arise

## 2020-01-06 NOTE — SOCIAL WORK
Message received from [facility]  New patient appt  Scheduled for 2/25   Information on AVS

## 2020-01-06 NOTE — UTILIZATION REVIEW
Continued Stay Review    Date: 1/4/20                         Current Patient Class: inpatient  Current Level of Care: Med/surg    HPI:54 y o  female initially admitted on 12/31    Assessment/Plan:   1:1 observation discoontinued  Cleared by psychiatry  Hematology consult placed  Recheck CBC  Nephrology consult 1/4:Acute kidney injury likely secondary to element of prerenal azotemia that has transitioned to ATN  LFT s improving  Creat 2 2 stable  Continue with IVF  Follow BMP  at risk for NSF with MRI with cindi - would hold off for GFR > 35 for 48hrs prior to cindi administration  Proteinuria most likely d/t diabetic nephropathy  Anemia-iron and TIBC low, iron sat 20%, ferritin high  Hgb 7  Transfuse with symptoms and hgb <7    GI consult 1/3;  LFTs slightly increase and now with elevated bilirubin  Check either MRCP or ERCP  If fever, start abx  Right upper quad abdominal pain with elevated LFTs  US shows sludge, but no stones  Common bile duct slightly dilated  Pertinent Labs/Diagnostic Results:   CXR 1/2:  No acute cardiopulmonary disease  Ct stroke alert 1/1: No acute findings  US transvag 12/31; The endometrium measures 4 mm  There is minimal free fluid in the pelvis    Results from last 7 days   Lab Units 01/04/20  1617 01/04/20  0435 01/03/20  0454   WBC Thousand/uL  --  5 20 7 90   HEMOGLOBIN g/dL 6 6* 7 0* 7 7*   HEMATOCRIT % 20 2* 21 2* 23 5*   PLATELETS Thousands/uL  --  167 222   NEUTROS ABS Thousands/µL  --  3 40 5 40     Results from last 7 days   Lab Units 01/02/20  0457   RETIC CT PCT % 3 27*     Results from last 7 days   Lab Units 01/04/20  0435 01/03/20  0454 01/02/20  0457   SODIUM mmol/L 140 145 141   POTASSIUM mmol/L 4 1 4 1 3 7   CHLORIDE mmol/L 113* 121* 117*   CO2 mmol/L 22 19* 21*   ANION GAP mmol/L 5 5 3*   BUN mg/dL 19 20 20   CREATININE mg/dL 2 22* 2 17* 2 19*   EGFR ml/min/1 73sq m 24* 25* 24*   CALCIUM mg/dL 8 0* 8 3* 8 1*     Results from last 7 days   Lab Units 01/04/20  0435 01/03/20  0454 01/02/20  0457 01/01/20  1542 01/01/20  0521   AST U/L 145* 245* 45*  --  36   ALT U/L 102* 121* 33  --  28   ALK PHOS U/L 500* 556* 554*  --  515*   TOTAL PROTEIN g/dL 5 5* 5 6* 5 5*  --  5 5*   ALBUMIN g/dL 2 3* 2 3* 2 4*  --  2 3*   TOTAL BILIRUBIN mg/dL 2 40* 2 60* 0 50  --  0 70   BILIRUBIN DIRECT mg/dL  --  1 90*  --   --   --    AMMONIA umol/L  --   --   --  <9*  --      Results from last 7 days   Lab Units 01/04/20  1626 01/04/20  1058 01/04/20  0522   POC GLUCOSE mg/dl 229* 99 154*     Results from last 7 days   Lab Units 01/03/20  0454 01/02/20  0457 01/01/20  1528 01/01/20  0521 12/31/19  2048 12/31/19  0635 12/30/19  2245   GLUCOSE RANDOM mg/dL 52* 303* 303* 291* 237* 148* 191*         Results from last 7 days   Lab Units 01/01/20  0521   HEMOGLOBIN A1C % 9 6*   EAG mg/dl 229     BETA-HYDROXYBUTYRATE   Date Value Ref Range Status   12/31/2019 4 2 (H) <0 6 mmol/L Final      Results from last 7 days   Lab Units 01/01/20  1520   PH ART  7 360   PCO2 ART mm Hg 33 0*   PO2 ART mm Hg 59 0*   HCO3 ART mmol/L 18 6*   BASE EXC ART mmol/L -6 2*   O2 CONTENT ART mL/dL 9 8*   O2 HGB, ARTERIAL % 91 1*   ABG SOURCE  Radial, Left       Results from last 7 days   Lab Units 01/01/20  1528   TROPONIN I ng/mL <0 01         Results from last 7 days   Lab Units 01/01/20  1529 12/31/19  0635   LACTIC ACID mmol/L 0 8 0 6*       Results from last 7 days   Lab Units 01/03/20  0454 01/01/20  0521   FERRITIN ng/mL 1,540*  1,565* 943*     Results from last 7 days   Lab Units 01/03/20  0454   HEP B S AG  Non-reactive   HEP C AB  Non-reactive   HEP B C IGM  Non-reactive     Results from last 7 days   Lab Units 01/03/20  0454   LIPASE u/L 61             Results from last 7 days   Lab Units 01/03/20  1230 12/30/19  2249   CLARITY UA   --  Cloudy*   COLOR UA   --  Qian*   SPEC GRAV UA   --  1 020   PH UA   --  5 0   GLUCOSE UA mg/dl  --  50 (1/25%)*   KETONES UA mg/dl  --  50 (2+)*   BLOOD UA   --  50 0* PROTEIN UA mg/dl  --  >=500*   NITRITE UA   --  Negative   BILIRUBIN UA   --  Negative   UROBILINOGEN UA mg/dL  --  Negative   LEUKOCYTES UA   --  500 0*   WBC UA /hpf  --  Innumerable*   RBC UA /hpf  --  0-1*   BACTERIA UA /hpf  --  Innumerable*   EPITHELIAL CELLS WET PREP /hpf  --  Moderate*   MUCUS THREADS   --  Moderate*   CREATININE UR mg/dL 53 2  --    PROTEIN UR mg/dL 586  --    PROT/CREAT RATIO UR  11 02*  --          Results from last 7 days   Lab Units 01/01/20  1648   AMPH/METH  Negative   BARBITURATE UR  Negative   BENZODIAZEPINE UR  Negative   COCAINE UR  Negative   METHADONE URINE  Negative   OPIATE UR  Negative   PCP UR  Negative   THC UR  Negative       Results from last 7 days   Lab Units 12/30/19  2249   URINE CULTURE  >100,000 cfu/ml Lactobacillus species*  >100,000 cfu/ml Escherichia coli*  >100,000 cfu/ml Escherichia coli ESBL*     Vital Signs:   /04/20 2324  97 8 °F (36 6 °C)  69  18  135/83  97 %  None (Room air)  Sitting   01/04/20 2249  97 7 °F (36 5 °C)  69  18  125/83  99 %  None (Room air)  Sitting   01/04/20 1621  98 2 °F (36 8 °C)  75  18  140/76  96 %  None (Room air)  Lying   01/04/20 0709  97 3 °F (36 3                   Medications:   Scheduled Medications:    Medications:  amLODIPine 10 mg Oral Daily   insulin glargine 5 Units Subcutaneous HS   insulin lispro 1-5 Units Subcutaneous 4x Daily (AC & HS)   nicotine 1 patch Transdermal Daily   pantoprazole 40 mg Oral BID AC   potassium chloride 40 mEq Oral Once   sertraline 50 mg Oral Daily     Continuous IV Infusions:     PRN Meds:    acetaminophen 650 mg Oral Q8H PRN   aluminum-magnesium hydroxide-simethicone 30 mL Oral Q4H PRN   HYDROmorphone 0 2 mg Intravenous Q4H PRN   ondansetron 4 mg Intravenous Q6H PRN       Discharge Plan: TBD  Network Utilization Review Department  [email or web address]  ATTENTION: Please call with any questions or concerns to [phone] and carefully listen to the prompts so that you are directed to the right person  All voicemails are confidential   Elbert Lofton all requests for admission clinical reviews, approved or denied determinations and any other requests to dedicated fax number below belonging to the campus where the patient is receiving treatment   List of dedicated fax numbers for the Facilities:  [address] DENIALS (Administrative/Medical Necessity) [phone]   1000  [facility] (Maternity/NICU/Pediatrics) [phone]   Daniel Bonds [phone]     [address]  [phone]   Steve Hernandez [phone]   Christo Gaspar [phone]   [address] [phone]   [facility]  [phone]   [address] [facility] [address] [facility]  [address] [facility] [address] [facility] [phone]

## 2020-01-06 NOTE — PROGRESS NOTES
Patient Name: Angélica Rubio  Patient MRN: [id number]  Date: 01/06/20  Service: [facility]    Subjective   Patient reports feeling better but then later complains that she still has right upper quadrant abdominal pain  Pain is not worsened with meals  Denies nausea or vomiting  No change in bowel habits such as constipation or diarrhea  No fevers or chills  She is watching cartoons at the bedside in no distress  Vitals  Blood pressure 152/80, pulse 67, temperature (!) 97 3 °F (36 3 °C), temperature source Temporal, resp  rate 18, height 5' 3" (1 6 m), weight 82 9 kg (182 lb 12 2 oz), SpO2 100 %, not currently breastfeeding  Physical Exam:     General Appearance:    Awake, alert, oriented x3, no distress, well developed, well    nourished   Head:    Normocephalic without obvious abnormality   Eyes:    PERRL, conjunctiva/corneas clear, EOM's intact        Neck:   Supple, no adenopathy   Throat:   Mucous membranes moist   Lungs:     Clear to auscultation bilaterally, no wheezing or rhonchi   Heart:    Regular rate and rhythm, S1 and S2 normal, no murmur   Abdomen:     Soft, RUQ tenderness, non-distended  bowel sounds active  No  masses, rebound or guarding  + suprapubic catheter in place  Extremities:   Extremities with chronic stasis changes   Psych  Derm:   Normal affect    No jaundice   Neurologic:   CNII-XII grossly intact  Speech intact         Laboratory Studies  Results from last 7 days   Lab Units 01/06/20  0520 01/05/20  0616 01/04/20  1617 01/04/20  0435 01/03/20  0454 01/02/20  0457 01/01/20  1528 01/01/20  0521   WBC Thousand/uL 5 00 5  10  --  5 20 7 90 4 80 4 20* 4 60   HEMOGLOBIN g/dL 9 6* 9 7* 6 6* 7 0* 7 7* 7 4* 7 4* 7 7*   HEMATOCRIT % 29 1* 29 8* 20 2* 21 2* 23 5* 22 4* 23 2* 23 3*   PLATELETS Thousands/uL 225 205  --  167 222 208 208 211     Results from last 7 days   Lab Units 01/06/20  0520 01/05/20  2760 01/04/20  0435 01/03/20  0454 01/02/20  0457  01/01/20  0667 SODIUM mmol/L 142 140 140 145 141   < > 138   POTASSIUM mmol/L 3 5* 4 0 4 1 4 1 3 7   < > 4 0   CHLORIDE mmol/L 111* 113* 113* 121* 117*   < > 115*   CO2 mmol/L 26 23 22 19* 21*   < > 20*   BUN mg/dL 20 20 19 20 20   < > 25   CREATININE mg/dL 1 92* 2 20* 2 22* 2 17* 2 19*   < > 2 08*   CALCIUM mg/dL 8 8 8 7 8 0* 8 3* 8 1*   < > 7 4*   ALBUMIN g/dL 2 5*  --  2 3* 2 3* 2 4*  --  2 3*   TOTAL BILIRUBIN mg/dL 1 00  --  2 40* 2 60* 0 50  --  0 70   ALK PHOS U/L 546*  --  500* 556* 554*  --  515*   ALT U/L 71*  --  102* 121* 33  --  28   AST U/L 46*  --  145* 245* 45*  --  36    < > = values in this interval not displayed       Acute hepatitis panel negative  HIV panel negative  ASMA negative  AMA negative  JOSE pending  Iron 26  Ferritin 1565 (acute phase reactant)    Imaging and Other Studies      Inhouse Medications     Current Facility-Administered Medications:     acetaminophen (TYLENOL) tablet 650 mg, 650 mg, Oral, Q8H PRN, 650 mg at 01/06/20 0606    aluminum-magnesium hydroxide-simethicone (MYLANTA) 200-200-20 mg/5 mL oral suspension 30 mL, 30 mL, Oral, Q4H PRN    amLODIPine (NORVASC) tablet 10 mg, 10 mg, Oral, Daily, 10 mg at 01/06/20 0807    HYDROmorphone (DILAUDID) injection 0 2 mg, 0 2 mg, Intravenous, Q4H PRN, 0 2 mg at 01/06/20 0245    insulin glargine (LANTUS) subcutaneous injection 5 Units 0 05 mL, 5 Units, Subcutaneous, HS, 5 Units at 01/05/20 2242    insulin lispro (HumaLOG) 100 units/mL subcutaneous injection 1-5 Units, 1-5 Units, Subcutaneous, 4x Daily (AC & HS), 1 Units at 01/06/20 0605 **AND** Fingerstick Glucose (POCT), , , 4x Daily AC and at bedtime    multi-electrolyte (PLASMALYTE-A/ISOLYTE-S PH 7 4) IV solution, 80 mL/hr, Intravenous, Continuous, 80 mL/hr at 01/05/20 2006    nicotine (NICODERM CQ) 14 mg/24hr TD 24 hr patch 1 patch, 1 patch, Transdermal, Daily, 1 patch at 01/01/20 0844    ondansetron (ZOFRAN) injection 4 mg, 4 mg, Intravenous, Q6H PRN, 4 mg at 12/31/19 0607   pantoprazole (PROTONIX) EC tablet 40 mg, 40 mg, Oral, BID AC, 40 mg at 01/06/20 0604    sertraline (ZOLOFT) tablet 50 mg, 50 mg, Oral, Daily, 50 mg at 01/06/20 0807      Assessment/Plan:    1  Elevated liver enzymes with right upper quadrant abdominal pain  RUQ US shows gallbladder sludge without calculi and dilated CBD 11 mm without definitive choledocholithiasis  MRCP with gadolinium would be ideal however due to kidney function imaging on hold  Overall liver enzymes have improved raising the possibility of a ball valving versus passed stone  Patient persistently complains of RUQ abdominal pain  No leukocytosis or fever to warrant antibiotics  Will discuss continued monitoring vs transfer to [facility] for ERCP with GI attending            Claude Ware PA-C

## 2020-01-06 NOTE — NURSING NOTE
Pt in 10/10 pain, at bedside moaning, hunched over bedside table  Pt given tylenol, however not yet due for anything stronger  Residents made aware

## 2020-01-07 NOTE — PROGRESS NOTES
Progress Note    Jamison Mccullough 62 y o  female MRN: [id number]  Unit/Bed#: 7T [facility] 702-02 Encounter: [id number]  Admitting Physician: Lila Chavez MD  PCP: Caren Hernandez MD  Date of Admission:  12/30/2019 10:23 PM    Assessment and Plan    * Acute kidney injury superimposed on CKD [facility])  Assessment & Plan  Cr 2 89 on admission   Today Cr trending up from 1 92-->2 06, GFR 26   Baseline Cr 1 5  Reports good urine output     - IVF stopped yesterday, patient endorses good PO fluid intake   - Per nephrology, await for kidney function to stabilize, GFR >35 in order for MRCP to be completed    - Avoid nephrotoxic agents  - CMP in am  - Nephrology following     Weight loss, unintentional  Assessment & Plan  Pap smear showed ASCUS     -HPV high risk pending results   -Outpatient mammogram and colonoscopy for malignancy screening discussed with patient  Agreeable to plan   Type 2 diabetes mellitus with kidney complication, with long-term current use of insulin [facility])  Assessment & Plan  Lab Results   Component Value Date    HGBA1C 9 1 (A) 07/08/2019       Recent Labs     12/31/19  1113 12/31/19  1545 12/31/19  2047 01/01/20  0553   POCGLU 143* 281* 250* 339*       Blood Sugar Average: Last 72 hrs:  (P) 230 8     Better controlled BG  HbA1c trending up from 9 1-->9 6   Home lantus 40 U qhs, hasn't taken in two weeks because of poor po intake     Received dose of Lantus 5 U last night, continue same regimen at bedtime   - Diabetic diet  - ISS and accuchek achs    Anemia  Assessment & Plan  Baseline Hb 10  Currently Hg stable at 9 6    - FOBT negative   - Will resume Vitamin B12 once renal function improves  - GI consult with plasma cell dyscrasia workup, labs pending   - Hematology on board       Right upper quadrant abdominal pain  Assessment & Plan  Stable, reports intermittent RUQ pain up to 9/10, controlled with Dilaudid PRN  Alk phos trending up 546-->560     Liver enzymes trending down: AST 39 and ALT 54  RUQ US: Gallbladder sludge without calculi  Dilated common bile duct at 11 mm without definite choledocholithiasis     GI recommending ERCP and possible transfer to [place]   Per nephrology, it would be ideal to wait for patient's creatinine to be at baseline and GFR >35 for 48 hours before any contrast procedure  If  MRCP becomes urgent then will need to discuss benefits and risks with the patient and IR  Appreciate nephrology recommendations     - Await GI further recommendations    - Pain control with Dilaudid, changed to Q6hrs PRN   - Multiple serologies for hepatic disease ordered: Hepatitis panel normal, HIV normal, others currently pending    HTN (hypertension)  Assessment & Plan  Slightly levated within last 24 hrs SBP of 150-160  Goal < 140/90      - Amlodipine 5mg has been changed to 10 mg by nephrology   - Monitor VS    High anion gap metabolic acidosis  Assessment & Plan  Resolved    Vaginal discharge  Assessment & Plan  Speculum exam performed 12/31/2019  Ultrasound of the pelvis showed endometrium 4 mm, no suspicion for adnexal mass or loculated collection  Minimal free fluid in pelvis, no fistula noted  - Pap smear result with ASCUS, HPV high risk pending     Neurogenic bladder  Assessment & Plan  H/o chronic neurogenic bladder with a suprapubic catheter in place, changed every 6 weeks       - Catheter site was examined, no signs of infection was noted    Abnormal urinalysis  Assessment & Plan  Asymptomatic and has suprapubic catheter  Received one dose of Zosyn     - Abx discontinued       Suicidal ideation  Assessment & Plan  Resolved  Psychiatry started patient on Remeron, however it was discontinued yesterday due sleep cycle disturbance     -Continue Zoloft 25 mg daily    Gastroesophageal reflux disease  Assessment & Plan  Chronic, Stable  Home meds: mylanta and prilosec 20 mg daily  - Protonix 40 mg PO BID     - Mylanta 30 mL daily prn    Tobacco dependence  Assessment & Plan  Smokes 1/2 PPD of cigarettes  -On nicotine patch  - Cessation counseling provided    Proteinuria  Assessment & Plan  Possible diabetic nephrology  Nephrology following - appreciate all recommendations  Acute Hepatitis panel negative  HIV normal   JOSE normal   Protein/Cr ratio: 11 2  Urine protein 603    - Anti-smooth ms, IgG and antimitochondrial Ab pending   - Urine protein 603  - Start ACEi/ARB once JULIO resolves  Stage 3 chronic kidney disease (HCC)  Assessment & Plan  Baseline Cr 1 5 - 1 8, etiology possibly due to diabetes and hypertension   - Currently has JULIO  - Nephrology consulted  - Will need outpatient nephrology follow-up after discharge  VTE Pharmacologic Prophylaxis:   Pharmacologic: Anemic, not on any anticoagulation regimen   Mechanical VTE Prophylaxis in Place: Yes    Patient Centered Rounds: I have performed bedside rounds with nursing staff today  Discussions with Specialists or Other Care Team Provider: Nephrology     Education and Discussions with Family / Patient: Patient     Time Spent for Care: 20 minutes  More than 50% of total time spent on counseling and coordination of care as described above  Current Length of Stay: 7 day(s)    Current Patient Status: Inpatient   Certification Statement: The patient will continue to require additional inpatient hospital stay due to JULIO, RUQ abdominal pain and anemia     Discharge Plan: When clinically stable     Code Status: Level 1 - Full Code      Subjective:     Patient seen and examined at bedside  Patient states she continues with slight intermittent RUQ abdominal tenderness and discomfort  Labs were not obtained this morning due to line being clogged, Catflo administered  Patient denies any fevers, chest pain, shortnes of breath, nausea, vomiting and diarrhea  Patient was seen by GI yesterday and recommendation was to be monitored vs transferring to [place] for ERCP  Awaiting further recommendations today       Objective:     Vitals:   Temp (24hrs), Av 2 °F (36 8 °C), Min:97 1 °F (36 2 °C), Max:98 9 °F (37 2 °C)    Temp:  [97 1 °F (36 2 °C)-98 9 °F (37 2 °C)] 97 1 °F (36 2 °C)  HR:  [68-72] 68  Resp:  [16-20] 20  BP: (118-168)/(71-90) 168/90  SpO2:  [98 %-99 %] 99 %  Body mass index is 32 22 kg/m²  Input and Output Summary (last 24 hours): Intake/Output Summary (Last 24 hours) at 2020 1131  Last data filed at 2020 0859  Gross per 24 hour   Intake 960 ml   Output 350 ml   Net 610 ml       Physical Exam:     Physical Exam   Constitutional: She is oriented to person, place, and time  She appears well-developed and well-nourished  No distress  HENT:   Head: Normocephalic and atraumatic  Nose: Nose normal    Mouth/Throat: Oropharynx is clear and moist    Eyes: Conjunctivae and EOM are normal    Neck: Normal range of motion  Neck supple  Cardiovascular: Normal rate, regular rhythm and normal heart sounds  Pulmonary/Chest: Effort normal and breath sounds normal  No respiratory distress  Abdominal: Soft  Bowel sounds are normal  She exhibits no distension  There is tenderness  There is no guarding  RUQ tenderness on palpation    Musculoskeletal: Normal range of motion  She exhibits edema  2+ pitting edema BLE to mid calf    Neurological: She is alert and oriented to person, place, and time  Skin: Skin is warm  No rash noted  She is not diaphoretic  Psychiatric: She has a normal mood and affect  Her behavior is normal  Judgment and thought content normal    Nursing note and vitals reviewed        Additional Data:     Labs:    Results from last 7 days   Lab Units 20  0929   WBC Thousand/uL 5 30   HEMOGLOBIN g/dL 9 6*   HEMATOCRIT % 29 5*   PLATELETS Thousands/uL 215   NEUTROS PCT % 56   LYMPHS PCT % 31   MONOS PCT % 8   EOS PCT % 4     Results from last 7 days   Lab Units 20  0928   POTASSIUM mmol/L 3 9   CHLORIDE mmol/L 110*   CO2 mmol/L 27   BUN mg/dL 25   CREATININE mg/dL 2 06*   CALCIUM mg/dL 8 7   ALK PHOS U/L 560* ALT U/L 54*   AST U/L 39*     Results from last 7 days   Lab Units 01/07/20  0929   INR  0 85*     Results from last 7 days   Lab Units 01/07/20  1112 01/07/20  0545 01/06/20  2031 01/06/20  1600 01/06/20  1107 01/06/20  0526 01/05/20  2108 01/05/20  1606 01/05/20  1118 01/05/20  0554 01/04/20  2059 01/04/20  1626   POC GLUCOSE mg/dl 328* 227* 248* 270* 272* 171* 276* 234* 267* 259* 236* 229*     Results from last 7 days   Lab Units 01/01/20  0521   HEMOGLOBIN A1C % 9 6*         * I Have Reviewed All Lab Data Listed Above  * Additional Pertinent Lab Tests Reviewed: All Labs Within Last 24 Hours Reviewed    Imaging:    Imaging Reports Reviewed Today Include: None   Imaging Personally Reviewed by Myself Includes: None     Recent Cultures (last 7 days):           Last 24 Hours Medication List:     Current Facility-Administered Medications:  acetaminophen 650 mg Oral Q8H PRN Selina French MD   aluminum-magnesium hydroxide-simethicone 30 mL Oral Q4H PRN Selina French MD   amLODIPine 10 mg Oral Daily Maura Johnson DO   HYDROmorphone 0 2 mg Intravenous Q6H PRN Cedrick Kelly MD   insulin glargine 5 Units Subcutaneous HS Cedrick Kelly MD   insulin lispro 1-5 Units Subcutaneous 4x Daily (AC & HS) Selina French MD   nicotine 1 patch Transdermal Daily Carla Willingham MD   ondansetron 4 mg Intravenous Q6H PRN Carla Willingham MD   pantoprazole 40 mg Oral BID AC Telma Barrientos IV, MD   sertraline 50 mg Oral Daily OMAYRA Fernandez        ** Please Note: Dictation voice to text software may have been used in the creation of this document   Patria Townsend MD  01/07/20  11:31 AM

## 2020-01-07 NOTE — PROGRESS NOTES
NEPHROLOGY PROGRESS NOTE   Jeannette Ndiaye 62 y o  female MRN: [id number]  Unit/Bed#: 7T [facility] 702-02 Encounter: [id number]      ASSESSMENT & PLAN:  1  Acute kidney injury suspected secondary to prerenal component likely transition to ATN  Serum creatinine peak at 2 89 and is improving down to 1 9 yesterday  Intravenously fluids were stopped yesterday  Waiting for labs to be drawn this morning, there were some issue with her PICC line  At risk for NSF given acute kidney injury episode and low GFR if MRI with gadolinium is done, awaiting further improvement on her kidney function  2  Chronic kidney disease stage 3 with baseline creatinine around 1 5-1 8 with nephrotic range proteinuria suspected secondary to long-term history of uncontrolled diabetes  Urine electrophoresis did not show any monoclonal bands, serum electrophoresis needs to be redrawn  3  Hypokalemia, status post replacement yesterday, follow labs in    4  Abnormal LFTs with right upper quadrant abdominal pain, GI on board  Plan for MRCP with gadolinium once kidney function improved  Patient continue with abdominal pain but clinically improving  5  Hypertension, blood pressure slightly elevated, amlodipine was increased to 10 mg daily 01/05  Will follow for now  Monitor blood pressure now that she is off intravenously fluids  6  Hyperchloremia, follow labs, currently she is off intravenously fluid  7  Anemia, monitor H&H and transfusion as needed  Hematology consulted  Serum electrophoresis needs to be redrawn    My plan and recommendations were discussed with family medicine team       SUBJECTIVE:  Patient seen and examined, including breakfast during my evaluation, denies any nausea, no vomiting, no diarrhea    She continues with right upper quadrant abdominal pain improving with pain medications    OBJECTIVE:  Current Weight: Weight - Scale: 82 5 kg (181 lb 14 1 oz)  Vitals:    01/07/20 0729   BP: 168/90   Pulse: 68   Resp: 20   Temp: (!) 97 1 °F (36 2 °C)   SpO2: 99%       Intake/Output Summary (Last 24 hours) at 1/7/2020 3183  Last data filed at 1/6/2020 2100  Gross per 24 hour   Intake 1020 ml   Output 350 ml   Net 670 ml     General:  Chronically ill, cooperative, in not acute distress  Eyes: conjunctivae pale, anicteric sclerae  ENT: lips and mucous membranes moist  Neck: supple, no JVD  Chest: clear breath sounds bilateral, no crackles, ronchus or wheezings  CVS: distinct S1 & S2, normal rate, regular rhythm  Abdomen: soft, mildly-tender over right upper quadrant, non-distended, normoactive bowel sounds  Back: no CVA tenderness  Extremities: no edema of both legs  Skin: no rash  Neuro: awake, alert, oriented        Medications:    Current Facility-Administered Medications:     acetaminophen (TYLENOL) tablet 650 mg, 650 mg, Oral, Q8H PRN, Carlos Morrison MD, 650 mg at 01/06/20 0606    alteplase (CATHFLO) injection 2 mg, 2 mg, Intracatheter, Once, Lester Darling MD    aluminum-magnesium hydroxide-simethicone (MYLANTA) 200-200-20 mg/5 mL oral suspension 30 mL, 30 mL, Oral, Q4H PRN, Carlos Morrison MD    amLODIPine (NORVASC) tablet 10 mg, 10 mg, Oral, Daily, Maura Johnson, DO, 10 mg at 01/06/20 0807    HYDROmorphone (DILAUDID) injection 0 2 mg, 0 2 mg, Intravenous, Q6H PRN, Lester Darling MD    insulin glargine (LANTUS) subcutaneous injection 5 Units 0 05 mL, 5 Units, Subcutaneous, HS, Lester Darling MD, 5 Units at 01/06/20 2300    insulin lispro (HumaLOG) 100 units/mL subcutaneous injection 1-5 Units, 1-5 Units, Subcutaneous, 4x Daily (AC & HS), 2 Units at 01/07/20 0636 **AND** Fingerstick Glucose (POCT), , , 4x Daily AC and at bedtime, Carlos Morrison MD    nicotine (NICODERM CQ) 14 mg/24hr TD 24 hr patch 1 patch, 1 patch, Transdermal, Daily, Pia Neri MD, 1 patch at 01/01/20 0844    ondansetron (ZOFRAN) injection 4 mg, 4 mg, Intravenous, Q6H PRN, Pia Neri MD, 4 mg at 12/31/19 0607   pantoprazole (PROTONIX) EC tablet 40 mg, 40 mg, Oral, BID AC, Jhonatan Can IV, MD, 40 mg at 01/07/20 6287    sertraline (ZOLOFT) tablet 50 mg, 50 mg, Oral, Daily, OMAYRA Merlos, 50 mg at 01/06/20 0807    Invasive Devices:        Lab Results:   Results from last 7 days   Lab Units 01/06/20  0520 01/05/20  0616 01/04/20  1617 01/04/20  0435 01/03/20  0454   WBC Thousand/uL 5 00 5  10  --  5 20 7 90   HEMOGLOBIN g/dL 9 6* 9 7* 6 6* 7 0* 7 7*   HEMATOCRIT % 29 1* 29 8* 20 2* 21 2* 23 5*   PLATELETS Thousands/uL 225 205  --  167 222   SODIUM mmol/L 142 140  --  140 145   POTASSIUM mmol/L 3 5* 4 0  --  4 1 4 1   CHLORIDE mmol/L 111* 113*  --  113* 121*   CO2 mmol/L 26 23  --  22 19*   BUN mg/dL 20 20  --  19 20   CREATININE mg/dL 1 92* 2 20*  --  2 22* 2 17*   CALCIUM mg/dL 8 8 8 7  --  8 0* 8 3*   ALK PHOS U/L 546*  --   --  500* 556*   ALT U/L 71*  --   --  102* 121*   AST U/L 46*  --   --  145* 245*       Previous work up:  See previous notes      Portions of the record may have been created with voice recognition software  Occasional wrong word or "sound a like" substitutions may have occurred due to the inherent limitations of voice recognition software  Read the chart carefully and recognize, using context, where substitutions have occurred  If you have any questions, please contact the dictating provider

## 2020-01-07 NOTE — PROGRESS NOTES
Patient Name: Leeroy Syed  Patient MRN: [id number]  Date: 01/07/20  Service: [facility]    Subjective   Patient currently without abdominal pain but states the pain has been waxing/waning  Her pain has not been related to meals  She ate 100% breakfast this morning without difficulty  She denies nausea, vomiting  Her last bowel movement was yesterday  No fevers or chills reported  She has had some pruritus  AM labs pending due to a problem with her line - currently being drawn by RN  Vitals  Blood pressure 168/90, pulse 68, temperature (!) 97 1 °F (36 2 °C), temperature source Temporal, resp  rate 20, height 5' 3" (1 6 m), weight 82 5 kg (181 lb 14 1 oz), SpO2 99 %, not currently breastfeeding  Physical Exam:     General Appearance:    Awake, alert, oriented x3, no distress, well developed, well    nourished   Head:    Normocephalic without obvious abnormality   Eyes:    PERRL, conjunctiva/corneas clear, EOM's intact        Neck:   Supple, no adenopathy   Throat:   Mucous membranes moist   Lungs:     Clear to auscultation bilaterally, no wheezing or rhonchi   Heart:    Regular rate and rhythm, S1 and S2 normal, no murmur   Abdomen:     Soft, obese,  non-tender, non-distended  bowel sounds active  No masses, rebound or guarding  Extremities:   Extremities with edema   Psych  Derm:   Normal affect    No jaundice, generalized dry skin noted   Neurologic:   CNII-XII grossly intact  Speech intact         Laboratory Studies  Results from last 7 days   Lab Units 01/06/20  0520 01/05/20  0616 01/04/20  1617 01/04/20  0435 01/03/20  0454 01/02/20  0457 01/01/20  1528 01/01/20  0521   WBC Thousand/uL 5 00 5  10  --  5 20 7 90 4 80 4 20* 4 60   HEMOGLOBIN g/dL 9 6* 9 7* 6 6* 7 0* 7 7* 7 4* 7 4* 7 7*   HEMATOCRIT % 29 1* 29 8* 20 2* 21 2* 23 5* 22 4* 23 2* 23 3*   PLATELETS Thousands/uL 225 205  --  167 222 208 208 211     Results from last 7 days   Lab Units 01/06/20  0520 01/05/20  8852 01/04/20  0435 01/03/20  0454 01/02/20  0457  01/01/20  0521   SODIUM mmol/L 142 140 140 145 141   < > 138   POTASSIUM mmol/L 3 5* 4 0 4 1 4 1 3 7   < > 4 0   CHLORIDE mmol/L 111* 113* 113* 121* 117*   < > 115*   CO2 mmol/L 26 23 22 19* 21*   < > 20*   BUN mg/dL 20 20 19 20 20   < > 25   CREATININE mg/dL 1 92* 2 20* 2 22* 2 17* 2 19*   < > 2 08*   CALCIUM mg/dL 8 8 8 7 8 0* 8 3* 8 1*   < > 7 4*   ALBUMIN g/dL 2 5*  --  2 3* 2 3* 2 4*  --  2 3*   TOTAL BILIRUBIN mg/dL 1 00  --  2 40* 2 60* 0 50  --  0 70   ALK PHOS U/L 546*  --  500* 556* 554*  --  515*   ALT U/L 71*  --  102* 121* 33  --  28   AST U/L 46*  --  145* 245* 45*  --  36    < > = values in this interval not displayed           Imaging and Other Studies      Inhouse Medications     Current Facility-Administered Medications:     acetaminophen (TYLENOL) tablet 650 mg, 650 mg, Oral, Q8H PRN, 650 mg at 01/06/20 0606    alteplase (CATHFLO) injection 2 mg, 2 mg, Intracatheter, Once    aluminum-magnesium hydroxide-simethicone (MYLANTA) 200-200-20 mg/5 mL oral suspension 30 mL, 30 mL, Oral, Q4H PRN    amLODIPine (NORVASC) tablet 10 mg, 10 mg, Oral, Daily, 10 mg at 01/06/20 0807    HYDROmorphone (DILAUDID) injection 0 2 mg, 0 2 mg, Intravenous, Q6H PRN    insulin glargine (LANTUS) subcutaneous injection 5 Units 0 05 mL, 5 Units, Subcutaneous, HS, 5 Units at 01/06/20 2300    insulin lispro (HumaLOG) 100 units/mL subcutaneous injection 1-5 Units, 1-5 Units, Subcutaneous, 4x Daily (AC & HS), 2 Units at 01/07/20 0636 **AND** Fingerstick Glucose (POCT), , , 4x Daily AC and at bedtime    nicotine (NICODERM CQ) 14 mg/24hr TD 24 hr patch 1 patch, 1 patch, Transdermal, Daily, 1 patch at 01/01/20 0844    ondansetron (ZOFRAN) injection 4 mg, 4 mg, Intravenous, Q6H PRN, 4 mg at 12/31/19 0607    pantoprazole (PROTONIX) EC tablet 40 mg, 40 mg, Oral, BID AC, 40 mg at 01/07/20 2720    sertraline (ZOLOFT) tablet 50 mg, 50 mg, Oral, Daily, 50 mg at 01/06/20 5000      Assessment/Plan:    1  Elevated liver enzymes with right upper quadrant abdominal pain  RUQ US shows gallbladder sludge without calculi and dilated CBD 11 mm without definitive choledocholithiasis  MRCP with gadolinium would be ideal however due to kidney function, imaging on hold  Overall liver enzymes have improved raising the possibility of a ball-valving vs passed stone  Patient complains of intermittent RUQ abdominal pain  No leukocytosis or fever to warrant antibiotics  Discussed with GI attending yesterday, ERCP also poses theoretical risk to kidneys from contrast  Discussed with Dr Taylor Mock  - risk of ERCP contrast much less than IV contrast    AM labs pending  2  Acute on chronic anemia without overt/occult gi bleeding  Evaluated by hematology - felt multifactorial with anemia of chronic disease/renal disease  Workup ongoing  Monitor h/h, transfuse as needed             Harvinder Kim PA-C

## 2020-01-08 NOTE — PROGRESS NOTES
Progress Note    Linda Shannon 62 y o  female MRN: [id number]  Unit/Bed#: 7T [facility] 702-02 Encounter: [id number]  Admitting Physician: Dada Reyna MD  PCP: Nanette Bernard MD  Date of Admission:  12/30/2019 10:23 PM    Assessment and Plan    * Acute kidney injury superimposed on CKD [facility])  Assessment & Plan  Cr 2 89 on admission   Today Cr trending up from 2 06-->2 17, GFR 25  Baseline Cr 1 5  Reports good urine output     - IVF stopped, patient endorses good PO fluid intake   - Discussed with nephrology, await for kidney function to stabilize, GFR >35 in order for MRCP to be completed  If creatinine stable tomorrow, patient can be discharged and follow up outpatient with nephology   - Avoid nephrotoxic agents  - CMP in am  - Nephrology following     Weight loss, unintentional  Assessment & Plan  Pap smear showed ASCUS     -HPV high risk pending results   -Outpatient mammogram and colonoscopy for malignancy screening discussed with patient  Agreeable to plan         Type 2 diabetes mellitus with kidney complication, with long-term current use of insulin [facility])  Assessment & Plan  Lab Results   Component Value Date    HGBA1C 9 1 (A) 07/08/2019       Recent Labs     12/31/19  1113 12/31/19  1545 12/31/19  2047 01/01/20  0553   POCGLU 143* 281* 250* 339*       Blood Sugar Average: Last 72 hrs:  (P) 230 8     Better controlled BG  HbA1c trending up from 9 1-->9 6   Home lantus 40 U qhs, hasn't taken in two weeks because of poor po intake     Received dose of Lantus 5 U last night, continue same regimen at bedtime   - Diabetic diet  - ISS and accuchek achs    Anemia  Assessment & Plan  Baseline Hb 10  Currently Hg stable at 9 8    - FOBT negative   - CBC in am  - Will resume Vitamin B12 once renal function improves  - GI consult with plasma cell dyscrasia workup, labs pending   - Hematology on board       Right upper quadrant abdominal pain  Assessment & Plan  Stable, reports intermittent RUQ pain up to 9/10, controlled Alk phos trending up 560->556    Liver enzymes normalized  RUQ US: Gallbladder sludge without calculi  Dilated common bile duct at 11 mm without definite choledocholithiasis     GI recommending outpatient monitoring of LFTs  Once kidney function stable, MRCP with contrast for further biliary anatomy evaluation  Per nephrology, it would be ideal to wait for patient's creatinine to be at baseline and GFR >35 for 48 hours before any contrast procedure  - Patient will follow up outpatient with GI and Nephrology    - Pain control with Tramadol 50 mg Q 6hrs PRN, Dilaudid stopped   - Multiple serologies for hepatic disease ordered: Hepatitis panel normal, HIV normal, others currently pending    HTN (hypertension)  Assessment & Plan  Controlled  within last 24 hrs 147/81  Goal < 140/90      - Amlodipine 5mg has been changed to 10 mg by nephrology   - Monitor VS    High anion gap metabolic acidosis  Assessment & Plan  Resolved    Vaginal discharge  Assessment & Plan  Speculum exam performed 12/31/2019  Ultrasound of the pelvis showed endometrium 4 mm, no suspicion for adnexal mass or loculated collection  Minimal free fluid in pelvis, no fistula noted  - Pap smear result with ASCUS, HPV high risk pending     Neurogenic bladder  Assessment & Plan  H/o chronic neurogenic bladder with a suprapubic catheter in place, changed every 6 weeks       - Catheter site was examined, no signs of infection was noted    Abnormal urinalysis  Assessment & Plan  Asymptomatic and has suprapubic catheter  Received one dose of Zosyn     - Abx discontinued       Suicidal ideation  Assessment & Plan  Resolved  Psychiatry started patient on Remeron, however it was discontinued yesterday due sleep cycle disturbance     -Continue Zoloft 25 mg daily    Gastroesophageal reflux disease  Assessment & Plan  Chronic, Stable  Home meds: mylanta and prilosec 20 mg daily  - Protonix 40 mg PO BID     - Mylanta 30 mL daily prn    Tobacco dependence  Assessment & Plan  Smokes 1/2 PPD of cigarettes  -On nicotine patch  - Cessation counseling provided    Proteinuria  Assessment & Plan  Possible diabetic nephrology  Nephrology following - appreciate all recommendations  Acute Hepatitis panel negative  HIV normal   JOSE normal   Protein/Cr ratio: 11 2  Urine protein 603    - Protein electrophoresis pending   - Anti-smooth ms, IgG and antimitochondrial Ab pending   - Start ACEi/ARB once JULIO resolves  Stage 3 chronic kidney disease (HCC)  Assessment & Plan  Baseline Cr 1 5 - 1 8, etiology possibly due to diabetes and hypertension   - Currently has JULIO  - Nephrology consulted  - Will need outpatient nephrology follow-up after discharge  VTE Pharmacologic Prophylaxis:   Pharmacologic: Anemic, will hold off on any anticoagulation regimen at this time  Mechanical VTE Prophylaxis in Place: Yes    Patient Centered Rounds: I have performed bedside rounds with nursing staff today  Discussions with Specialists or Other Care Team Provider: Nephrology     Education and Discussions with Family / Patient: Patient     Time Spent for Care: 20 minutes  More than 50% of total time spent on counseling and coordination of care as described above  Current Length of Stay: 8 day(s)    Current Patient Status: Inpatient   Certification Statement: The patient will continue to require additional inpatient hospital stay due to JULIO    Discharge Plan: Tomorrow if clinically stable     Code Status: Level 1 - Full Code      Subjective:     Patient seen and examined at bedside today  Patient states she continues with intermittent RUQ pain, however controlled  Patient received Tramadol 50 mg Q6hrs for pain  Dilaudid was discontinued  Patient denies any fevers, chest pain, shortness of breath, nausea, vomiting and diarrhea        Objective:     Vitals:   Temp (24hrs), Av 9 °F (36 6 °C), Min:97 5 °F (36 4 °C), Max:98 4 °F (36 9 °C)    Temp:  [97 5 °F (36 4 °C)-98 4 °F (36 9 °C)] 97 5 °F (36 4 °C)  HR:  [65-69] 69  Resp:  [16-18] 18  BP: (146-158)/(78-86) 147/81  SpO2:  [97 %-99 %] 98 %  Body mass index is 32 14 kg/m²  Input and Output Summary (last 24 hours): Intake/Output Summary (Last 24 hours) at 1/8/2020 0951  Last data filed at 1/8/2020 0944  Gross per 24 hour   Intake 648 33 ml   Output 450 ml   Net 198 33 ml       Physical Exam:     Physical Exam   Constitutional: She is oriented to person, place, and time  She appears well-developed and well-nourished  No distress  HENT:   Head: Normocephalic and atraumatic  Nose: Nose normal    Mouth/Throat: Oropharynx is clear and moist    Eyes: Conjunctivae and EOM are normal    Neck: Normal range of motion  Neck supple  Cardiovascular: Normal rate, regular rhythm and normal heart sounds  Pulmonary/Chest: Effort normal and breath sounds normal    Abdominal: Soft  Bowel sounds are normal  She exhibits no distension  There is tenderness  There is no guarding  RUQ mild tenderness on palpation    Musculoskeletal: Normal range of motion  She exhibits edema  Neurological: She is alert and oriented to person, place, and time  She exhibits normal muscle tone  Skin: Skin is warm  No rash noted  She is not diaphoretic  Psychiatric: She has a normal mood and affect  Her behavior is normal  Judgment and thought content normal    Nursing note and vitals reviewed        Additional Data:     Labs:    Results from last 7 days   Lab Units 01/08/20  0440   WBC Thousand/uL 5 50   HEMOGLOBIN g/dL 9 8*   HEMATOCRIT % 29 8*   PLATELETS Thousands/uL 232   NEUTROS PCT % 54   LYMPHS PCT % 30   MONOS PCT % 9   EOS PCT % 6     Results from last 7 days   Lab Units 01/08/20  0440   POTASSIUM mmol/L 3 6   CHLORIDE mmol/L 110*   CO2 mmol/L 26   BUN mg/dL 25   CREATININE mg/dL 2 17*   CALCIUM mg/dL 8 9   ALK PHOS U/L 556*   ALT U/L 51   AST U/L 32     Results from last 7 days   Lab Units 01/07/20  0929   INR  0 85*     Results from last 7 days   Lab Units 01/08/20  0548 01/07/20 2025 01/07/20  1601 01/07/20  1112 01/07/20  0545 01/06/20  2031 01/06/20  1600 01/06/20  1107 01/06/20  0526 01/05/20  2108 01/05/20  1606 01/05/20  1118   POC GLUCOSE mg/dl 175* 272* 348* 328* 227* 248* 270* 272* 171* 276* 234* 267*             * I Have Reviewed All Lab Data Listed Above  * Additional Pertinent Lab Tests Reviewed: All Labs Within Last 24 Hours Reviewed    Imaging:    Imaging Reports Reviewed Today Include: None  Imaging Personally Reviewed by Myself Includes:  None    Recent Cultures (last 7 days):            Last 24 Hours Medication List:     Current Facility-Administered Medications:  acetaminophen 650 mg Oral Q8H PRN Praveena Lazar MD   aluminum-magnesium hydroxide-simethicone 30 mL Oral Q4H PRN Rashard Cheney MD   amLODIPine 10 mg Oral Daily Maura Johnson DO   insulin glargine 10 Units Subcutaneous HS Praveena Lazar MD   insulin lispro 1-5 Units Subcutaneous 4x Daily (AC & HS) Rashard Cheney MD   nicotine 1 patch Transdermal Daily Silvia Flores MD   ondansetron 4 mg Intravenous Q6H PRN Silvia Flores MD   pantoprazole 40 mg Oral BID AC Carrie Spear IV, MD   sertraline 50 mg Oral Daily OMAYRA Murphy   traMADol 50 mg Oral Q6H PRN Praveena Lazar MD        ** Please Note: Dictation voice to text software may have been used in the creation of this document   Shannen Roberts MD  01/08/20  9:51 AM

## 2020-01-08 NOTE — PROGRESS NOTES
Patient Name: Author Garcia  Patient MRN: [id number]  Date: 01/08/20  Service: [facility]    Subjective   Patient is still complaining of intermittent RUQ/epigastric abdominal pain  She does not relate this pain to movement or meals  She denies any early satiety  She reports her abdominal pain is currently 9/10 and is awaiting her pain medicine  She was switched from Dilaudid to Ultram since yesterday  She denies any nausea or vomiting  She ate her breakfast without difficulty  Vitals  Blood pressure 147/81, pulse 69, temperature 97 5 °F (36 4 °C), temperature source Temporal, resp  rate 18, height 5' 3" (1 6 m), weight 82 3 kg (181 lb 7 oz), SpO2 98 %, not currently breastfeeding  Physical Exam:     General Appearance:    Awake, alert, oriented x3, no distress, well developed, well    nourished sitting at edge of bed watching cartoons   Head:    Normocephalic without obvious abnormality   Eyes:    PERRL, conjunctiva/corneas clear, EOM's intact        Neck:   Supple, no adenopathy   Throat:   Mucous membranes moist   Lungs:     Clear to auscultation bilaterally, no wheezing or rhonchi   Heart:    Regular rate and rhythm, S1 and S2 normal, no murmur   Abdomen:     Soft, + RUQ/epigastric tenderness, non-distended  bowel sounds active  No masses, rebound +guarding  Extremities:   Extremities without edema   Psych  Derm:   Normal affect    No jaundice   Neurologic:   CNII-XII grossly intact  Speech intact         Laboratory Studies  Results from last 7 days   Lab Units 01/08/20  0440 01/07/20  0929 01/06/20  0520 01/05/20  0616 01/04/20  1617 01/04/20  0435 01/03/20  0454 01/02/20  0457   WBC Thousand/uL 5 50 5 30 5 00 5  10  --  5 20 7 90 4 80   HEMOGLOBIN g/dL 9 8* 9 6* 9 6* 9 7* 6 6* 7 0* 7 7* 7 4*   HEMATOCRIT % 29 8* 29 5* 29 1* 29 8* 20 2* 21 2* 23 5* 22 4*   PLATELETS Thousands/uL 232 215 225 205  --  167 222 208   INR   --  0 85*  --   --   --   --   --   --      Results from last 7 days   Lab Units 01/08/20  0440 01/07/20  0928 01/06/20  0520 01/05/20  0616 01/04/20  0435 01/03/20  0454   SODIUM mmol/L 140 142 142 140 140 145   POTASSIUM mmol/L 3 6 3 9 3 5* 4 0 4 1 4 1   CHLORIDE mmol/L 110* 110* 111* 113* 113* 121*   CO2 mmol/L 26 27 26 23 22 19*   BUN mg/dL 25 25 20 20 19 20   CREATININE mg/dL 2 17* 2 06* 1 92* 2 20* 2 22* 2 17*   CALCIUM mg/dL 8 9 8 7 8 8 8 7 8 0* 8 3*   ALBUMIN g/dL 2 6* 2 6* 2 5*  --  2 3* 2 3*   TOTAL BILIRUBIN mg/dL 0 70 0 80 1 00  --  2 40* 2 60*   ALK PHOS U/L 556* 560* 546*  --  500* 556*   ALT U/L 51 54* 71*  --  102* 121*   AST U/L 32 39* 46*  --  145* 245*         Imaging and Other Studies      Inhouse Medications     Current Facility-Administered Medications:     acetaminophen (TYLENOL) tablet 650 mg, 650 mg, Oral, Q8H PRN    aluminum-magnesium hydroxide-simethicone (MYLANTA) 200-200-20 mg/5 mL oral suspension 30 mL, 30 mL, Oral, Q4H PRN    amLODIPine (NORVASC) tablet 10 mg, 10 mg, Oral, Daily, 10 mg at 01/07/20 0902    insulin glargine (LANTUS) subcutaneous injection 10 Units 0 1 mL, 10 Units, Subcutaneous, HS, 10 Units at 01/07/20 2102    insulin lispro (HumaLOG) 100 units/mL subcutaneous injection 1-5 Units, 1-5 Units, Subcutaneous, 4x Daily (AC & HS), 1 Units at 01/08/20 0616 **AND** Fingerstick Glucose (POCT), , , 4x Daily AC and at bedtime    nicotine (NICODERM CQ) 14 mg/24hr TD 24 hr patch 1 patch, 1 patch, Transdermal, Daily, 1 patch at 01/01/20 0844    ondansetron (ZOFRAN) injection 4 mg, 4 mg, Intravenous, Q6H PRN, 4 mg at 12/31/19 0607    pantoprazole (PROTONIX) EC tablet 40 mg, 40 mg, Oral, BID AC, 40 mg at 01/08/20 0601    sertraline (ZOLOFT) tablet 50 mg, 50 mg, Oral, Daily, 50 mg at 01/07/20 0902    traMADol (ULTRAM) tablet 50 mg, 50 mg, Oral, Q6H PRN, 50 mg at 01/08/20 0110      Assessment/Plan:    1  Elevated liver enzymes with intermittent RUQ abdominal pain  Liver serologies unrevealing   RUQ US shows gallbladder sludge without calculi and (chronically) dilated CBD 11 mm without definitive choledocholithiasis    MRCP with gadolinium would be ideal however due to kidney function, imaging on hold   Overall liver enzymes have improved raising the possibility of a ball-valving vs passed stone  No leukocytosis or fever to warrant antibiotics   Continue Ultram for pain  Can consider continued monitoring of improving LFTs as outpatient  Once cleared renally, suggest MRCP with contrast for further biliary anatomy evaluation  ERCP if continued symptoms or if MRCP suggests further intervention necessary  ??HIDA   2  Acute on chronic anemia without overt/occult gi bleeding  Labs inconsistent with iron def  Evaluated by hematology - felt multifactorial with anemia of chronic disease/renal disease   Monitor h/h, transfuse as needed          Leslie Holley PA-C

## 2020-01-08 NOTE — UTILIZATION REVIEW
Continued Stay Review    Date:  1/6/2020  Thru 1/8/2020                      Current Patient Class:  IP Current Level of Care: Tiffanie Cobb y o  female initially admitted on  12/31/2019 with JULIO on superimposed on CKD, high anion gap metabolic acidosis, right upper quadrant abdominal pain    Assessment/Plan: Pt with JULIO, Abdominal Pain and  Anemia    1/6: Continues with 10/10 abd pain  Dilaudid 0 2 mg IV x 4  And 0 5 mg IV x 1  Increased Amlodipine to 10 po qd  IVF's DC'd  Per Psych -  Slow improvement in mood on zoloft  1/7:    Dilaudid 0 2 mg IV x 3  Increased hs Insulin to 10 units  Per GI 1/7 - Use Ultram for pain and discontinue Dilaudid      1/8:  Creat trending back up today off  IVF's  Good po intake  Plan to restart gentle IVF's per Nephrology  MRCP once kidney fx stabilizes  Reports intermittent RUQ pain up to 9/10, controlled  Continue to monitor h&h        Pertinent Labs/Diagnostic Results:   Results from last 7 days   Lab Units 01/08/20 0440 01/07/20  0929 01/06/20  0520 01/05/20  0616 01/04/20  1617   WBC Thousand/uL 5 50 5 30 5 00 5  10  --    HEMOGLOBIN g/dL 9 8* 9 6* 9 6* 9 7* 6 6*   HEMATOCRIT % 29 8* 29 5* 29 1* 29 8* 20 2*   PLATELETS Thousands/uL 232 215 225 205  --    NEUTROS ABS Thousands/µL 3 00 3 00 2 90 3 00  --      Results from last 7 days   Lab Units 01/07/20  0929 01/02/20  0457   RETIC CT PCT % 2 36 3 27*     Results from last 7 days   Lab Units 01/08/20  0440 01/07/20  0928 01/06/20  0520 01/05/20  0616 01/04/20  0435   SODIUM mmol/L 140 142 142 140 140   POTASSIUM mmol/L 3 6 3 9 3 5* 4 0 4 1   CHLORIDE mmol/L 110* 110* 111* 113* 113*   CO2 mmol/L 26 27 26 23 22   ANION GAP mmol/L 4* 5 5 4* 5   BUN mg/dL 25 25 20 20 19   CREATININE mg/dL 2 17* 2 06* 1 92* 2 20* 2 22*   EGFR ml/min/1 73sq m 25* 26* 29* 24* 24*   CALCIUM mg/dL 8 9 8 7 8 8 8 7 8 0*     Results from last 7 days   Lab Units 01/08/20  0440 01/07/20  1836 01/06/20  0520 01/04/20  0435 01/03/20  0454 01/01/20  1542   AST U/L 32 39* 46* 145* 245*   < >  --    ALT U/L 51 54* 71* 102* 121*   < >  --    ALK PHOS U/L 556* 560* 546* 500* 556*   < >  --    TOTAL PROTEIN g/dL 5 6* 5 8* 5 7* 5 5* 5 6*   < >  --    ALBUMIN g/dL 2 6* 2 6* 2 5* 2 3* 2 3*   < >  --    TOTAL BILIRUBIN mg/dL 0 70 0 80 1 00 2 40* 2 60*   < >  --    BILIRUBIN DIRECT mg/dL  --   --   --   --  1 90*  --   --    AMMONIA umol/L  --   --   --   --   --   --  <9*    < > = values in this interval not displayed       Results from last 7 days   Lab Units 01/08/20  1113 01/08/20  0548 01/07/20 2025 01/07/20  1601 01/07/20  1112 01/07/20  0545 01/06/20  2031 01/06/20  1600 01/06/20  1107 01/06/20  0526   POC GLUCOSE mg/dl 239* 175* 272* 348* 328* 227* 248* 270* 272* 171*     Results from last 7 days   Lab Units 01/08/20  0440 01/07/20  0928 01/06/20  0520 01/05/20  0616 01/04/20  0435 01/03/20  0454 01/02/20  0457   GLUCOSE RANDOM mg/dL 181* 232* 161* 277* 154* 52* 303*     BETA-HYDROXYBUTYRATE   Date Value Ref Range Status   12/31/2019 4 2 (H) <0 6 mmol/L Final      Results from last 7 days   Lab Units 01/07/20  0929   PROTIME seconds 9 4*   INR  0 85*   PTT seconds 23*     Results from last 7 days   Lab Units 01/03/20  0454   FERRITIN ng/mL 1,540*  1,565*     Results from last 7 days   Lab Units 01/03/20  0454   HEP B S AG  Non-reactive   HEP C AB  Non-reactive   HEP B C IGM  Non-reactive     Results from last 7 days   Lab Units 01/03/20  0454   LIPASE u/L 61     Results from last 7 days   Lab Units 01/07/20  0929 01/07/20  0928   CRP mg/L  --  22 4*   SED RATE mm/hour 128*  --      Results from last 7 days   Lab Units 01/03/20  1230   CREATININE UR mg/dL 53 2   PROTEIN UR mg/dL 586   PROT/CREAT RATIO UR  11 02*     Results from last 7 days   Lab Units 01/01/20  1648   AMPH/METH  Negative   BARBITURATE UR  Negative   BENZODIAZEPINE UR  Negative   COCAINE UR  Negative   METHADONE URINE  Negative   OPIATE UR  Negative   PCP UR  Negative   THC UR  Negative Vital Signs:   01/08/20 0724  97 5 °F (36 4 °C)  69  18 147/81 98 % None (Room air) Lying   01/07/20 2325  98 4 °F (36 9 °C)  65  16 146/86 97 % None (Room air) Lying   01/07/20 1511  97 8 °F (36 6 °C)  69  18 158/78 99 % None (Room air) Lying   01/07/20 0729  97 1 °F (36 2 °C)  68  20 168/90 99 % None (Room air) Sitting   01/06/20 2316  98 5 °F (36 9 °C)  72  16 157/87 98 % None (Room air) Sitting   01/06/20 1506  98 9 °F (37 2 °C)  68  18 118/71 98 % None (Room air) Sitting   01/06/20 0733  97 3 °F (36 3 °C)  67  18 152/80 100 % None (Room air) Sitting         Medications:   Scheduled Medications:  Medications:  amLODIPine 10 mg Oral Daily   insulin glargine 10 Units Subcutaneous HS   insulin lispro 1-5 Units Subcutaneous 4x Daily (AC & HS)   nicotine 1 patch Transdermal Daily   pantoprazole 40 mg Oral BID AC   sertraline 50 mg Oral Daily     Continuous IV Infusions:  PRN Meds:  acetaminophen 650 mg Oral Q8H PRN x 1 1/6   aluminum-magnesium hydroxide-simethicone 30 mL Oral Q4H PRN   ondansetron 4 mg Intravenous Q6H PRN   traMADol 50 mg Oral Q6H PRN x 1 1/7  & x 3 1/8     1/6 Dilaudid 0 5 mg IV X 1   1/6 KDUR 40 po x 1    Discharge Plan: TBD    Network Utilization Review Department  [email or web address]  ATTENTION: Please call with any questions or concerns to [phone] and carefully listen to the prompts so that you are directed to the right person  All voicemails are confidential   Sully Wright all requests for admission clinical reviews, approved or denied determinations and any other requests to dedicated fax number below belonging to the campus where the patient is receiving treatment   List of dedicated fax numbers for the Facilities:  FACILITY NAME UR FAX NUMBER   ADMISSION DENIALS (Administrative/Medical Necessity) [phone]   [address] (Maternity/NICU/Pediatrics) [phone]   Bri Flanagan [address] [facility] [phone]   Romi Marks Luz Hebert [phone]   [facility] [address] [facility] [phone]   [facility]  [phone] [address] [facility]  [phone]   [address] [place] [address] [address] [facility] [phone]

## 2020-01-08 NOTE — PROGRESS NOTES
NEPHROLOGY PROGRESS NOTE   La Hinojosa 62 y o  female MRN: [id number]  Unit/Bed#: 7T [facility] 702-02 Encounter: [id number]      ASSESSMENT & PLAN:  1  Acute kidney injury suspected secondary to prerenal component likely transition to ATN  Serum creatinine peak at 2 89 and it was improving down to 1 9 on 01/06, now increasing up to 2 17 after stopping intravenously fluid  Will restart gentle intravenously fluids for next 24 hours follow labs in the morning  At risk for NSF given acute kidney injury episode and low GFR    2  Chronic kidney disease stage 3 with baseline creatinine around 1 5-1 8 with nephrotic range proteinuria suspected secondary to long-term history of uncontrolled diabetes  Urine electrophoresis did not show any monoclonal bands, serum electrophoresis in process  3  Hypokalemia, improved after replacement  4  Abnormal LFTs with right upper quadrant abdominal pain, GI on board  Plan for MRCP with gadolinium once kidney function improved  Patient continue with abdominal pain  5  Hypertension, blood pressure slightly elevated, amlodipine was increased to 10 mg daily 01/05  Will follow for now  Avoid relative hypotension in the setting of acute renal failure  6  Hyperchloremia, follow labs    7  Anemia, monitor H&H and transfusion as needed  Hematology consulted  Serum electrophoresis in process    8  History of neurogenic bladder with suprapubic catheter in place  SUBJECTIVE:  Patient seen and examined, denies any complaints other than persistent abdominal pain, denies any nausea, no vomiting, no diarrhea, denies any chest pain or shortness of Breath      OBJECTIVE:  Current Weight: Weight - Scale: 82 3 kg (181 lb 7 oz)  Vitals:    01/08/20 0724   BP: 147/81   Pulse: 69   Resp: 18   Temp: 97 5 °F (36 4 °C)   SpO2: 98%       Intake/Output Summary (Last 24 hours) at 1/8/2020 0809  Last data filed at 1/7/2020 1801  Gross per 24 hour   Intake 960 ml   Output 450 ml   Net 510 ml General: conscious, cooperative, in not acute distress  Eyes: conjunctivae pale, anicteric sclerae  ENT: lips and mucous membranes moist  Neck: supple, no JVD  Chest: clear breath sounds bilateral, no crackles, ronchus or wheezings  CVS: distinct S1 & S2, normal rate, regular rhythm  Abdomen: soft, non-tender, non-distended, normoactive bowel sounds, suprapubic catheter in place  Back: no CVA tenderness  Extremities: no significant edema of both legs  Skin: no rash  Neuro: awake, alert, oriented        Medications:    Current Facility-Administered Medications:     acetaminophen (TYLENOL) tablet 650 mg, 650 mg, Oral, Q8H PRN, Oliverio Deshpande MD    aluminum-magnesium hydroxide-simethicone (MYLANTA) 200-200-20 mg/5 mL oral suspension 30 mL, 30 mL, Oral, Q4H PRN, Danni Tom MD    amLODIPine (NORVASC) tablet 10 mg, 10 mg, Oral, Daily, Maura Johnson, DO, 10 mg at 01/07/20 0902    insulin glargine (LANTUS) subcutaneous injection 10 Units 0 1 mL, 10 Units, Subcutaneous, HS, Oliverio Deshpande MD, 10 Units at 01/07/20 2102    insulin lispro (HumaLOG) 100 units/mL subcutaneous injection 1-5 Units, 1-5 Units, Subcutaneous, 4x Daily (AC & HS), 1 Units at 01/08/20 0616 **AND** Fingerstick Glucose (POCT), , , 4x Daily AC and at bedtime, Danni Tom MD    nicotine (NICODERM CQ) 14 mg/24hr TD 24 hr patch 1 patch, 1 patch, Transdermal, Daily, Garcia Guzman MD, 1 patch at 01/01/20 0844    ondansetron (ZOFRAN) injection 4 mg, 4 mg, Intravenous, Q6H PRN, Garcia Guzman MD, 4 mg at 12/31/19 0607    pantoprazole (PROTONIX) EC tablet 40 mg, 40 mg, Oral, BID AC, Clearnce Cake IV, MD, 40 mg at 01/08/20 0601    sertraline (ZOLOFT) tablet 50 mg, 50 mg, Oral, Daily, OMAYRA Murphy, 50 mg at 01/07/20 0902    traMADol (ULTRAM) tablet 50 mg, 50 mg, Oral, Q6H PRN, Oliverio Deshpande MD, 50 mg at 01/08/20 0110    Invasive Devices:        Lab Results:   Results from last 7 days   Lab Units 01/08/20  0440 01/07/20  0929 01/07/20  0928 01/06/20  0520   WBC Thousand/uL 5 50 5 30  --  5 00   HEMOGLOBIN g/dL 9 8* 9 6*  --  9 6*   HEMATOCRIT % 29 8* 29 5*  --  29 1*   PLATELETS Thousands/uL 232 215  --  225   SODIUM mmol/L 140  --  142 142   POTASSIUM mmol/L 3 6  --  3 9 3 5*   CHLORIDE mmol/L 110*  --  110* 111*   CO2 mmol/L 26  --  27 26   BUN mg/dL 25  --  25 20   CREATININE mg/dL 2 17*  --  2 06* 1 92*   CALCIUM mg/dL 8 9  --  8 7 8 8   ALK PHOS U/L 556*  --  560* 546*   ALT U/L 51  --  54* 71*   AST U/L 32  --  39* 46*       Previous work up:  See previous notes      Portions of the record may have been created with voice recognition software  Occasional wrong word or "sound a like" substitutions may have occurred due to the inherent limitations of voice recognition software  Read the chart carefully and recognize, using context, where substitutions have occurred  If you have any questions, please contact the dictating provider

## 2020-01-08 NOTE — NURSING NOTE
Patient has been alert and oriented x4 with stable vitals  Requesting tramadol for pain with min decrease of pain  Assessments are unchanged  Dressing changed on midline  Tolerated procedure well   Will monitor

## 2020-01-09 PROBLEM — N89.8 VAGINAL DISCHARGE: Status: RESOLVED | Noted: 2019-01-01 | Resolved: 2020-01-01

## 2020-01-09 PROBLEM — R45.851 SUICIDAL IDEATION: Status: RESOLVED | Noted: 2019-01-01 | Resolved: 2020-01-01

## 2020-01-09 PROBLEM — E87.2 HIGH ANION GAP METABOLIC ACIDOSIS: Status: RESOLVED | Noted: 2019-01-01 | Resolved: 2020-01-01

## 2020-01-09 NOTE — DISCHARGE INSTRUCTIONS
Acute Kidney Injury, Ambulatory Care   GENERAL INFORMATION:   Acute kidney injury  happens when your kidneys suddenly stop working correctly  Normally, the kidneys turn fluid, chemicals, and waste from your blood into urine  In acute kidney injury, your kidneys can no longer do this  In most cases, it is temporary, but it may become a chronic kidney condition  Common symptoms include the following:   · Decreased urination or dark-colored urine    · Swelling in your arms, legs, or feet     · Abdominal or low back pain    · Vomiting, diarrhea, or loss of appetite    · Fatigue     · Skin rash  Seek immediate care for the following symptoms:   · Heart beating faster than normal for you    · Sudden chest pain or trouble breathing    · Seizure  Treatment for acute kidney injury:  Treatment depends upon the cause of your acute kidney injury and how severe it is  Medicines may be given to increase blood flow to your kidneys and protect your kidneys  You may also need medicine to decrease inflammation in your kidneys  You may be given IV fluids to replenish fluids and help your heart pump blood  Dialysis may be needed to remove chemicals and waste from your blood when your kidneys cannot  Manage acute kidney injury:   · Manage other health conditions  Care for your diabetes, high blood pressure, or heart disease  These conditions increase your risk for acute kidney injury  · Talk to your healthcare provider before you take over-the-counter-medicine  NSAIDs, stomach medicine, or laxatives may harm your kidneys and increase your risk for acute kidney injury  Follow up with your healthcare provider as directed:  Write down your questions so you remember to ask them during your visits  CARE AGREEMENT:   You have the right to help plan your care  Learn about your health condition and how it may be treated  Discuss treatment options with your caregivers to decide what care you want to receive   You always have the right to refuse treatment  The above information is an  only  It is not intended as medical advice for individual conditions or treatments  Talk to your doctor, nurse or pharmacist before following any medical regimen to see if it is safe and effective for you  © 2014 [address] is for End User's use only and may not be sold, redistributed or otherwise used for commercial purposes  All illustrations and images included in CareNotes® are the copyrighted property of A D A M , Inc  or Javi Gil

## 2020-01-09 NOTE — TELEPHONE ENCOUNTER
There is Dr Rhett Rhodes name on the form to sign  The form is placed into Dr Rhett Rhodes bin  Patient is actually currently hospitalized

## 2020-01-09 NOTE — SOCIAL WORK
CM was informed that pt's medication would cost $3 32, which pt stated she did not have enough money for  CM requested financial office run a search Karen to assess pt's financial eligibility, CM was informed that medication would be covered at 100%  Pt aware, medication to be delivered to pt's room at time of discharge  Financial eligibility form faxed to [facility]

## 2020-01-09 NOTE — SOCIAL WORK
DEWAYNE contacted [place] [address] [facility] to schedule a follow up appointment for pt  Appointment was scheduled for 1/15/2020 at 2:00 PM with Dr Nery Pichardo CM contacted [place] [address] [facility] to schedule follow up appointment for pt  The individual on the phone stated that they will contact pt to schedule the appointment and are aware that it is vital to make the appointment within one week of discharge  It was put on the pt's discharge instructions that she should contact them to schedule an appointment within one week of discharge  DEWAYNE contacted [place] [address] [facility] to schedule an appointment with pt but there was no answer  CM placed instructions on discharge instructions for pt to call and schedule a follow up appointment within two weeks of discharge  DEWAYNE contacted [facility] and scheduled a follow up appointment for January 17th at 8:30 AM     All of the above information has been added to pt's discharge instructions

## 2020-01-09 NOTE — TELEPHONE ENCOUNTER
----- Message from Chely Muñoz MD sent at 1/9/2020 10:22 AM EST -----  Please arrange for hospital follow-up in our [facility] office in 1-2 weeks with 1st available extender or provider with repeat labs (CBC, renal function panel, PTH, UPC)    Thanks

## 2020-01-09 NOTE — NURSING NOTE
Discharge instructions given both written and verbally to the patient with details on medications, and next dose due  Reviewed the written f/u apts  Patient told to discregard the beginning print out part and only see the comments section where each apt was individually documented with dates and times or to call to make and apt  Midline catheter removed by me from right upper arm with catheter intact  Dressed in street clothes and wheel chair brought to the patient to escort her to the lobby

## 2020-01-09 NOTE — TELEPHONE ENCOUNTER
Natalya Engel is calling from [facility] inquiring if pt would be able to get a hospital follow up appt, she is being discharged today

## 2020-01-09 NOTE — PROGRESS NOTES
NEPHROLOGY PROGRESS NOTE   Rowdy Parr 62 y o  female MRN: [id number]  Unit/Bed#: 7T [facility] 702-02 Encounter: [id number]      ASSESSMENT & PLAN:  1  Acute kidney injury suspected secondary to prerenal component likely transition to ATN  Serum creatinine peak at 2 89 and it was improving down to 1 9 on 01/06 while she was on intravenously fluids, since intravenously fluids were stopped her creatinine now is hovering in the low 2s  Suspected probably new baseline  Stable from renal standpoint of view to be discharged as long as okay with other specialties  Will arrange for outpatient follow-up in a couple of weeks with repeat labs  2  Chronic kidney disease stage 3 with previous baseline creatinine around 1 5-1 8 though likely now her baseline is in the low 2s with nephrotic range proteinuria suspected secondary to long-term history of uncontrolled diabetes  Urine and serum electrophoresis did not show any monoclonal bands, noted slightly elevated kappa lambda ratio, awaiting him on recommendation  3  Hypokalemia, improved after replacement  4  Abnormal LFTs with intermittent right upper quadrant abdominal pain, GI on board  5  Hypertension, blood pressure slightly elevated, amlodipine was increased to 10 mg daily 01/05  Will follow for now  Avoid relative hypotension in the setting of acute renal failure  6  Anemia, monitor H&H and transfusion as needed  Hematology consulted  Serum electrophoresis in process    7  History of neurogenic bladder with suprapubic catheter in place  My plan and recommendations were discussed with family medicine team       SUBJECTIVE:  Patient seen and examined, states that continue with abdominal pain, denies any chest pain or shortness of Breath, denies any nausea, no vomiting, no diarrhea       OBJECTIVE:  Current Weight: Weight - Scale: 82 5 kg (181 lb 14 1 oz)  Vitals:    01/09/20 0829   BP:    Pulse:    Resp:    Temp: (!) 96 6 °F (35 9 °C)   SpO2: Intake/Output Summary (Last 24 hours) at 1/9/2020 1019  Last data filed at 1/9/2020 0844  Gross per 24 hour   Intake 840 ml   Output    Net 840 ml     General: conscious, cooperative, in not acute distress  Eyes: conjunctivae pale, anicteric sclerae  ENT: lips and mucous membranes moist  Neck: supple, no JVD  Chest: clear breath sounds bilateral, no crackles, ronchus or wheezings  CVS: distinct S1 & S2, normal rate, regular rhythm  Abdomen: soft, mildly-tender, non-distended, normoactive bowel sounds  Back: no CVA tenderness  Extremities:  Minimal edema of both legs  Skin: no rash  Neuro: awake, alert, oriented      Medications:    Current Facility-Administered Medications:     acetaminophen (TYLENOL) tablet 650 mg, 650 mg, Oral, Q8H PRN, Shruthi Mccormick MD    aluminum-magnesium hydroxide-simethicone (MYLANTA) 200-200-20 mg/5 mL oral suspension 30 mL, 30 mL, Oral, Q4H PRN, Claudia Lundberg MD    amLODIPine (NORVASC) tablet 10 mg, 10 mg, Oral, Daily, Maura Johnson, DO, 10 mg at 01/09/20 0908    insulin glargine (LANTUS) subcutaneous injection 10 Units 0 1 mL, 10 Units, Subcutaneous, HS, Shruthi Mccormick MD, 10 Units at 01/08/20 2110    insulin lispro (HumaLOG) 100 units/mL subcutaneous injection 1-5 Units, 1-5 Units, Subcutaneous, 4x Daily (AC & HS), 1 Units at 01/09/20 0604 **AND** Fingerstick Glucose (POCT), , , 4x Daily AC and at bedtime, Claudia Lundberg MD    nicotine (NICODERM CQ) 14 mg/24hr TD 24 hr patch 1 patch, 1 patch, Transdermal, Daily, Avery Zamorano MD, 1 patch at 01/01/20 0844    ondansetron (ZOFRAN) injection 4 mg, 4 mg, Intravenous, Q6H PRN, Avery Zamorano MD, 4 mg at 12/31/19 0607    pantoprazole (PROTONIX) EC tablet 40 mg, 40 mg, Oral, BID AC, Bobo Murrell IV, MD, 40 mg at 01/09/20 0604    sertraline (ZOLOFT) tablet 50 mg, 50 mg, Oral, Daily, OMAYRA Murphy, 50 mg at 01/09/20 0908    traMADol (ULTRAM) tablet 50 mg, 50 mg, Oral, Q6H PRN, Shruthi Mccormick MD, 50 mg at 01/09/20 0357    Invasive Devices:        Lab Results:   Results from last 7 days   Lab Units 01/09/20  0432 01/08/20  0440 01/07/20  0929 01/07/20  0928   WBC Thousand/uL 5 90 5 50 5 30  --    HEMOGLOBIN g/dL 9 8* 9 8* 9 6*  --    HEMATOCRIT % 29 5* 29 8* 29 5*  --    PLATELETS Thousands/uL 248 232 215  --    SODIUM mmol/L 138 140  --  142   POTASSIUM mmol/L 4 0 3 6  --  3 9   CHLORIDE mmol/L 107 110*  --  110*   CO2 mmol/L 28 26  --  27   BUN mg/dL 31* 25  --  25   CREATININE mg/dL 2 28* 2 17*  --  2 06*   CALCIUM mg/dL 9 1 8 9  --  8 7   ALK PHOS U/L 492* 556*  --  560*   ALT U/L 38 51  --  54*   AST U/L 32 32  --  39*       Previous work up:  See previous notes      Portions of the record may have been created with voice recognition software  Occasional wrong word or "sound a like" substitutions may have occurred due to the inherent limitations of voice recognition software  Read the chart carefully and recognize, using context, where substitutions have occurred  If you have any questions, please contact the dictating provider

## 2020-01-09 NOTE — TELEPHONE ENCOUNTER
Pt is currently still admitted to [facility] 1/9/20

## 2020-01-09 NOTE — DISCHARGE INSTR - APPOINTMENTS
Heme/Onc Dr Kat Mckeon MD  Monday February 3, 2020   1:40pm  Office will call to try and make an earlier appointment    [facility]  Call for follow up appointment within two weeks of discharge  [phone]    General Surgery / Dr Lesley Villagran MD  Friday January 17, 2020    8:30 am    [facility]  Wednesday January 15, 2020   2:00pm    [address] [facility]  Wednesday January 15, 2020    2:00pm  [phone]  Please call to confirm appointment    [address] [address]   Tuesday February 25, 2020    1:30pm  [phone]  Please call to confirm appointment

## 2020-01-10 NOTE — TELEPHONE ENCOUNTER
Pt called to cancel hosp f/u apt on 01/15 as she has another apt that day  Patient also had a hosp f/u apt scheduled on 02/03  Pt cancelled both apts, and rescheduled for Mon 01/27 at 2:40 w/Dr Yesy Sebastian

## 2020-01-13 NOTE — UTILIZATION REVIEW
Notification of Discharge  This is a Notification of Discharge from our facility [address]  Please be advised that this patient has been discharge from our facility  Below you will find the admission and discharge date and time including the patients disposition  PRESENTATION DATE: 12/30/2019 10:23 PM    IP ADMISSION DATE: 12/31/19 0057   DISCHARGE DATE: 1/9/2020  3:31 PM  DISPOSITION: Home with Hadley Ramesh with [facility]   Admission Orders listed below:  Admission Orders (From admission, onward)     Ordered        12/31/19 0057  Inpatient Admission (expected length of stay for this patient Order details is greater than two midnights)  Once                   Please contact the UR Department if additional information is required to close this patient's authorization/case  [facility] Utilization Review Department  Main: [phone] x carefully listen to the prompts  All voicemails are confidential   [email or web address]  Send all requests for admission clinical reviews, approved or denied determinations and any other requests to dedicated fax number below belonging to the campus where the patient is receiving treatment   List of dedicated fax numbers:  [address] DENIALS (Administrative/Medical Necessity) [phone]   [address] (Maternity/NICU/Pediatrics) [phone]   Adeel Hoyt [phone]   [facility] [phone]   [facility] [phone]   Albert Larios [address] [phone]   [facility]  [phone]   [address] [facility] [address]  And [address] [facility] [phone]

## 2020-01-14 NOTE — TELEPHONE ENCOUNTER
Attempted to call to set up hospital follow up  TCM call missed  Phone number is restricted and not taking calls

## 2020-01-16 NOTE — TELEPHONE ENCOUNTER
A message has been left reminding pt of f/u appointment that is scheduled for Wednesday 1/22  Pt has been reminded of the repeat labs that should be done prior to that appointment

## 2020-01-20 NOTE — TELEPHONE ENCOUNTER
There is Dr Lluvia Silverman name listed on the form as ordering physician with the information filled in  Will refer the form to Dr Lj Matos  Thank you

## 2020-01-20 NOTE — TELEPHONE ENCOUNTER
[facility] [place]  FORM RECEIVED VIA FAX  WILL BE PLACED IN YOUR BIN AT ASSIGNED DELIVERY TIMES

## 2020-01-23 PROBLEM — R63.4 WEIGHT LOSS, UNINTENTIONAL: Status: RESOLVED | Noted: 2019-01-01 | Resolved: 2020-01-01

## 2020-01-23 PROBLEM — M79.604 PAIN OF RIGHT LOWER EXTREMITY: Status: RESOLVED | Noted: 2018-08-13 | Resolved: 2020-01-01

## 2020-01-23 PROBLEM — R82.90 ABNORMAL URINALYSIS: Status: RESOLVED | Noted: 2019-01-01 | Resolved: 2020-01-01

## 2020-01-23 PROBLEM — M79.604 RIGHT LEG PAIN: Status: ACTIVE | Noted: 2020-01-01

## 2020-01-23 PROBLEM — Z01.818 PREOP EXAMINATION: Status: RESOLVED | Noted: 2018-11-20 | Resolved: 2020-01-01

## 2020-01-23 PROBLEM — N17.9 ACUTE KIDNEY INJURY SUPERIMPOSED ON CKD (HCC): Status: RESOLVED | Noted: 2018-05-24 | Resolved: 2020-01-01

## 2020-01-23 PROBLEM — R10.13 EPIGASTRIC PAIN: Status: RESOLVED | Noted: 2019-05-29 | Resolved: 2020-01-01

## 2020-01-23 PROBLEM — R80.9 PROTEINURIA: Status: RESOLVED | Noted: 2020-01-01 | Resolved: 2020-01-01

## 2020-01-23 PROBLEM — R60.0 PEDAL EDEMA: Status: RESOLVED | Noted: 2018-08-13 | Resolved: 2020-01-01

## 2020-01-23 PROBLEM — M25.551 ACUTE RIGHT HIP PAIN: Status: RESOLVED | Noted: 2019-04-25 | Resolved: 2020-01-01

## 2020-01-23 PROBLEM — Z23 ENCOUNTER FOR ADMINISTRATION OF VACCINE: Status: RESOLVED | Noted: 2018-11-20 | Resolved: 2020-01-01

## 2020-01-23 PROBLEM — N18.9 ACUTE KIDNEY INJURY SUPERIMPOSED ON CKD (HCC): Status: RESOLVED | Noted: 2018-05-24 | Resolved: 2020-01-01

## 2020-01-23 PROBLEM — Z09 HOSPITAL DISCHARGE FOLLOW-UP: Status: ACTIVE | Noted: 2020-01-01

## 2020-01-23 PROBLEM — R10.11 RIGHT UPPER QUADRANT ABDOMINAL PAIN: Status: RESOLVED | Noted: 2019-01-01 | Resolved: 2020-01-01

## 2020-01-23 NOTE — ASSESSMENT & PLAN NOTE
Chronic in nature, no significant findings on physical exam   X-ray results completed in April, 2019 discussed with patient and her daughter in depth  Significant for arthritis however no other pathology identified  -patient requested that I reach out to her PCP in regards to pain management  I will send a message to PCP

## 2020-01-23 NOTE — ASSESSMENT & PLAN NOTE
Lab Results   Component Value Date    HGBA1C 9 6 (H) 01/01/2020     Patient states her glucometer broke recently and she has been unable to check her sugars  I have provided her with a new glucometer including lancets and test strips  I have advised her to continue to remain in close follow-up with her PCP

## 2020-01-23 NOTE — Clinical Note
Dear Dr Bruce Miles, I saw this patient for TCM today  She requested that I reach out to you in regards to her R leg pain and pain meds for the pain  Thank you!

## 2020-01-23 NOTE — PROGRESS NOTES
Assessment/Plan:    Hospital discharge follow-up  Had a lengthy discussion with patient and her daughter in regards to her recent hospital discharge  Strongly encouraged her to call Nephrology office to reschedule her missed appointment  Specifically given her extremely low GFR, she will need to see a nephrologist to discuss potential option for dialysis in near future  Reminded patient of her upcoming appointments with General surgery, hematology and PCP  Advised to make sure she shows a for all appointments  On patient's request I will reach out to PCP in regards of her complaint of right leg pain  Type 2 diabetes mellitus with kidney complication, with long-term current use of insulin (HCC)    Lab Results   Component Value Date    HGBA1C 9 6 (H) 01/01/2020     Patient states her glucometer broke recently and she has been unable to check her sugars  I have provided her with a new glucometer including lancets and test strips  I have advised her to continue to remain in close follow-up with her PCP  Right leg pain  Chronic in nature, no significant findings on physical exam   X-ray results completed in April, 2019 discussed with patient and her daughter in depth  Significant for arthritis however no other pathology identified  -patient requested that I reach out to her PCP in regards to pain management  I will send a message to PCP  Diagnoses and all orders for this visit:    Type 2 diabetes mellitus with stage 3 chronic kidney disease, with long-term current use of insulin ([facility])  -     Glucometer  -     Lancets 28G MISC; by Does not apply route 3 (three) times a day  -     Glucometer test strips    Hospital discharge follow-up    Right leg pain          Subjective:      Patient ID: Oj Charles is a 62 y o  female      Patient is a 78-year-old female with past medical history significant for history of CKD, ESBL carrier, history of recurrent UTIs with neurogenic bladder status post catheter placement, depression who was originally admitted on January 3rd, 2020 at [facility] new complaints of right upper quadrant pain, suicidal ideation with plan  During her hospitalization, patient noted to have elevated liver enzymes             Review of Systems   Constitutional: Negative for chills and fever  Respiratory: Negative for cough and shortness of breath  Cardiovascular: Negative for chest pain and palpitations  Endocrine: Negative for polyphagia and polyuria  Musculoskeletal: Positive for arthralgias and myalgias  Negative for back pain  Neurological: Negative for light-headedness, numbness and headaches  Objective:      Pulse 87   Temp 98 °F (36 7 °C) (Temporal)   Resp 16   Wt 83 kg (183 lb)   SpO2 98%   BMI 32 42 kg/m²          Physical Exam   Constitutional: She appears well-developed and well-nourished  HENT:   Head: Normocephalic and atraumatic  Eyes: EOM are normal    Neck: Normal range of motion  Neck supple  Cardiovascular: Normal rate and normal heart sounds  Pulmonary/Chest: Effort normal and breath sounds normal  No respiratory distress  Abdominal: Soft  Bowel sounds are normal  She exhibits no distension  Musculoskeletal: She exhibits tenderness (Right anterior thigh and right groin pain  )  Neurological: She is alert  Skin: Skin is warm and dry

## 2020-01-23 NOTE — ASSESSMENT & PLAN NOTE
Had a lengthy discussion with patient and her daughter in regards to her recent hospital discharge  Strongly encouraged her to call Nephrology office to reschedule her missed appointment  Specifically given her extremely low GFR, she will need to see a nephrologist to discuss potential option for dialysis in near future  Reminded patient of her upcoming appointments with General surgery, hematology and PCP  Advised to make sure she shows a for all appointments  On patient's request I will reach out to PCP in regards of her complaint of right leg pain

## 2020-01-27 NOTE — TELEPHONE ENCOUNTER
Pt is requesting refills on;      omeprazole (PriLOSEC) 40 MG capsule      aluminum-magnesium hydroxide-simethicone (MYLANTA) 200-200-20 mg/5 mL suspension    traMADol (ULTRAM) 50 mg tablet    Lancets 28G MISC

## 2020-01-28 NOTE — TELEPHONE ENCOUNTER
Pt informed  She will call after she sees the surgeon Thursday to schedule a follow up with us   She was just here last week for her TCM

## 2020-01-28 NOTE — TELEPHONE ENCOUNTER
Omeprazole, Mylanta and Lancets has been refilled  I can't refill her tramadol, it is controlled medication that was only prescribed temporally post-hospitalization  She needs to be seen in the office in order to assess her pain and evaluate the need for pain medications  Please let her know to schedule the appointment as well as she needs to go for blood work that has been ordered post hospital discharge  She needs to make sure to see specialists as well as scheduled in order to evaluate her condition  She keeps missing appointments

## 2020-01-30 NOTE — PROGRESS NOTES
Assessment/Plan:  Symptoms are atypical of gallbladder disease, especially given that she does not have gallstones on imaging  I explained that her pain is more likely musculoskeletal or back related as it is positional   She has requested pain medication and I have referred her back to her primary care doctor or she may need pain management  She is at higher surgical risk given her medical comorbidities and would not recommend proceeding with cholecystectomy at this time  Diagnoses and all orders for this visit:    Right upper quadrant abdominal pain          Subjective:      Patient ID: Jessica Prieto is a 62 y o  female  Ms Lashae Marino presents complaining of right upper quadrant abdominal pain as well as flank and back pain  She says the pain is indescribable" but is located in the right upper quadrant as well as her right flank and right back  The pain is worse when lying on her right side and with movement  She denies any associated nausea or vomiting but does endorse having diarrhea  She does occasionally the pain is associated with eating tuna fish or baked chicken but says that she does not have any pain when drinking clear liquids  She says the pain is unbearable and she is requesting pain medication and to have her gallbladder out  Ultrasound of the right upper quadrant and CT of the abdomen reviewed  The following portions of the patient's history were reviewed and updated as appropriate: She  has a past medical history of Ambulatory dysfunction, Anemia, Chronic kidney disease (5/24/2018), Chronic pain disorder, Diabetes mellitus ([facility] [facility] ), Diabetic foot ulcer ([facility] ), Dyslipidemia (5/24/2018), ESBL E  coli carrier, GERD (gastroesophageal reflux disease), History of frequent urinary tract infections, HTN (hypertension) (5/24/2018), Hyperlipidemia, Irritable bowel syndrome, Neurogenic bladder, Neuropathy, Pedal edema, and Vitamin D deficiency    She   Patient Active Problem List    Diagnosis Date Noted   [facility] discharge follow-up 01/23/2020    Right leg pain 01/23/2020    Ambulatory dysfunction 05/29/2019    Tobacco dependence 05/29/2019    ESBL Escherichia coli carrier 12/07/2018    Non-compliance 12/04/2018    Diabetic ulcer of toe of left foot associated with type 2 diabetes mellitus, with fat layer exposed ([facility] ) 12/04/2018    Anemia 10/10/2018    Neurogenic bladder 10/08/2018    Other constipation 08/13/2018    Diabetic peripheral neuropathy ([facility] ) 05/26/2018    Stage 3 chronic kidney disease ([facility] ) 05/24/2018    HTN (hypertension) 05/24/2018    Dyslipidemia 05/24/2018    Gastroesophageal reflux disease 12/12/2017    Vitamin D deficiency 12/12/2017    Type 2 diabetes mellitus with kidney complication, with long-term current use of insulin ([facility] ) 04/21/2017     She  has a past surgical history that includes Suprapubic catheter insertion; pr xcapsl ctrc rmvl insj io lens prosth w/o ecp (Right, 12/6/2018); Cataract extraction; and pr xcapsl ctrc rmvl insj io lens prosth w/o ecp (Left, 10/17/2019)  Her family history includes Breast cancer in her mother; Cancer in her maternal aunt; Diabetes in her mother; Hypertension in her mother; Parkinsonism in her mother  She  reports that she has been smoking cigarettes  She has a 20 00 pack-year smoking history  She has never used smokeless tobacco  She reports that she drank alcohol  She reports that she has current or past drug history  Current Outpatient Medications   Medication Sig Dispense Refill    Alcohol Swabs (ALCOHOL PREP) 70 % PADS Apply 1 applicator topically as needed      aluminum-magnesium hydroxide-simethicone (MYLANTA) 200-200-20 mg/5 mL suspension Take 30 mL by mouth every 4 (four) hours as needed for indigestion or heartburn 355 mL 0    amLODIPine (NORVASC) 5 mg tablet TAKE 1 TABLET BY MOUTH ONCE DAILY   30 tablet 3    atorvastatin (LIPITOR) 40 mg tablet Take 1 tablet (40 mg total) by mouth daily 30 tablet 4    B-D INS SYRINGE 0 5CC/31GX5/16 31G X 5/16" 0 5 ML MISC       Catheters MISC Please change patients suprapubic catheter as soon as possible and then every three months  Dx: N31 9 Neurogenic Bladder 6 each 0    DOCQLACE 100 MG capsule Take 1 capsule (100 mg total) by mouth 2 (two) times a day 10 capsule 0    insulin glargine (BASAGLAR KWIKPEN) 100 units/mL injection pen Inject 10 Units under the skin daily at bedtime 15 mL 0    Lancets 28G MISC Test blood sugars three times daily      Lancets 28G MISC by Does not apply route 3 (three) times a day 100 each 9    nystatin (MYCOSTATIN) powder Apply topically 2 (two) times a day Apply to lower abdomen twice a day 15 g 0    omeprazole (PriLOSEC) 40 MG capsule Take 1 capsule (40 mg total) by mouth daily 60 capsule 2    ONE TOUCH ULTRA TEST test strip 100 strips by Device route 3 (three) times a day      pregabalin (LYRICA) 50 mg capsule Take 1 capsule (50 mg total) by mouth 2 (two) times a day 60 capsule 3    sertraline (ZOLOFT) 50 mg tablet Take 1 tablet (50 mg total) by mouth daily at bedtime 30 tablet 2    traMADol (ULTRAM) 50 mg tablet Take 1 tablet (50 mg total) by mouth 2 (two) times a day as needed for moderate pain or severe pain 45 tablet 0    Insulin Pen Needle (UNIFINE PENTIPS) 32G X 4 MM MISC Inject under the skin 4 (four) times a day for 90 days 400 each 0     No current facility-administered medications for this visit  Current Outpatient Medications on File Prior to Visit   Medication Sig    Alcohol Swabs (ALCOHOL PREP) 70 % PADS Apply 1 applicator topically as needed    aluminum-magnesium hydroxide-simethicone (MYLANTA) 200-200-20 mg/5 mL suspension Take 30 mL by mouth every 4 (four) hours as needed for indigestion or heartburn    amLODIPine (NORVASC) 5 mg tablet TAKE 1 TABLET BY MOUTH ONCE DAILY      atorvastatin (LIPITOR) 40 mg tablet Take 1 tablet (40 mg total) by mouth daily    B-D INS SYRINGE 0 5CC/31GX5/16 31G X 5/16" 0 5 ML MISC     Catheters MISC Please change patients suprapubic catheter as soon as possible and then every three months  Dx: N31 9 Neurogenic Bladder    DOCQLACE 100 MG capsule Take 1 capsule (100 mg total) by mouth 2 (two) times a day    insulin glargine (BASAGLAR KWIKPEN) 100 units/mL injection pen Inject 10 Units under the skin daily at bedtime    Lancets 28G MISC Test blood sugars three times daily    Lancets 28G MISC by Does not apply route 3 (three) times a day    nystatin (MYCOSTATIN) powder Apply topically 2 (two) times a day Apply to lower abdomen twice a day    omeprazole (PriLOSEC) 40 MG capsule Take 1 capsule (40 mg total) by mouth daily    ONE TOUCH ULTRA TEST test strip 100 strips by Device route 3 (three) times a day    pregabalin (LYRICA) 50 mg capsule Take 1 capsule (50 mg total) by mouth 2 (two) times a day    sertraline (ZOLOFT) 50 mg tablet Take 1 tablet (50 mg total) by mouth daily at bedtime    traMADol (ULTRAM) 50 mg tablet Take 1 tablet (50 mg total) by mouth 2 (two) times a day as needed for moderate pain or severe pain    Insulin Pen Needle (UNIFINE PENTIPS) 32G X 4 MM MISC Inject under the skin 4 (four) times a day for 90 days     No current facility-administered medications on file prior to visit  She is allergic to gabapentin       Review of Systems   Gastrointestinal: Positive for abdominal pain and diarrhea  Musculoskeletal: Positive for back pain and gait problem  All other systems reviewed and are negative  Objective:      /76 (BP Location: Left arm, Patient Position: Sitting, Cuff Size: Standard)   Pulse 90   Ht 5' 3" (1 6 m)   Wt 82 7 kg (182 lb 6 4 oz)   BMI 32 31 kg/m²          Physical Exam   Constitutional: She is oriented to person, place, and time  She appears well-developed and well-nourished  HENT:   Head: Normocephalic     Right Ear: External ear normal    Left Ear: External ear normal    Nose: Nose normal    Mouth/Throat: Oropharynx is clear and moist    Eyes: Pupils are equal, round, and reactive to light  Conjunctivae and EOM are normal    Neck: Normal range of motion  Neck supple  Cardiovascular: Normal rate and regular rhythm  Pulmonary/Chest: Effort normal and breath sounds normal  No respiratory distress  Abdominal: Soft  She exhibits no distension  There is tenderness in the right upper quadrant and epigastric area  There is no rebound and no guarding  Neurological: She is alert and oriented to person, place, and time  No cranial nerve deficit  Skin: Skin is warm and dry  She is not diaphoretic  Vitals reviewed

## 2020-02-06 NOTE — TELEPHONE ENCOUNTER
The patient was called due to her appointment being rescheduled to 2/21/2020 at 2:20  The patient did not have a voice message box that was sent up, the daughter was called and she answered and disconnected the call

## 2020-02-17 NOTE — TELEPHONE ENCOUNTER
VM not set up yet  The cost of her medications has nothing to do with us but if she can specify which medication she paid for, I would have a better idea of where to direct her complaint

## 2020-02-17 NOTE — TELEPHONE ENCOUNTER
Patient upset stating she must pay $2 35  For her medications  Patient states she will not pay again and is requesting a call back on this matter  I inform patient I will send doctor  nurse message

## 2020-02-21 NOTE — TELEPHONE ENCOUNTER
Pt had hosp f/u apt today 2/21 at 2:20 w/Dr Arabella Gross  Pt no showed for apt, as well as three other hosp f/u apts  Office had to call the pt for each apt to reschedule  Called pt, and left message she missed her apt, and informed her the apt will not be rescheduled due to her repeated no shows  Per Dr Arabella Gross pt is not allowed to be rescheduled  Pt must go elsewhere

## 2020-02-25 PROBLEM — Z53.29 NO-SHOW FOR APPOINTMENT: Status: ACTIVE | Noted: 2020-01-01

## 2020-02-25 NOTE — TELEPHONE ENCOUNTER
Patient has a schedule appointment with you on Monday patient states she will wait to see you but is still in a lot of pain

## 2020-02-25 NOTE — TELEPHONE ENCOUNTER
She needs to be evaluated in the office and she needs to go for blood work that  was ordered before as her kidney function was bad previously, so pain medication we can use are limited  Please let her know to schedule an appointment  Thank you

## 2020-02-26 NOTE — ED PROVIDER NOTES
History  Chief Complaint   Patient presents with    Rib Pain     Right rib pain under breats  Worse with touch and movement  Has an appt with PCP on 2020  Pain worse so came in  Denies injury  No meds taken  Patient is a 57-year-old female who presents today for evaluation of right rib pain that is present under her right breast   The patient reports that she has had this pain for over 3 weeks  She reports that the pain is persistent  Per records, she has been seen by general surgery for evaluation of potential gallbladder disease and was told that this is most likely musculoskeletal   She has been seen by her primary care provider and told to take Tylenol as needed  The patient reports she has not taken any medications today  She reports the pain is primarily made worse with twisting motions  She denies any nausea or vomiting  She denies any diarrhea  She reports no cough or shortness of breath  Prior to Admission Medications   Prescriptions Last Dose Informant Patient Reported? Taking? Alcohol Swabs (ALCOHOL PREP) 70 % PADS   Yes Yes   Sig: Apply 1 applicator topically as needed   B-D INS SYRINGE 0 5CC/31GX5/16 31G X 5/16" 0 5 ML MISC   Yes Yes   Catheters MISC   No Yes   Sig: Please change patients suprapubic catheter as soon as possible and then every three months      Dx: N31 9 Neurogenic Bladder   DOCQLACE 100 MG capsule   No Yes   Sig: Take 1 capsule (100 mg total) by mouth 2 (two) times a day   Insulin Pen Needle (UNIFINE PENTIPS) 32G X 4 MM MISC   No No   Sig: Inject under the skin 4 (four) times a day for 90 days   Lancets 28G MISC   Yes Yes   Sig: Test blood sugars three times daily   Lancets 28G MISC   No Yes   Sig: by Does not apply route 3 (three) times a day   ONE TOUCH ULTRA TEST test strip   Yes Yes   Si strips by Device route 3 (three) times a day   aluminum-magnesium hydroxide-simethicone (MYLANTA) 200-200-20 mg/5 mL suspension   No Yes   Sig: Take 30 mL by mouth every 4 (four) hours as needed for indigestion or heartburn   amLODIPine (NORVASC) 5 mg tablet   No Yes   Sig: TAKE 1 TABLET BY MOUTH ONCE DAILY     atorvastatin (LIPITOR) 40 mg tablet   No Yes   Sig: Take 1 tablet (40 mg total) by mouth daily   insulin glargine (BASAGLAR KWIKPEN) 100 units/mL injection pen   No Yes   Sig: Inject 10 Units under the skin daily at bedtime   nystatin (MYCOSTATIN) powder   No Yes   Sig: Apply topically 2 (two) times a day Apply to lower abdomen twice a day   omeprazole (PriLOSEC) 40 MG capsule   No Yes   Sig: Take 1 capsule (40 mg total) by mouth daily   pregabalin (LYRICA) 50 mg capsule   No Yes   Sig: Take 1 capsule (50 mg total) by mouth 2 (two) times a day   sertraline (ZOLOFT) 50 mg tablet   No Yes   Sig: Take 1 tablet (50 mg total) by mouth daily at bedtime   traMADol (ULTRAM) 50 mg tablet   No Yes   Sig: Take 1 tablet (50 mg total) by mouth 2 (two) times a day as needed for moderate pain or severe pain      Facility-Administered Medications: None       Past Medical History:   Diagnosis Date    Ambulatory dysfunction     Anemia     macrocyctic    Chronic kidney disease 5/24/2018    Chronic pain disorder     right hip    Diabetes mellitus ([address] )     Diabetic foot ulcer (HCC)     left with fat layer exposed    Dyslipidemia 5/24/2018    ESBL E  coli carrier     GERD (gastroesophageal reflux disease)     History of frequent urinary tract infections     HTN (hypertension) 5/24/2018    Hyperlipidemia     Irritable bowel syndrome     constipation    Neurogenic bladder     Neuropathy     Pedal edema     Vitamin D deficiency        Past Surgical History:   Procedure Laterality Date    CATARACT EXTRACTION      WY XCAPSL CTRC RMVL INSJ IO LENS PROSTH W/O ECP Right 12/6/2018    Procedure: EXTRACAPSULAR CATARACT REMOVAL/INSERTION OF INTRAOCULAR LENS;  Surgeon: Gricelda Kate MD;  Location:  MAIN OR;  Service: Ophthalmology    WY XCAPSL CTRC RMVL INSJ IO LENS PROSTH W/O ECP Left 10/17/2019    Procedure: EXTRACAPSULAR CATARACT REMOVAL/INSERTION OF INTRAOCULAR LENS;  Surgeon: Eliza Jorge MD;  Location: [address] [place] [address] OR;  Service: Ophthalmology    SUPRAPUBIC CATHETER INSERTION         Family History   Problem Relation Age of Onset   [de-identified] Breast cancer Mother     Hypertension Mother     Diabetes Mother         Mellitus    Parkinsonism Mother     Cancer Maternal Aunt         Unknown     I have reviewed and agree with the history as documented  E-Cigarette/Vaping     E-Cigarette/Vaping Substances     Social History     Tobacco Use    Smoking status: Current Every Day Smoker     Packs/day: 0 50     Years: 40 00     Pack years: 20 00     Types: Cigarettes    Smokeless tobacco: Never Used   Substance Use Topics    Alcohol use: Not Currently     Alcohol/week: 0 0 standard drinks     Comment: no alcohol for 16 yrs    Drug use: Not Currently       Review of Systems   Constitutional: Negative  HENT: Negative  Eyes: Negative  Respiratory: Negative  Cardiovascular: Negative  Gastrointestinal: Negative  Endocrine: Negative  Genitourinary: Negative  Musculoskeletal: Positive for myalgias  Skin: Negative  Allergic/Immunologic: Negative  Neurological: Negative  Hematological: Negative  Psychiatric/Behavioral: Negative  Physical Exam  Physical Exam   Constitutional: She appears well-developed and well-nourished  No distress  HENT:   Head: Normocephalic and atraumatic  Right Ear: External ear normal    Left Ear: External ear normal    Mouth/Throat: Oropharynx is clear and moist    Eyes: Conjunctivae are normal    Cardiovascular: Normal rate, regular rhythm and normal heart sounds  Pulmonary/Chest: Effort normal and breath sounds normal    Abdominal: Soft  She exhibits no distension and no mass  There is no tenderness  There is no guarding  Negative Michael sign     Musculoskeletal:        Arms:  Patient has noted sharp tenderness upon palpation of chest wall under right breast   She reports completely reproducible pain  Skin: She is not diaphoretic  Vital Signs  ED Triage Vitals [02/24/20 1556]   Temperature Pulse Respirations Blood Pressure SpO2   (!) 97 1 °F (36 2 °C) 76 19 (!) 172/97 97 %      Temp Source Heart Rate Source Patient Position - Orthostatic VS BP Location FiO2 (%)   Tympanic Monitor Lying Right arm --      Pain Score       Worst Possible Pain           Vitals:    02/24/20 1556   BP: (!) 172/97   Pulse: 76   Patient Position - Orthostatic VS: Lying         Visual Acuity      ED Medications  Medications   diazepam (VALIUM) tablet 5 mg (5 mg Oral Given 2/24/20 1648)       Diagnostic Studies  Results Reviewed     None                 XR ribs right w pa chest min 3 views   Final Result by Dominik Landeros DO (02/24 1735)      No active cardiopulmonary disease  No evidence of rib fractures  Workstation performed: [id number]                    Procedures  Procedures         ED Course                               MDM  Number of Diagnoses or Management Options  Rib pain on right side:   Diagnosis management comments: Patient is a 12-year-old female who has been to multiple providers for evaluation of her right-sided musculoskeletal pain  She has been seen by general surgery who deemed the patient not a surgical candidate as this is most likely musculoskeletal pain and not gallbladder or biliary colic  The patient is not taken anything for pain today  Given her known chronic kidney disease we are limited on medications to give this patient  She was given 1 Lidoderm patch and Valium here in the emergency department today  Recommend that she continue with Tylenol as needed and follow-up with her primary care provider as told in the past   Patient was discharged home stable          Disposition  Final diagnoses:   Rib pain on right side     Time reflects when diagnosis was documented in both MDM as applicable and the Disposition within this note     Time User Action Codes Description Comment    2/24/2020  5:07 PM Gee Ventura Add [R07 81] Rib pain on right side       ED Disposition     ED Disposition Condition Date/Time Comment    Discharge Stable Mon Feb 24, 2020  5:07 PM Phill Cervantes discharge to home/self care  Follow-up Information     Follow up With Specialties Details Why Contact Info Additional [address] Schedule an appointment as soon as possible for a visit   [address], [address] [address]  [address], [address], [address] [place], [place], [address]          Discharge Medication List as of 2/24/2020  5:17 PM      START taking these medications    Details   acetaminophen (TYLENOL) 325 mg tablet Take 2 tablets (650 mg total) by mouth every 6 (six) hours as needed for moderate pain, Starting Mon 2/24/2020, Normal         CONTINUE these medications which have NOT CHANGED    Details   Alcohol Swabs (ALCOHOL PREP) 70 % PADS Apply 1 applicator topically as needed, Starting Tue 6/26/2018, Historical Med      aluminum-magnesium hydroxide-simethicone (MYLANTA) 200-200-20 mg/5 mL suspension Take 30 mL by mouth every 4 (four) hours as needed for indigestion or heartburn, Starting Tue 1/28/2020, Normal      amLODIPine (NORVASC) 5 mg tablet TAKE 1 TABLET BY MOUTH ONCE DAILY , Normal      atorvastatin (LIPITOR) 40 mg tablet Take 1 tablet (40 mg total) by mouth daily, Starting Fri 10/4/2019, Normal      B-D INS SYRINGE 0 5CC/31GX5/16 31G X 5/16" 0 5 ML MISC Starting Wed 10/3/2018, Historical Med      Catheters MISC Please change patients suprapubic catheter as soon as possible and then every three months      Dx: N31 9 Neurogenic Bladder, Print      DOCQLACE 100 MG capsule Take 1 capsule (100 mg total) by mouth 2 (two) times a day, Normal      insulin glargine (BASAGLAR KWIKPEN) 100 units/mL injection pen Inject 10 Units under the skin daily at bedtime, Starting Thu 1/9/2020, Normal      !! Lancets 28G MISC Test blood sugars three times daily, Historical Med      !! Lancets 28G MISC by Does not apply route 3 (three) times a day, Starting Tue 1/28/2020, Normal      nystatin (MYCOSTATIN) powder Apply topically 2 (two) times a day Apply to lower abdomen twice a day, Starting Thu 12/12/2019, Print      omeprazole (PriLOSEC) 40 MG capsule Take 1 capsule (40 mg total) by mouth daily, Starting Tue 1/28/2020, Normal      ONE TOUCH ULTRA TEST test strip 100 strips by Device route 3 (three) times a day, Starting Tue 6/12/2018, Historical Med      pregabalin (LYRICA) 50 mg capsule Take 1 capsule (50 mg total) by mouth 2 (two) times a day, Starting Mon 12/9/2019, Normal      sertraline (ZOLOFT) 50 mg tablet Take 1 tablet (50 mg total) by mouth daily at bedtime, Starting Thu 1/9/2020, Normal      traMADol (ULTRAM) 50 mg tablet Take 1 tablet (50 mg total) by mouth 2 (two) times a day as needed for moderate pain or severe pain, Starting Thu 1/9/2020, Normal      Insulin Pen Needle (UNIFINE PENTIPS) 32G X 4 MM MISC Inject under the skin 4 (four) times a day for 90 days, Starting Thu 4/25/2019, Until Wed 7/24/2019, Normal       !! - Potential duplicate medications found  Please discuss with provider  No discharge procedures on file      PDMP Review       Value Time User    PDMP Reviewed  Yes 1/9/2020 11:24 AM Aravind Daniels MD          ED Provider  Electronically Signed by           Stephanie Franks PA-C  02/25/20 5129

## 2020-03-02 PROBLEM — N18.5 ACUTE RENAL FAILURE SUPERIMPOSED ON STAGE 5 CHRONIC KIDNEY DISEASE, NOT ON CHRONIC DIALYSIS (HCC): Status: ACTIVE | Noted: 2020-01-01

## 2020-03-02 PROBLEM — N17.9 ACUTE RENAL FAILURE SUPERIMPOSED ON STAGE 5 CHRONIC KIDNEY DISEASE, NOT ON CHRONIC DIALYSIS (HCC): Status: ACTIVE | Noted: 2020-01-01

## 2020-03-02 PROBLEM — R94.31 QT PROLONGATION: Status: ACTIVE | Noted: 2020-01-01

## 2020-03-02 PROBLEM — R07.82 INTERCOSTAL PAIN: Status: ACTIVE | Noted: 2020-01-01

## 2020-03-02 PROBLEM — N18.4 STAGE 4 CHRONIC KIDNEY DISEASE (HCC): Status: ACTIVE | Noted: 2018-05-24

## 2020-03-02 PROBLEM — F32.A DEPRESSION: Status: ACTIVE | Noted: 2020-01-01

## 2020-03-02 PROBLEM — L84 CORN OF FOOT: Status: ACTIVE | Noted: 2020-01-01

## 2020-03-02 NOTE — ASSESSMENT & PLAN NOTE
Complains of the pain under her right breast for about 2-3 weeks, was seen in ED on 2/24 that was thought to be muscular skeletal pain  Will prescribe SalonPas patches, advised that we are very limited with the pain medications due to her CKD  Patient requests to be sent to the pain management due to pain in multiple sites including chronic hip pain, back pain  Referral placed

## 2020-03-02 NOTE — ASSESSMENT & PLAN NOTE
Patient was hospitalized in January 2020 due to JULIO,  she was supposed to follow-up with nephrologist and go for blood work 1 week after the discharge, but she missed her appointment and has not go for blood work     Her GFR on discharge date was 23 and creatinine was 2 28  Instructed to go for blood work today  Encouraged hydration, avoid nephrotoxic medications  Discussed with referral group and helped to reschedule appointment with nephrologists for March 25

## 2020-03-02 NOTE — TELEPHONE ENCOUNTER
Reviewed the blood work, noted worsening of the GFR and creatinine  She has a scheduled appointment with nephrologist at the end of March  Discussed the case with nephrology, given her multiple missed appointments patient might need to be admitted as she needs hydration and close monitor of her kidney function within 24-48 hrs  I called the patient around 5:40 pm and let her know about the results, instructed to go to ED  She agreed

## 2020-03-02 NOTE — PROGRESS NOTES
Assessment/Plan:    Stage 4 chronic kidney disease ([facility] )  Patient was hospitalized in January 2020 due to JULIO,  she was supposed to follow-up with nephrologist and go for blood work 1 week after the discharge, but she missed her appointment and has not go for blood work  Her GFR on discharge date was 23 and creatinine was 2 28  Instructed to go for blood work today  Encouraged hydration, avoid nephrotoxic medications  Discussed with referral group and helped to reschedule appointment with nephrologists for March 25    Intercostal pain  Complains of the pain under her right breast for about 2-3 weeks, was seen in ED on 2/24 that was thought to be muscular skeletal pain  Will prescribe SalonPas patches, advised that we are very limited with the pain medications due to her CKD  Patient requests to be sent to the pain management due to pain in multiple sites including chronic hip pain, back pain  Referral placed       Diagnoses and all orders for this visit:    Intercostal pain  -     Camphor-Menthol-Methyl Sal 1 2-5 7-6 3 % PTCH; Apply 1 patch topically daily  -     Ambulatory referral to Pain Management; Future  -     Menthol (Icy Hot) 5 % PTCH; Apply 1 patch topically daily    Right leg pain  -     Ambulatory referral to Pain Management; Future    Stage 4 chronic kidney disease ([facility] )    Other orders  -     ASPIR-LOW 81 MG EC tablet  -     ergocalciferol (VITAMIN D2) 50,000 units          Subjective:      Patient ID: Adrian Holley is a 62 y o  female  Patient presents due to pain under her right breast that has been going on for about 2-3 weeks  She denies any trauma, rash, sick contacts, cough or other provoking factors  She describes pain as constant, worse with palpation and deep breathing  Patient also continues to complain of the pain in her right hip that has been going on for couple months as well as back pain  Patient was recently admitted to the hospital her due to abdominal pain, JULIO, anemia  She was supposed to follow-up with multiple specialist upon discharge including surgery, gastroenterologist, nephrologist, but she missed most of the appointments  The following portions of the patient's history were reviewed and updated as appropriate: allergies, current medications, past family history, past medical history, past social history, past surgical history and problem list     Review of Systems   Constitutional: Negative for chills, diaphoresis, fatigue and fever  HENT: Negative for congestion, ear discharge, ear pain, mouth sores, rhinorrhea, sore throat and trouble swallowing  Eyes: Negative for photophobia, pain and discharge  Respiratory: Negative for cough, chest tightness, shortness of breath and wheezing  Cardiovascular: Positive for chest pain (pain under the right breast)  Negative for palpitations and leg swelling  Gastrointestinal: Negative for abdominal distention, abdominal pain, blood in stool, constipation, diarrhea and nausea  Genitourinary: Negative for difficulty urinating, frequency and urgency  Musculoskeletal: Positive for arthralgias (right hip and thigh pain), back pain and myalgias  Negative for joint swelling and neck stiffness  Skin: Negative for color change, pallor and rash  Neurological: Negative for dizziness, syncope, numbness and headaches  Objective:      /74 (BP Location: Left arm, Patient Position: Sitting, Cuff Size: Standard)   Pulse 83   Temp (!) 96 3 °F (35 7 °C) (Temporal)   Resp 18   Ht 5' 3" (1 6 m)   Wt 80 3 kg (177 lb)   SpO2 99%   Breastfeeding No   BMI 31 35 kg/m²          Physical Exam   Constitutional: She is oriented to person, place, and time  She appears well-developed and well-nourished  No distress  HENT:   Head: Normocephalic and atraumatic  Cardiovascular: Normal rate, regular rhythm and normal heart sounds  Exam reveals no gallop and no friction rub  No murmur heard    Pulmonary/Chest: Effort normal and breath sounds normal  No respiratory distress  She has no wheezes  She has no rales  She exhibits tenderness (rib cage under the right breast)  She exhibits no mass, no laceration and no deformity  Abdominal: Soft  Bowel sounds are normal  She exhibits no distension  There is no tenderness  Suprapubic catheter in place   Musculoskeletal: She exhibits no edema or deformity  Right hip: She exhibits decreased range of motion and tenderness  Neurological: She is alert and oriented to person, place, and time  Skin: Skin is warm and dry  No rash noted  No erythema  No pallor  Psychiatric: She has a normal mood and affect

## 2020-03-03 PROBLEM — D63.1 ANEMIA DUE TO STAGE 3 CHRONIC KIDNEY DISEASE (HCC): Status: ACTIVE | Noted: 2018-10-10

## 2020-03-03 PROBLEM — N18.30 ANEMIA DUE TO STAGE 3 CHRONIC KIDNEY DISEASE (HCC): Status: ACTIVE | Noted: 2018-10-10

## 2020-03-03 PROBLEM — E87.6 HYPOKALEMIA: Status: ACTIVE | Noted: 2020-01-01

## 2020-03-03 PROBLEM — E83.42 HYPOMAGNESEMIA: Status: ACTIVE | Noted: 2020-01-01

## 2020-03-03 NOTE — H&P
History and Physical - 5001 24/7 Card    Patient Information: Ana Noble 62 y o  female MRN: [id number]  Unit/Bed#: ED 03 Encounter: [id number]  Admitting Physician: Myra Morrison MD  PCP: Praneeth Dela Cruz MD  Date of Admission:  03/02/20    Assessment and Plan    * Acute renal failure superimposed on stage 5 chronic kidney disease, not on chronic dialysis [facility])  Assessment & Plan  Most likely 2/2 poorly controlled CKD, History of CKD stage 4, neurogenic bladder with suprapubic catheter, HTN, admission 2 months ago for JULIO as well  Presented to ED with complaints of RUQ pain that wraps around to her right back x 2 weeks  Has Hx of ESBL carrier status secondary to suprapubic cath from neurogenic bladder  Baseline BUN/Cr:  31:2 28   GFR  23  Now BUN/Cr 47:3 76, GFR 13  IVF resuscitation started with 1L NS bolus followed by 125 cc/hr mIFV with NS  Exam significant for Right CVA tenderness  Volume status chronic bilateral pitting edema, no evidence of pulmonary edema  - continue with mIVF  - strict I/O  - am CBC, BMP, Mg  - judicious use of nephrotoxic drugs  - CT abdomen/pelvis wo contrast pending  - UA pending  - Consult nephrology     Intercostal pain  Assessment & Plan  Right lower rib pain, however clinical picture is unclear as she has a positive ivey's     - CT Ab  - Camphor/Menthol and lidocaine patches  - Continue Lyrica, reduce dose by 1/2 to 25 mg bid, for CrCl 15-30      Anemia  Assessment & Plan  Chronic and stable  Baseline is Hb 10  Hb today 10 2    - Continue to monitor for signs/symptoms of acute blood loss  - CBC in am       Type 2 diabetes mellitus with kidney complication, with long-term current use of insulin [facility])  Assessment & Plan    Lab Results   Component Value Date    HGBA1C 9 6 (H) 01/01/2020     Poorly controlled and resulted in nephropathy, neuropathy and cataracts  Home prescribed basaglar 10 U, patient reports taking 40 U at night    - Lantus 10 U qhs and adjust per protocol   - ISS with alogrithm 3 and accuchek achs  -Diabetic diet with low salt     HTN (hypertension)  Assessment & Plan  BP Readings from Last 1 Encounters:   03/02/20 140/79   Goal < 130/90  Continue home medication Amlodipine 5 mg  Corn of foot  Assessment & Plan  Present on bottom of right foot  Patient did not know it was there likely because of diabetic neuropathy with T2DM    - Consult podiatry    Depression  Assessment & Plan  Currently endorses passive suicidal ideation, denies plan  - Continue Zoloft 50 mg,   - Referral to psychiatry     Neurogenic bladder  Assessment & Plan  Has suprapubic cath last changed January 2020, ordered to be changed q 3 months  VTE Prophylaxis: SCD's ordered as VTE screen was 2, this is low risk   Code Status: Level 1 - Full Code  Anticipated Length of Stay:  Patient will be admitted on an Emergency basis with an anticipated length of stay of  more than 2 midnights  Justification for Hospital Stay: JULIO on CKD 4  Total Time for Visit, including Counseling / Coordination of Care: 60 mins  Greater than 50% of this total time spent on direct patient counseling and coordination of care  Chief Complaint:     Chief Complaint   Patient presents with    Abnormal Lab     has pain issues on right side abdomen and right lower back; had labs done today and called and told that the kidney function was worse and needed go to the ER for admission and fluids  Recent hospitalization for the same  History of Present Illness:    Misty Tamayo is a 62 y o  female with PMH significant for CKD 4, neurogenic bladder with indwelling suprapubic cath, Hx of ESBL, DMII, diabetic nephropathy, HTN, Anemia, Chronic pain and depression who was sent to the ED by her PCP for worsening Kidney function, creatinine of 3 76, eGFR of 13 down from 23  She was recently discharged from the hospital on 1/9 for the same   She did not follow up with Nephrology or Hematology as an outpatient  In ED she was found to be stable and called for admission  On admission she was exhibiting CVA tenderness and RUQ tenderness, and rib tenderness to palpation  And noted to have 1+ pitting edema to knees BL, but chest was CTA  She denied fever, chills, CP, SOB, endorsed nausea, but denied vomiting, denied dysuria, or suprapubic tenderness, and denied blood in urine or stool, denied diarrhea, or calf pain  Review of Systems:  Review of Systems   Constitutional: Negative for chills and fever  HENT: Positive for congestion  Eyes: Negative for pain and redness  Respiratory: Negative for cough and shortness of breath  Cardiovascular: Positive for leg swelling  Negative for chest pain and palpitations  Gastrointestinal: Positive for abdominal pain and constipation  Negative for blood in stool, diarrhea, nausea and vomiting  Genitourinary: Negative for dysuria and hematuria  Musculoskeletal: Positive for back pain  Negative for neck pain  Neurological: Negative for dizziness and headaches  Psychiatric/Behavioral: Positive for dysphoric mood and suicidal ideas  Negative for self-injury  The patient is nervous/anxious        Past Medical and Surgical History:   Past Medical History:   Diagnosis Date    Ambulatory dysfunction     Anemia     macrocyctic    Chronic kidney disease 5/24/2018    Chronic pain disorder     right hip    Diabetes mellitus ([address] )     Diabetic foot ulcer [address] [address]     left with fat layer exposed    Dyslipidemia 5/24/2018    ESBL E  coli carrier     GERD (gastroesophageal reflux disease)     History of frequent urinary tract infections     HTN (hypertension) 5/24/2018    Hyperlipidemia     Irritable bowel syndrome     constipation    Neurogenic bladder     Neuropathy     Pedal edema     Vitamin D deficiency      Past Surgical History:   Procedure Laterality Date    CATARACT EXTRACTION      DE XCAPSL CTRC RMVL INSJ IO LENS PROSTH W/O ECP Right 12/6/2018 Procedure: EXTRACAPSULAR CATARACT REMOVAL/INSERTION OF INTRAOCULAR LENS;  Surgeon: Jens Whitmore MD;  Location: [address] [place] [address] MAIN OR;  Service: Ophthalmology    CO XCAPSL CTRC RMVL INSJ IO LENS PROSTH W/O ECP Left 10/17/2019    Procedure: EXTRACAPSULAR CATARACT REMOVAL/INSERTION OF INTRAOCULAR LENS;  Surgeon: Jens Whitmore MD;  Location:  MAIN OR;  Service: Ophthalmology    SUPRAPUBIC CATHETER INSERTION       Meds/Allergies: Allergies: Allergies   Allergen Reactions    Gabapentin      Other reaction(s): slurring of speech, falls   Metformin Headache     Prior to Admission Medications   Prescriptions Last Dose Informant Patient Reported? Taking? ASPIR-LOW 81 MG EC tablet Not Taking at Unknown time  Yes No   Alcohol Swabs (ALCOHOL PREP) 70 % PADS Unknown at Unknown time  Yes No   Sig: Apply 1 applicator topically as needed   B-D INS SYRINGE 0 5CC/31GX5/16 31G X 5/16" 0 5 ML MISC Unknown at Unknown time  Yes No   Camphor-Menthol-Methyl Sal 1 2-5 7-6 3 % PTCH Unknown at Unknown time  No No   Sig: Apply 1 patch topically daily   Catheters MISC Unknown at Unknown time  No No   Sig: Please change patients suprapubic catheter as soon as possible and then every three months      Dx: N31 9 Neurogenic Bladder   DOCQLACE 100 MG capsule Past Week at Unknown time  No Yes   Sig: Take 1 capsule (100 mg total) by mouth 2 (two) times a day   Insulin Pen Needle (UNIFINE PENTIPS) 32G X 4 MM MISC   No No   Sig: Inject under the skin 4 (four) times a day for 90 days   Lancets 28G MISC 3/1/2020 at Unknown time  Yes Yes   Sig: Test blood sugars three times daily   Lancets 28G MISC 3/1/2020 at Unknown time  No Yes   Sig: by Does not apply route 3 (three) times a day   Menthol (Icy Hot) 5 % PTCH Unknown at Unknown time  No No   Sig: Apply 1 patch topically daily   ONE TOUCH ULTRA TEST test strip 3/1/2020 at Unknown time  Yes Yes   Si strips by Device route 3 (three) times a day   acetaminophen (TYLENOL) 325 mg tablet Past Week at Unknown time  No Yes   Sig: Take 2 tablets (650 mg total) by mouth every 6 (six) hours as needed for moderate pain   aluminum-magnesium hydroxide-simethicone (MYLANTA) 200-200-20 mg/5 mL suspension More than a month at Unknown time  No No   Sig: Take 30 mL by mouth every 4 (four) hours as needed for indigestion or heartburn   amLODIPine (NORVASC) 5 mg tablet Past Week at Unknown time  No Yes   Sig: TAKE 1 TABLET BY MOUTH ONCE DAILY     atorvastatin (LIPITOR) 40 mg tablet Past Week at Unknown time  No Yes   Sig: Take 1 tablet (40 mg total) by mouth daily   ergocalciferol (VITAMIN D2) 50,000 units Past Week at Unknown time  Yes Yes   insulin glargine (BASAGLAR KWIKPEN) 100 units/mL injection pen 3/1/2020 at Unknown time  No Yes   Sig: Inject 10 Units under the skin daily at bedtime   nystatin (MYCOSTATIN) powder Past Month at Unknown time  No Yes   Sig: Apply topically 2 (two) times a day Apply to lower abdomen twice a day   omeprazole (PriLOSEC) 40 MG capsule More than a month at Unknown time  No No   Sig: Take 1 capsule (40 mg total) by mouth daily   pregabalin (LYRICA) 50 mg capsule 3/2/2020 at 0800  No Yes   Sig: Take 1 capsule (50 mg total) by mouth 2 (two) times a day   sertraline (ZOLOFT) 50 mg tablet 3/1/2020 at 2200  No Yes   Sig: Take 1 tablet (50 mg total) by mouth daily at bedtime      Facility-Administered Medications: None     Social History:     Social History     Socioeconomic History    Marital status: Single     Spouse name: Not on file    Number of children: Not on file    Years of education: Not on file    Highest education level: Not on file   Occupational History    Not on file   Social Needs    Financial resource strain: Somewhat hard    Food insecurity:     Worry: Not on file     Inability: Not on file    Transportation needs:     Medical: No     Non-medical: No   Tobacco Use    Smoking status: Current Every Day Smoker     Packs/day: 0 50     Years: 40 00     Pack years: 20 00 Types: Cigarettes    Smokeless tobacco: Never Used   Substance and Sexual Activity    Alcohol use: Not Currently     Alcohol/week: 0 0 standard drinks     Comment: no alcohol for 16 yrs    Drug use: Not Currently    Sexual activity: Not on file   Lifestyle    Physical activity:     Days per week: Not on file     Minutes per session: Not on file    Stress: To some extent   Relationships    Social connections:     Talks on phone: Not on file     Gets together: More than three times a week     Attends [employer] service: Not on file     Active member of club or organization: Not on file     Attends meetings of clubs or organizations: Not on file     Relationship status: Not on file    Intimate partner violence:     Fear of current or ex partner: Not on file     Emotionally abused: Not on file     Physically abused: Not on file     Forced sexual activity: Not on file   Other Topics Concern    Not on file   Social History Narrative    Hospitalization: 4/28/18    Flu shot:yes     Patient Pre-hospital Living Situation: Independent  Patient Pre-hospital Level of Mobility: Independent   Patient Pre-hospital Diet Restrictions: Diabetic diet, not following     Family History:  Family History   Problem Relation Age of Onset   [facility] Breast cancer Mother     Hypertension Mother     Diabetes Mother         Mellitus    Parkinsonism Mother     Cancer Maternal Aunt         Unknown     Physical Exam:   Vitals:   Blood Pressure: 140/79 (03/02/20 1841)  Pulse: 78 (03/02/20 1841)  Temperature: (!) 95 6 °F (35 3 °C) (03/02/20 1841)  Temp Source: Tympanic (03/02/20 1841)  Respirations: 16 (03/02/20 1841)  Weight - Scale: 80 1 kg (176 lb 9 4 oz) (03/02/20 1841)  SpO2: 99 % (03/02/20 1841)    Physical Exam   Constitutional: She is oriented to person, place, and time  She appears well-developed and well-nourished  HENT:   Head: Normocephalic and atraumatic     Right Ear: External ear normal    Left Ear: External ear normal    Nose: Nose normal    Mouth/Throat: Oropharynx is clear and moist    Eyes: Pupils are equal, round, and reactive to light  Conjunctivae and EOM are normal    Neck: Normal range of motion  Neck supple  Cardiovascular: Normal rate, regular rhythm, normal heart sounds and intact distal pulses  Pulmonary/Chest: Effort normal and breath sounds normal    Abdominal: Soft  Bowel sounds are normal  There is tenderness in the right upper quadrant  There is CVA tenderness and positive Michael's sign  There is no rigidity, no rebound and no guarding  Genitourinary:   Genitourinary Comments: Suprapubic cath in place, clean   Musculoskeletal: Normal range of motion  She exhibits edema (1+ to knees BL)  She exhibits no tenderness  Lymphadenopathy:     She has no cervical adenopathy  Neurological: She is alert and oriented to person, place, and time  Skin: Skin is warm and dry  Psychiatric: She has a normal mood and affect  Her behavior is normal        Lab Results: I have personally reviewed pertinent reports      Results from last 7 days   Lab Units 03/02/20  1449   WBC Thousand/uL 7 10   HEMOGLOBIN g/dL 10 2*   HEMATOCRIT % 30 7*   PLATELETS Thousands/uL 191   NEUTROS PCT % 65   LYMPHS PCT % 25   MONOS PCT % 4   EOS PCT % 5     Results from last 7 days   Lab Units 03/02/20  1449   POTASSIUM mmol/L 3 1*   CHLORIDE mmol/L 108   CO2 mmol/L 22   BUN mg/dL 47*   CREATININE mg/dL 3 76*   CALCIUM mg/dL 8 2*   ALK PHOS U/L 213*   ALT U/L 21   AST U/L 19   EGFR ml/min/1 73sq m 13*   PHOSPHORUS mg/dL 5 6*                          Results from last 7 days   Lab Units 03/02/20  2228   COLOR UA  Straw   CLARITY UA  Cloudy*   SPEC GRAV UA  1 015   PH UA  6 0   LEUKOCYTES UA  500 0*   NITRITE UA  Positive*   GLUCOSE UA mg/dl 250 (1/4%)*   KETONES UA mg/dl Negative   BILIRUBIN UA  Negative   BLOOD UA  50 0*      Results from last 7 days   Lab Units 03/02/20  2228   RBC UA /hpf 2-4*   WBC UA /hpf Innumerable*   EPITHELIAL CELLS WET PREP /hpf Occasional   BACTERIA UA /hpf Innumerable*        Imaging: I have personally reviewed pertinent reports  Xr Ribs Right W Pa Chest Min 3 Views    Result Date: 2/24/2020  Narrative: RIGHT RIBS AND CHEST INDICATION: Right mid rib pain for 3 weeks, worsening  COMPARISON:  1/1/2020 VIEWS:  XR RIBS RIGHT W PA CHEST MIN 3 VIEWS Images: 4 FINDINGS: Cardiomediastinal silhouette appears unremarkable  Lungs are clear  No pleural effusions  There is no pneumothorax  No rib fractures are identified  Mild degenerative changes right AC joint  Impression: No active cardiopulmonary disease  No evidence of rib fractures  Workstation performed: [id number]     EKG, Pathology, and Other Studies Reviewed on Admission:   EKG  Result Date: 03/02/20  Impression:  NSR, prolonged QT to 481, nonspecific T wave changes in lateral leads    Entire H&P was discussed with Dr Toma Cummings who agreed to what is noted above    Payton Cabrera MD  03/02/20  11:13 PM

## 2020-03-03 NOTE — NURSING NOTE
On initial rounds patient in no apparent distress  Skin warm and dry to touch  Pleasant and cooperative  States pain to under right breast at level 6 Lidocaine patch placed  Ambulates well with her own walker encourage to call for assistance  NS change to PLASMA Lyte- A at this time  Limb alert placed on left arm as ordered  No s/s of hypo/hyperglycemic reaction noted  Tolerating diet  All safety maintained  Will continue to monitor

## 2020-03-03 NOTE — PROGRESS NOTES
Progress Note    Author Jose 62 y o  female MRN: [id number]  Unit/Bed#: 7T [facility] 716-02 Encounter: [id number]  Admitting Physician: Patrica Lowery MD  PCP: Aissatou Parker MD  Date of Admission:  3/2/2020  6:45 PM    Assessment and Plan    * Acute renal failure superimposed on stage 5 chronic kidney disease, not on chronic dialysis [facility])  Assessment & Plan  Worsening CKD with neurogenic bladder and suprapubic catheter  Recently admitted for JULIO  Baseline BUN/Cr:  31:2 28   GFR  23  Now BUN/Cr 47:3 76, GFR 13  Contnue NS @125 cc/hr  Exam significant for Right CVA tenderness, but no evidence of UTI, SIRs, or nephrolithiasis  Nephrology evaluated on 3/3/2020 and will pursue a full workup, meanwhile, will correct electrolyte abnormalities  To maintain K >3 5 and Mg >2    - continue with mIVF  - strict I/O  - am CBC, BMP, Mg  - judicious use of nephrotoxic drugs  - CT abdomen/pelvis wo contrast pending  - UA pending  - Consult nephrology     Hypokalemia  Assessment & Plan  K of 2 7 this morning  Patient asymptomatic  EKG showed mild  T wave flattening only  Will give KCl 40 meq PO this morning  Supplement Mg 1mg IV  F/u BMP at 2pm      Hypomagnesemia  Assessment & Plan  Although Mg is normal, it is <2 in a patient with hypokalemia  Will supplement with Mg 1 g IV   F/u Mg level this afternoon  QT prolongation  Assessment & Plan  QTc 481 on admission and stable  Likely 2/2 to zoloft and hypokalemia      - Continue to monitor      Corn of foot  Assessment & Plan  Present on bottom of right foot  diabetic neuropathy with T2DM    - Consult podiatry     Depression  Assessment & Plan  In a happy mood this morning, was tearful on admission  No suicide ideation    - Continue Zoloft 50 mg,   - Referral to psychiatry     Intercostal pain  Assessment & Plan  Right lower rib pain, however clinical picture is unclear as she has a positive ivey's  Much improved       - Camphor/Menthol and lidocaine patches  - Continue Lyrica, reduce dose by 1/2 to 25 mg bid, for CrCl 15-30  Nephrotic range proteinuria  Assessment & Plan  Will follow along with nephrology  Anemia due to stage 3 chronic kidney disease (HCC)  Assessment & Plan  Chronic and stable, no active bleed  Baseline is Hb 10  Hg decreased to 8 7 likely from hemodilution      - Continue to monitor for signs/symptoms of acute blood loss  - CBC in am       Neurogenic bladder  Assessment & Plan  Has suprapubic cath last changed January 2020, ordered to be changed q 3 months  Type 2 diabetes mellitus with kidney complication, with long-term current use of insulin ([facility])  Assessment & Plan    Lab Results   Component Value Date    HGBA1C 7 0 (H) 03/02/2020     Poorly controlled and resulted in nephropathy, neuropathy and cataracts  Home prescribed basaglar 10 U, patient reports taking 40 U at night    - Lantus 10 U qhs and adjust per protocol   - ISS with alogrithm 3 and accuchek achs  -Diabetic diet with low salt     HTN (hypertension)  Assessment & Plan  BP Readings from Last 1 Encounters:   03/03/20 (!) 172/92   Goal < 130/80  Continue home medication Amlodipine 5 mg, will consider increasing the dose  VTE Pharmacologic Prophylaxis:   Pharmacologic: none  Mechanical VTE Prophylaxis in Place: Yes    Patient Centered Rounds: I have performed bedside rounds with nursing staff today  Discussions with Specialists or Other Care Team Provider: Nephrology  Education and Discussions with Family / Patient: Patien    Time Spent for Care: 45 minutes  More than 50% of total time spent on counseling and coordination of care as described above  Current Length of Stay: 1 day(s)    Current Patient Status: Inpatient   Certification Statement: The patient will continue to require additional inpatient hospital stay due to renal failure and hypokalemia  Discharge Plan: tentative       Code Status: Level 1 - Full Code      Subjective: Patient seen at bedside  No acute events overnight  She reports an improvement in her mood compared to time of admissions  Denied fever, chills, appetite change, nausea, vomiting, SOB, chest pain, numbness or tinging of the extremities  Objective:     Vitals:   Temp (24hrs), Av 8 °F (36 °C), Min:95 6 °F (35 3 °C), Max:97 9 °F (36 6 °C)    Temp:  [95 6 °F (35 3 °C)-97 9 °F (36 6 °C)] 97 3 °F (36 3 °C)  HR:  [72-83] 76  Resp:  [16-18] 18  BP: (134-172)/(74-94) 172/92  SpO2:  [93 %-99 %] 93 %  Body mass index is 30 77 kg/m²  Input and Output Summary (last 24 hours): Intake/Output Summary (Last 24 hours) at 3/3/2020 1306  Last data filed at 3/3/2020 1151  Gross per 24 hour   Intake 2452 5 ml   Output 1800 ml   Net 652 5 ml       Physical Exam:     Physical Exam   Constitutional: She is oriented to person, place, and time  She appears well-developed and well-nourished  No distress  HENT:   Head: Normocephalic and atraumatic  Mouth/Throat: Oropharynx is clear and moist  No oropharyngeal exudate  Eyes: Right eye exhibits no discharge  Left eye exhibits no discharge  Neck: Neck supple  No JVD present  Cardiovascular: Normal rate, regular rhythm and normal heart sounds  No murmur heard  Pulmonary/Chest: Effort normal and breath sounds normal  She has no wheezes  Abdominal: Soft  Bowel sounds are normal  She exhibits no distension  There is no tenderness  Suprapubic catheter in place, no sign of surrounding infection  Musculoskeletal: She exhibits no edema or deformity  Lymphadenopathy:     She has no cervical adenopathy  Neurological: She is alert and oriented to person, place, and time  Skin: Skin is warm and dry  No rash noted  She is not diaphoretic  Psychiatric: She has a normal mood and affect  Her behavior is normal    Nursing note and vitals reviewed          Additional Data:     Labs:    Results from last 7 days   Lab Units 20  0532   WBC Thousand/uL 5 80 HEMOGLOBIN g/dL 8 7*   HEMATOCRIT % 26 1*   PLATELETS Thousands/uL 153   NEUTROS PCT % 46   LYMPHS PCT % 40   MONOS PCT % 6   EOS PCT % 8*     Results from last 7 days   Lab Units 03/03/20  0532   POTASSIUM mmol/L 2 7*   CHLORIDE mmol/L 112*   CO2 mmol/L 24   BUN mg/dL 44*   CREATININE mg/dL 3 72*   CALCIUM mg/dL 7 4*   ALK PHOS U/L 164*   ALT U/L 16   AST U/L 17         Results from last 7 days   Lab Units 03/03/20  1131 03/03/20  0544 03/02/20  2216   POC GLUCOSE mg/dl 151* 166* 271*     Results from last 7 days   Lab Units 03/02/20  1449   HEMOGLOBIN A1C % 7 0*         * I Have Reviewed All Lab Data Listed Above  * Additional Pertinent Lab Tests Reviewed: Endyg[address] 66 Admission Reviewed    Imaging:    Imaging Reports Reviewed Today Include: none  Imaging Personally Reviewed by Myself Includes:  none    Recent Cultures (last 7 days):           Last 24 Hours Medication List:     Current Facility-Administered Medications:  acetaminophen 975 mg Oral Q8H [address] Nicolle Hernandez MD    [START ON 3/4/2020] amLODIPine 5 mg Oral Daily Maura Johnson DO    atorvastatin 40 mg Oral Daily Nicolle Hernandez MD    docusate sodium 100 mg Oral Daily PRN Nicolle Hernandez MD    insulin glargine 10 Units Subcutaneous HS Nicolle Hernandez MD    insulin lispro 1-6 Units Subcutaneous TID AC Nicolle Hernandez MD    lidocaine 1 patch Topical Daily Nicolle Hernandez MD    multi-electrolyte 125 mL/hr Intravenous Continuous Maura Mcmahan DO Last Rate: 125 mL/hr (03/03/20 1153)   pregabalin 25 mg Oral BID Tigist Gomez MD    sertraline 50 mg Oral HS Segundo Miguel MD         ** Please Note: Dictation voice to text software may have been used in the creation of this document   **    Soco Jara MD  03/03/20  1:06 PM

## 2020-03-03 NOTE — CONSULTS
Consultation - Nephrology   Washington Bowman 62 y o  female MRN: [id number]  Unit/Bed#: 7T [facility] 716-02 Encounter: [id number]    ASSESSMENT and PLAN:  1  JULIO, present on admission, possibly prerenal vs progression of CKD in setting of diabetic nephropathy  -sCr 3 7 on admission and unchanged on IVF today  -On NS at 125ml/hr  Will change to isolyte 125ml/hr  -f/u am BMP  -avoid relative hypotension  -avoid nephrotoxins/IV dye  -CT abdomen and pelvis without contrast shows no acute abnormality, unremarkable kidneys  Enlarged left para-aortic lymph nodes noted  -urinalysis with microscopy shows cloudy urine, straw color, positive glucose, positive blood, positive nitrites, leukocytes, greater than 500 protein, 2-4 RBCs, innumerable WBCs innumerable bacteria in the setting of a suprapubic catheter that is chronic  2  CKD ? Stage 3 vs progression to later stage with previous b/l sCr 1 5-1 8, more recently baseline sCr low 2s as of January 2020(s/p recent hospital admission for prerenal JULIO)  -highly suspect CKD d/t long standing diabetes, previously uncontrolled A1C  -save non dominant arm for potential future vascular access  -needs follow-up with Nephrology outpatient upon discharge    3  Hypokalemia along with hypomagnesemia - K 2 7, mag 1 7, receiving 1g IV mag sulfate today  S/p K repletion per Family medicine  F/u repeat K this afternoon prior to further K repletion  Goal K 4  Avoid aggressive K repletion in light of JULIO  4  HTN - BP generally well controlled  Continue amlodipine 5mg daily with hold parameters    5  Anemia likely due to CKD - Hgb 10 2 on admission down to 8 7, ? Dilutional  Recent iron panel shows low iron, high ferritin, iron sat 20% and low TIBC at 128      6  History of neurogenic bladder s/p Suprapubic catheter in place     7  LE edema ? D/t nephrotic cause as alb low at 2 3 with renal impairment and nephrotic range proteinuria- will avoid diuresis at this time in light of hypokalemia  8  Nephrotic range proteinuria most likely d/t diabetes -urine protein to creatinine ratio 11 g as of January 30, 2020  SPEP/UPEP with IF negative for monoclonality  FLC 2 12    -Prior serologic work up shows negative HIV, negative acute hepatitis panel, negative JOSE, IgA level normal  Normal IgA level in serum does not exclude IgAN  -will check anti VIRGIL 2R  -will avoid ACEi for now  SUMMARY OF RECOMMENDATIONS:  Will provide IVF  Consider d/c tomorrow with close outpatient renal f/u if renal function remains stable or improves  I have high suspicion for CKD progression  Have discussed potential need for RRT soon with the patient and her daughter  No urgent RRT needs now  HISTORY OF PRESENT ILLNESS:  Requesting Physician: Maribell Omer MD  Reason for Consult: JULIO Bowman is a 62y o  year old female who was admitted to [facility] after presenting with JULIO  A renal consultation is requested today for assistance in the management of JULIO  The patient states that she had outpatient blood work done yesterday which showed elevation in creatinine  She feels fine  She states that her blood sugars are improved but did have a reading of 200 today  Her appetite is good  She denies any recent illnesses or recent antibiotic use  She does have occasional headaches and says that her vision is blurry  She is not aware of any diabetic retinopathy but has had cataract removal     She denies any dizziness, lightheadedness, chest pain or shortness of breath, nausea, vomiting, diarrhea but does have occasional constipation and uses Dulcolax for this  She denies any use of at and MIs or any other laxative agents  She has a chronic SPT which is to be changed again on March 11, 2020  She has had a chronic suprapubic catheter for over 5 years  She denies any rashes or joint pains  She does admit to smoking half a pack per day since age 13   She complains of pain under her right breast but otherwise has no complaints  She does have leg edema but states this has been chronic and she is used to it  PAST MEDICAL HISTORY:  Past Medical History:   Diagnosis Date    Ambulatory dysfunction     Anemia     macrocyctic    Chronic kidney disease 5/24/2018    Chronic pain disorder     right hip    Diabetes mellitus ([address] )     Diabetic foot ulcer ([address] )     left with fat layer exposed    Dyslipidemia 5/24/2018    ESBL E  coli carrier     GERD (gastroesophageal reflux disease)     History of frequent urinary tract infections     HTN (hypertension) 5/24/2018    Hyperlipidemia     Irritable bowel syndrome     constipation    Neurogenic bladder     Neuropathy     Pedal edema     Vitamin D deficiency        PAST SURGICAL HISTORY:  Past Surgical History:   Procedure Laterality Date    CATARACT EXTRACTION      ND XCAPSL CTRC RMVL INSJ IO LENS PROSTH W/O ECP Right 12/6/2018    Procedure: EXTRACAPSULAR CATARACT REMOVAL/INSERTION OF INTRAOCULAR LENS;  Surgeon: Kathrin Eli MD;  Location: [address] [place] [address] MAIN OR;  Service: Ophthalmology    ND XCAPSL CTRC RMVL INSJ IO LENS PROSTH W/O ECP Left 10/17/2019    Procedure: EXTRACAPSULAR CATARACT REMOVAL/INSERTION OF INTRAOCULAR LENS;  Surgeon: Kathrin Eli MD;  Location:  MAIN OR;  Service: Ophthalmology    SUPRAPUBIC CATHETER INSERTION         ALLERGIES:  Allergies   Allergen Reactions    Gabapentin      Other reaction(s): slurring of speech, falls      Metformin Headache       SOCIAL HISTORY:  Social History     Substance and Sexual Activity   Alcohol Use Not Currently    Alcohol/week: 0 0 standard drinks    Frequency: Never    Binge frequency: Never    Comment: no alcohol for 16 yrs     Social History     Substance and Sexual Activity   Drug Use Not Currently     Social History     Tobacco Use   Smoking Status Current Every Day Smoker    Packs/day: 0 50    Years: 40 00    Pack years: 20 00    Types: Cigarettes   Smokeless Tobacco Never Used       FAMILY HISTORY:  Family History   Problem Relation Age of Onset   [facility] Breast cancer Mother     Hypertension Mother     Diabetes Mother         Mellitus    Parkinsonism Mother     Cancer Maternal Aunt         Unknown       MEDICATIONS:    Current Facility-Administered Medications:     acetaminophen (TYLENOL) tablet 975 mg, 975 mg, Oral, Q8H [address], Nicolle Hernandez MD, 975 mg at 03/02/20 2122    amLODIPine (NORVASC) tablet 5 mg, 5 mg, Oral, Daily, Nicolle Hernandez MD, 5 mg at 03/03/20 0830    atorvastatin (LIPITOR) tablet 40 mg, 40 mg, Oral, Daily, Nicolle Hernandez MD, 40 mg at 03/03/20 0831    docusate sodium (COLACE) capsule 100 mg, 100 mg, Oral, Daily PRN, Emily Canada MD    insulin glargine (LANTUS) subcutaneous injection 10 Units 0 1 mL, 10 Units, Subcutaneous, HS, Nicolle Hernandez MD, 10 Units at 03/02/20 2304    insulin lispro (HumaLOG) 100 units/mL subcutaneous injection 1-6 Units, 1-6 Units, Subcutaneous, TID AC, 1 Units at 03/03/20 0610 **AND** Fingerstick Glucose (POCT), , , TID AC, Nicolle Hernandez MD    lidocaine (LIDODERM) 5 % patch 1 patch, 1 patch, Topical, Daily, Nicolle Hernandez MD, 1 patch at 03/02/20 2058    magnesium sulfate IVPB (premix) SOLN 1 g, 1 g, Intravenous, Once, Maura Johnson,     potassium chloride (K-DUR,KLOR-CON) CR tablet 40 mEq, 40 mEq, Oral, Once, Toyin Paul MD    pregabalin (LYRICA) capsule 25 mg, 25 mg, Oral, BID, Joni Maldonado MD, 25 mg at 03/03/20 0830    sertraline (ZOLOFT) tablet 50 mg, 50 mg, Oral, HS, Nicolle Hernandez MD, 50 mg at 03/02/20 2122    sodium chloride 0 9 % infusion, 125 mL/hr, Intravenous, Continuous, Nicolle Hernandez MD, Last Rate: 125 mL/hr at 03/03/20 0525, 125 mL/hr at 03/03/20 0525    REVIEW OF SYSTEMS:  More than 10 systems were reviewed  No other pertinent positive findings other than those mentioned in HPI      PHYSICAL EXAM:  Current Weight: Weight - Scale: 80 3 kg (177 lb 0 5 oz)  First Weight: Weight - Scale: 80 1 kg (176 lb 9 4 oz)  Vitals:    03/02/20 2314 03/02/20 2354 03/03/20 0600 03/03/20 0708   BP: 143/89 142/94  (!) 172/92   BP Location: Left arm Left arm  Right arm   Pulse: 72 80  76   Resp: 16 18  18   Temp: (!) 96 8 °F (36 °C) 97 9 °F (36 6 °C)  (!) 97 3 °F (36 3 °C)   TempSrc: Temporal Temporal  Temporal   SpO2: 95% 94%  93%   Weight:  80 3 kg (177 lb) 80 3 kg (177 lb 0 5 oz)    Height:  5' 3 6" (1 615 m)         Intake/Output Summary (Last 24 hours) at 3/3/2020 1054  Last data filed at 3/3/2020 0936  Gross per 24 hour   Intake 2452 5 ml   Output 850 ml   Net 1602 5 ml     Physical Exam   Constitutional: She appears well-developed and well-nourished  No distress  HENT:   Head: Normocephalic and atraumatic  Mouth/Throat: No oropharyngeal exudate  MM dry   Eyes: Right eye exhibits no discharge  Left eye exhibits no discharge  No scleral icterus  Neck: Normal range of motion  Neck supple  No thyromegaly present  Cardiovascular: Normal rate and regular rhythm  No murmur heard  Pulmonary/Chest: Effort normal and breath sounds normal  No respiratory distress  She has no wheezes  Abdominal: Soft  Bowel sounds are normal  She exhibits no distension  Musculoskeletal: She exhibits edema (b/l LE)  Neurological: She is alert  She exhibits normal muscle tone  awake   Skin: Skin is warm and dry  No rash noted  She is not diaphoretic  Psychiatric: She has a normal mood and affect  Her behavior is normal    Nursing note and vitals reviewed        Invasive Devices:      Lab Results:   Results from last 7 days   Lab Units 03/03/20  0532 03/02/20  1449   WBC Thousand/uL 5 80 7 10   HEMOGLOBIN g/dL 8 7* 10 2*   HEMATOCRIT % 26 1* 30 7*   PLATELETS Thousands/uL 153 191   POTASSIUM mmol/L 2 7* 3 1*   CHLORIDE mmol/L 112* 108   CO2 mmol/L 24 22   BUN mg/dL 44* 47*   CREATININE mg/dL 3 72* 3 76*   CALCIUM mg/dL 7 4* 8 2*   MAGNESIUM mg/dL 1 7  --    PHOSPHORUS mg/dL  --  5 6*   ALK PHOS U/L 164* 213*   ALT U/L 16 21   AST U/L 17 19

## 2020-03-03 NOTE — ASSESSMENT & PLAN NOTE
BP Readings from Last 1 Encounters:   03/03/20 (!) 172/92   Goal < 130/80  Current /73  -Continue home medication Amlodipine 5 mg

## 2020-03-03 NOTE — ASSESSMENT & PLAN NOTE
Lab Results   Component Value Date    HGBA1C 9 6 (H) 01/01/2020     Poorly controlled and resulted in nephropathy, neuropathy and cataracts  Home prescribed basaglar 10 U, patient reports taking 40 U at night    - Lantus 10 U qhs and adjust per protocol   - ISS with alogrithm 3 and accuchek achs  -Diabetic diet with low salt

## 2020-03-03 NOTE — ASSESSMENT & PLAN NOTE
QTc 481-> on admission   Qtc->520 on 3/3/2020  Likely 2/2 to zoloft and recent electrolyte abnormalities particularly potassium  Current Mg level-1 9 and K-3 5    - Repeat EKG stat  - Continued Telemetry  - Discussed with Psychiatrist, Will discontinue Sertraline tonight if repeat EKG shows elevated Qtc

## 2020-03-03 NOTE — ASSESSMENT & PLAN NOTE
In a happy mood this morning  No suicide/homicidal ideation  Evaluated by Psychiatry on 3/3/2020,  Zoloft increased from 50 mg->100mg daily  -Will hold off on Sertraline tonight due to prolonged Qtc

## 2020-03-03 NOTE — ASSESSMENT & PLAN NOTE
Currently endorses passive suicidal ideation, denies plan       - Continue Zoloft 50 mg,   - Referral to psychiatry

## 2020-03-03 NOTE — ASSESSMENT & PLAN NOTE
Right lower rib pain, however clinical picture is unclear as she has a positive ivey's  Much improved  - Camphor/Menthol and lidocaine patches  - Continue Lyrica, reduce dose by 1/2 to 25 mg bid, for CrCl 15-30

## 2020-03-03 NOTE — ED NOTES
Floor notified patient being transported upstairs  Spoke with [facility]  Also informed her of the need of a leg bag           [facility]  03/02/20 7177

## 2020-03-03 NOTE — DISCHARGE SUMMARY
Discharge Summary - Fercho Godoy    Patient Information: Ysabel Olivares 62 y o  female MRN: [id number]  Unit/Bed#: ED 03 Encounter: [id number]    Discharging Physician / Practitioner: Morris Trevizo MD  PCP: Wiliam Barton MD  Admission Date:   Admission Orders (From admission, onward)     Ordered        03/02/20 1817  Inpatient Admission  Once                   Discharge Date:3/5/2020  Reason for Admission: Acute renal failure superimposed on Chronic kidney disease    Discharge Diagnoses:     Principal Problem:    Acute renal failure superimposed on stage 5 chronic kidney disease, not on chronic dialysis [facility])  Active Problems:    Anemia    Intercostal pain    Type 2 diabetes mellitus with kidney complication, with long-term current use of insulin (HCC)    HTN (hypertension)    Neurogenic bladder    Depression    Corn of foot  Resolved Problems:    * No resolved hospital problems  *      Consultations During Hospital Stay:  · Nephrology    Procedures Performed:   · None    Significant Findings / Test Results:   · JULIO  · Hypokalemia  · Prolonged Qtc    Incidental Findings:   · Enlarged left Para- aortic lymph node found of unknown Etiology on CT chest- Need repeat Ct scan of chest in 3 months    Test Results Pending at Discharge (will require follow up):   · none     Outpatient Tests Requested:  · CT scan of chest in 3 months  · EKG-Attention to Qtc  · BMP-Attention to K+  · Mg level    Outpatient follow-up Requested:   Nephrology   PCP    Complications:  none    Hospital Course:     Ysabel Olivares is a 62 y o  female patient  with PMH significant for CKD 4, neurogenic bladder with indwelling suprapubic cath, Hx of ESBL, DMII, diabetic nephropathy, HTN, Anemia, Chronic pain and depression who originally presented to the hospital on 3/2/2020 from her PCP office for worsening kidney function, creatinine of 3 76, eGFR of 13 down from 23   She was recently discharged from the hospital on 1/9 for the same problem  She did not follow up with Nephrology or Hematology as an outpatient      In ED she was found to be stable and called for admission      On admission she was exhibiting CVA tenderness and RUQ tenderness, and rib tenderness to palpation  And noted to have 1+ pitting edema to knees BL, but chest was CTA  She denied fever, chills, CP, SOB, endorsed nausea, but denied vomiting, denied dysuria, or suprapubic tenderness, and denied blood in urine or stool, denied diarrhea, or calf pain  Patient received 1 L bolus of NS and started on IVF  Pain control with Tylenol 975 mg Q8h and lidocaine patch     Day 1: No acute distress, no acute events overnight  Reports improvement in her mood  Her K+ was low at 2 7, and Mg<2 0, Bun/Cr:47/3 76 and GFR of 13  Nephrology was consulted  Patient was given maintain IV normal saline at 125 cc/hr       Day 2: No acute distress, no acute events overnight  Nephrology was consulted, who recommend Gentle hydration with 125 ml/hr of isolyte and close monitoring of BMP and Mg  Her JULIO is likely from prerenal etiology from dehydration  Her CKD was likely due to longstanding DM II  She received 40 meq of K+ and 400 mg po mg oxide daily  Day 3:No acute distress, no acute events overnight  Serum Creatinne level improved to 3 from 3 7 on admission  Hypokalemia has resolved s/p repletion  Per Nephrology patient can follow up outpatient in their office within 1 week after discharge  Repeat BMP has been ordered by nephrology to be completed prior to her office visit    1) JULIO superimposed on CKD  JULIO, present on admission, possibly prerenal from dehydration (Baseline BUN/Cr:  31:2 28, GFR  23)   vs progression of CKD in setting of diabetic nephropathy  Currently improving- Cr-3 76->3 72->3 37->3 0   - Nephrology on board- Will follow as outpatient- appointment already scheduled for 3/9  -Need repeat BMP prior to the visit      2) CKD stage 3 vs progression to later stage  Baseline sCr low 2s as of January 2020  Most likely due to longstanding DM II  -Optimize DM II control  -Avoid dehydration/hypotension  -Nephrology outpatient follow up- Need Renal replacement therapy    3) Electrolyte abnormalities  Hypokalemia and Hypomagnesemia-resolved s/p repletion  -Follow up BMP/mg level as outpatient    4) DM II  Patient was taking 40 units of basaglar as outpatinet  Well controlled as inpatient on 10 units of lantus 10 units and Insulin Sliding Scale   -Continue 10 units of glargine at bedtime  -Diabetic diet     5) HTN  Well controlled  -Continue Amlodipine 5 mg as outpatient    6) Normocytic Anemia  Hb-10  MCV- 96  Likely due to chronic kidney disease  Iron panel showed- low iron, high ferritin, iron sat 20% and low TIBC at 128  -Monitor CBC as outpatient    7) Nephrotic range proteinuria  Likely from long standing diabetes  SPEP/UPEP negative for monoclonality    -Nephrology ordered serological workup and will follow up as outpatient  -Avoid ACE-I for blood pressure control    8) Corn/Callus on foot  Podiatry consulted  Callus was pared and sanded to pt comfort     -Follow up appointment with podiatry as outpatient on 3/9/2020    9) Neurogenic bladder  -Has suprapubic catheter, that is to be changed Q3 months          Condition at Discharge: good     Discharge Day Visit / Exam:     Vitals: Blood Pressure: 143/89 (03/02/20 2314)  Pulse: 72 (03/02/20 2314)  Temperature: (!) 96 8 °F (36 °C) (03/02/20 2314)  Temp Source: Temporal (03/02/20 2314)  Respirations: 16 (03/02/20 2314)  Weight - Scale: 80 1 kg (176 lb 9 4 oz) (03/02/20 1841)  SpO2: 95 % (03/02/20 2314)  Exam:   Physical Exam   Constitutional: She is oriented to person, place, and time  She appears well-developed and well-nourished  HENT:   Head: Normocephalic and atraumatic  Eyes: Pupils are equal, round, and reactive to light  EOM are normal    Neck: Normal range of motion     Cardiovascular: Normal rate, regular rhythm, normal heart sounds and intact distal pulses  Exam reveals no gallop and no friction rub  No murmur heard  Pulmonary/Chest: Effort normal and breath sounds normal  No stridor  No respiratory distress  She has no wheezes  Abdominal: Soft  Bowel sounds are normal  There is no tenderness  There is no guarding  Suprapubic catheter in place, no erythema and skin breakdown   Musculoskeletal: Normal range of motion  Neurological: She is alert and oriented to person, place, and time  Skin: Skin is warm  Capillary refill takes less than 2 seconds  Discussion with Family: Need close follow up with Nephrology    Discharge instructions/Information to patient and family:   See after visit summary for information provided to patient and family  Discharge Medications:   acetaminophen 325 mg tablet   Commonly known as: TYLENOL   Take 2 tablets (650 mg total) by mouth every 6 (six) hours as needed for moderate pain   Refills: 0           aluminum-magnesium hydroxide-simethicone 200-200-20 mg/5 mL suspension   Commonly known as: MYLANTA   Take 30 mL by mouth every 4 (four) hours as needed for indigestion or heartburn   Refills: 0          amLODIPine 5 mg tablet   Commonly known as: NORVASC   TAKE 1 TABLET BY MOUTH ONCE DAILY     Refills: 3           Aspir-Low 81 mg EC tablet   Generic drug: aspirin   Refills: 0          atorvastatin 40 mg tablet   Commonly known as: LIPITOR   Take 1 tablet (40 mg total) by mouth daily   Refills: 4          Camphor-Menthol-Methyl Sal 1 2-5 7-6 3 % Ptch   Apply 1 patch topically daily   Refills: 1          DocQLace 100 mg capsule   Generic drug: docusate sodium   Take 1 capsule (100 mg total) by mouth 2 (two) times a day   Refills: 0          ergocalciferol 50,000 units   Commonly known as: VITAMIN D2   Refills: 0          insulin glargine 100 units/mL injection pen   Commonly known as: Basaglar KwikPen   Inject 10 Units under the skin daily at bedtime   Refills: 0          Menthol 5 % Ptch   Commonly known as: Icy Hot   Apply 1 patch topically daily   Refills: 1          nystatin powder   Commonly known as: MYCOSTATIN   Apply topically 2 (two) times a day Apply to lower abdomen twice a day   Refills: 0          omeprazole 40 MG capsule   Commonly known as: PriLOSEC   Take 1 capsule (40 mg total) by mouth daily   Refills: 2           ONE TOUCH ULTRA TEST test strip   Generic drug: glucose blood   100 strips by Device route 3 (three) times a day   Refills: 0          pregabalin 25 mg capsule   Commonly known as: LYRICA   Take 1 capsule (25 mg total) by mouth 2 (two) times a day   Refills: 1   What changed:    0 medication strength   0 how much to take          sertraline 50 mg tablet   Commonly known as: ZOLOFT   Take 1 tablet (50 mg total) by mouth daily at bedtime   Refills: 2             Provisions for Follow-Up Care:  See after visit summary for information related to follow-up care and any pertinent home health orders  Disposition:     Home    For Discharges to [facility] SNF:   · Not Applicable to this Patient - Not Applicable to this Patient    Planned Readmission: none     Discharge Statement:  I spent 30 minutes discharging the patient  This time was spent on the day of discharge  I had direct contact with the patient on the day of discharge  Greater than 50% of the total time was spent examining patient, answering all patient questions, arranging and discussing plan of care with patient as well as directly providing post-discharge instructions  Additional time then spent on discharge activities      ** Please Note: This note has been constructed using a voice recognition system **    Paramjit Feng   3/6/2020  7:29am

## 2020-03-03 NOTE — ASSESSMENT & PLAN NOTE
On bottom of right foot     diabetic neuropathy with T2DM  Podiatry on board- Callus was pared and sanded to pt comfort today    -Per podiatry will follow up as outpatient on 3/9/2020 for diabetic foot care

## 2020-03-03 NOTE — ASSESSMENT & PLAN NOTE
Although Mg is normal, it is <2 in a patient with hypokalemia  Will supplement with Mg 1 g IV   F/u Mg level this afternoon

## 2020-03-03 NOTE — ASSESSMENT & PLAN NOTE
Chronic and stable  Baseline is Hb 10  Hb today 10 2    - Continue to monitor for signs/symptoms of acute blood loss  - CBC in am

## 2020-03-03 NOTE — ASSESSMENT & PLAN NOTE
Right lower rib pain, however clinical picture is unclear as she has a positive ivey's     - CT Ab  - Camphor/Menthol and lidocaine patches  - Continue Lyrica, reduce dose by 1/2 to 25 mg bid, for CrCl 15-30

## 2020-03-03 NOTE — ED NOTES
Patient transported to CT via stretcher with Shellia Gravely from radiology        [facility]  03/02/20 2030

## 2020-03-03 NOTE — CONSULTS
Consult Routine [address] [facility] A Helen 62 y o  female MRN: [id number]  Unit/Bed#: 7T [facility] 716-02 Encounter: [id number]    Assessment/Plan     Assessment:  1  R foot pain  2  DM c DN  3  Capsulitis R  4  PVD     Plan:  - pt eval and managed today  - most of pt pain today is to the MPJ  D/w pt that we will treat this with injection therapy as outpt  - Callus was pared and sanded to pt comfort today  - pt has appt with me on March 9 for diabetic foot care, will further assess then  - Podiatry signing off, thank you for the consult  History of Present Illness     HPI:  Misty Tamayo is a 62 y o  female who presents with painful R foot  Pt thinks pain is from the callus  Pt has been lost to follow up since September 2019  States pain has been present for several weeks  Denies any type of trauma to the area  Pain is with weight bearing and shoe gear  No other complaints        Inpatient consult to Podiatry  Consult performed by: Jacques Gonzales DPM  Consult ordered by: Joellen Lozano MD        Review of Systems   Constitutional: Negative  HENT: Negative  Eyes: Negative  Respiratory: Negative  Cardiovascular: Negative  Gastrointestinal: Negative  Musculoskeletal: foot pain  Skin: callus   Neurological: Negative          Historical Information   Past Medical History:   Diagnosis Date    Ambulatory dysfunction     Anemia     macrocyctic    Chronic kidney disease 5/24/2018    Chronic pain disorder     right hip    Diabetes mellitus ([address] )     Diabetic foot ulcer ([address] )     left with fat layer exposed    Dyslipidemia 5/24/2018    ESBL E  coli carrier     GERD (gastroesophageal reflux disease)     History of frequent urinary tract infections     HTN (hypertension) 5/24/2018    Hyperlipidemia     Irritable bowel syndrome     constipation    Neurogenic bladder     Neuropathy     Pedal edema     Vitamin D deficiency      Past Surgical History:   Procedure Laterality Date    CATARACT EXTRACTION      WY XCAPSL CTRC RMVL INSJ IO LENS PROSTH W/O ECP Right 12/6/2018    Procedure: EXTRACAPSULAR CATARACT REMOVAL/INSERTION OF INTRAOCULAR LENS;  Surgeon: Vikash Joe MD;  Location: [address] [place] [address];  Service: Ophthalmology    WY XCAPSL CTRC RMVL INSJ IO LENS PROSTH W/O ECP Left 10/17/2019    Procedure: EXTRACAPSULAR CATARACT REMOVAL/INSERTION OF INTRAOCULAR LENS;  Surgeon: Vikash Joe MD;  Location: [address] [place] [address];  Service: Ophthalmology    SUPRAPUBIC CATHETER INSERTION       Social History   Social History     Substance and Sexual Activity   Alcohol Use Not Currently    Alcohol/week: 0 0 standard drinks    Frequency: Never    Binge frequency: Never    Comment: no alcohol for 16 yrs     Social History     Substance and Sexual Activity   Drug Use Not Currently     Social History     Tobacco Use   Smoking Status Current Every Day Smoker    Packs/day: 0 50    Years: 40 00    Pack years: 20 00    Types: Cigarettes   Smokeless Tobacco Never Used     Family History:   Family History   Problem Relation Age of Onset   Dela Cruz Breast cancer Mother     Hypertension Mother     Diabetes Mother         Mellitus    Parkinsonism Mother     Cancer Maternal Aunt         Unknown       Meds/Allergies   Medications Prior to Admission   Medication    acetaminophen (TYLENOL) 325 mg tablet    amLODIPine (NORVASC) 5 mg tablet    atorvastatin (LIPITOR) 40 mg tablet    DOCQLACE 100 MG capsule    ergocalciferol (VITAMIN D2) 50,000 units    insulin glargine (BASAGLAR KWIKPEN) 100 units/mL injection pen    Lancets 28G MISC    Lancets 28G MISC    nystatin (MYCOSTATIN) powder    ONE TOUCH ULTRA TEST test strip    pregabalin (LYRICA) 50 mg capsule    sertraline (ZOLOFT) 50 mg tablet    Alcohol Swabs (ALCOHOL PREP) 70 % PADS    aluminum-magnesium hydroxide-simethicone (MYLANTA) 200-200-20 mg/5 mL suspension    ASPIR-LOW 81 MG EC tablet    B-D INS SYRINGE 0 5CC/31GX5/16 31G X 5/16" 0 5 ML MISC    Camphor-Menthol-Methyl Sal 1 2-5 7-6 3 % PTCH    Catheters MISC    Insulin Pen Needle (UNIFINE PENTIPS) 32G X 4 MM MISC    Menthol (Icy Hot) 5 % PTCH    omeprazole (PriLOSEC) 40 MG capsule     Allergies   Allergen Reactions    Gabapentin      Other reaction(s): slurring of speech, falls      Metformin Headache       Objective   First Vitals:   Blood Pressure: 140/79 (03/02/20 1841)  Pulse: 78 (03/02/20 1841)  Temperature: (!) 95 6 °F (35 3 °C) (03/02/20 1841)  Temp Source: Tympanic (03/02/20 1841)  Respirations: 16 (03/02/20 1841)  Height: 5' 3 6" (161 5 cm) (03/02/20 2354)  Weight - Scale: 80 1 kg (176 lb 9 4 oz) (03/02/20 1841)  SpO2: 99 % (03/02/20 1841)    Current Vitals:   Blood Pressure: (!) 172/92 (03/03/20 0708)  Pulse: 76 (03/03/20 0708)  Temperature: (!) 97 3 °F (36 3 °C) (03/03/20 0708)  Temp Source: Temporal (03/03/20 0708)  Respirations: 18 (03/03/20 0708)  Height: 5' 3 6" (161 5 cm) (03/02/20 2354)  Weight - Scale: 80 3 kg (177 lb 0 5 oz) (03/03/20 0600)  SpO2: 93 % (03/03/20 0708)    BP (!) 172/92 (BP Location: Right arm)   Pulse 76   Temp (!) 97 3 °F (36 3 °C) (Temporal)   Resp 18   Ht 5' 3 6" (1 615 m)   Wt 80 3 kg (177 lb 0 5 oz)   SpO2 93%   Breastfeeding No   BMI 30 77 kg/m²     General Appearance:    Alert, cooperative, no distress   Head:    Normocephalic, without obvious abnormality, atraumatic   Eyes:    PERRL, conjunctiva/corneas clear, EOM's intact            Nose:   Moist mucous membranes, no drainage or sinus tenderness   Throat:   No tenderness, no exudates   Neck:   Supple, symmetrical, trachea midline, no JVD   Back:     Symmetric, no CVA tenderness   Lungs:     Clear to auscultation bilaterally, respirations unlabored   Chest wall:    No tenderness or deformity   Heart:    Regular rate and rhythm, S1 and S2 normal, no murmur, rub   or gallop   Abdomen:     Soft, non-tender, bowel sounds active all four quadrants,     no masses, no organomegaly     Extremities:   MMT is 4/5 to all compartments of the LE, +1/4 edema B/L, Digital ROM is intact,     Pain to palpation of submetatarsal head 4 of the R foot with discomfort to ROM of R 4th MPJ     Pulses:   R DP is +1/4, R PT is +1/4, L DP is +1/4, L PT is +1/4, CFT< 3sec to all digits  Skin:    Calluses located plantar R foot       Neurologic:   CNII-XII intact  Normal strength, sensation and reflexes       Throughout  Gross sensation is intact   Protective sensation is diminished       Lab Results:   Admission on 03/02/2020   Component Date Value    Color, UA 03/02/2020 Straw     Clarity, UA 03/02/2020 Cloudy*    Specific Gravity, UA 03/02/2020 1 015     pH, UA 03/02/2020 6 0     Leukocytes, UA 03/02/2020 500 0*    Nitrite, UA 03/02/2020 Positive*    Protein, UA 03/02/2020 >=500*    Glucose, UA 03/02/2020 250 (1/4%)*    Ketones, UA 03/02/2020 Negative     Bilirubin, UA 03/02/2020 Negative     Blood, UA 03/02/2020 50 0*    UROBILINOGEN UA 03/02/2020 Negative     Ventricular Rate 03/02/2020 76     Atrial Rate 03/02/2020 76     AK Interval 03/02/2020 150     QRSD Interval 03/02/2020 84     QT Interval 03/02/2020 428     QTC Interval 03/02/2020 481     P Axis 03/02/2020 68     QRS Axis 03/02/2020 19     T Wave Axis 03/02/2020 68     POC Glucose 03/02/2020 271*    RBC, UA 03/02/2020 2-4*    WBC, UA 03/02/2020 Innumerable*    Epithelial Cells 03/02/2020 Occasional     Bacteria, UA 03/02/2020 Innumerable*    Sodium 03/03/2020 140     Potassium 03/03/2020 2 7*    Chloride 03/03/2020 112*    CO2 03/03/2020 24     ANION GAP 03/03/2020 4*    BUN 03/03/2020 44*    Creatinine 03/03/2020 3 72*    Glucose 03/03/2020 173*    Calcium 03/03/2020 7 4*    AST 03/03/2020 17     ALT 03/03/2020 16     Alkaline Phosphatase 03/03/2020 164*    Total Protein 03/03/2020 5 1*    Albumin 03/03/2020 2 3*    Total Bilirubin 03/03/2020 0 20     eGFR 03/03/2020 13*    Magnesium 03/03/2020 1 7     WBC 03/03/2020 5 80     RBC 03/03/2020 2 74*    Hemoglobin 03/03/2020 8 7*    Hematocrit 03/03/2020 26 1*    MCV 03/03/2020 95     MCH 03/03/2020 31 7     MCHC 03/03/2020 33 3     RDW 03/03/2020 14 5     MPV 03/03/2020 9 9     Platelets 68/64/1343 153     Neutrophils Relative 03/03/2020 46     Lymphocytes Relative 03/03/2020 40     Monocytes Relative 03/03/2020 6     Eosinophils Relative 03/03/2020 8*    Basophils Relative 03/03/2020 1     Neutrophils Absolute 03/03/2020 2 70     Lymphocytes Absolute 03/03/2020 2 30     Monocytes Absolute 03/03/2020 0 30     Eosinophils Absolute 03/03/2020 0 40     Basophils Absolute 03/03/2020 0 10     POC Glucose 03/03/2020 166*    POC Glucose 03/03/2020 151*    Ventricular Rate 03/03/2020 75     Atrial Rate 03/03/2020 75     [place] Interval 03/03/2020 160     QRSD Interval 03/03/2020 82     QT Interval 03/03/2020 466     QTC Interval 03/03/2020 520     P Axis 03/03/2020 68     QRS Axis 03/03/2020 38     T Wave Axis 03/03/2020 68      Imaging: I have personally reviewed pertinent films in PACS  EKG, Pathology, and Other Studies: I have personally reviewed pertinent reports        Code Status: Level 1 - Full Code  Advance Directive and Living Will:      Power of :    POLST:

## 2020-03-03 NOTE — UTILIZATION REVIEW
Notification of Inpatient Admission/Inpatient Authorization Request   This is a Notification of Inpatient Admission for [address]  Be advised that this patient was admitted to our facility under Inpatient Status  Contact Patria Eduardo at [phone] for additional admission information  [address] [facility] [address]   DEPT DEDICATED [facility] [phone]  Patient Name:   Adrian Holley   YOB: 1965       [address]   [address]:   [place] [address]  Tax ID: [id number]  NPI: [id number] Attending Provider/NPI: Tigist Dotson, [address] Mancini [[id number]]   Place of Service Code: 24     Place of Service Name:  [facility]   Start Date: 3/2/20 2323     Discharge Date & Time: No discharge date for patient encounter  Type of Admission: Inpatient Status Discharge Disposition (if discharged): Home/Self Care   Patient Diagnoses: Depression [F32 9]  Abnormal laboratory test result [R89 9]  JULIO (acute kidney injury) ([address] ) [N17 9]  Type 2 diabetes mellitus ([address] ) [E11 9]  Corn of foot [L84]  Type 2 diabetes mellitus with stage 3 chronic kidney disease, with long-term current use of insulin ([address] ) [E11 22, N18 3, Z79 4]  Acute renal failure superimposed on stage 5 chronic kidney disease, not on chronic dialysis, unspecified acute renal failure type ([address] ) [N17 9, N18 5]     Orders: Admission Orders (From admission, onward)     Ordered        03/02/20 1817  Inpatient Admission  Once                    Assigned Utilization Review Contact: Patria Eduardo  Utilization   Network Utilization Review Department  Phone: [phone]; Fax [phone]  Email: [email or web address]   ATTENTION PAYERS: Please call the assigned Utilization  directly with any questions or concerns ALL voicemails in the department are confidential  Send all requests for admission clinical reviews, approved or denied determinations and any other requests to dedicated fax number belonging to the campus where the patient is receiving treatment

## 2020-03-03 NOTE — ASSESSMENT & PLAN NOTE
Lab Results   Component Value Date    HGBA1C 7 0 (H) 03/02/2020   Well controlled  Fasting glucose of 141    - Lantus 10 U qhs and adjust per protocol   - ISS with alogrithm 3 and accuchek achs  -Diabetic diet with low salt

## 2020-03-03 NOTE — PLAN OF CARE
Problem: Potential for Falls  Goal: Patient will remain free of falls  Description  INTERVENTIONS:  - Assess patient frequently for physical needs  -  Identify cognitive and physical deficits and behaviors that affect risk of falls    -  [place] fall precautions as indicated by assessment   - Educate patient/family on patient safety including physical limitations  - Instruct patient to call for assistance with activity based on assessment  - Modify environment to reduce risk of injury  - Consider OT/PT consult to assist with strengthening/mobility  Outcome: Progressing     Problem: PAIN - ADULT  Goal: Verbalizes/displays adequate comfort level or baseline comfort level  Description  Interventions:  - Encourage patient to monitor pain and request assistance  - Assess pain using appropriate pain scale  - Administer analgesics based on type and severity of pain and evaluate response  - Implement non-pharmacological measures as appropriate and evaluate response  - Consider cultural and social influences on pain and pain management  - Notify physician/advanced practitioner if interventions unsuccessful or patient reports new pain  Outcome: Progressing     Problem: INFECTION - ADULT  Goal: Absence or prevention of progression during hospitalization  Description  INTERVENTIONS:  - Assess and monitor for signs and symptoms of infection  - Monitor lab/diagnostic results  - Monitor all insertion sites, i e  indwelling lines, tubes, and drains  - Monitor endotracheal if appropriate and nasal secretions for changes in amount and color  - [place] appropriate cooling/warming therapies per order  - Administer medications as ordered  - Instruct and encourage patient and family to use good hand hygiene technique  - Identify and instruct in appropriate isolation precautions for identified infection/condition  Outcome: Progressing     Problem: DISCHARGE PLANNING  Goal: Discharge to home or other facility with appropriate resources  Description  INTERVENTIONS:  - Identify barriers to discharge w/patient and caregiver  - Arrange for needed discharge resources and transportation as appropriate  - Identify discharge learning needs (meds, wound care, etc )  - Arrange for interpretive services to assist at discharge as needed  - Refer to Case Management Department for coordinating discharge planning if the patient needs post-hospital services based on physician/advanced practitioner order or complex needs related to functional status, cognitive ability, or social support system  Outcome: Progressing     Problem: Knowledge Deficit  Goal: Patient/family/caregiver demonstrates understanding of disease process, treatment plan, medications, and discharge instructions  Description  Complete learning assessment and assess knowledge base    Interventions:  - Provide teaching at level of understanding  - Provide teaching via preferred learning methods  Outcome: Progressing

## 2020-03-03 NOTE — NURSING NOTE
Patient received via litter from ER staff  Oriented to room and surroundings  A&Ox3  No c/o pain  Lungs CTA  No SOB  SR on teli monitor  +1 edema to B/L LE  +PP  ABD soft NT ND with +BS  Chronic ann in place and draining cloudy yellow urine  VSS  Call bell in reach  Will continue to monitor accordingly

## 2020-03-03 NOTE — ED PROVIDER NOTES
History  Chief Complaint   Patient presents with    Abnormal Lab     has pain issues on right side abdomen and right lower back; had labs done today and called and told that the kidney function was worse and needed go to the ER for admission and fluids  Recent hospitalization for the same  Patient is a 75-year-old female who was sent in for admission to the hospital by her PCP today after patient had abnormal routine labs  Patient is found to have an increase in her creatinine in a decrease in her GFR  Patient denies any complaints  No fevers sweats or chills  No nausea vomiting diarrhea  States had ongoing chronic pain in her right lower rib going down her leg  But denies taking any pain medications specifically Motrin or any other NSAIDs  Normal appetite and normal urination  Patient does have her suprapubic patch better due to a fistula  Nothing is making her labs better worse  Patient has again no complaints  Prior to Admission Medications   Prescriptions Last Dose Informant Patient Reported? Taking? ASPIR-LOW 81 MG EC tablet Not Taking at Unknown time  Yes No   Alcohol Swabs (ALCOHOL PREP) 70 % PADS Unknown at Unknown time  Yes No   Sig: Apply 1 applicator topically as needed   B-D INS SYRINGE 0 5CC/31GX5/16 31G X 5/16" 0 5 ML MISC Unknown at Unknown time  Yes No   Camphor-Menthol-Methyl Sal 1 2-5 7-6 3 % PTCH Unknown at Unknown time  No No   Sig: Apply 1 patch topically daily   Catheters MISC Unknown at Unknown time  No No   Sig: Please change patients suprapubic catheter as soon as possible and then every three months      Dx: N31 9 Neurogenic Bladder   DOCQLACE 100 MG capsule Past Week at Unknown time  No Yes   Sig: Take 1 capsule (100 mg total) by mouth 2 (two) times a day   Insulin Pen Needle (UNIFINE PENTIPS) 32G X 4 MM MISC   No No   Sig: Inject under the skin 4 (four) times a day for 90 days   Lancets 28G MISC 3/1/2020 at Unknown time  Yes Yes   Sig: Test blood sugars three times daily   Lancets 28G MISC 3/1/2020 at Unknown time  No Yes   Sig: by Does not apply route 3 (three) times a day   Menthol (Icy Hot) 5 % PTCH Unknown at Unknown time  No No   Sig: Apply 1 patch topically daily   ONE TOUCH ULTRA TEST test strip 3/1/2020 at Unknown time  Yes Yes   Si strips by Device route 3 (three) times a day   acetaminophen (TYLENOL) 325 mg tablet Past Week at Unknown time  No Yes   Sig: Take 2 tablets (650 mg total) by mouth every 6 (six) hours as needed for moderate pain   aluminum-magnesium hydroxide-simethicone (MYLANTA) 200-200-20 mg/5 mL suspension More than a month at Unknown time  No No   Sig: Take 30 mL by mouth every 4 (four) hours as needed for indigestion or heartburn   amLODIPine (NORVASC) 5 mg tablet Past Week at Unknown time  No Yes   Sig: TAKE 1 TABLET BY MOUTH ONCE DAILY     atorvastatin (LIPITOR) 40 mg tablet Past Week at Unknown time  No Yes   Sig: Take 1 tablet (40 mg total) by mouth daily   ergocalciferol (VITAMIN D2) 50,000 units Past Week at Unknown time  Yes Yes   insulin glargine (BASAGLAR KWIKPEN) 100 units/mL injection pen 3/1/2020 at Unknown time  No Yes   Sig: Inject 10 Units under the skin daily at bedtime   nystatin (MYCOSTATIN) powder Past Month at Unknown time  No Yes   Sig: Apply topically 2 (two) times a day Apply to lower abdomen twice a day   omeprazole (PriLOSEC) 40 MG capsule More than a month at Unknown time  No No   Sig: Take 1 capsule (40 mg total) by mouth daily   pregabalin (LYRICA) 50 mg capsule 3/2/2020 at 0800  No Yes   Sig: Take 1 capsule (50 mg total) by mouth 2 (two) times a day   sertraline (ZOLOFT) 50 mg tablet 3/1/2020 at 2200  No Yes   Sig: Take 1 tablet (50 mg total) by mouth daily at bedtime      Facility-Administered Medications: None       Past Medical History:   Diagnosis Date    Ambulatory dysfunction     Anemia     macrocyctic    Chronic kidney disease 2018    Chronic pain disorder     right hip    Diabetes mellitus [facility])     Diabetic foot ulcer ([address] )     left with fat layer exposed    Dyslipidemia 5/24/2018    ESBL E  coli carrier     GERD (gastroesophageal reflux disease)     History of frequent urinary tract infections     HTN (hypertension) 5/24/2018    Hyperlipidemia     Irritable bowel syndrome     constipation    Neurogenic bladder     Neuropathy     Pedal edema     Vitamin D deficiency        Past Surgical History:   Procedure Laterality Date    CATARACT EXTRACTION      ID XCAPSL CTRC RMVL INSJ IO LENS PROSTH W/O ECP Right 12/6/2018    Procedure: EXTRACAPSULAR CATARACT REMOVAL/INSERTION OF INTRAOCULAR LENS;  Surgeon: Rigoberto Mendoza MD;  Location: [facility] MAIN OR;  Service: Ophthalmology    ID XCAPSL CTRC RMVL INSJ IO LENS PROSTH W/O ECP Left 10/17/2019    Procedure: EXTRACAPSULAR CATARACT REMOVAL/INSERTION OF INTRAOCULAR LENS;  Surgeon: Rigoberto Mendoza MD;  Location: [facility] MAIN OR;  Service: Ophthalmology    SUPRAPUBIC CATHETER INSERTION         Family History   Problem Relation Age of Onset   [facility] Breast cancer Mother     Hypertension Mother     Diabetes Mother         Mellitus    Parkinsonism Mother     Cancer Maternal Aunt         Unknown     I have reviewed and agree with the history as documented  E-Cigarette/Vaping    E-Cigarette Use Never User      E-Cigarette/Vaping Substances     Social History     Tobacco Use    Smoking status: Current Every Day Smoker     Packs/day: 0 50     Years: 40 00     Pack years: 20 00     Types: Cigarettes    Smokeless tobacco: Never Used   Substance Use Topics    Alcohol use: Not Currently     Alcohol/week: 0 0 standard drinks     Comment: no alcohol for 16 yrs    Drug use: Not Currently       Review of Systems   Constitutional: Negative  HENT: Negative  Eyes: Negative  Respiratory: Negative  Cardiovascular: Negative  Gastrointestinal: Negative  Endocrine: Negative  Genitourinary: Negative  Musculoskeletal: Negative  Skin: Negative  Allergic/Immunologic: Negative  Neurological: Negative  Hematological: Negative  Psychiatric/Behavioral: Negative  All other systems reviewed and are negative  Physical Exam  Physical Exam   Constitutional: She is oriented to person, place, and time  She appears well-developed and well-nourished  HENT:   Head: Normocephalic  Nose: Nose normal    Mouth/Throat: Oropharynx is clear and moist    Eyes: Pupils are equal, round, and reactive to light  Conjunctivae are normal    Neck: Normal range of motion  Neck supple  Cardiovascular: Normal rate, regular rhythm, normal heart sounds and intact distal pulses  Pulmonary/Chest: Effort normal and breath sounds normal    Abdominal: Soft  Bowel sounds are normal    Suprapubic catheter clean dry and intact  No surrounding erythema warmth fluctuance or drainage  Musculoskeletal: Normal range of motion  Neurological: She is alert and oriented to person, place, and time  Skin: Skin is warm and dry  Capillary refill takes less than 2 seconds  Psychiatric: She has a normal mood and affect  Her behavior is normal    Nursing note and vitals reviewed        Vital Signs  ED Triage Vitals [03/02/20 1841]   Temperature Pulse Respirations Blood Pressure SpO2   (!) 95 6 °F (35 3 °C) 78 16 140/79 99 %      Temp Source Heart Rate Source Patient Position - Orthostatic VS BP Location FiO2 (%)   Tympanic Monitor Sitting Left arm --      Pain Score       Worst Possible Pain           Vitals:    03/02/20 1841   BP: 140/79   Pulse: 78   Patient Position - Orthostatic VS: Sitting         Visual Acuity      ED Medications  Medications   amLODIPine (NORVASC) tablet 5 mg (has no administration in time range)   atorvastatin (LIPITOR) tablet 40 mg (has no administration in time range)   pregabalin (LYRICA) capsule 50 mg (50 mg Oral Given 3/2/20 2057)   sertraline (ZOLOFT) tablet 50 mg (has no administration in time range)   docusate sodium (COLACE) capsule 100 mg (has no administration in time range)   insulin lispro (HumaLOG) 100 units/mL subcutaneous injection 1-6 Units (has no administration in time range)   insulin glargine (LANTUS) subcutaneous injection 10 Units 0 1 mL (has no administration in time range)   sodium chloride 0 9 % infusion (125 mL/hr Intravenous New Bag 3/2/20 2018)   acetaminophen (TYLENOL) tablet 975 mg (has no administration in time range)   lidocaine (LIDODERM) 5 % patch 1 patch (1 patch Topical Medication Applied 3/2/20 2058)   sodium chloride 0 9 % bolus 1,000 mL (1,000 mL Intravenous New Bag 3/2/20 2020)   lidocaine (PF) (XYLOCAINE-MPF) 1 % injection **ADS Override Pull** (has no administration in time range)   lidocaine (PF) (XYLOCAINE-MPF) 1 % injection 5 mL (has no administration in time range)       Diagnostic Studies  Results Reviewed     Procedure Component Value Units Date/Time    UA w Reflex to Microscopic w Reflex to Culture [[id number]]     Lab Status:  No result Specimen:  Urine, Suprapubic catheter                  CT abdomen pelvis wo contrast   Final Result by Alonso Rinne, MD (03/02 2059)      1  No acute abnormality in the abdomen or pelvis  2   Mild biliary ductal dilatation, suboptimally evaluated without contrast, similar to the most recent previous CT though not present in 2017  As the patient cannot receive intravenous contrast, follow-up MRCP is recommended for further evaluation  3   Increasing borderline enlarged left para-aortic lymph nodes of uncertain etiology  Lymphoproliferative disorder is not excluded and neoplastic process such as lymphoproliferative disorder is not excluded  Short interval follow-up is recommended in    3 months  The study was marked in EPIC for significant notification           Workstation performed: [id number]                    Procedures  ECG 12 Lead Documentation Only  Date/Time: 3/2/2020 7:16 PM  Performed by: Jada Hammond MD  Authorized by: Jada Hammond MD Indications / Diagnosis:  JULIO  ECG reviewed by me, the ED Provider: yes    Patient location:  ED  Interpretation:     Interpretation: normal    Rate:     ECG rate:  76    ECG rate assessment: normal    Rhythm:     Rhythm: sinus rhythm    Ectopy:     Ectopy: none    QRS:     QRS axis:  Normal    QRS intervals:  Normal  Conduction:     Conduction: normal    ST segments:     ST segments:  Normal  T waves:     T waves: normal               ED Course  ED Course as of Mar 02 2119   Mon Mar 02, 2020   1816 Spoke with Dr Arina Herrera, will admit  Med/surg tele  2117 Patient's IV was lost staff unable to obtain a new IV  With patient consent a 18 gauge left EJ was placed by me on 1st type  Stu back and flushed without issue  Patient did request some anesthesia with lidocaine  Given 1 mL of 1% lidocaine without epi  Patient tolerated procedure well                                    MDM      Disposition  Final diagnoses:   JULIO (acute kidney injury) ([place] [address] )     Time reflects when diagnosis was documented in both MDM as applicable and the Disposition within this note     Time User Action Codes Description Comment    3/2/2020  6:17 PM Isha Bio [N17 9] JULIO (acute kidney injury) ([facility] )     3/2/2020  7:14 PM Nemunaitis, Adal Ivanoff Add [N18 4] Stage 4 chronic kidney disease ([facility] )     3/2/2020  7:15 PM Nemunaitis, Nicolle Add [N17 9,  N18 5] Acute renal failure superimposed on stage 5 chronic kidney disease, not on chronic dialysis, unspecified acute renal failure type ([facility]  )     3/2/2020  7:15 PM Nemunaitis, Nicolle Remove [N18 4] Stage 4 chronic kidney disease ([facility] )     3/2/2020  7:15 PM Nemunaitis, Nicolle Add [E11 22,  N18 3,  Z79 4] Type 2 diabetes mellitus with stage 3 chronic kidney disease, with long-term current use of insulin [facility])       ED Disposition     ED Disposition Condition Date/Time Comment    Admit Stable Mon Mar 2, 2020  6:17 PM Case was discussed with Dr Arina Herrera and the patient's admission status was agreed to be Admission Status: inpatient status to the service of Dr Jayro Haywood   Follow-up Information    None         Patient's Medications   Discharge Prescriptions    No medications on file     No discharge procedures on file      PDMP Review       Value Time User    PDMP Reviewed  Yes 1/9/2020 11:24 AM Lenora Mckinney MD          ED Provider  Electronically Signed by           Livan Mccarty MD  03/02/20 1950       Livan Mccarty MD  03/02/20 8825

## 2020-03-03 NOTE — ASSESSMENT & PLAN NOTE
JULIO, present on admission, possibly prerenal from dehydration (Baseline BUN/Cr:  31:2 28, GFR  23)  vs progression of CKD in setting of diabetic nephropathy  Currently improving- Cr-3 76->3 72->3 37  Nephrology on board- Per Nephrology would consider d/c 3/5 if sCr continues to improve, K normalizes      -Continue isolyte 125ml/hr  -BMP in am  -avoid relative hypotension  -avoid nephrotoxins drugs and contrast  -Strict I/O  -Follow up outpatient with Nephrology

## 2020-03-03 NOTE — ASSESSMENT & PLAN NOTE
Present on bottom of right foot   Patient did not know it was there likely because of diabetic neuropathy with T2DM    - Consult podiatry

## 2020-03-03 NOTE — ASSESSMENT & PLAN NOTE
Chronic and stable, no active bleeding, Baseline is Hb 10  Normocytic anemia likely from chronic kidney disease  Current Hb- 9 0    - Continue to monitor for signs/symptoms of acute blood loss  - CBC in am

## 2020-03-03 NOTE — ASSESSMENT & PLAN NOTE
Most likely 2/2 poorly controlled CKD, History of CKD stage 4, neurogenic bladder with suprapubic catheter, HTN, admission 2 months ago for JULIO as well  Presented to ED with complaints of RUQ pain that wraps around to her right back x 2 weeks  Has Hx of ESBL carrier status secondary to suprapubic cath from neurogenic bladder  Baseline BUN/Cr:  31:2 28   GFR  23  Now BUN/Cr 47:3 76, GFR 13  IVF resuscitation started with 1L NS bolus followed by 125 cc/hr mIFV with NS  Exam significant for Right CVA tenderness  Volume status chronic bilateral pitting edema, no evidence of pulmonary edema       - continue with mIVF  - strict I/O  - am CBC, BMP, Mg  - judicious use of nephrotoxic drugs  - CT abdomen/pelvis wo contrast pending  - UA pending  - Consult nephrology

## 2020-03-03 NOTE — ASSESSMENT & PLAN NOTE
Most likely 2/2 Longstanding DM II  -Nephrology on board  -Need outpatient follow up with Nephrology on discharge

## 2020-03-03 NOTE — UTILIZATION REVIEW
Initial Clinical Review    Admission: Date/Time/Statement: Admission Orders (From admission, onward)     Ordered        03/02/20 1817  Inpatient Admission  Once                   Orders Placed This Encounter   Procedures    Inpatient Admission     Standing Status:   Standing     Number of Occurrences:   1     Order Specific Question:   Admitting Physician     Answer:   Bharathmichael Stapleton [[id number]]     Order Specific Question:   Level of Care     Answer:   Med Surg [16]     Order Specific Question:   Estimated length of stay     Answer:   More than 2 Midnights     Order Specific Question:   Certification     Answer:   I certify that inpatient services are medically necessary for this patient for a duration of greater than two midnights  See H&P and MD Progress Notes for additional information about the patient's course of treatment  ED Arrival Information     Expected Arrival Acuity Means of Arrival Escorted By Service Admission Type    - 3/2/2020 18:00 Urgent Walk-In Family Member Emergency Medicine Urgent    Arrival Complaint    Abnormal Labs         Chief Complaint   Patient presents with    Abnormal Lab     has pain issues on right side abdomen and right lower back; had labs done today and called and told that the kidney function was worse and needed go to the ER for admission and fluids  Recent hospitalization for the same  Assessment/Plan:  61 yo female with PMH CKD 4, neurogenic bladder with indwelling suprapubic cath, Hx of ESBL, DMII, diabetic nephropathy, HTN, Anemia, Chronic pain and depression Sent to ED by PCP for abnormal OP Labs  Cr  3 76, eGFR 13 down from 23  On admission noted CVA tenderness,  RUQ tenderness, and rib tenderness to palpation;  1+ pitting edema to knees BL  Admits to some nausea but denies vomiting  Admitted to IP with ARF on CKD,  Poorly controlled DM    Plan: IVF's, Consult Nephrology, Continue Lyrica, reduce dose by 1/2 to 25 mg bid, for CrCl 15-30, accu checks with ssi    3/3 Per Nephrology  - JULIO - Cr 3 7 on admission and unchanged on IVF today  at 125ml/hr  Change to isolyte 125ml/hr; avoid hypotension and nephrotoxins   (Recently baseline sCr low 2s )  HypoK and HypoMag - Replete   high suspicion for CKD progression  Consult PICC      ED Triage Vitals [03/02/20 1841]   Temperature Pulse Respirations Blood Pressure SpO2   (!) 95 6 °F (35 3 °C) 78 16 140/79 99 %      Temp Source Heart Rate Source Patient Position - Orthostatic VS BP Location FiO2 (%)   Tympanic Monitor Sitting Left arm --      Pain Score       Worst Possible Pain        Wt Readings from Last 1 Encounters:   03/03/20 80 3 kg (177 lb 0 5 oz)     Additional Vital Signs:   03/03/20 0708  97 3 °F (36 3 °C) 76 18 172/92 93 % None (Room air) Lying   03/02/20 2354  97 9 °F (36 6 °C) 80 18 142/94 94 % None (Room air) Sitting   03/02/20 2314  96 8 °F (36 °C) 72 16 143/89 95 % None (Room air)        Pertinent Labs/Diagnostic Test Results:   Results from last 7 days   Lab Units 03/03/20  0532 03/02/20  1449   WBC Thousand/uL 5 80 7 10   HEMOGLOBIN g/dL 8 7* 10 2*   HEMATOCRIT % 26 1* 30 7*   PLATELETS Thousands/uL 153 191   NEUTROS ABS Thousands/µL 2 70 4 60     Results from last 7 days   Lab Units 03/03/20  0532 03/02/20  1449   SODIUM mmol/L 140 139   POTASSIUM mmol/L 2 7* 3 1*   CHLORIDE mmol/L 112* 108   CO2 mmol/L 24 22   ANION GAP mmol/L 4* 9   BUN mg/dL 44* 47*   CREATININE mg/dL 3 72* 3 76*   EGFR ml/min/1 73sq m 13* 13*   CALCIUM mg/dL 7 4* 8 2*   MAGNESIUM mg/dL 1 7  --    PHOSPHORUS mg/dL  --  5 6*     Results from last 7 days   Lab Units 03/03/20  0532 03/02/20  1449   AST U/L 17 19   ALT U/L 16 21   ALK PHOS U/L 164* 213*   TOTAL PROTEIN g/dL 5 1* 6 4   ALBUMIN g/dL 2 3* 2 9*   TOTAL BILIRUBIN mg/dL 0 20 0 30     Results from last 7 days   Lab Units 03/03/20  1131 03/03/20  0544 03/02/20  2216   POC GLUCOSE mg/dl 151* 166* 271*     Results from last 7 days   Lab Units 03/03/20  0532 03/02/20  1449   GLUCOSE RANDOM mg/dL 173* 274*     Results from last 7 days   Lab Units 03/02/20  1449   HEMOGLOBIN A1C % 7 0*   EAG mg/dl 154     BETA-HYDROXYBUTYRATE   Date Value Ref Range Status   12/31/2019 4 2 (H) <0 6 mmol/L Final      Results from last 7 days   Lab Units 03/02/20  2228   CLARITY UA  Cloudy*   COLOR UA  Straw   SPEC GRAV UA  1 015   PH UA  6 0   GLUCOSE UA mg/dl 250 (1/4%)*   KETONES UA mg/dl Negative   BLOOD UA  50 0*   PROTEIN UA mg/dl >=500*   NITRITE UA  Positive*   BILIRUBIN UA  Negative   UROBILINOGEN UA mg/dL Negative   LEUKOCYTES UA  500 0*   WBC UA /hpf Innumerable*   RBC UA /hpf 2-4*   BACTERIA UA /hpf Innumerable*   EPITHELIAL CELLS WET PREP /hpf Occasional     3/2 CT A&P: 1   No acute abnormality in the abdomen or pelvis  2   Mild biliary ductal dilatation, suboptimally evaluated without contrast, similar to the most recent previous CT though not present in 2017   As the patient cannot receive intravenous contrast, follow-up MRCP is recommended for further evaluation    3   Increasing borderline enlarged left para-aortic lymph nodes of uncertain etiology   Lymphoproliferative disorder is not excluded and neoplastic process such as lymphoproliferative disorder is not excluded   Short interval follow-up is recommended in 3 months       3/2 EKG: NSR      ED Treatment:   Medication Administration from 03/02/2020 1800 to 03/02/2020 2323       Date/Time Order Dose Route Action     03/02/2020 2057 pregabalin (LYRICA) capsule 50 mg 50 mg Oral Given     03/02/2020 2122 sertraline (ZOLOFT) tablet 50 mg 50 mg Oral Given     03/02/2020 2304 insulin glargine (LANTUS) subcutaneous injection 10 Units 0 1 mL 10 Units Subcutaneous Given     03/02/2020 2018 sodium chloride 0 9 % infusion 125 mL/hr Intravenous New Bag     03/02/2020 2122 acetaminophen (TYLENOL) tablet 975 mg 975 mg Oral Given     03/02/2020 2058 lidocaine (LIDODERM) 5 % patch 1 patch 1 patch Topical Medication Applied     03/02/2020 2020 sodium chloride 0 9 % bolus 1,000 mL 1,000 mL Intravenous New Bag     03/02/2020 2116 lidocaine (PF) (XYLOCAINE-MPF) 1 % injection 5 mL 5 mL Infiltration Given        Past Medical History:   Diagnosis Date    Ambulatory dysfunction     Anemia     macrocyctic    Chronic kidney disease 5/24/2018    Chronic pain disorder     right hip    Diabetes mellitus ([facility] )     Diabetic foot ulcer ([place] [address] )     left with fat layer exposed    Dyslipidemia 5/24/2018    ESBL E  coli carrier     GERD (gastroesophageal reflux disease)     History of frequent urinary tract infections     HTN (hypertension) 5/24/2018    Hyperlipidemia     Irritable bowel syndrome     constipation    Neurogenic bladder     Neuropathy     Pedal edema     Vitamin D deficiency      Present on Admission:   Acute renal failure superimposed on stage 5 chronic kidney disease, not on chronic dialysis (HCC)   Anemia due to stage 3 chronic kidney disease ([facility])   HTN (hypertension)   Neurogenic bladder   Intercostal pain   Corn of foot      Admitting Diagnosis: Depression [F32 9]  Abnormal laboratory test result [R89 9]  JULIO (acute kidney injury) ([place] [address] ) [N17 9]  Type 2 diabetes mellitus ([facility]) [E11 9]  Corn of foot [L84]  Type 2 diabetes mellitus with stage 3 chronic kidney disease, with long-term current use of insulin ([facility]) [E11 22, N18 3, Z79 4]  Acute renal failure superimposed on stage 5 chronic kidney disease, not on chronic dialysis, unspecified acute renal failure type ([place] [address] ) [N17 9, N18 5]     Age/Sex: 62 y o  female  Admission Orders:  Scheduled Medications:  Medications:  acetaminophen 975 mg Oral Q8H [address]   [START ON 3/4/2020] amLODIPine 5 mg Oral Daily   atorvastatin 40 mg Oral Daily   insulin glargine 10 Units Subcutaneous HS   insulin lispro 1-6 Units Subcutaneous TID AC   lidocaine 1 patch Topical Daily   pregabalin 25 mg Oral BID   sertraline 50 mg Oral HS     Continuous IV Infusions  multi-electrolyte 125 mL/hr Intravenous Continuous     PRN Meds:  docusate sodium 100 mg Oral Daily PRN     3/3 Mag Sulfate  1 Gram  IV x 1  3/3 KDUR  40  MEq  Po x 1    TELE  SCDs  IP CONSULT TO NEPHROLOGY  IP CONSULT TO PODIATRY  IP CONSULT TO PSYCHIATRY  IP CONSULT TO PICC TEAM    Network Utilization Review Department  [email or web address]  ATTENTION: Please call with any questions or concerns to [phone] and carefully listen to the prompts so that you are directed to the right person  All voicemails are confidential   Darrell Zimmer all requests for admission clinical reviews, approved or denied determinations and any other requests to dedicated fax number below belonging to the campus where the patient is receiving treatment   List of dedicated fax numbers for the Facilities:  [address] DENIALS (Administrative/Medical Necessity) [phone]   [address] (Maternity/NICU/Pediatrics) [phone]   Jose Sierra [phone]   Charley Paige [phone]   Chris Luna [phone]   Connor El [phone]   [address] [place] [address] [phone]   [facility]  [phone]   [address] [facility]  [address] [facility] [address] [facility] [phone]

## 2020-03-03 NOTE — ASSESSMENT & PLAN NOTE
BP Readings from Last 1 Encounters:   03/02/20 140/79   Goal < 130/90  Continue home medication Amlodipine 5 mg

## 2020-03-03 NOTE — ED NOTES
Patient transported to floor via stretcher with Bridger Figueredo Obs [facility]  03/02/20 1303

## 2020-03-04 PROBLEM — R07.82 INTERCOSTAL PAIN: Status: RESOLVED | Noted: 2020-01-01 | Resolved: 2020-01-01

## 2020-03-04 PROBLEM — E87.6 HYPOKALEMIA: Status: RESOLVED | Noted: 2020-01-01 | Resolved: 2020-01-01

## 2020-03-04 PROBLEM — E83.42 HYPOMAGNESEMIA: Status: RESOLVED | Noted: 2020-01-01 | Resolved: 2020-01-01

## 2020-03-04 NOTE — PROGRESS NOTES
Progress Note    Megan Right 62 y o  female MRN: [id number]  Unit/Bed#: 7T [facility] 716-02 Encounter: [id number]  Admitting Physician: Garcia Guzman MD  PCP: Oliverio Deshpande MD  Date of Admission:  3/2/2020  6:45 PM    Assessment and Plan    * Acute renal failure superimposed on stage 5 chronic kidney disease, not on chronic dialysis [facility])  Assessment & Plan  JULIO, present on admission, possibly prerenal from dehydration (Baseline BUN/Cr:  31:2 28, GFR  23)  vs progression of CKD in setting of diabetic nephropathy  Currently improving- Cr-3 76->3 72->3 37  Nephrology on board- Per Nephrology would consider d/c 3/5 if sCr continues to improve, K normalizes  -Continue isolyte 125ml/hr  -BMP in am  -avoid relative hypotension  -avoid nephrotoxins drugs and contrast  -Strict I/O  -Follow up outpatient with Nephrology         HTN (hypertension)  Assessment & Plan  BP Readings from Last 1 Encounters:   03/03/20 (!) 172/92   Goal < 130/80  Current /73  -Continue home medication Amlodipine 5 mg     Neurogenic bladder  Assessment & Plan  Has suprapubic cath last changed January 2020, ordered to be changed q 3 months      Type 2 diabetes mellitus with kidney complication, with long-term current use of insulin (HCC)  Assessment & Plan    Lab Results   Component Value Date    HGBA1C 7 0 (H) 03/02/2020   Well controlled  Fasting glucose of 141    - Lantus 10 U qhs and adjust per protocol   - ISS with alogrithm 3 and accuchek achs  -Diabetic diet with low salt     Hypokalemia  Assessment & Plan  Current K-3 5  Replenished with 40 meq IV  -BMP/Mg level in am    QT prolongation  Assessment & Plan  QTc 481-> on admission   Qtc->520 on 3/3/2020  Likely 2/2 to zoloft and recent electrolyte abnormalities particularly potassium  Current Mg level-1 9 and K-3 5    - Repeat EKG stat  - Continued Telemetry  - Discussed with Psychiatrist, Will discontinue Sertraline tonight if repeat EKG shows elevated Qtc    Corn of foot  Assessment & Plan  On bottom of right foot  diabetic neuropathy with T2DM  Podiatry on board- Callus was pared and sanded to pt comfort today    -Per podiatry will follow up as outpatient on 3/9/2020 for diabetic foot care      Depression  Assessment & Plan  In a happy mood this morning  No suicide/homicidal ideation  Evaluated by Psychiatry on 3/3/2020,  Zoloft increased from 50 mg->100mg daily  -Will hold off on Sertraline tonight due to prolonged Qtc    Nephrotic range proteinuria  Assessment & Plan  Most likely 2/2 Longstanding DM II  -Nephrology on board  -Need outpatient follow up with Nephrology on discharge      Anemia due to stage 3 chronic kidney disease ([facility] [address] )  Assessment & Plan  Chronic and stable, no active bleeding, Baseline is Hb 10  Normocytic anemia likely from chronic kidney disease  Current Hb- 9 0    - Continue to monitor for signs/symptoms of acute blood loss  - CBC in am       Hypomagnesemiaresolved as of 3/4/2020  Assessment & Plan  Although Mg is normal, it is <2 in a patient with hypokalemia  Will supplement with Mg 1 g IV   F/u Mg level this afternoon  Intercostal painresolved as of 3/4/2020  Assessment & Plan  Right lower rib pain, however clinical picture is unclear as she has a positive ivey's  Much improved  - Camphor/Menthol and lidocaine patches  - Continue Lyrica, reduce dose by 1/2 to 25 mg bid, for CrCl 15-30  VTE Pharmacologic Prophylaxis:   Pharmacologic: SCD  Mechanical VTE Prophylaxis in Place: Yes    Patient Centered Rounds: I have performed bedside rounds with nursing staff today  Discussions with Specialists or Other Care Team Provider: Psychiatrist    Education and Discussions with Family / Patient: none    Time Spent for Care: 30 minutes  More than 50% of total time spent on counseling and coordination of care as described above      Current Length of Stay: 2 day(s)    Current Patient Status: Inpatient   Certification Statement: The patient will continue to require additional inpatient hospital stay due to Acute renal failure    Discharge Plan: Potential discharge tomorrow once improvement in serum creatinine and normalization of electrolytes  Code Status: Level 1 - Full Code      Subjective:   No acute distress, resting comfortably  Patient feels very happy today and is excited about going home soon  She denies any fever, chills, headache, chest pain, and shortness of breath    Objective:     Vitals:   Temp (24hrs), Av 9 °F (36 6 °C), Min:97 4 °F (36 3 °C), Max:98 6 °F (37 °C)    Temp:  [97 4 °F (36 3 °C)-98 6 °F (37 °C)] 97 8 °F (36 6 °C)  HR:  [73-79] 76  Resp:  [18-20] 20  BP: (139-164)/(73-82) 148/73  SpO2:  [93 %-100 %] 100 %  Body mass index is 30 66 kg/m²  Input and Output Summary (last 24 hours): Intake/Output Summary (Last 24 hours) at 3/4/2020 1339  Last data filed at 3/4/2020 1104  Gross per 24 hour   Intake 2904 58 ml   Output 2350 ml   Net 554 58 ml       Physical Exam:     Physical Exam   Constitutional: She is oriented to person, place, and time  She appears well-developed and well-nourished  HENT:   Head: Normocephalic  Eyes: Pupils are equal, round, and reactive to light  EOM are normal    Neck: Normal range of motion  No JVD present  No tracheal deviation present  No thyromegaly present  Cardiovascular: Normal rate, regular rhythm, normal heart sounds and intact distal pulses  Exam reveals no gallop and no friction rub  No murmur heard  Pulmonary/Chest: Effort normal and breath sounds normal  No stridor  No respiratory distress  She has no wheezes  Abdominal: Soft  Bowel sounds are normal  She exhibits no distension and no mass  There is no tenderness  There is no guarding  Suprapubic cath in place, no erythema or drainage's  Musculoskeletal: Normal range of motion  She exhibits no edema  Neurological: She is alert and oriented to person, place, and time  She displays normal reflexes   She exhibits normal muscle tone  Coordination normal    Skin: Skin is warm  Capillary refill takes less than 2 seconds  No rash noted  Psychiatric: She has a normal mood and affect  Her behavior is normal  Thought content normal          Additional Data:     Labs:    Results from last 7 days   Lab Units 03/04/20  0623   WBC Thousand/uL 6 10   HEMOGLOBIN g/dL 9 0*   HEMATOCRIT % 26 9*   PLATELETS Thousands/uL 153   NEUTROS PCT % 55   LYMPHS PCT % 33   MONOS PCT % 6   EOS PCT % 5     Results from last 7 days   Lab Units 03/04/20  0623   POTASSIUM mmol/L 3 5*   CHLORIDE mmol/L 115*   CO2 mmol/L 21*   BUN mg/dL 39*   CREATININE mg/dL 3 37*   CALCIUM mg/dL 7 3*   ALK PHOS U/L 185*   ALT U/L 22   AST U/L 26         Results from last 7 days   Lab Units 03/04/20  1123 03/04/20  0600 03/03/20  2003 03/03/20  1600 03/03/20  1131 03/03/20  0544 03/02/20  2216   POC GLUCOSE mg/dl 261* 144* 231* 187* 151* 166* 271*     Results from last 7 days   Lab Units 03/02/20  1449   HEMOGLOBIN A1C % 7 0*         * I Have Reviewed All Lab Data Listed Above  * Additional Pertinent Lab Tests Reviewed:  [address] Admission Reviewed    Imaging:    Imaging Reports Reviewed Today Include: none  Imaging Personally Reviewed by Myself Includes:  none    Recent Cultures (last 7 days):     Results from last 7 days   Lab Units 03/02/20  2228   URINE CULTURE  >100,000 cfu/ml Non lactose fermenting gram negative matt*  >100,000 cfu/ml Gram Negative Matt*       Last 24 Hours Medication List:     Current Facility-Administered Medications:  acetaminophen 975 mg Oral Q8H [address] Nicolle Hernandez MD    amLODIPine 5 mg Oral Daily Maura K Alex, DO    atorvastatin 40 mg Oral Daily Nicolle Hernandez MD    docusate sodium 100 mg Oral Daily PRN Nicolle Hernandez MD    insulin glargine 10 Units Subcutaneous HS Nicolle Hernandez MD    insulin lispro 1-6 Units Subcutaneous TID AC Nicolle Hernandez MD    lidocaine 1 patch Topical Daily Emily Canada MD magnesium oxide 400 mg Oral BID Fabby Mclain MD    multi-electrolyte 125 mL/hr Intravenous Continuous Zafar Schultz DO Last Rate: 125 mL/hr (03/04/20 1135)   pregabalin 25 mg Oral BID Char Dennis MD    sertraline 100 mg Oral HS Surekha Hung MD         ** Please Note: Dictation voice to text software may have been used in the creation of this document   Sofie Oliveira MD  03/04/20  1:39 PM

## 2020-03-04 NOTE — QUICK NOTE
Patient was sleeping comfortable on arrival  Nursing had no concerns       Radha Sanchez MD  03/03/20  10:40 PM

## 2020-03-04 NOTE — PROGRESS NOTES
Progress Note - Nephrology   Rowdy Keshav 62 y o  female MRN: [id number]  Unit/Bed#: 7T [facility] 716-02 Encounter: [id number]      Assessment / Plan:  1  JULIO, present on admission, possibly prerenal vs progression of CKD in setting of diabetic nephropathy  -sCr 3 7 on admission, improved to 3 37 on isolyte 125ml/hr  -continue isolyte with repeat BMP in am  -avoid relative hypotension  -avoid nephrotoxins/IV dye  -CT abdomen and pelvis without contrast shows no acute abnormality, unremarkable kidneys  Enlarged left para-aortic lymph nodes noted  -urinalysis with microscopy shows cloudy urine, straw color, positive glucose, positive blood, positive nitrites, leukocytes, greater than 500 protein, 2-4 RBCs, innumerable WBCs innumerable bacteria in the setting of a suprapubic catheter that is chronic      2  CKD ? Stage 3 vs progression to later stage with previous b/l sCr 1 5-1 8, more recently baseline sCr low 2s as of January 2020(s/p recent hospital admission for prerenal JULIO)  -highly suspect CKD d/t long standing diabetes, previously uncontrolled A1C  -save non dominant arm for potential future vascular access  -needs follow-up with Nephrology outpatient within 1 week of discharge  She will also need repeat BMP prior to office visit       3  Hypokalemia along with hypomagnesemia - K 2 7-->3 5, mag 1 9  Provide 40meq Kdur today  Monitor BMP       4  HTN - BP generally well controlled for level of CKD  Continue amlodipine 5mg daily with hold parameters     5  Anemia likely due to CKD - Hgb 9, ? Dilutional  Recent iron panel shows low iron, high ferritin, iron sat 20% and low TIBC at 128       6  History of neurogenic bladder s/p Suprapubic catheter in place      7  LE edema ? D/t nephrotic cause as alb low at 2 4 with renal impairment and nephrotic range proteinuria- will avoid diuresis at this time in light of hypokalemia       8   Nephrotic range proteinuria most likely d/t diabetes -urine protein to creatinine ratio 11 g as of 2020  SPEP/UPEP with IF negative for monoclonality  FLC 2 12    -Prior serologic work up shows negative HIV, negative acute hepatitis panel, negative JOSE, IgA level normal  Normal IgA level in serum does not exclude IgAN  -will check anti VIRGIL 2R  -will avoid ACEi for now  Would consider d/c 3/5 if sCr continues to improve, K normalizes  Subjective:   She feels well  She wants to leave and go outside  She denies any chest pain or shortness of breath  Objective:     Vitals: Blood pressure 148/73, pulse 76, temperature 97 8 °F (36 6 °C), temperature source Temporal, resp  rate 20, height 5' 3 6" (1 615 m), weight 80 kg (176 lb 5 9 oz), SpO2 100 %, not currently breastfeeding  ,Body mass index is 30 66 kg/m²  Temp (24hrs), Av 9 °F (36 6 °C), Min:97 4 °F (36 3 °C), Max:98 6 °F (37 °C)      Weight (last 2 days)     Date/Time   Weight    20 0551   80 (176 37)    20 0600   80 3 (177 03)    20 2354   80 3 (177)    20 1841   80 1 (176 59)                Intake/Output Summary (Last 24 hours) at 3/4/2020 1135  Last data filed at 3/4/2020 1104  Gross per 24 hour   Intake 2904 58 ml   Output 2750 ml   Net 154 58 ml     I/O last 24 hours: In: 3144 6 [P O :880; I V :2264 6]  Out: 2750 [Urine:2750]        Physical Exam:   Physical Exam   Constitutional: She appears well-developed and well-nourished  No distress  HENT:   Head: Normocephalic and atraumatic  Mouth/Throat: No oropharyngeal exudate  Eyes: Right eye exhibits no discharge  Left eye exhibits no discharge  No scleral icterus  Neck: Normal range of motion  Neck supple  No thyromegaly present  Cardiovascular: Normal rate and regular rhythm  No murmur heard  Pulmonary/Chest: Effort normal and breath sounds normal  No respiratory distress  She has no wheezes  Abdominal: Soft  Bowel sounds are normal  She exhibits no distension     Genitourinary:   Genitourinary Comments: +SPT Musculoskeletal: She exhibits no edema  Neurological: She is alert  She exhibits normal muscle tone  awake   Skin: Skin is warm and dry  No rash noted  She is not diaphoretic  Psychiatric: She has a normal mood and affect  Her behavior is normal    Nursing note and vitals reviewed        Invasive Devices     Peripheral Intravenous Line            Peripheral IV 03/02/20 Left  1 day          Drain            Suprapubic Catheter 650 days                Medications:    Scheduled Meds:  Current Facility-Administered Medications:  acetaminophen 975 mg Oral Q8H [address] Nicolle Hernandez MD    amLODIPine 5 mg Oral Daily Maura Johnson DO    atorvastatin 40 mg Oral Daily Nicolle Hernandez MD    docusate sodium 100 mg Oral Daily PRN Nicolle Hernandez MD    insulin glargine 10 Units Subcutaneous HS Nicolle Hernandez MD    insulin lispro 1-6 Units Subcutaneous TID AC Nicolle Hernandez MD    lidocaine 1 patch Topical Daily Nicolle Hernandez MD    magnesium oxide 400 mg Oral BID Whitley Sosa MD    multi-electrolyte 125 mL/hr Intravenous Continuous Maura Kahn DO Last Rate: 125 mL/hr (03/04/20 1135)   potassium chloride 40 mEq Oral Once Whitley Sosa MD    pregabalin 25 mg Oral BID Sheri Stone MD    sertraline 100 mg Oral HS Roselyn Whitman MD        PRN Meds: docusate sodium    Continuous Infusions:  multi-electrolyte 125 mL/hr Last Rate: 125 mL/hr (03/04/20 1135)           LAB RESULTS:      Results from last 7 days   Lab Units 03/04/20  0623 03/03/20  1402 03/03/20  1401 03/03/20  0532 03/02/20  1449   WBC Thousand/uL 6 10  --   --  5 80 7 10   HEMOGLOBIN g/dL 9 0*  --   --  8 7* 10 2*   HEMATOCRIT % 26 9*  --   --  26 1* 30 7*   PLATELETS Thousands/uL 153  --   --  153 191   NEUTROS PCT % 55  --   --  46 65   LYMPHS PCT % 33  --   --  40 25   MONOS PCT % 6  --   --  6 4   EOS PCT % 5  --   --  8* 5   POTASSIUM mmol/L 3 5*  --  3 1* 2 7* 3 1*   CHLORIDE mmol/L 115*  --  115* 112* 108   CO2 mmol/L 21*  --  24 24 22   BUN mg/dL 39*  --  43* 44* 47*   CREATININE mg/dL 3 37*  --  3 67* 3 72* 3 76*   CALCIUM mg/dL 7 3*  --  7 9* 7 4* 8 2*   ALK PHOS U/L 185*  --   --  164* 213*   ALT U/L 22  --   --  16 21   AST U/L 26  --   --  17 19   MAGNESIUM mg/dL 1 9 1 9  --  1 7  --    PHOSPHORUS mg/dL  --   --   --   --  5 6*       CUTURES:  Lab Results   Component Value Date    BLOODCX Proprionibacterium acnes (A) 12/03/2018    BLOODCX Staphylococcus coagulase negative (A) 12/03/2018    BLOODCX Proprionibacterium acnes (A) 12/03/2018    URINECX (A) 03/02/2020     >100,000 cfu/ml Non lactose fermenting gram negative matt    URINECX >100,000 cfu/ml Gram Negative Matt (A) 03/02/2020    URINECX >100,000 cfu/ml Lactobacillus species (A) 12/30/2019    URINECX >100,000 cfu/ml Escherichia coli (A) 12/30/2019    URINECX >100,000 cfu/ml Escherichia coli ESBL (A) 12/30/2019    URINECX >100,000 cfu/ml  12/14/2019    URINECX 40,000-49,000 cfu/ml Escherichia coli ESBL (A) 05/29/2019    URINECX 10,000-19,000 cfu/ml Escherichia coli (A) 05/29/2019    URINECX (A) 05/29/2019     20,000-29,000 cfu/ml Alpha Hemolytic Streptococcus NOT Enterococcus    URINECX 20,000-29,000 cfu/ml Lactobacillus species (A) 05/29/2019                 Portions of the record may have been created with voice recognition software  Occasional wrong word or "sound a like" substitutions may have occurred due to the inherent limitations of voice recognition software  Read the chart carefully and recognize, using context, where substitutions have occurred  If you have any questions, please contact the dictating provider

## 2020-03-04 NOTE — CONSULTS
[address] [facility] Psychiatry Consultation/Liaison  Initial Evaluation      Patient Name: Jean Olmedo  MRN: [id number]  DOS: 03/03/20     Consulted requested by:  Mary COE  Reason for request: passive SI    (Source of information: patient, chart review)    HPI: Jean Olmedo  is a 62 y o   female  with no prior psychiatric treatment who was admitted for acute on chronic kidney disease and during her admission happened to report feeling depressed, as well as passive SI  Patient reports feeling better today and attributes some of the improvement to improvement in physical condition  She states she has had depressed mood on and off since her brother passed a year ago  She denies having had any problems with sleep, appetite, hopelessness, and anhedonia  Has had intermittent passive SI during the same period of time  No delusions elicited  Denies SI/HI  Denies Ah/VH  Denies drug use  Denies adverse effects of sertraline  She states sertraline was started during last medical admission 3 months ago and did not follow up with psychiatry  PPHx:  Denies prior inpatient or outpatient treatment  Denies h/o suicide  Denies drug use  No prior medication trials       PMHx/PSHx: Reviewed    Medications:     Current Facility-Administered Medications:     acetaminophen (TYLENOL) tablet 975 mg, 975 mg, Oral, Q8H [address], Nicolle Hernandez MD, 975 mg at 03/02/20 2122    [START ON 3/4/2020] amLODIPine (NORVASC) tablet 5 mg, 5 mg, Oral, Daily, Maura K Alex, DO    atorvastatin (LIPITOR) tablet 40 mg, 40 mg, Oral, Daily, Nicolle Hernandez MD, 40 mg at 03/03/20 0831    docusate sodium (COLACE) capsule 100 mg, 100 mg, Oral, Daily PRN, Eloise Alejo MD    insulin glargine (LANTUS) subcutaneous injection 10 Units 0 1 mL, 10 Units, Subcutaneous, HS, Nicolle Hernandez MD, 10 Units at 03/03/20 2141    insulin lispro (HumaLOG) 100 units/mL subcutaneous injection 1-6 Units, 1-6 Units, Subcutaneous, TID AC, 1 Units at 03/03/20 1720 **AND** Fingerstick Glucose (POCT), , , TID AC, Nicolle Hernandez MD    lidocaine (LIDODERM) 5 % patch 1 patch, 1 patch, Topical, Daily, Nicolle Hernandez MD, 1 patch at 03/03/20 2142    magnesium oxide (MAG-OX) tablet 400 mg, 400 mg, Oral, BID, Valencia Moreno MD, 400 mg at 03/03/20 1920    multi-electrolyte (PLASMALYTE-A/ISOLYTE-S PH 7 4) IV solution, 125 mL/hr, Intravenous, Continuous, Maura Johnson, DO, Last Rate: 125 mL/hr at 03/03/20 1153, 125 mL/hr at 03/03/20 1153    pregabalin (LYRICA) capsule 25 mg, 25 mg, Oral, BID, Steph Farfan MD, 25 mg at 03/03/20 1722    sertraline (ZOLOFT) tablet 50 mg, 50 mg, Oral, HS, Nicolle Hernandez MD, 50 mg at 03/03/20 2142                Allergies: Allergies   Allergen Reactions    Gabapentin      Other reaction(s): slurring of speech, falls   Metformin Headache       Social History: Lives with daughter  Unemployed  Denies trauma  Has good social support from adult children    Family history: denies    Examination:    Vitals:    03/03/20 1517   BP: 139/78   Pulse: 79   Resp: 18   Temp: 98 6 °F (37 °C)   SpO2: 93%       Mental Status Exam [Per above +]  Appearance: fair grooming/hygiene  Behavior: calm, cooperative  Speech: wnl  Mood: improved  Affect: neutral, stable, reactive  Thought process: linear, logical  Thought content: no delusions elicited; denies SI/HI  Perceptual disturbances: denies AH/VH  Cognition: oriented to all domains  Insight: adequate  Judgement: fair    Lab/work up: Reviewed    ASSESSMENT:   - depressive d/o unspecified    Recommendations:   1  Disposition: no inpatient psychiatric treatment required  2  Psychopharmacologic interventions:  a  Increase sertraline to 100mg po daily  3  Nonpharmacologic interventions:  a  Follow up with PCP - patient not interested in outpatient psychiatric care  4  Work-up: none  5   Precautions: no enhanced observation recommended    Will follow up    Lyndon Cuello MD  03/03/20

## 2020-03-05 PROBLEM — E87.6 HYPOKALEMIA: Status: RESOLVED | Noted: 2020-01-01 | Resolved: 2020-01-01

## 2020-03-05 NOTE — NURSING NOTE
Discharge Note  Patient leaves for home in stable condition  Refused to be cover for blood sugar 211 mg/dl as per patient " I will take care of it when I get home just want to leave " Left walking with own walker as requested  Accompanied off unit by PCA and daughter  All safety maintained  Augusto continue to monitor

## 2020-03-05 NOTE — QUICK NOTE
Patient was seen at bedside  Patient did not appear to be in any distress  C/o continued pain under right breast, states lidocaine patch not helping, will try Bengay  Currently patient denies any fever, chills, shortness of breath, chest pain, palpitations, light headedness, headaches, vision change, abdominal pain, N/V/D, urinary symptoms

## 2020-03-05 NOTE — DISCHARGE INSTRUCTIONS
Chronic Kidney Disease   WHAT YOU NEED TO KNOW:   Chronic kidney disease (CKD) is the gradual and permanent loss of kidney function  It is also called chronic kidney failure, or chronic renal insufficiency  Normally, the kidneys remove fluid, chemicals, and waste from your blood  These wastes are turned into urine by your kidneys  CKD may worsen over time and lead to kidney failure  DISCHARGE INSTRUCTIONS:   Return to the emergency department if:   · You are confused and very drowsy  · You have a seizure  · You have shortness of breath  Contact your healthcare provider if:   · You suddenly gain or lose more weight than your healthcare provider has told you is okay  · You have itchy skin or a rash  · You urinate more or less than you normally do  · You have blood in your urine  · You have nausea and repeated vomiting  · You have fatigue or muscle weakness  · You have hiccups that will not stop  · You have questions or concerns about your condition or care  Medicines:   · Medicines  may be given to decrease blood pressure and get rid of extra fluid  You may also receive medicine to manage health conditions that may occur with CKD, such as anemia, diabetes, and heart disease  · Take your medicine as directed  Contact your healthcare provider if you think your medicine is not helping or if you have side effects  Tell him or her if you are allergic to any medicine  Keep a list of the medicines, vitamins, and herbs you take  Include the amounts, and when and why you take them  Bring the list or the pill bottles to follow-up visits  Carry your medicine list with you in case of an emergency  Follow up with your healthcare provider as directed: You will need to return for tests to monitor your kidney function  You may also be referred to a kidney specialist  Write down your questions so you remember to ask them during your visits  Manage other health conditions:   Follow your healthcare provider's directions on how to manage diabetes, high blood pressure, and heart disease  These conditions can make CKD worse  Talk to your healthcare provider before you take over-the-counter medicine  Medicines such as NSAIDs, stomach medicine, or laxatives may harm your kidneys  Weigh yourself daily:  Ask your healthcare provider what your weight should be  Ask how much liquid you should drink each day  CKD may cause you to gain or lose weight rapidly  Weigh yourself every day  Write down your weight, how much liquid you drink or eat, and how much you urinate each day  Contact your healthcare provider if your weight is higher or lower than it should be  Manage CKD:   · Maintain a healthy weight  Ask your healthcare provider how much you should weigh  Ask him to help you create a weight loss plan if you are overweight  · Exercise 30 to 60 minutes a day, 4 to 7 times a week, or as directed  Ask about the best exercise plan for you  Regular exercise can help you manage CKD, high blood pressure, and diabetes  · Follow your healthcare provider's advice about what to eat and drink  He may tell you to eat food low in sodium (salt), potassium, phosphorus, or protein  You may need to see a dietitian if you need help planning meals  Ask how much liquid to drink each day and which liquids are best for you  · Limit alcohol  Ask how much alcohol is safe for you to drink  A drink of alcohol is 12 ounces of beer, 5 ounces of wine, or 1½ ounces of liquor  · Do not smoke  Nicotine and other chemicals in cigarettes and cigars can cause lung and kidney damage  Ask your healthcare provider for information if you currently smoke and need help to quit  E-cigarettes or smokeless tobacco still contain nicotine  Talk to your healthcare provider before you use these products  · Ask your healthcare provider if you need vaccines    Infections such as pneumonia, influenza, and hepatitis can be more harmful or more likely to occur in a person who has CKD  Vaccines reduce your risk of infection with these viruses  © 2017 [employer] Information is for End User's use only and may not be sold, redistributed or otherwise used for commercial purposes  All illustrations and images included in CareNotes® are the copyrighted property of A D A M , Inc  or Javi Gil  The above information is an  only  It is not intended as medical advice for individual conditions or treatments  Talk to your doctor, nurse or pharmacist before following any medical regimen to see if it is safe and effective for you  Pregabalin (By mouth)   Pregabalin (pre-GA-ba-mago)  Treats nerve and muscle pain, including fibromyalgia  Also treats seizures  Brand Name(s): Lyrica   There may be other brand names for this medicine  When This Medicine Should Not Be Used: This medicine is not right for everyone  Do not use it if you had an allergic reaction to pregabalin  How to Use This Medicine:   Capsule, Liquid  · Take your medicine as directed  Your dose may need to be changed several times to find what works best for you  · Measure the oral liquid medicine with a marked measuring spoon, oral syringe, or medicine cup  · This medicine should come with a Medication Guide  Ask your pharmacist for a copy if you do not have one  · Missed dose: Take a dose as soon as you remember  If it is almost time for your next dose, wait until then and take a regular dose  Do not take extra medicine to make up for a missed dose  · Store the medicine in a closed container at room temperature, away from heat, moisture, and direct light  Drugs and Foods to Avoid:   Ask your doctor or pharmacist before using any other medicine, including over-the-counter medicines, vitamins, and herbal products  · Some medicines can affect how pregabalin works   Tell your doctor if you are using any of the following:   ¨ Diabetes medicine that you take by mouth (pioglitazone, rosiglitazone)  ¨ An ACE inhibitor (benazepril, enalapril, lisinopril, quinapril, ramipril)  · Tell your doctor if you use anything else that makes you sleepy  Some examples are allergy medicine, narcotic pain medicine, and alcohol  Warnings While Using This Medicine:   · Tell your doctor if you are pregnant or breastfeeding, or if you have kidney disease, heart failure, heart rhythm problems, angioedema, a bleeding disorder, or a low blood platelet count  Tell your doctor if you have a history of alcohol or drug abuse, depression, or other mood problems  · This medicine may cause the following problems:   ¨ Serious allergic reactions  ¨ Suicidal thoughts  · This medicine may make you dizzy or drowsy  It may also cause blurry or double vision  Do not drive or do anything that could be dangerous until you know how this medicine affects you  · Do not stop using this medicine suddenly  Your doctor will need to slowly decrease your dose before you stop it completely  · Your doctor will check your progress and the effects of this medicine at regular visits  Keep all appointments  · Keep all medicine out of the reach of children  Never share your medicine with anyone    Possible Side Effects While Using This Medicine:   Call your doctor right away if you notice any of these side effects:  · Allergic reaction: Itching or hives, swelling in your face or hands, swelling or tingling in your mouth or throat, chest tightness, trouble breathing  · Blistering, peeling, red skin rash  · Blurry or double vision  · Fever, chills, cough, sore throat, and body aches  · Muscle pain, tenderness, or weakness, fever, or general ill feeling  · Rapid weight gain, swelling in your hands, ankles, or feet  · Severe dizziness or drowsiness  · Sudden mood changes, unusual thoughts or behavior such as extreme happiness or depression  · Suicidal thoughts  · Swelling in your throat, head, or neck  · Uneven heartbeat  · Unusual bleeding, bruising, or weakness  If you notice these less serious side effects, talk with your doctor:   · Confusion, trouble concentrating  · Constipation  · Dry mouth  If you notice other side effects that you think are caused by this medicine, tell your doctor  Call your doctor for medical advice about side effects  You may report side effects to FDA at 8-512-FDA-1219  © 2017 [employer] Information is for End User's use only and may not be sold, redistributed or otherwise used for commercial purposes  The above information is an  only  It is not intended as medical advice for individual conditions or treatments  Talk to your doctor, nurse or pharmacist before following any medical regimen to see if it is safe and effective for you

## 2020-03-05 NOTE — QUICK NOTE
Patient was seen at bedside  Nursing reports leaking from EJ IV  Patient did not appear to be in any distress and had no questions or concerns at this time  No infiltration, EJ is flushing, small amount of fluid around EJ  Patient AAOx3  Currently patient denies any fever, chills, shortness of breath, chest pain, palpitations, light headedness, headaches, vision change, abdominal pain, N/V/D, urinary symptoms  Will DC fluids

## 2020-03-05 NOTE — PROGRESS NOTES
Progress Note - Nephrology   Misty Tamayo 62 y o  female MRN: [id number]  Unit/Bed#: 7T [facility] 716-02 Encounter: [id number]      Assessment / Plan:  1  JULIO, present on admission, possibly prerenal vs progression of CKD in setting of diabetic nephropathy  -sCr 3 7 on admission, improved to 3 on isolyte 125ml/hr  -continue isolyte with repeat BMP in am  -avoid relative hypotension  -avoid nephrotoxins/IV dye  -CT abdomen and pelvis without contrast shows no acute abnormality, unremarkable kidneys   Enlarged left para-aortic lymph nodes noted  -urinalysis with microscopy shows cloudy urine, straw color, positive glucose, positive blood, positive nitrites, leukocytes, greater than 500 protein, 2-4 RBCs, innumerable WBCs innumerable bacteria in the setting of a suprapubic catheter that is chronic      2  CKD ? Stage 3 vs progression to later stage with previous b/l sCr 1 5-1 8, more recently baseline sCr low 2s as of January 2020(s/p recent hospital admission for prerenal JULIO)  -highly suspect CKD d/t long standing diabetes, previously uncontrolled A1C  -save non dominant arm for potential future vascular access  -needs follow-up with Nephrology outpatient within 1 week of discharge  She will also need repeat BMP prior to office visit on 3/9 which has been ordered       3  Hypokalemia along with hypomagnesemia - resolved s/p repletion     4  HTN - BP generally well controlled for level of CKD  Continue amlodipine 5mg daily with hold parameters     5  Anemia likely due to CKD - Hgb 10, Recent iron panel shows low iron, high ferritin, iron sat 20% and low TIBC at 128       6  History of neurogenic bladder s/p Suprapubic catheter in place, should see urology outpatient     7  LE edema ?  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 d/t diabetes -urine protein to creatinine ratio 11 g as of 2020  SPEP/UPEP with IF negative for monoclonality  FLC 2 12    -Prior serologic work up shows negative HIV, negative acute hepatitis panel, negative JOSE, IgA level normal  Normal IgA level in serum does not exclude IgAN  anti VIRGIL 2R results pending    -will avoid ACEi for now  Will address need for ACEI outpatient     Instructed patient to obtain [facility] Monday 3/9  Renal office to call for follow up appt in 1 week as patient with JULIO  Office message sent regarding this  Frankie Kumar for d/c from renal perspective  Subjective:   She feels well  She denies any chest pain or shortness of breath  She hopes to go home today  No complaints  Objective:     Vitals: Blood pressure 154/82, pulse 75, temperature 97 8 °F (36 6 °C), temperature source Temporal, resp  rate 20, height 5' 3 6" (1 615 m), weight 83 1 kg (183 lb 3 2 oz), SpO2 99 %, not currently breastfeeding  ,Body mass index is 31 84 kg/m²  Temp (24hrs), Av 3 °F (36 8 °C), Min:97 8 °F (36 6 °C), Max:99 1 °F (37 3 °C)      Weight (last 2 days)     Date/Time   Weight    20 0600   83 1 (183 2)    20 0551   80 (176 37)    20 0600   80 3 (177 03)                Intake/Output Summary (Last 24 hours) at 3/5/2020 0917  Last data filed at 3/5/2020 3597  Gross per 24 hour   Intake 2340 ml   Output 2650 ml   Net -310 ml     I/O last 24 hours: In: 80 [P O :1700; I V :1000]  Out: 3200 [Urine:3200]        Physical Exam:   Physical Exam   Constitutional: She appears well-developed and well-nourished  No distress  HENT:   Head: Normocephalic and atraumatic  Mouth/Throat: No oropharyngeal exudate  Eyes: Right eye exhibits no discharge  Left eye exhibits no discharge  No scleral icterus  Neck: Normal range of motion  Neck supple  No thyromegaly present  Cardiovascular: Normal rate and regular rhythm  No murmur heard  Pulmonary/Chest: Effort normal and breath sounds normal  No respiratory distress   She has no wheezes  Abdominal: Soft  Bowel sounds are normal  She exhibits no distension  Genitourinary:   Genitourinary Comments: +SPT   Musculoskeletal: She exhibits edema (trace b/l LE)  Neurological: She is alert  She exhibits normal muscle tone  awake   Skin: Skin is warm and dry  No rash noted  She is not diaphoretic  Psychiatric: She has a normal mood and affect  Her behavior is normal    Nursing note and vitals reviewed        Invasive Devices     Peripheral Intravenous Line            Peripheral IV 03/02/20 Left  2 days          Drain            Suprapubic Catheter 651 days                Medications:    Scheduled Meds:  Current Facility-Administered Medications:  acetaminophen 975 mg Oral Q8H [address] Nicolle Hernandez MD   amLODIPine 5 mg Oral Daily Maura K Alex, DO   atorvastatin 40 mg Oral Daily Nicolle Hernandez MD   docusate sodium 100 mg Oral Daily PRN Sánchez Miller Hernandez MD   insulin glargine 10 Units Subcutaneous HS Nicolle Hernandez MD   magnesium oxide 400 mg Oral BID Tonyk Gamma, MD   menthol-methyl salicylate 1 application Apply externally 4x Daily PRN Polly August MD   pregabalin 25 mg Oral BID Genette Graft, MD   sertraline 100 mg Oral HS Zula Gilford, MD       PRN Meds: docusate sodium    menthol-methyl salicylate    Continuous Infusions:         LAB RESULTS:      Results from last 7 days   Lab Units 03/05/20  0604 03/04/20  0623 03/03/20  1402 03/03/20  1401 03/03/20  0532 03/02/20  1449   WBC Thousand/uL 6 20 6 10  --   --  5 80 7 10   HEMOGLOBIN g/dL 10 0* 9 0*  --   --  8 7* 10 2*   HEMATOCRIT % 30 3* 26 9*  --   --  26 1* 30 7*   PLATELETS Thousands/uL 156 153  --   --  153 191   NEUTROS PCT % 51 55  --   --  46 65   LYMPHS PCT % 35 33  --   --  40 25   MONOS PCT % 6 6  --   --  6 4   EOS PCT % 7* 5  --   --  8* 5   POTASSIUM mmol/L 3 7 3 5*  --  3 1* 2 7* 3 1*   CHLORIDE mmol/L 113* 115*  --  115* 112* 108   CO2 mmol/L 24 21*  --  24 24 22   BUN mg/dL 34* 39*  --  43* 44* 47* CREATININE mg/dL 3 00* 3 37*  --  3 67* 3 72* 3 76*   CALCIUM mg/dL 8 1* 7 3*  --  7 9* 7 4* 8 2*   ALK PHOS U/L  --  185*  --   --  164* 213*   ALT U/L  --  22  --   --  16 21   AST U/L  --  26  --   --  17 19   MAGNESIUM mg/dL 2 1 1 9 1 9  --  1 7  --    PHOSPHORUS mg/dL  --   --   --   --   --  5 6*       CUTURES:  Lab Results   Component Value Date    BLOODCX Proprionibacterium acnes (A) 12/03/2018    BLOODCX Staphylococcus coagulase negative (A) 12/03/2018    BLOODCX Proprionibacterium acnes (A) 12/03/2018    URINECX >100,000 cfu/ml Escherichia coli (A) 03/02/2020    URINECX >100,000 cfu/ml Escherichia coli ESBL (A) 03/02/2020    URINECX >100,000 cfu/ml Lactobacillus species (A) 12/30/2019    URINECX >100,000 cfu/ml Escherichia coli (A) 12/30/2019    URINECX >100,000 cfu/ml Escherichia coli ESBL (A) 12/30/2019    URINECX >100,000 cfu/ml  12/14/2019    URINECX 40,000-49,000 cfu/ml Escherichia coli ESBL (A) 05/29/2019    URINECX 10,000-19,000 cfu/ml Escherichia coli (A) 05/29/2019    URINECX (A) 05/29/2019     20,000-29,000 cfu/ml Alpha Hemolytic Streptococcus NOT Enterococcus    URINECX 20,000-29,000 cfu/ml Lactobacillus species (A) 05/29/2019                 Portions of the record may have been created with voice recognition software  Occasional wrong word or "sound a like" substitutions may have occurred due to the inherent limitations of voice recognition software  Read the chart carefully and recognize, using context, where substitutions have occurred  If you have any questions, please contact the dictating provider

## 2020-03-05 NOTE — NURSING NOTE
DR Hernandez notified of patients BS 46 and asymptomatic  Informed OJ and nourishments given and will repeat in 30 minutes  Also in formed IJ is leaking  Will come to see patient at bedside

## 2020-03-05 NOTE — NURSING NOTE
On initial rounds patient in no apparent distress  Skin warm and dry to touch  Pleasant and cooperative  Denied pain but verbalized understanding of pain scale 0-10  Ambulates by self with own walker  Suprapubic ann in place with leg bag in adequate amount  Tolerating diet  No s/s of hypo/hyperglycemic reaction noted none voiced  All safety maintained  Will continue to monitor

## 2020-03-05 NOTE — PLAN OF CARE
Problem: Potential for Falls  Goal: Patient will remain free of falls  Description  INTERVENTIONS:  - Assess patient frequently for physical needs  -  Identify cognitive and physical deficits and behaviors that affect risk of falls    -  [place] fall precautions as indicated by assessment   - Educate patient/family on patient safety including physical limitations  - Instruct patient to call for assistance with activity based on assessment  - Modify environment to reduce risk of injury  - Consider OT/PT consult to assist with strengthening/mobility  Outcome: Adequate for Discharge

## 2020-03-06 NOTE — TELEPHONE ENCOUNTER
----- Message from Bertin Shirley DO sent at 3/5/2020  9:16 AM EST -----  Regarding: JULIO hospital follow up  Patient to be discharged from [facility] today and placed on Epic JULIO list  Please schedule hospital follow up for JULIO in 1 week with first available provider  Patient should have BMP prior to office visit which has already been ordered  Thanks

## 2020-03-06 NOTE — UTILIZATION REVIEW
Notification of Discharge  This is a Notification of Discharge from our facility [address]  Please be advised that this patient has been discharge from our facility  Below you will find the admission and discharge date and time including the patients disposition  PRESENTATION DATE: 3/2/2020  6:45 PM  IP ADMISSION DATE: 3/2/20 2323   DISCHARGE DATE: 3/5/2020 11:30 AM  DISPOSITION: Home/Self Care Home/Self Care   Admission Orders listed below:  Admission Orders (From admission, onward)     Ordered        03/02/20 1817  Inpatient Admission  Once                   Please contact the UR Department if additional information is required to close this patient's authorization/case  [facility]  Utilization Review Department  Main: [phone] x carefully listen to the prompts  All voicemails are confidential   [email or web address]  Send all requests for admission clinical reviews, approved or denied determinations and any other requests to dedicated fax number below belonging to the campus where the patient is receiving treatment   List of dedicated fax numbers:  [address] DENIALS (Administrative/Medical Necessity) [phone]   [address] (Maternity/NICU/Pediatrics) [phone]   Vibha Schulz [phone]   Myra Franco [phone]   Ramy Dave [phone]   Shellie Ward [facility] [address] [place] [address] [phone]   [facility]  [phone]   [address] [facility]  [address] [facility] [address] [facility] [phone]

## 2020-03-06 NOTE — TELEPHONE ENCOUNTER
Called pt to move up appt from 3/25 to 3/18 with Janna  Pt did not want to move and wants to stay with Willis Cordero on 3/25, will mail out lab to be done

## 2020-03-09 NOTE — TELEPHONE ENCOUNTER
Patient called in stating [employer] didn't have orders for shower chair and commode chair  I saw it faxed back on 2/17/20   Will refax again to [phone]

## 2020-03-11 NOTE — TELEPHONE ENCOUNTER
SIGNATURES NEEDED FOR [employer]  FORM RECEIVED VIA FAX  WILL BE PLACED IN YOUR BIN AT ASSIGNED DELIVERY TIMES      Shower Chair/Commode

## 2020-03-16 NOTE — TELEPHONE ENCOUNTER
I have informed the patient of her abnormal bloodwork results - her sCr has risen to 4 2 since hospital d/c with sCr 3  Her glucose is elevated at 300  She thinks she has been eating and drinking well  I encourage her to go to the ER but she refuses  I recommend she work on glucose control, hydration and repeat BMP this Wednesday, 3/18  She agrees to this plan  She is to see Sapphire Regalado next week in the office  I also mention to the patient that she may be nearing dialysis  She shows understanding

## 2020-03-19 NOTE — TELEPHONE ENCOUNTER
Caklled patient in regards to her most recent lab work  There was no answer  Her creatinine remains elevated at 4 2  Will re-attempt to call again        ----- Message from Jamie Jimenez DO sent at 3/18/2020  3:09 PM EDT -----  This patient is seeing you next week in the office  Her renal function has worsened but remains stable as of blood work today  Her appt is in 1 week  She was completely asymptomatic 2 days ago when I spoke with her prior to this blood work today  I do think that she needs dialysis planning  If possible, her appointment in person should be moved up for dialysis planning conversation in person  ----- Message -----  From: Lab, Background User  Sent: 3/18/2020   1:47 PM EDT  To:  Jamie Jimenez DO

## 2020-03-19 NOTE — TELEPHONE ENCOUNTER
Spoke with patient in regard to most recent blood work  Her creatinine is elevated to 4 2 with GFR of 11  She states that she is doing well  She "feels well"  She is urinating well  She is eating and drinking well  She states she is working on her diet and diabetes  She is taking all of her medications as prescribed  She denies uremic symptoms or NSAID use  She wishes to cancel her appointment for next week and reschedule in one month or so  Repeat BMP in 2 weeks  Discussed going to the hospital if experiencing uremic symptoms

## 2020-03-20 NOTE — TELEPHONE ENCOUNTER
Tried to call the patient to reschedule her hospital follow per Dimple's OMAYRA telephone call from yesterday  The number is not working and she has no emergency contact listed  I will try again later        aKit Scruggs MA

## 2020-03-23 NOTE — TELEPHONE ENCOUNTER
Spoke with the patient and she did reschedule her hospital follow up and repeat BMP slip has been mailed out for the patient        Balwinder Vaughan MA

## 2020-03-30 NOTE — ASSESSMENT & PLAN NOTE
Contacted patient to follow up after her hospitalization due to JULIO  She is noted to continue to have worsening kidney function, most recent performed on 3/18 with creatinine of 4 27 and GFR 11  She follows with nephrology and supposed to go for repeat blood work in about 1 week from now  Patient is aware and stated she will go for blood work  Patient denies any complains at present and states that she is feeling good  Encouraged hydration with water, patient stated that she drinks about 6 regular bottles of water, encouraged to drink 6-8 bottles a day  ER precautions given to the patient, encouraged to call if any new symptoms appear    Will follow up in 4-6 weeks

## 2020-03-30 NOTE — PROGRESS NOTES
Virtual Brief Visit    Problem List Items Addressed This Visit        Genitourinary    Stage 4 chronic kidney disease ([facility] [address] ) - Primary     Contacted patient to follow up after her hospitalization due to JULIO  She is noted to continue to have worsening kidney function, most recent performed on 3/18 with creatinine of 4 27 and GFR 11  She follows with nephrology and supposed to go for repeat blood work in about 1 week from now  Patient is aware and stated she will go for blood work  Patient denies any complains at present and states that she is feeling good  Encouraged hydration with water, patient stated that she drinks about 6 regular bottles of water, encouraged to drink 6-8 bottles a day  ER precautions given to the patient, encouraged to call if any new symptoms appear  Will follow up in 4-6 weeks                     Reason for visit is follow up on chronic condition    Encounter provider Shruthi Mccormick MD    Provider located at [address] [place] [address]  [address] [place] [id number] [phone]      Recent Visits  No visits were found meeting these conditions  Showing recent visits within past 7 days and meeting all other requirements     Future Appointments  No visits were found meeting these conditions  Showing future appointments within next 150 days and meeting all other requirements        After connecting through telephone and patient was informed that this is not a secure, HIPAA-complaint platform  she agrees to proceed  , the patient was identified by name and date of birth  Jean Olmedo was informed that this is a telemedicine visit and that the visit is being conducted through telephone and patient was informed that this is not a secure, HIPAA-complaint platform  she agrees to proceed     My office door was closed  No one else was in the room  She acknowledged consent and understanding of privacy and security of the video platform   The patient has agreed to participate and understands they can discontinue the visit at any time  Patient is aware this is a billable service  Subjective  Rich Mariee is a 62 y o  female who was contacted for follow up on her kidney problems  She has been recently hospitalized due to JULIO, currently has nephrology service involved  She has no complains at this time and states that she feels good        Past Medical History:   Diagnosis Date    Ambulatory dysfunction     Anemia     macrocyctic    Chronic kidney disease 5/24/2018    Chronic pain disorder     right hip    Diabetes mellitus ([address] )     Diabetic foot ulcer ([address] )     left with fat layer exposed    Dyslipidemia 5/24/2018    ESBL E  coli carrier     GERD (gastroesophageal reflux disease)     History of frequent urinary tract infections     HTN (hypertension) 5/24/2018    Hyperlipidemia     Irritable bowel syndrome     constipation    Neurogenic bladder     Neuropathy     Pedal edema     Vitamin D deficiency        Past Surgical History:   Procedure Laterality Date    CATARACT EXTRACTION      [place] XCAPSL CTRC RMVL INSJ IO LENS PROSTH W/O ECP Right 12/6/2018    Procedure: EXTRACAPSULAR CATARACT REMOVAL/INSERTION OF INTRAOCULAR LENS;  Surgeon: Vikash Joe MD;  Location: [address] [place] [address];  Service: Ophthalmology    [place] XCAPSL CTRC RMVL INSJ IO LENS PROSTH W/O ECP Left 10/17/2019    Procedure: EXTRACAPSULAR CATARACT REMOVAL/INSERTION OF INTRAOCULAR LENS;  Surgeon: Vikash Jeo MD;  Location: [address] [place] [address];  Service: Ophthalmology    SUPRAPUBIC CATHETER INSERTION         Current Outpatient Medications   Medication Sig Dispense Refill    acetaminophen (TYLENOL) 325 mg tablet Take 2 tablets (650 mg total) by mouth every 6 (six) hours as needed for moderate pain 30 tablet 0    Alcohol Swabs (ALCOHOL PREP) 70 % PADS Apply 1 applicator topically as needed      aluminum-magnesium hydroxide-simethicone (MYLANTA) 200-200-20 mg/5 mL suspension Take 30 mL by mouth every 4 (four) hours as needed for indigestion or heartburn 355 mL 0    amLODIPine (NORVASC) 5 mg tablet TAKE 1 TABLET BY MOUTH ONCE DAILY  30 tablet 3    ASPIR-LOW 81 MG EC tablet       atorvastatin (LIPITOR) 40 mg tablet Take 1 tablet (40 mg total) by mouth daily 30 tablet 4    B-D INS SYRINGE 0 5CC/31GX5/16 31G X 5/16" 0 5 ML MISC       Camphor-Menthol-Methyl Sal 1 2-5 7-6 3 % PTCH Apply 1 patch topically daily 30 patch 1    Catheters MISC Please change patients suprapubic catheter as soon as possible and then every three months  Dx: N31 9 Neurogenic Bladder 6 each 0    DOCQLACE 100 MG capsule Take 1 capsule (100 mg total) by mouth 2 (two) times a day 10 capsule 0    ergocalciferol (VITAMIN D2) 50,000 units       insulin glargine (BASAGLAR KWIKPEN) 100 units/mL injection pen Inject 10 Units under the skin daily at bedtime 15 mL 0    Insulin Pen Needle (UNIFINE PENTIPS) 32G X 4 MM MISC Inject under the skin 4 (four) times a day for 90 days 400 each 0    Lancets 28G MISC Test blood sugars three times daily      Lancets 28G MISC by Does not apply route 3 (three) times a day 100 each 9    Menthol (Icy Hot) 5 % PTCH Apply 1 patch topically daily 30 patch 1    nystatin (MYCOSTATIN) powder Apply topically 2 (two) times a day Apply to lower abdomen twice a day 15 g 0    omeprazole (PriLOSEC) 40 MG capsule Take 1 capsule (40 mg total) by mouth daily 60 capsule 2    ONE TOUCH ULTRA TEST test strip 100 strips by Device route 3 (three) times a day      pregabalin (LYRICA) 25 mg capsule Take 1 capsule (25 mg total) by mouth 2 (two) times a day 60 capsule 1    sertraline (ZOLOFT) 50 mg tablet Take 1 tablet (50 mg total) by mouth daily at bedtime 30 tablet 2     No current facility-administered medications for this visit  Allergies   Allergen Reactions    Gabapentin      Other reaction(s): slurring of speech, falls      Metformin Headache       Review of Systems   Constitutional: Negative for chills, diaphoresis, fatigue and fever  HENT: Negative for congestion, ear discharge, ear pain, mouth sores, rhinorrhea, sore throat and trouble swallowing  Eyes: Negative for photophobia, pain and discharge  Respiratory: Negative for cough, chest tightness, shortness of breath and wheezing  Cardiovascular: Negative for chest pain, palpitations and leg swelling  Gastrointestinal: Negative for abdominal distention, abdominal pain, blood in stool, constipation, diarrhea and nausea  Genitourinary: Negative for difficulty urinating, frequency and urgency  Musculoskeletal: Positive for arthralgias (right hip and thigh pain, chronic but improved at this time) and back pain (chronic, but improved at this time)  Negative for joint swelling, myalgias and neck stiffness  Skin: Negative for color change, pallor and rash  Neurological: Negative for dizziness, syncope, numbness and headaches  I spent 10 minutes with the patient during this visit

## 2020-06-30 NOTE — ED PROVIDER NOTES
History  Chief Complaint   Patient presents with    Flank Pain     rt flank pain that starts under rt breast and radiates into rt flank  has a supra[ubic cath and states urine smells strong/sour     Patient is a 53-year-old female history of CKD 4, chronic right-sided pain history of chronically dilated common biliary duct, presenting for complaints of right-sided pain which describes as being under her right breast   Not associated with exertion, states she has some mild pain with deep breathing  Denies productive cough or hemoptysis or history of pulmonary embolism  She notes that she has a suprapubic catheter due to neurogenic bladder, thinks that she may have increased smell in her urine  She has been otherwise in her usual state of health  Prior to Admission Medications   Prescriptions Last Dose Informant Patient Reported? Taking? ASPIR-LOW 81 MG EC tablet   No No   Sig: Take 1 tablet (81 mg total) by mouth daily   Alcohol Swabs (ALCOHOL PREP) 70 % PADS   Yes No   Sig: Apply 1 applicator topically as needed   B-D INS SYRINGE 0 5CC/31GX5/16 31G X 5/16" 0 5 ML MISC   Yes No   Camphor-Menthol-Methyl Sal 1 2-5 7-6 3 % PTCH   No No   Sig: Apply 1 patch topically daily   Catheters MISC   No No   Sig: Please change patients suprapubic catheter as soon as possible and then every three months      Dx: N31 9 Neurogenic Bladder   DOCQLACE 100 MG capsule   No No   Sig: Take 1 capsule (100 mg total) by mouth 2 (two) times a day   Insulin Pen Needle (UNIFINE PENTIPS) 32G X 4 MM MISC   No No   Sig: Inject under the skin 4 (four) times a day for 90 days   Lancets 28G MISC   Yes No   Sig: Test blood sugars three times daily   Lancets 28G MISC   No No   Sig: by Does not apply route 3 (three) times a day   Menthol (Icy Hot) 5 % PTCH   No No   Sig: Apply 1 patch topically daily   ONE TOUCH ULTRA TEST test strip   Yes No   Si strips by Device route 3 (three) times a day   acetaminophen (TYLENOL) 325 mg tablet No No   Sig: Take 2 tablets (650 mg total) by mouth every 6 (six) hours as needed for moderate pain   aluminum-magnesium hydroxide-simethicone (MYLANTA) 200-200-20 mg/5 mL suspension   No No   Sig: Take 30 mL by mouth every 4 (four) hours as needed for indigestion or heartburn   amLODIPine (NORVASC) 5 mg tablet   No No   Sig: TAKE 1 TABLET BY MOUTH ONCE DAILY     atorvastatin (LIPITOR) 40 mg tablet   No No   Sig: Take 1 tablet (40 mg total) by mouth daily   cholecalciferol (VITAMIN D3) 1,000 units tablet   No No   Sig: Take 1 tablet (1,000 Units total) by mouth daily   insulin glargine (Lantus SoloStar) 100 units/mL injection pen   No No   Sig: Inject 10 Units under the skin daily   nystatin (MYCOSTATIN) powder   No No   Sig: Apply topically 2 (two) times a day Apply to lower abdomen twice a day   omeprazole (PriLOSEC) 40 MG capsule   No No   Sig: Take 1 capsule (40 mg total) by mouth daily   pregabalin (LYRICA) 25 mg capsule   No No   Sig: Take 1 capsule (25 mg total) by mouth 2 (two) times a day   sertraline (ZOLOFT) 50 mg tablet   No No   Sig: Take 1 tablet (50 mg total) by mouth daily at bedtime      Facility-Administered Medications: None       Past Medical History:   Diagnosis Date    Ambulatory dysfunction     Anemia     macrocyctic    Chronic kidney disease 5/24/2018    Chronic pain disorder     right hip    Diabetes mellitus ([address] )     Diabetic foot ulcer (HCC)     left with fat layer exposed    Dyslipidemia 5/24/2018    ESBL E  coli carrier     GERD (gastroesophageal reflux disease)     History of frequent urinary tract infections     HTN (hypertension) 5/24/2018    Hyperlipidemia     Irritable bowel syndrome     constipation    Neurogenic bladder     Neuropathy     Pedal edema     Vitamin D deficiency        Past Surgical History:   Procedure Laterality Date    CATARACT EXTRACTION      [place] XCAPSL CTRC RMVL INSJ IO LENS PROSTH W/O ECP Right 12/6/2018    Procedure: EXTRACAPSULAR CATARACT REMOVAL/INSERTION OF INTRAOCULAR LENS;  Surgeon: Abhay Rodrigez MD;  Location: [facility] MAIN OR;  Service: Ophthalmology    [place] XCAPSL CTRC RMVL INSJ IO LENS PROSTH W/O ECP Left 10/17/2019    Procedure: EXTRACAPSULAR CATARACT REMOVAL/INSERTION OF INTRAOCULAR LENS;  Surgeon: Abhay Rodrigez MD;  Location: [facility] MAIN OR;  Service: Ophthalmology    SUPRAPUBIC CATHETER INSERTION         Family History   Problem Relation Age of Onset   Chuck Cisneros Breast cancer Mother     Hypertension Mother     Diabetes Mother         Mellitus    Parkinsonism Mother     Cancer Maternal Aunt         Unknown     I have reviewed and agree with the history as documented  E-Cigarette/Vaping    E-Cigarette Use Never User      E-Cigarette/Vaping Substances     Social History     Tobacco Use    Smoking status: Current Every Day Smoker     Packs/day: 0 50     Years: 40 00     Pack years: 20 00     Types: Cigarettes    Smokeless tobacco: Never Used   Substance Use Topics    Alcohol use: Not Currently     Alcohol/week: 0 0 standard drinks     Frequency: Never     Binge frequency: Never     Comment: no alcohol for 16 yrs    Drug use: Not Currently       Review of Systems   Constitutional: Negative  Negative for chills and fever  HENT: Negative  Negative for rhinorrhea, sore throat, trouble swallowing and voice change  Eyes: Negative  Negative for pain and visual disturbance  Respiratory: Negative  Negative for cough, shortness of breath and wheezing  Cardiovascular: Negative  Negative for chest pain and palpitations  Rib pain   Gastrointestinal: Negative for abdominal pain, diarrhea, nausea and vomiting  Genitourinary: Negative  Negative for difficulty urinating, dysuria and frequency  Foul-smelling urine   Musculoskeletal: Negative  Negative for neck pain and neck stiffness  Skin: Negative  Negative for rash  Neurological: Negative  Negative for dizziness, speech difficulty, weakness, light-headedness and numbness  Physical Exam  Physical Exam   Constitutional: She is oriented to person, place, and time  She appears well-developed and well-nourished  No distress  HENT:   Head: Normocephalic and atraumatic  Mouth/Throat: Oropharynx is clear and moist    Eyes: Pupils are equal, round, and reactive to light  Conjunctivae and EOM are normal    Neck: Normal range of motion  Neck supple  No tracheal deviation present  Cardiovascular: Normal rate, regular rhythm and intact distal pulses  Pulmonary/Chest: Effort normal and breath sounds normal  No respiratory distress  She has no wheezes  She has no rales  Abdominal: Soft  Bowel sounds are normal  She exhibits no distension  There is no tenderness  There is no rebound and no guarding  Musculoskeletal: Normal range of motion  She exhibits no tenderness or deformity  Neurological: She is alert and oriented to person, place, and time  Skin: Skin is warm and dry  Capillary refill takes less than 2 seconds  No rash noted  Psychiatric: She has a normal mood and affect  Her behavior is normal    Nursing note and vitals reviewed        Vital Signs  ED Triage Vitals [06/30/20 1219]   Temperature Pulse Respirations Blood Pressure SpO2   97 8 °F (36 6 °C) 85 16 (!) 177/97 95 %      Temp Source Heart Rate Source Patient Position - Orthostatic VS BP Location FiO2 (%)   Tympanic Monitor Sitting Left arm --      Pain Score       Worst Possible Pain           Vitals:    06/30/20 1219 06/30/20 1447   BP: (!) 177/97 (!) 165/103   Pulse: 85 77   Patient Position - Orthostatic VS: Sitting Sitting         Visual Acuity      ED Medications  Medications   fentanyl citrate (PF) 100 MCG/2ML 50 mcg (50 mcg Intravenous Given 6/30/20 1433)       Diagnostic Studies  Results Reviewed     Procedure Component Value Units Date/Time    CBC and differential [[id number]]  (Abnormal) Collected:  06/30/20 1511    Lab Status:  Final result Specimen:  Blood from Hand, Right Updated:  06/30/20 1896 WBC 6 10 Thousand/uL      RBC 2 79 Million/uL      Hemoglobin 9 0 g/dL      Hematocrit 27 2 %      MCV 97 fL      MCH 32 2 pg      MCHC 33 1 g/dL      RDW 14 6 %      MPV 9 3 fL      Platelets 192 Thousands/uL      Neutrophils Relative 60 %      Lymphocytes Relative 27 %      Monocytes Relative 6 %      Eosinophils Relative 7 %      Basophils Relative 1 %      Neutrophils Absolute 3 60 Thousands/µL      Lymphocytes Absolute 1 60 Thousands/µL      Monocytes Absolute 0 30 Thousand/µL      Eosinophils Absolute 0 40 Thousand/µL      Basophils Absolute 0 10 Thousands/µL     Urine Microscopic [[id number]]  (Abnormal) Collected:  06/30/20 1432    Lab Status:  Final result Specimen:  Urine, Suprapubic catheter Updated:  06/30/20 1512     RBC, UA 0-1 /hpf      WBC, UA 10-20 /hpf      Epithelial Cells Moderate /hpf      Bacteria, UA Innumerable /hpf     Urine culture [[id number]] Collected:  06/30/20 1432    Lab Status:   In process Specimen:  Urine, Suprapubic catheter Updated:  06/30/20 1512    Lipase [[id number]]  (Normal) Collected:  06/30/20 1432    Lab Status:  Final result Specimen:  Blood from Arm, Left Updated:  06/30/20 1500     Lipase 81 u/L     Narrative:       Hemolysis    Comprehensive metabolic panel [[id number]]  (Abnormal) Collected:  06/30/20 1432    Lab Status:  Final result Specimen:  Blood from Arm, Left Updated:  06/30/20 1500     Sodium 141 mmol/L      Potassium 4 2 mmol/L      Chloride 110 mmol/L      CO2 24 mmol/L      ANION GAP 7 mmol/L      BUN 60 mg/dL      Creatinine 5 82 mg/dL      Glucose 112 mg/dL      Calcium 7 7 mg/dL      AST 23 U/L      ALT 13 U/L      Alkaline Phosphatase 160 U/L      Total Protein 6 7 g/dL      Albumin 2 9 g/dL      Total Bilirubin 0 70 mg/dL      eGFR 7 ml/min/1 73sq m     Narrative:       Hemolysis  National Kidney Disease Foundation guidelines for Chronic Kidney Disease (CKD):     Stage 1 with normal or high GFR (GFR > 90 mL/min/1 73 square meters)    Stage 2 Mild CKD (GFR = 60-89 mL/min/1 73 square meters)    Stage 3A Moderate CKD (GFR = 45-59 mL/min/1 73 square meters)    Stage 3B Moderate CKD (GFR = 30-44 mL/min/1 73 square meters)    Stage 4 Severe CKD (GFR = 15-29 mL/min/1 73 square meters)    Stage 5 End Stage CKD (GFR <15 mL/min/1 73 square meters)  Note: GFR calculation is accurate only with a steady state creatinine    UA w Reflex to Microscopic w Reflex to Culture [[id number]]  (Abnormal) Collected:  06/30/20 1432    Lab Status:  Final result Specimen:  Urine, Suprapubic catheter Updated:  06/30/20 1448     Color, UA Straw     Clarity, UA Cloudy     Specific Gravity, UA 1 015     pH, UA 7 0     Leukocytes,  0     Nitrite, UA Negative     Protein, UA >=500 mg/dl      Glucose, UA Negative mg/dl      Ketones, UA Negative mg/dl      Bilirubin, UA Negative     Blood, UA 25 0     UROBILINOGEN UA Negative mg/dL                  US gallbladder   Final Result by Perry Martinez MD (06/30 1426)      No cholelithiasis  11 mm stably dilated CBD without definite choledocholithiasis identified  Workstation performed: [id number]                    Procedures  Procedures         ED Course  ED Course as of Jun 30 1603   Tue Jun 30, 2020   1300 Procedure Note: EKG  Date/Time: 06/30/20 1:00 PM   Performed by: Miller Shaw  Authorized by: Miller Shaw  ECG interpreted by me, the ED Provider: yes   The EKG demonstrates:  Rate 84  Rhythm sinus  QTc 465  No ST elevations/depressions          1553 UA weakly positive  Will hold on antibiotics pending culture results  US stable  LFTs okay  Patient can be discharged home for follow up on her chronic right sided pain  US AUDIT      Most Recent Value   Initial Alcohol Screen: US AUDIT-C    1  How often do you have a drink containing alcohol?  0 Filed at: 06/30/2020 1221   2  How many drinks containing alcohol do you have on a typical day you are drinking? 0 Filed at: 06/30/2020 1221   3b   FEMALE Any Age, or MALE 65+: How often do you have 4 or more drinks on one occassion? 0 Filed at: 06/30/2020 1221   Audit-C Score  0 Filed at: 06/30/2020 1221                  AUSTYN/DAST-10      Most Recent Value   How many times in the past year have you    Used an illegal drug or used a prescription medication for non-medical reasons? Never Filed at: 06/30/2020 1221                                MDM  Number of Diagnoses or Management Options  Diagnosis management comments: Patient is a 49-year-old female presents for evaluation of right-sided pain  As well as urine that does not smell his usual state  Patient denies fevers chills nausea vomiting or diarrhea  She states her pain is slightly worse after eating meals and then resolves  She gets some relief by lying on her side  She has had a CT scan done 3 months ago that showed that she has a 10-11 mm dilated common bile duct  Ultrasound confirms this is stable today  Patient's pain has been controlled here in the emergency room  Awaiting blood work to confirm the no evidence of worsening kidney function or abnormal LFTs  If these are okay, patient can be discharged for outpatient follow-up given that her pain is chronic in nature  Disposition  Final diagnoses:   Flank pain   UTI (urinary tract infection) - pending culture   Dilated cbd, acquired     Time reflects when diagnosis was documented in both MDM as applicable and the Disposition within this note     Time User Action Codes Description Comment    6/30/2020  3:57 PM Sherolyn Maged Add [R10 9] Flank pain     6/30/2020  3:57 PM Sherolyn Maged Add [N39 0] UTI (urinary tract infection)     6/30/2020  4:01 PM Sherolyn Maged Modify [N39 0] UTI (urinary tract infection) pending culture    6/30/2020  4:02 PM Sherolyn Maged Add [K83 8] Dilated cbd, acquired       ED Disposition     ED Disposition Condition Date/Time Comment    Discharge Stable Tue Jun 30, 2020  3:56 PM [facility] STAN Cervantes discharge to home/self care  Follow-up Information     Follow up With Specialties Details Why Contact Info Additional [address] In 1 week  [address], [address] [address]  [address], [address], [address] [place], [place], [address]          Patient's Medications   Discharge Prescriptions    No medications on file     No discharge procedures on file      PDMP Review       Value Time User    PDMP Reviewed  Yes 3/5/2020 10:44 AM Edward Spangler MD          ED Provider  Electronically Signed by           Liang Garrett DO  06/30/20 2360

## 2020-07-06 NOTE — TELEPHONE ENCOUNTER
Attempted # twice and received [employer] msg stating call cannot be completed because there are restrictions on this line

## 2020-07-06 NOTE — TELEPHONE ENCOUNTER
----- Message from Catherine Reddy MD sent at 7/2/2020  4:37 PM EDT -----  Please remid patient to go for blood work before her next scheduled apointment

## 2020-07-07 NOTE — TELEPHONE ENCOUNTER
2nd attempt, received [employer] msg stating call cannot be completed because there are restrictions on this line

## 2020-07-08 NOTE — TELEPHONE ENCOUNTER
3rd attempt, received [employer] msg stating call cannot be completed because there are restrictions on this line  Mailed letter to pt requesting she contact office to update demographics

## 2020-07-13 PROBLEM — N18.5 STAGE 5 CHRONIC KIDNEY DISEASE NOT ON CHRONIC DIALYSIS (HCC): Status: ACTIVE | Noted: 2018-05-24

## 2020-07-13 PROBLEM — Z09 HOSPITAL DISCHARGE FOLLOW-UP: Status: RESOLVED | Noted: 2020-01-01 | Resolved: 2020-01-01

## 2020-07-13 PROBLEM — E11.621 DIABETIC ULCER OF TOE OF LEFT FOOT ASSOCIATED WITH TYPE 2 DIABETES MELLITUS, WITH FAT LAYER EXPOSED (HCC): Status: RESOLVED | Noted: 2018-12-04 | Resolved: 2020-01-01

## 2020-07-13 PROBLEM — L97.522 DIABETIC ULCER OF TOE OF LEFT FOOT ASSOCIATED WITH TYPE 2 DIABETES MELLITUS, WITH FAT LAYER EXPOSED (HCC): Status: RESOLVED | Noted: 2018-12-04 | Resolved: 2020-01-01

## 2020-07-13 NOTE — ASSESSMENT & PLAN NOTE
Patient continues to have worsening kidney function  She was following with nephrology, but had cancelled appointment due to pandemic and has not rescheduled one  Will help to schedule an appointment to nephrology  Instructed again to go for blood work as it was ordered earlier, patient stated, she will go tomorrow  Patient denies any complains at present and states that she is feeling good  Encouraged hydration with water- 6-8 bottles a day  ER precautions given to the patient, encouraged to call if any new symptoms appear    Follow up in 4 weeks

## 2020-07-13 NOTE — ASSESSMENT & PLAN NOTE
Lab Results   Component Value Date    HGBA1C 5 7 07/13/2020   Patient states that she was told "when she was in the hospital to take 18 U of insulin" so she has been taking 18 U for the last couple weeks even though upon chart review she was prescribed 10 U of Latus  Her HgA1C today is 5 7   Instructed patient to take 10 U of Lantus and keep checking her fasting glucose  Diabetic diet discussed  Follows with podiatry   F/u in 4 weeks on glucose logs

## 2020-07-13 NOTE — ASSESSMENT & PLAN NOTE
Patient ran out of Amlodipine few months ago and stated that she did not have it refilled  Amlodipine 5 mg resent to the pharmacy, instructed to restart  F/U in 4 weeks

## 2020-07-13 NOTE — PROGRESS NOTES
Assessment/Plan:    Type 2 diabetes mellitus with kidney complication, with long-term current use of insulin ([address] )    Lab Results   Component Value Date    HGBA1C 5 7 07/13/2020   Patient states that she was told "when she was in the hospital to take 18 U of insulin" so she has been taking 18 U for the last couple weeks even though upon chart review she was prescribed 10 U of Latus  Her HgA1C today is 5 7  Instructed patient to take 10 U of Lantus and keep checking her fasting glucose  Diabetic diet discussed  Follows with podiatry   F/u in 4 weeks on glucose logs    HTN (hypertension)  Patient ran out of Amlodipine few months ago and stated that she did not have it refilled  Amlodipine 5 mg resent to the pharmacy, instructed to restart  F/U in 4 weeks    Stage 5 chronic kidney disease not on chronic dialysis [facility])  Patient continues to have worsening kidney function  She was following with nephrology, but had cancelled appointment due to pandemic and has not rescheduled one  Will help to schedule an appointment to nephrology  Instructed again to go for blood work as it was ordered earlier, patient stated, she will go tomorrow  Patient denies any complains at present and states that she is feeling good  Encouraged hydration with water- 6-8 bottles a day  ER precautions given to the patient, encouraged to call if any new symptoms appear  Follow up in 4 weeks       Diagnoses and all orders for this visit:    Type 2 diabetes mellitus with stage 3 chronic kidney disease, with long-term current use of insulin ([facility])  -     POCT hemoglobin A1c  -     Alcohol Swabs (ALCOHOL PREP) 70 % PADS; Apply 1 applicator topically as needed (glucose monitoring)  -     B-D INS SYRINGE 0 5CC/31GX5/16 31G X 5/16" 0 5 ML MISC; Inject under the skin daily at bedtime    Colon cancer screening  -     Ambulatory referral to Gastroenterology; Future    Essential hypertension  -     amLODIPine (NORVASC) 5 mg tablet;  Take 1 tablet (5 mg total) by mouth daily    Mixed hyperlipidemia  -     atorvastatin (LIPITOR) 40 mg tablet; Take 1 tablet (40 mg total) by mouth daily    Vitamin D deficiency  -     cholecalciferol (VITAMIN D3) 1,000 units tablet; Take 1 tablet (1,000 Units total) by mouth daily    Constipation, unspecified constipation type  -     docusate sodium (DocQLace) 100 mg capsule; Take 1 capsule (100 mg total) by mouth 2 (two) times a day as needed for constipation    Epigastric pain  -     omeprazole (PriLOSEC) 40 MG capsule; Take 1 capsule (40 mg total) by mouth daily    Stage 5 chronic kidney disease not on chronic dialysis (HCC)          Subjective:      Patient ID: Romel Javier is a 60 y o  female  Patient presented to f/u on CKD, hypertension and diabetes  She has no new complaints today, but admits to having intermittent pain in right side of the chest that has been chronic, and relieved with Tylenol  She states that she has been compliant with medications  She also requests refill on her medications      The following portions of the patient's history were reviewed and updated as appropriate: allergies, current medications, past family history, past medical history, past social history, past surgical history and problem list     Review of Systems   Constitutional: Negative for chills, diaphoresis, fatigue and fever  HENT: Negative for congestion, ear discharge, ear pain, mouth sores, rhinorrhea, sore throat and trouble swallowing  Eyes: Negative for photophobia, pain and discharge  Respiratory: Negative for cough, chest tightness, shortness of breath and wheezing  Cardiovascular: Positive for chest pain (pain under the right breast)  Negative for palpitations and leg swelling  Gastrointestinal: Negative for abdominal distention, abdominal pain, blood in stool, constipation, diarrhea and nausea  Genitourinary: Negative for difficulty urinating, frequency and urgency     Musculoskeletal: Negative for back pain, joint swelling and neck stiffness  Skin: Negative for color change, pallor and rash  Neurological: Negative for dizziness, syncope, numbness and headaches  Objective:      /80 (BP Location: Right arm, Patient Position: Sitting, Cuff Size: Adult)   Pulse 82   Temp 97 6 °F (36 4 °C) (Skin)   Resp 16   Ht 5' 3 6" (1 615 m)   Wt 86 kg (189 lb 11 2 oz)   SpO2 97%   Breastfeeding No   BMI 32 97 kg/m²          Physical Exam   Constitutional: She is oriented to person, place, and time  She appears well-developed and well-nourished  No distress  HENT:   Head: Normocephalic and atraumatic  Cardiovascular: Normal rate, regular rhythm and normal heart sounds  Exam reveals no gallop and no friction rub  No murmur heard  Pulmonary/Chest: Effort normal and breath sounds normal  No respiratory distress  She has no wheezes  She has no rales  She exhibits tenderness (rib cage under the right breast)  She exhibits no mass, no laceration and no deformity  Abdominal: Soft  Bowel sounds are normal  She exhibits no distension  There is no tenderness  Suprapubic catheter in place, no signs of infection around   Musculoskeletal: She exhibits no edema or deformity  Neurological: She is alert and oriented to person, place, and time  Skin: Skin is warm and dry  No rash noted  No erythema  No pallor  Psychiatric: She has a normal mood and affect

## 2020-07-26 NOTE — ED PROVIDER NOTES
History  Chief Complaint   Patient presents with    Cardiac Arrest     found down, unknown time  EMS report asystole on arrival and through out CPR, family had hear her about an hour prior  61-year-old female presents in cardiac arrest   She was found down by family with an unknown down time  She does have a history of stage 4 kidney disease along with diabetes and hypertension  Upon EMS arrival the patient was found to be asystolic  Patient was intubated and CPR was initiated with a Angeline Robertson device  She was given three rounds of epinephrine and remained asystolic during every pulse check  On arrival here ACLS protocols were continued and during two pulse checks with ultrasound the patient was found to be pulseless, she had no spontaneous respiratory activity, and she had complete cardiac standstill  An additional dose of epinephrine was given and circulated with no change  Cardiac Arrest   Witnessed by:  Not witnessed  Incident location:  Home  Time before BLS initiated: Unknown  Time before ALS initiated: Unknown  Condition upon EMS arrival:  Unresponsive  Pulse:  Absent  Initial cardiac rhythm per EMS:  Asystole  Treatments prior to arrival:  ACLS protocol  Medications given prior to ED:  Epinephrine  Airway:  Intubation prior to arrival  Rhythm on admission to ED:  Unchanged  Risk factors: diabetes mellitus        Prior to Admission Medications   Prescriptions Last Dose Informant Patient Reported? Taking?    ASPIR-LOW 81 MG EC tablet   No No   Sig: Take 1 tablet (81 mg total) by mouth daily   Alcohol Swabs (ALCOHOL PREP) 70 % PADS   No No   Sig: Apply 1 applicator topically as needed (glucose monitoring)   B-D INS SYRINGE 0 5CC/31GX5/16 31G X 5/16" 0 5 ML MISC   No No   Sig: Inject under the skin daily at bedtime   Camphor-Menthol-Methyl Sal 1 2-5 7-6 3 % PTCH   No No   Sig: Apply 1 patch topically daily   Patient not taking: Reported on 7/13/2020   Catheters MISC   No No   Sig: Please change patients suprapubic catheter as soon as possible and then every three months      Dx: N31 9 Neurogenic Bladder   Insulin Pen Needle (UNIFINE PENTIPS) 32G X 4 MM MISC   No No   Sig: Inject under the skin 4 (four) times a day for 90 days   Lancets 28G MISC   No No   Sig: by Does not apply route 3 (three) times a day   Menthol (Icy Hot) 5 % PTCH   No No   Sig: Apply 1 patch topically daily   Patient not taking: Reported on 2020   ONE TOUCH ULTRA TEST test strip   Yes No   Si strips by Device route 3 (three) times a day   acetaminophen (TYLENOL) 325 mg tablet   No No   Sig: Take 2 tablets (650 mg total) by mouth every 6 (six) hours as needed for moderate pain   aluminum-magnesium hydroxide-simethicone (MYLANTA) 200-200-20 mg/5 mL suspension   No No   Sig: Take 30 mL by mouth every 4 (four) hours as needed for indigestion or heartburn   amLODIPine (NORVASC) 5 mg tablet   No No   Sig: Take 1 tablet (5 mg total) by mouth daily   atorvastatin (LIPITOR) 40 mg tablet   No No   Sig: Take 1 tablet (40 mg total) by mouth daily   cholecalciferol (VITAMIN D3) 1,000 units tablet   No No   Sig: Take 1 tablet (1,000 Units total) by mouth daily   docusate sodium (DocQLace) 100 mg capsule   No No   Sig: Take 1 capsule (100 mg total) by mouth 2 (two) times a day as needed for constipation   insulin glargine (Lantus SoloStar) 100 units/mL injection pen   No No   Sig: Inject 10 Units under the skin daily   nystatin (MYCOSTATIN) powder   No No   Sig: Apply topically 2 (two) times a day Apply to lower abdomen twice a day   omeprazole (PriLOSEC) 40 MG capsule   No No   Sig: Take 1 capsule (40 mg total) by mouth daily   pregabalin (LYRICA) 25 mg capsule   No No   Sig: Take 1 capsule (25 mg total) by mouth 2 (two) times a day   sertraline (ZOLOFT) 50 mg tablet   No No   Sig: Take 1 tablet (50 mg total) by mouth daily at bedtime      Facility-Administered Medications: None       Past Medical History:   Diagnosis Date    Ambulatory dysfunction     Anemia     macrocyctic    Chronic kidney disease 5/24/2018    Chronic pain disorder     right hip    Diabetes mellitus ([address] )     Diabetic foot ulcer ([address] )     left with fat layer exposed    Dyslipidemia 5/24/2018    ESBL E  coli carrier     GERD (gastroesophageal reflux disease)     History of frequent urinary tract infections     HTN (hypertension) 5/24/2018    Hyperlipidemia     Irritable bowel syndrome     constipation    Neurogenic bladder     Neuropathy     Pedal edema     Vitamin D deficiency        Past Surgical History:   Procedure Laterality Date    CATARACT EXTRACTION      OK XCAPSL CTRC RMVL INSJ IO LENS PROSTH W/O ECP Right 12/6/2018    Procedure: EXTRACAPSULAR CATARACT REMOVAL/INSERTION OF INTRAOCULAR LENS;  Surgeon: Harley Aldrich MD;  Location: [address] [place] [address] OR;  Service: Ophthalmology    OK XCAPSL CTRC RMVL INSJ IO LENS PROSTH W/O ECP Left 10/17/2019    Procedure: EXTRACAPSULAR CATARACT REMOVAL/INSERTION OF INTRAOCULAR LENS;  Surgeon: Harley Aldrich MD;  Location: [address] [place] [address];  Service: Ophthalmology    SUPRAPUBIC CATHETER INSERTION         Family History   Problem Relation Age of Onset   Shelton Decker Breast cancer Mother     Hypertension Mother     Diabetes Mother         Mellitus    Parkinsonism Mother     Cancer Maternal Aunt         Unknown     I have reviewed and agree with the history as documented      E-Cigarette/Vaping    E-Cigarette Use Never User      E-Cigarette/Vaping Substances     Social History     Tobacco Use    Smoking status: Current Every Day Smoker     Packs/day: 0 25     Years: 40 00     Pack years: 10 00     Types: Cigarettes    Smokeless tobacco: Never Used    Tobacco comment: about 5-6 cigs daily   Substance Use Topics    Alcohol use: Not Currently     Alcohol/week: 0 0 standard drinks     Frequency: Never     Binge frequency: Never     Comment: no alcohol for 16 yrs    Drug use: Never       Review of Systems   Unable to perform ROS: Acuity of condition Physical Exam  Physical Exam   Constitutional: She appears well-developed and well-nourished  She is intubated  Leg bag in place with cloudy urine noted  HENT:   Head: Normocephalic and atraumatic  Right Ear: External ear normal    Left Ear: External ear normal    Mouth/Throat: Oropharynx is clear and moist    Eyes:   Pupils are fixed and dilated   Cardiovascular:   Pulses noted with compressions  Absent pulses when CPR is held  Pulmonary/Chest: She is intubated  Coarse breath sounds bilaterally with bagging  No spontaneous respirations  Abdominal: Soft  Musculoskeletal: She exhibits no deformity  Neurological: She is unresponsive  GCS eye subscore is 1  GCS verbal subscore is 1  GCS motor subscore is 1  Skin: Skin is warm  Psychiatric: She is noncommunicative  Nursing note and vitals reviewed  Vital Signs  ED Triage Vitals   Temp Pulse Resp BP SpO2   -- -- -- -- --      Temp src Heart Rate Source Patient Position - Orthostatic VS BP Location FiO2 (%)   -- -- -- -- --      Pain Score       --           There were no vitals filed for this visit  Visual Acuity      ED Medications  Medications   EPINEPHrine (ADRENALIN) injection SOSY (1 mg Intravenous Given 7/26/20 1213)       Diagnostic Studies  Results Reviewed     None                 No orders to display              Procedures  POC Cardiac US  Date/Time: 7/26/2020 12:05 PM  Performed by: Jorge Veras DO  Authorized by:  Jorge Veras DO     Patient location:  ED  Other Assisting Provider: No    Procedure details:     Indications: cardiac arrest      Assessment / Evaluation for: cardiac function and pericardial effusion      Exam Type: initial exam      Image quality: diagnostic      Image availability:  Not saved  Patient Details:     Mechanical ventilation: Yes    Cardiac findings:     Pericardial effusion: absent      Tamponade physiology: absent      LV systolic function: absent      RV dilation: none Interpretation: This study was repeated a second time with identical results  CriticalCare Time  Performed by: Ana Vaughan DO  Authorized by: Ana Vaughan DO     Critical care provider statement:     Critical care time (minutes):  30    Critical care time was exclusive of:  Separately billable procedures and treating other patients    Critical care was necessary to treat or prevent imminent or life-threatening deterioration of the following conditions:  Cardiac failure, circulatory failure, respiratory failure and CNS failure or compromise    Critical care was time spent personally by me on the following activities:  Obtaining history from patient or surrogate, examination of patient and evaluation of patient's response to treatment    I assumed direction of critical care for this patient from another provider in my specialty: no               ED Course                                             MDM  Number of Diagnoses or Management Options  Cardiac arrest [facility]):   Chronic renal failure:   Diagnosis management comments: 57-year-old female presents in cardiac arrest   She was asystolic on EMS arrival remained so during the course of their treatment and hours as well  She received a total of four rounds of epinephrine  Cardiac point of care ultrasound was done on two occasions and at both times the patient exhibited complete cardiac standstill  The code was called with time of death being 12:11 pm   I spoke with the patient's daughter who did not report any acute changes in the patient's condition as of late  Additional family members are in route          Disposition  Final diagnoses:   Cardiac arrest [facility])   Chronic renal failure     Time reflects when diagnosis was documented in both MDM as applicable and the Disposition within this note     Time User Action Codes Description Comment    7/26/2020 12:24 PM Félix Johnson [I46 9] Cardiac arrest [facility])     7/26/2020 12:24 PM Félix Johnson [N18 9] Chronic renal failure       ED Disposition     ED Disposition Condition Date/Time Comment      Sun 2020 12:24 PM       Follow-up Information    None         Patient's Medications   Discharge Prescriptions    No medications on file     No discharge procedures on file      PDMP Review       Value Time User    PDMP Reviewed  Yes 3/5/2020 10:44 AM Dat Eller MD          ED Provider  Electronically Signed by           Sudarshan Huynh DO  20 1322

## 2020-07-26 NOTE — ED NOTES
office notified  Body released       Nakia Zamorano RN  07/26/20 [address] Kerri Baker RN  07/26/20 [id number]

## 2020-07-26 NOTE — ED NOTES
ET Tube and IO removed   Family in with body     Jackie Garrett RN  07/26/20 [facility] William Pina  07/26/20 [id number]

## 2021-07-30 NOTE — PROGRESS NOTES
Dr Shial Mojica met with this SW CM to inquire about covering 's note on status of pt's VNA  Dr Shila Mojica wanting to know if she needed to do anything further  Dr Shila Mojica states she did read covering 's note  [facility] advised Dr Shila Mojica that nothing further should be needed as pt was already established with [facility] according to covering SW's note  Dr Shila Mojica states she will call pt today to f/u with her and ask about the status of her VNA  SW CM encouraged Dr Shila Mojica to consult  with any additional needs  normal appearance , without tenderness upon palpation , no deformities , trachea midline , Thyroid normal size , no thyroid nodules , no masses , no JVD , thyroid nontender

## 2022-04-18 NOTE — PROGRESS NOTES
Progress Note- Rich Mariee 62 y o  female MRN: [id number]    Unit/Bed#: 7T [facility] 702-02 Encounter: [id number]      Assessment and Plan:    Dilated CBD with RUQ pain:  - patient reported intermittent RUQ pain with findings of dilated CBD and gallbladder sludge, on admission, she had abnormal LFTs now improved likely she has been symptomatic for gallstone passing intermittently  - MRCP can be done to better evaluate CBD  - surgery evaluation for cholecystectomy  - discussed with family medicine service, plan is to discharge patient today, will follow up with MRCP and surgery as outpatient        ______________________________________________________________________    Subjective:     Patient reported RUQ pain resolved for now  But she has been having  intermittent RUQ pain for a long time        Medication Administration - last 24 hours from 01/08/2020 1141 to 01/09/2020 1141       Date/Time Order Dose Route Action Action by     01/09/2020 0830 nicotine (NICODERM CQ) 14 mg/24hr TD 24 hr patch 1 patch 1 patch Transdermal Not Given Bernadette Epperson RN     01/09/2020 0604 insulin lispro (HumaLOG) 100 units/mL subcutaneous injection 1-5 Units 1 Units Subcutaneous Given [facility], RN     01/08/2020 2109 insulin lispro (HumaLOG) 100 units/mL subcutaneous injection 1-5 Units 2 Units Subcutaneous Given [facility], RN     01/08/2020 1616 insulin lispro (HumaLOG) 100 units/mL subcutaneous injection 1-5 Units 3 Units Subcutaneous Given Bernadette Epperson RN     01/08/2020 1158 insulin lispro (HumaLOG) 100 units/mL subcutaneous injection 1-5 Units 2 Units Subcutaneous Given Bernadette Epperson RN     01/09/2020 0604 pantoprazole (PROTONIX) EC tablet 40 mg 40 mg Oral Given [facility], RN     01/08/2020 1616 pantoprazole (PROTONIX) EC tablet 40 mg 40 mg Oral Given Bernadette Epperson RN     01/09/2020 0908 amLODIPine (NORVASC) tablet 10 mg 10 mg Oral Given Bernadette Epperson RN     01/09/2020 0908 sertraline (ZOLOFT) tablet 50 mg 50 mg Oral Given Mikaela Ocasio, RN     01/09/2020 0357 traMADol (ULTRAM) tablet 50 mg 50 mg Oral Given Shila Strange, AUREA     01/08/2020 2055 traMADol (ULTRAM) tablet 50 mg 50 mg Oral Given [facility] Shmuel, RN     01/08/2020 1354 traMADol (ULTRAM) tablet 50 mg 50 mg Oral Given Mikaeal Ocasio, RN     01/08/2020 2110 insulin glargine (LANTUS) subcutaneous injection 10 Units 0 1 mL 10 Units Subcutaneous Given Bita Araujo RN          Objective:     Vitals: Blood pressure 163/97, pulse 69, temperature (!) 96 6 °F (35 9 °C), resp  rate 20, height 5' 3" (1 6 m), weight 82 5 kg (181 lb 14 1 oz), SpO2 99 %, not currently breastfeeding  ,Body mass index is 32 22 kg/m²  Intake/Output Summary (Last 24 hours) at 1/9/2020 1141  Last data filed at 1/9/2020 0844  Gross per 24 hour   Intake 840 ml   Output    Net 840 ml       Physical Exam:   General Appearance: Awake and alert, in no acute distress  Abdomen: Soft, non-tender, non-distended; bowel sounds normal; no masses or no organomegaly    Invasive Devices     Peripheral Intravenous Line            Long-Dwell Peripheral IV (Midline) 12/31/19 8 days          Drain            Suprapubic Catheter 595 days                Lab Results:  No results displayed because visit has over 200 results  Imaging Studies: I have personally reviewed pertinent imaging studies  Products Recommended: Recommended Neutrogena 110 spf or EltaMd. General Sunscreen Counseling: I recommended a broad spectrum sunscreen with a SPF of 50 or higher.  I explained that SPF 50 sunscreens block approximately 97 percent of the sun's harmful rays. Detail Level: Detailed

## 2023-05-12 NOTE — ED PROVIDER NOTES
Antibody + but no viral load consistent with previous treatment, will continue to periodically monitor History  Chief Complaint   Patient presents with    Vaginal Itching    Vaginal Pain     Patient is reporting vaginal itching an pain for 4/5 days  Patient reports she was seen here 12/12  Patient has a HX of suprapubic catheter  Patient has not followed up with her urologist      Patient is a 59-year-old female presents today with chief complaint of vaginal discharge vaginal itching and vaginal irritation  Patient reports she has a suprapubic catheter and has not followed up with her urologist   Patient denies any fevers or chills, upper abdominal pain, nausea, vomiting, diarrhea, flank pain  Patient does report that she has been urinating out of her vagina which began today  Patient denies any defecation out of her vagina      History provided by:  Patient  Vaginal Discharge   Quality:  White and thick  Severity:  Moderate  Onset quality:  Gradual  Duration:  5 days  Timing:  Constant  Progression:  Unchanged  Chronicity:  New  Context: at rest and during urination    Relieved by:  Nothing  Worsened by:  Nothing  Ineffective treatments:  None tried  Associated symptoms: no abdominal pain, no dysuria, no fever, no nausea and no vomiting        Prior to Admission Medications   Prescriptions Last Dose Informant Patient Reported? Taking? ADMELOG SOLOSTAR 100 units/mL injection pen   No No   Sig: INJECT 10 UNITS SUBCUTANEOUSLY 3 TIMES DAILY WITH MEALS   ASPIRIN LOW DOSE 81 MG EC tablet   No No   Sig: TAKE 1 TABLET BY MOUTH ONCE DAILY  Alcohol Swabs (ALCOHOL PREP) 70 % PADS   Yes No   Sig: Apply 1 applicator topically as needed   B-D INS SYRINGE 0 5CC/31GX5/16 31G X 5/16" 0 5 ML MISC   Yes No   BASAGLAR KWIKPEN 100 units/mL injection pen   No No   Sig: INJECT 40 UNIST SUBCUTANEOUSLY DAILY AT BEDTIME   Catheters MISC   No No   Sig: Please change patients suprapubic catheter as soon as possible and then every three months      Dx: N31 9 Neurogenic Bladder   DOCQLACE 100 MG capsule   No No   Sig: Take 1 capsule (100 mg total) by mouth 2 (two) times a day   Insulin Pen Needle (UNIFINE PENTIPS) 32G X 4 MM MISC   No No   Sig: Inject under the skin 4 (four) times a day for 90 days   Lancets 28G MISC   Yes No   Sig: Test blood sugars three times daily   ONE TOUCH ULTRA TEST test strip   Yes No   Si strips by Device route 3 (three) times a day   aluminum-magnesium hydroxide-simethicone (MYLANTA) 200-200-20 mg/5 mL suspension   No No   Sig: Take 30 mL by mouth every 4 (four) hours as needed for indigestion or heartburn   amLODIPine (NORVASC) 5 mg tablet   No No   Sig: TAKE 1 TABLET BY MOUTH ONCE DAILY     atorvastatin (LIPITOR) 40 mg tablet   No No   Sig: Take 1 tablet (40 mg total) by mouth daily   cyanocobalamin (VITAMIN B-12) 1,000 mcg tablet   No No   Sig: Take 1 tablet (1,000 mcg total) by mouth daily for 90 days   ergocalciferol (VITAMIN D2) 50,000 units   No No   Sig: Take 1 capsule (50,000 Units total) by mouth once a week   nortriptyline (PAMELOR) 50 mg capsule   No No   Sig: Take 1 capsule (50 mg total) by mouth daily   nystatin (MYCOSTATIN) powder   No No   Sig: Apply topically 2 (two) times a day Apply to lower abdomen twice a day   omeprazole (PriLOSEC) 20 mg delayed release capsule   No No   Sig: Take 1 capsule (20 mg total) by mouth daily before breakfast   predniSONE 20 mg tablet   No No   Sig: Take 2 tablets (40 mg total) by mouth daily   pregabalin (LYRICA) 50 mg capsule   No No   Sig: Take 1 capsule (50 mg total) by mouth 2 (two) times a day   senna (SENOKOT) 8 6 mg   No No   Sig: Take 1 tablet (8 6 mg total) by mouth daily at bedtime      Facility-Administered Medications: None       Past Medical History:   Diagnosis Date    Ambulatory dysfunction     Anemia     macrocyctic    Chronic kidney disease 2018    Chronic pain disorder     right hip    Diabetes mellitus ([address] )     Diabetic foot ulcer (HCC)     left with fat layer exposed    Dyslipidemia 2018    ESBL E  coli carrier     GERD (gastroesophageal reflux disease)     History of frequent urinary tract infections     HTN (hypertension) 5/24/2018    Hyperlipidemia     Irritable bowel syndrome     constipation    Neurogenic bladder     Neuropathy     Pedal edema     Vitamin D deficiency        Past Surgical History:   Procedure Laterality Date    CATARACT EXTRACTION      [place] REMV CATARACT EXTRACAP,INSERT LENS Right 12/6/2018    Procedure: EXTRACAPSULAR CATARACT REMOVAL/INSERTION OF INTRAOCULAR LENS;  Surgeon: Layne Rodney MD;  Location: [address] [place] [address];  Service: Ophthalmology     [address] CATARACT EXTRACAP,INSERT LENS Left 10/17/2019    Procedure: EXTRACAPSULAR CATARACT REMOVAL/INSERTION OF INTRAOCULAR LENS;  Surgeon: Layne Rodney MD;  Location: [address] [place] [address];  Service: Ophthalmology    SUPRAPUBIC CATHETER INSERTION         Family History   Problem Relation Age of Onset   Tiffanie Rome Breast cancer Mother     Hypertension Mother     Diabetes Mother         Mellitus    Parkinsonism Mother     Cancer Maternal Aunt         Unknown     I have reviewed and agree with the history as documented  Social History     Tobacco Use    Smoking status: Current Every Day Smoker     Packs/day: 0 50     Years: 40 00     Pack years: 20 00     Types: Cigarettes    Smokeless tobacco: Never Used   Substance Use Topics    Alcohol use: Not Currently     Alcohol/week: 0 0 standard drinks     Comment: no alcohol for 16 yrs    Drug use: Not Currently        Review of Systems   Constitutional: Negative for chills, fatigue and fever  HENT: Negative for congestion, ear pain, rhinorrhea and sore throat  Eyes: Negative for redness  Respiratory: Negative for chest tightness and shortness of breath  Cardiovascular: Negative for chest pain and palpitations  Gastrointestinal: Negative for abdominal pain, nausea and vomiting  Genitourinary: Positive for vaginal discharge and vaginal pain  Negative for difficulty urinating, dysuria, hematuria and pelvic pain  Musculoskeletal: Negative  Skin: Negative for rash  Neurological: Negative for dizziness, syncope, light-headedness and numbness  Physical Exam  Physical Exam   Constitutional: She is oriented to person, place, and time  She appears well-developed and well-nourished  HENT:   Head: Normocephalic  Eyes: No scleral icterus  Cardiovascular: Normal rate and regular rhythm  Pulmonary/Chest: Effort normal and breath sounds normal  No stridor  Abdominal: Soft  She exhibits no distension  There is no tenderness  Genitourinary: Cervix exhibits discharge  Musculoskeletal: Normal range of motion  Neurological: She is alert and oriented to person, place, and time  Skin: Skin is warm and dry  Capillary refill takes less than 2 seconds  Psychiatric: She has a normal mood and affect  Nursing note and vitals reviewed  Vital Signs  ED Triage Vitals   Temperature Pulse Respirations Blood Pressure SpO2   12/14/19 1630 12/14/19 1630 12/14/19 1630 12/14/19 1630 12/14/19 1630   97 5 °F (36 4 °C) 85 18 135/68 98 %      Temp Source Heart Rate Source Patient Position - Orthostatic VS BP Location FiO2 (%)   12/14/19 1630 12/14/19 1630 12/14/19 1630 12/14/19 1630 --   Tympanic Monitor Sitting Left arm       Pain Score       12/14/19 1727       9           Vitals:    12/14/19 1630   BP: 135/68   Pulse: 85   Patient Position - Orthostatic VS: Sitting         Visual Acuity      ED Medications  Medications   acetaminophen (TYLENOL) tablet 650 mg (650 mg Oral Given 12/14/19 1727)   sulfamethoxazole-trimethoprim (BACTRIM DS) 800-160 mg per tablet 1 tablet (1 tablet Oral Given 12/14/19 1730)   metroNIDAZOLE (FLAGYL) tablet 500 mg (500 mg Oral Given 12/14/19 1730)       Diagnostic Studies  Results Reviewed     Procedure Component Value Units Date/Time    Vaginosis DNA Probe [[id number]] Collected:  12/14/19 1709    Lab Status:   In process Specimen:  Genital from Vaginal Updated:  12/14/19 1715 8 [facility] amplified DNA by PCR [[id number]] Collected:  12/14/19 1709    Lab Status: In process Specimen:  Genital from Endocervical Updated:  12/14/19 1714    Herpes Simplex Virus Culture with Typing [[id number]] Collected:  12/14/19 1709    Lab Status: In process Specimen:  Other from Vulva Updated:  12/14/19 1714    Wound culture and Gram stain [[id number]] Collected:  12/14/19 1709    Lab Status: In process Specimen:  Wound from Abdominal Updated:  12/14/19 1714    Urine Microscopic [[id number]]  (Abnormal) Collected:  12/14/19 1643    Lab Status:  Final result Specimen:  Urine, Clean Catch Updated:  12/14/19 1659     RBC, UA 4-10 /hpf      WBC, UA Innumerable /hpf      Epithelial Cells Moderate /hpf      Bacteria, UA Moderate /hpf      AMORPH URATES Moderate /hpf      MUCUS THREADS Moderate    Urine culture [[id number]] Collected:  12/14/19 1643    Lab Status: In process Specimen:  Urine, Clean Catch Updated:  12/14/19 1659    UA w Reflex to Microscopic w Reflex to Culture [[id number]]  (Abnormal) Collected:  12/14/19 1643    Lab Status:  Final result Specimen:  Urine, Clean Catch Updated:  12/14/19 1650     Color, UA Straw     Clarity, UA Cloudy     Specific Carter, UA 1 020     pH, UA 5 0     Leukocytes,  0     Nitrite, UA Positive     Protein, UA >=500 mg/dl      Glucose, UA >=1000 (1%) mg/dl      Ketones, UA Negative mg/dl      Bilirubin, UA Negative     Blood,  0     UROBILINOGEN UA Negative mg/dL                  No orders to display              Procedures  Procedures         ED Course                               MDM  Number of Diagnoses or Management Options  Diagnosis management comments: Patient previously empirically treated for a yeast infection symptoms continued  Will cover with Flagyl for both bacterial vaginosis and for likely pseudomonal infection based off of green mucopurulent drainage, will add Bactrim for potential MRSA coverage and potential UTI    Patient will need to follow up with Urology ASAP for a urethral vaginal fistula which was visualized on physical exam   Will recommend Tylenol for symptomatic management  All imaging and/or lab testing discussed with patient, strict return to ED precautions discussed  Patient and/or family members verbalizes understanding and agrees with plan  Patient is stable for discharge     Portions of the record may have been created with voice recognition software  Occasional wrong word or "sound a like" substitutions may have occurred due to the inherent limitations of voice recognition software  Read the chart carefully and recognize, using context, where substitutions have occurred  Disposition  Final diagnoses:   Vaginal discharge   Acute vaginitis   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     Time reflects when diagnosis was documented in both MDM as applicable and the Disposition within this note     Time User Action Codes Description Comment    12/14/2019  5:31 PM Murphy Dull Add [N89 8] Vaginal discharge     12/14/2019  5:31 PM Murphy Dull Add [N76 0] Acute vaginitis     12/14/2019  5:31 PM Murphy Dull Add [N82 1] 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       ED Disposition     ED Disposition Condition Date/Time Comment    Discharge Good Sat Dec 14, 2019  5:31 PM Familia Cervantes discharge to home/self care              Follow-up Information     Follow up With Specialties Details Why Contact Info     Urogynecology Obstetrics and Gynecology Schedule an appointment as soon as possible for a visit in 1 day  [address]      Devin Strickland MD Urology Schedule an appointment as soon as possible for a visit in 1 day  [address]  [phone]      Pita Ramon MD Family Medicine Call in 1 day  [address] [address]            Patient's Medications   Discharge Prescriptions    ACETAMINOPHEN (TYLENOL) 500 MG TABLET    Take 1 tablet (500 mg total) by mouth every 6 (six) hours as needed for moderate pain       Start Date: 12/14/2019End Date: --       Order Dose: 500 mg       Quantity: 30 tablet    Refills: 0    METRONIDAZOLE (FLAGYL) 500 MG TABLET    Take 1 tablet (500 mg total) by mouth every 12 (twelve) hours for 7 days       Start Date: 12/14/2019End Date: 12/21/2019       Order Dose: 500 mg       Quantity: 14 tablet    Refills: 0    SULFAMETHOXAZOLE-TRIMETHOPRIM (BACTRIM DS) 800-160 MG PER TABLET    Take 1 tablet by mouth 2 (two) times a day for 7 days smx-tmp DS (BACTRIM) 800-160 mg tabs (1tab q12 D10)       Start Date: 12/14/2019End Date: 12/21/2019       Order Dose: 1 tablet       Quantity: 14 tablet    Refills: 0     No discharge procedures on file      ED Provider  Electronically Signed by           Hever Davidson PA-C  12/14/19 [id number]

## 2024-05-17 NOTE — ASSESSMENT & PLAN NOTE
Called to reschedule missed appointment, reset tracker    Resolved  Reports feeling much better   Psychiatry started patient on Remeron   Cleared for discharge by psychiatry and does not require inpatient management once medically stable     1:1 observation was discontinued
